# Patient Record
Sex: FEMALE | Race: WHITE | NOT HISPANIC OR LATINO | Employment: FULL TIME | URBAN - METROPOLITAN AREA
[De-identification: names, ages, dates, MRNs, and addresses within clinical notes are randomized per-mention and may not be internally consistent; named-entity substitution may affect disease eponyms.]

---

## 2018-04-22 ENCOUNTER — HOSPITAL ENCOUNTER (EMERGENCY)
Facility: HOSPITAL | Age: 33
Discharge: HOME | End: 2018-04-22
Attending: EMERGENCY MEDICINE
Payer: MEDICAID

## 2018-04-22 VITALS
WEIGHT: 196 LBS | DIASTOLIC BLOOD PRESSURE: 76 MMHG | RESPIRATION RATE: 16 BRPM | OXYGEN SATURATION: 100 % | SYSTOLIC BLOOD PRESSURE: 129 MMHG | TEMPERATURE: 98 F | BODY MASS INDEX: 30.7 KG/M2 | HEART RATE: 94 BPM

## 2018-04-22 DIAGNOSIS — R07.9 CHEST PAIN: ICD-10-CM

## 2018-04-22 DIAGNOSIS — F43.21 GRIEF REACTION: Primary | ICD-10-CM

## 2018-04-22 LAB
ALBUMIN SERPL BCP-MCNC: 4.6 G/DL
ALP SERPL-CCNC: 67 U/L
ALT SERPL W/O P-5'-P-CCNC: 19 U/L
ANION GAP SERPL CALC-SCNC: 15 MMOL/L
AST SERPL-CCNC: 16 U/L
BASOPHILS # BLD AUTO: 0.02 K/UL
BASOPHILS NFR BLD: 0.1 %
BILIRUB SERPL-MCNC: 0.5 MG/DL
BILIRUB UR QL STRIP: NEGATIVE
BNP SERPL-MCNC: 14 PG/ML
BUN SERPL-MCNC: 11 MG/DL
CALCIUM SERPL-MCNC: 10 MG/DL
CHLORIDE SERPL-SCNC: 107 MMOL/L
CLARITY UR: CLEAR
CO2 SERPL-SCNC: 17 MMOL/L
COLOR UR: COLORLESS
CREAT SERPL-MCNC: 0.9 MG/DL
DIFFERENTIAL METHOD: ABNORMAL
EOSINOPHIL # BLD AUTO: 0 K/UL
EOSINOPHIL NFR BLD: 0.1 %
ERYTHROCYTE [DISTWIDTH] IN BLOOD BY AUTOMATED COUNT: 13.7 %
EST. GFR  (AFRICAN AMERICAN): >60 ML/MIN/1.73 M^2
EST. GFR  (NON AFRICAN AMERICAN): >60 ML/MIN/1.73 M^2
GLUCOSE SERPL-MCNC: 110 MG/DL
GLUCOSE UR QL STRIP: NEGATIVE
HCT VFR BLD AUTO: 40.7 %
HGB BLD-MCNC: 13.7 G/DL
HGB UR QL STRIP: NEGATIVE
KETONES UR QL STRIP: NEGATIVE
LEUKOCYTE ESTERASE UR QL STRIP: NEGATIVE
LYMPHOCYTES # BLD AUTO: 1.6 K/UL
LYMPHOCYTES NFR BLD: 11 %
MCH RBC QN AUTO: 29.3 PG
MCHC RBC AUTO-ENTMCNC: 33.7 G/DL
MCV RBC AUTO: 87 FL
MONOCYTES # BLD AUTO: 0.7 K/UL
MONOCYTES NFR BLD: 4.6 %
NEUTROPHILS # BLD AUTO: 12.1 K/UL
NEUTROPHILS NFR BLD: 84.1 %
NITRITE UR QL STRIP: NEGATIVE
PH UR STRIP: 6 [PH] (ref 5–8)
PLATELET # BLD AUTO: 208 K/UL
PMV BLD AUTO: 11.5 FL
POTASSIUM SERPL-SCNC: 3.2 MMOL/L
PROT SERPL-MCNC: 7.9 G/DL
PROT UR QL STRIP: NEGATIVE
RBC # BLD AUTO: 4.67 M/UL
SODIUM SERPL-SCNC: 139 MMOL/L
SP GR UR STRIP: 1 (ref 1–1.03)
TROPONIN I SERPL DL<=0.01 NG/ML-MCNC: <0.006 NG/ML
URN SPEC COLLECT METH UR: ABNORMAL
UROBILINOGEN UR STRIP-ACNC: NEGATIVE EU/DL
WBC # BLD AUTO: 14.45 K/UL

## 2018-04-22 PROCEDURE — 99284 EMERGENCY DEPT VISIT MOD MDM: CPT

## 2018-04-22 PROCEDURE — 84484 ASSAY OF TROPONIN QUANT: CPT

## 2018-04-22 PROCEDURE — 81003 URINALYSIS AUTO W/O SCOPE: CPT

## 2018-04-22 PROCEDURE — 85025 COMPLETE CBC W/AUTO DIFF WBC: CPT

## 2018-04-22 PROCEDURE — 25000003 PHARM REV CODE 250: Performed by: EMERGENCY MEDICINE

## 2018-04-22 PROCEDURE — 80053 COMPREHEN METABOLIC PANEL: CPT

## 2018-04-22 PROCEDURE — 83880 ASSAY OF NATRIURETIC PEPTIDE: CPT

## 2018-04-22 PROCEDURE — 93005 ELECTROCARDIOGRAM TRACING: CPT

## 2018-04-22 PROCEDURE — 87086 URINE CULTURE/COLONY COUNT: CPT

## 2018-04-22 PROCEDURE — 93010 ELECTROCARDIOGRAM REPORT: CPT | Mod: ,,, | Performed by: INTERNAL MEDICINE

## 2018-04-22 RX ORDER — DIAZEPAM 5 MG/1
5 TABLET ORAL
Status: COMPLETED | OUTPATIENT
Start: 2018-04-22 | End: 2018-04-22

## 2018-04-22 RX ORDER — ESCITALOPRAM OXALATE 10 MG/1
10 TABLET ORAL DAILY
COMMUNITY
End: 2020-01-11 | Stop reason: CLARIF

## 2018-04-22 RX ORDER — DIAZEPAM 5 MG/1
5 TABLET ORAL EVERY 12 HOURS PRN
Qty: 4 TABLET | Refills: 0 | Status: SHIPPED | OUTPATIENT
Start: 2018-04-22 | End: 2018-11-06

## 2018-04-22 RX ORDER — POTASSIUM CHLORIDE 20 MEQ/1
20 TABLET, EXTENDED RELEASE ORAL
Status: COMPLETED | OUTPATIENT
Start: 2018-04-22 | End: 2018-04-22

## 2018-04-22 RX ADMIN — DIAZEPAM 5 MG: 5 TABLET ORAL at 06:04

## 2018-04-22 RX ADMIN — SODIUM CHLORIDE 500 ML: 0.9 INJECTION, SOLUTION INTRAVENOUS at 06:04

## 2018-04-22 RX ADMIN — POTASSIUM CHLORIDE 20 MEQ: 1500 TABLET, EXTENDED RELEASE ORAL at 07:04

## 2018-04-22 NOTE — ED TRIAGE NOTES
Patient presents to the ED via personal transportation. Patient reports a sharp, constant chest pain, headache, nausea, and sob that started after the death of family member.

## 2018-04-23 NOTE — DISCHARGE INSTRUCTIONS
Continue  your medications as prescribed.  Talk to your friends and family members about your feelings.  Return to the emergency room if you feel overwhelmed by your grief.  Or if you have persistent chest pain, breathing difficulty or any new or worsening symptoms.  Make an appointment to see your primary physician as soon as possible as well as your gynecologist.

## 2018-04-24 LAB — BACTERIA UR CULT: NORMAL

## 2018-05-30 ENCOUNTER — HOSPITAL ENCOUNTER (EMERGENCY)
Facility: HOSPITAL | Age: 33
Discharge: PSYCHIATRIC OR SUBSTANCE USE FACILITY | End: 2018-05-30
Attending: EMERGENCY MEDICINE
Payer: MEDICAID

## 2018-05-30 VITALS
HEIGHT: 66 IN | TEMPERATURE: 98 F | WEIGHT: 190 LBS | HEART RATE: 124 BPM | OXYGEN SATURATION: 99 % | DIASTOLIC BLOOD PRESSURE: 79 MMHG | RESPIRATION RATE: 18 BRPM | BODY MASS INDEX: 30.53 KG/M2 | SYSTOLIC BLOOD PRESSURE: 132 MMHG

## 2018-05-30 DIAGNOSIS — E87.29 ALCOHOLIC KETOACIDOSIS: ICD-10-CM

## 2018-05-30 DIAGNOSIS — F32.A DEPRESSION, UNSPECIFIED DEPRESSION TYPE: ICD-10-CM

## 2018-05-30 DIAGNOSIS — F29 PSYCHOSIS: ICD-10-CM

## 2018-05-30 DIAGNOSIS — T07.XXXA LACERATION OF MULTIPLE SITES OF SKIN: ICD-10-CM

## 2018-05-30 DIAGNOSIS — F10.929 ALCOHOLIC INTOXICATION WITH COMPLICATION: ICD-10-CM

## 2018-05-30 LAB
ALBUMIN SERPL BCP-MCNC: 4.1 G/DL
ALP SERPL-CCNC: 69 U/L
ALT SERPL W/O P-5'-P-CCNC: 14 U/L
AMPHET+METHAMPHET UR QL: NEGATIVE
ANION GAP SERPL CALC-SCNC: 15 MMOL/L
APAP SERPL-MCNC: <3 UG/ML
AST SERPL-CCNC: 12 U/L
B-HCG UR QL: NEGATIVE
BARBITURATES UR QL SCN>200 NG/ML: NEGATIVE
BASOPHILS # BLD AUTO: 0.03 K/UL
BASOPHILS NFR BLD: 0.2 %
BENZODIAZ UR QL SCN>200 NG/ML: NEGATIVE
BILIRUB SERPL-MCNC: 0.4 MG/DL
BILIRUB UR QL STRIP: NEGATIVE
BUN SERPL-MCNC: 13 MG/DL
BZE UR QL SCN: NEGATIVE
CALCIUM SERPL-MCNC: 9.3 MG/DL
CANNABINOIDS UR QL SCN: NORMAL
CHLORIDE SERPL-SCNC: 110 MMOL/L
CLARITY UR: CLEAR
CO2 SERPL-SCNC: 19 MMOL/L
COLOR UR: YELLOW
CREAT SERPL-MCNC: 0.8 MG/DL
CREAT UR-MCNC: 117.6 MG/DL
CTP QC/QA: YES
DIFFERENTIAL METHOD: ABNORMAL
EOSINOPHIL # BLD AUTO: 0.2 K/UL
EOSINOPHIL NFR BLD: 1.1 %
ERYTHROCYTE [DISTWIDTH] IN BLOOD BY AUTOMATED COUNT: 13.4 %
EST. GFR  (AFRICAN AMERICAN): >60 ML/MIN/1.73 M^2
EST. GFR  (NON AFRICAN AMERICAN): >60 ML/MIN/1.73 M^2
ETHANOL SERPL-MCNC: 97 MG/DL
GLUCOSE SERPL-MCNC: 161 MG/DL
GLUCOSE UR QL STRIP: NEGATIVE
HCT VFR BLD AUTO: 41.6 %
HGB BLD-MCNC: 13.7 G/DL
HGB UR QL STRIP: NEGATIVE
KETONES UR QL STRIP: ABNORMAL
LEUKOCYTE ESTERASE UR QL STRIP: NEGATIVE
LYMPHOCYTES # BLD AUTO: 4.1 K/UL
LYMPHOCYTES NFR BLD: 30.1 %
MCH RBC QN AUTO: 29.5 PG
MCHC RBC AUTO-ENTMCNC: 32.9 G/DL
MCV RBC AUTO: 90 FL
METHADONE UR QL SCN>300 NG/ML: NEGATIVE
MONOCYTES # BLD AUTO: 0.6 K/UL
MONOCYTES NFR BLD: 4.3 %
NEUTROPHILS # BLD AUTO: 8.8 K/UL
NEUTROPHILS NFR BLD: 64.3 %
NITRITE UR QL STRIP: NEGATIVE
OPIATES UR QL SCN: NEGATIVE
PCP UR QL SCN>25 NG/ML: NEGATIVE
PH UR STRIP: 6 [PH] (ref 5–8)
PLATELET # BLD AUTO: 202 K/UL
PMV BLD AUTO: 11 FL
POTASSIUM SERPL-SCNC: 3.2 MMOL/L
PROT SERPL-MCNC: 7.6 G/DL
PROT UR QL STRIP: NEGATIVE
RBC # BLD AUTO: 4.64 M/UL
SALICYLATES SERPL-MCNC: <5 MG/DL
SODIUM SERPL-SCNC: 144 MMOL/L
SP GR UR STRIP: 1.02 (ref 1–1.03)
TOXICOLOGY INFORMATION: NORMAL
TSH SERPL DL<=0.005 MIU/L-ACNC: 1.16 UIU/ML
URN SPEC COLLECT METH UR: ABNORMAL
UROBILINOGEN UR STRIP-ACNC: NEGATIVE EU/DL
WBC # BLD AUTO: 13.69 K/UL

## 2018-05-30 PROCEDURE — 80307 DRUG TEST PRSMV CHEM ANLYZR: CPT

## 2018-05-30 PROCEDURE — 80320 DRUG SCREEN QUANTALCOHOLS: CPT

## 2018-05-30 PROCEDURE — 12005 RPR S/N/A/GEN/TRK12.6-20.0CM: CPT | Mod: RT

## 2018-05-30 PROCEDURE — 25000003 PHARM REV CODE 250: Performed by: EMERGENCY MEDICINE

## 2018-05-30 PROCEDURE — 80053 COMPREHEN METABOLIC PANEL: CPT

## 2018-05-30 PROCEDURE — 93010 ELECTROCARDIOGRAM REPORT: CPT | Mod: ,,, | Performed by: INTERNAL MEDICINE

## 2018-05-30 PROCEDURE — 81025 URINE PREGNANCY TEST: CPT | Performed by: NURSE PRACTITIONER

## 2018-05-30 PROCEDURE — 90471 IMMUNIZATION ADMIN: CPT | Performed by: EMERGENCY MEDICINE

## 2018-05-30 PROCEDURE — 90715 TDAP VACCINE 7 YRS/> IM: CPT | Performed by: EMERGENCY MEDICINE

## 2018-05-30 PROCEDURE — 85025 COMPLETE CBC W/AUTO DIFF WBC: CPT

## 2018-05-30 PROCEDURE — 80329 ANALGESICS NON-OPIOID 1 OR 2: CPT

## 2018-05-30 PROCEDURE — 93005 ELECTROCARDIOGRAM TRACING: CPT

## 2018-05-30 PROCEDURE — 81003 URINALYSIS AUTO W/O SCOPE: CPT | Mod: 59

## 2018-05-30 PROCEDURE — 84443 ASSAY THYROID STIM HORMONE: CPT

## 2018-05-30 PROCEDURE — 12035 INTMD RPR S/A/T/EXT 12.6-20: CPT | Mod: LT

## 2018-05-30 PROCEDURE — 63600175 PHARM REV CODE 636 W HCPCS: Performed by: EMERGENCY MEDICINE

## 2018-05-30 PROCEDURE — 99285 EMERGENCY DEPT VISIT HI MDM: CPT | Mod: 25

## 2018-05-30 RX ORDER — LIDOCAINE HYDROCHLORIDE AND EPINEPHRINE 20; 10 MG/ML; UG/ML
20 INJECTION, SOLUTION INFILTRATION; PERINEURAL
Status: COMPLETED | OUTPATIENT
Start: 2018-05-30 | End: 2018-05-30

## 2018-05-30 RX ORDER — LORAZEPAM 0.5 MG/1
2 TABLET ORAL
Status: COMPLETED | OUTPATIENT
Start: 2018-05-30 | End: 2018-05-30

## 2018-05-30 RX ORDER — LIDOCAINE HYDROCHLORIDE 20 MG/ML
JELLY TOPICAL
Status: DISCONTINUED | OUTPATIENT
Start: 2018-05-30 | End: 2018-05-30

## 2018-05-30 RX ORDER — ACETAMINOPHEN 325 MG/1
650 TABLET ORAL
Status: COMPLETED | OUTPATIENT
Start: 2018-05-30 | End: 2018-05-30

## 2018-05-30 RX ORDER — LIDOCAINE HYDROCHLORIDE 20 MG/ML
5 INJECTION, SOLUTION INFILTRATION; PERINEURAL
Status: COMPLETED | OUTPATIENT
Start: 2018-05-30 | End: 2018-05-30

## 2018-05-30 RX ORDER — IBUPROFEN 400 MG/1
800 TABLET ORAL
Status: COMPLETED | OUTPATIENT
Start: 2018-05-30 | End: 2018-05-30

## 2018-05-30 RX ADMIN — IBUPROFEN 800 MG: 400 TABLET ORAL at 02:05

## 2018-05-30 RX ADMIN — CLOSTRIDIUM TETANI TOXOID ANTIGEN (FORMALDEHYDE INACTIVATED), CORYNEBACTERIUM DIPHTHERIAE TOXOID ANTIGEN (FORMALDEHYDE INACTIVATED), BORDETELLA PERTUSSIS TOXOID ANTIGEN (GLUTARALDEHYDE INACTIVATED), BORDETELLA PERTUSSIS FILAMENTOUS HEMAGGLUTININ ANTIGEN (FORMALDEHYDE INACTIVATED), BORDETELLA PERTUSSIS PERTACTIN ANTIGEN, AND BORDETELLA PERTUSSIS FIMBRIAE 2/3 ANTIGEN 0.5 ML: 5; 2; 2.5; 5; 3; 5 INJECTION, SUSPENSION INTRAMUSCULAR at 02:05

## 2018-05-30 RX ADMIN — LIDOCAINE HYDROCHLORIDE,EPINEPHRINE BITARTRATE 20 ML: 20; .01 INJECTION, SOLUTION INFILTRATION; PERINEURAL at 03:05

## 2018-05-30 RX ADMIN — LIDOCAINE HYDROCHLORIDE 5 ML: 20 INJECTION, SOLUTION INFILTRATION; PERINEURAL at 02:05

## 2018-05-30 RX ADMIN — BACITRACIN ZINC, NEOMYCIN SULFATE, AND POLYMYXIN B SULFATE 1 EACH: 400; 3.5; 5 OINTMENT TOPICAL at 04:05

## 2018-05-30 RX ADMIN — ACETAMINOPHEN 650 MG: 325 TABLET, FILM COATED ORAL at 02:05

## 2018-05-30 RX ADMIN — LORAZEPAM 2 MG: 0.5 TABLET ORAL at 02:05

## 2018-05-30 NOTE — ED TRIAGE NOTES
"Patient here with laceration to left arm wrapped in gauze.  Reports cutting arm with a knife to "release my pain."  Patient states "I am in a lot of pain emotionally and have been going through a lot of things lately.  I didn't mean to cut that deep!  I am not trying to kill myself."  Patient reports not taking her lexapro for "a couple of months."  "

## 2018-05-30 NOTE — ED TRIAGE NOTES
"Pt states "she hurts too much, her sister , she's trying to stay strong for her boyfriend, so she cut herself to relieve some of her pain. She wasn't trying to kill herself and didn't mean to cut that deep".  "

## 2018-05-30 NOTE — ED NOTES
Patient accepted by Deisi at [facility] ([address] [place] [address]) for the service of Dr. Monroy.  Report to be called to [phone].

## 2018-05-30 NOTE — ED PROVIDER NOTES
"Encounter Date: 2018    SCRIBE #1 NOTE: I, Yon Balderrama, am scribing for, and in the presence of,  Fabby Montanez MD. I have scribed the following portions of the note - Other sections scribed: HPI, ROS, PE.       History     Chief Complaint   Patient presents with    Laceration     pt reports that she cut herself just PTA; pt has multiple lacerations to left forearm, sterile gauze applied, bleeding controlled; pt denies trying to kill herself and reports that she just wanted to feel pain but went too deep;     CC: Laceration    32 year old female presents to the ED for evaluation after she cut herself 3 times to her left forearm. She reports acute moderate pain to her forearm and a generalized headache that is 4/10. Pt is very tearful during exam and is wiping away her tears with a bloody towel. Pt reports she is "dealing with a lot as her sister and boyfriend's sister have passed away in the last few months." Pt denies suicidal ideation and reports she "wanted to get rid of the emotional pain." Pt reports she has been drinking tonight.    Last tetanus shot unknown.       Pt is not currently taking any medications. She has a history of cutting herself in her teenage years; she has never been hospitalized for this. Pt reports she began talking to a grief specialist when her sister , but stopped but stopped after her boyfriend's sister .    Pt's sister  earlier this year. Pt's boyfriend's sister Radha committed suicide by hanging herself around two months ago. Pt's boyfriend has been "suicidal" since his sister ." Pt's brother was killed by a drunk  in . Pt's mother lives in [place] with pt's two-year-old brother. Pt lives with her boyfriend of 7 years.    Pt just enrolled in [employer]. Has math class tomorrow. Wants to get into nursing school.    PMH: anxiety, POTS (postural orthostatic tachycardia syndrome)      The history is provided by the patient. No "  was used.     Review of patient's allergies indicates:   Allergen Reactions    Demerol (pf) [meperidine (pf)]      Past Medical History:   Diagnosis Date    Anxiety     Inappropriate sinus tachycardia     POTS (postural orthostatic tachycardia syndrome)     Sepsis      History reviewed. No pertinent surgical history.  Family History   Problem Relation Age of Onset    Hypertension Mother     Diabetes Mother     Hypertension Father     Diabetes Father      Social History   Substance Use Topics    Smoking status: Current Every Day Smoker     Packs/day: 0.50     Types: Cigarettes    Smokeless tobacco: Never Used    Alcohol use Yes      Comment: ocassionally     Review of Systems   Constitutional: Negative for fever.   HENT: Negative for rhinorrhea.    Eyes: Negative for visual disturbance.   Respiratory: Negative for shortness of breath.    Cardiovascular: Negative for chest pain.   Gastrointestinal: Negative for nausea.   Genitourinary: Negative for dysuria.   Musculoskeletal: Negative for back pain and neck stiffness.   Skin: Negative for rash.   Neurological: Negative for weakness.   Hematological: Does not bruise/bleed easily.   Psychiatric/Behavioral: Positive for dysphoric mood and self-injury (3 lacerations to left forearm).       Physical Exam     Initial Vitals [05/30/18 0054]   BP Pulse Resp Temp SpO2   135/67 (!) 140 (!) 22 99.2 °F (37.3 °C) 98 %      MAP       89.67         Physical Exam    Nursing note and vitals reviewed.  Constitutional: She appears well-developed and well-nourished. She is not diaphoretic.   Awake and alert. Tearful. Crying.    HENT:   Head: Normocephalic and atraumatic.   Nose: Nose normal.   Eyes: EOM are normal. Pupils are equal, round, and reactive to light. No scleral icterus.   Neck: Normal range of motion. Neck supple.   Cardiovascular: Regular rhythm, normal heart sounds and intact distal pulses.   Tachycardic, regular.   Pulmonary/Chest: Breath  sounds normal. No respiratory distress. She has no wheezes. She has no rhonchi. She has no rales.   Abdominal: Soft. Normal appearance and bowel sounds are normal. She exhibits no distension. There is no tenderness. There is no rebound and no guarding.   Musculoskeletal: Normal range of motion. She exhibits no edema or tenderness.   FAROM of L hand.   Neurological: She is alert and oriented to person, place, and time. She has normal strength.   Sensation intact to L hand.   Skin: Skin is warm and dry. No rash noted.   3 linear lacerations to left forearm (6 cm, 5 cm, and 4 cm)   Exposes subcutaneous fat but does not expose tendons  Veinous oozing from largest veing  No pulsatile flow   Psychiatric: Her behavior is normal.   Tearful. Appropriate.         ED Course   Lac Repair  Date/Time: 5/30/2018 3:14 AM  Performed by: MARIANA BURNS  Authorized by: MARIANA BURNS   Body area: upper extremity  Location details: left lower arm  Preparation: Patient was prepped and draped in the usual sterile fashion.  Irrigation solution: saline  Tendon closure: 4-0 nylon  Number of sutures: 18  Technique: simple  Approximation: close  Approximation difficulty: simple  Dressing: antibiotic ointment  Patient tolerance: Patient tolerated the procedure well with no immediate complications  Comments: 3 distinct lacerations on palmar left forearm.  1 laceration is 6 cm, 1 laceration is 5 cm, 1 laceration is 4 cm.  The 6cm laceration is deeper and exposes subcutaneous fat; the other two are more shallow and again expose fat. No significant vascular or tendon injury.  Irrigated with 1L NS.  40mL lidocaine 2% with epinephrine used for local anesthesia.  7 sutures placed in 6cm laceration.  6 sutures placed in 5cm laceration.  5 sutures placed in 4cm laceration.  All sutures were simple interrupted. No deep sutures placed.  All sutures were 4-0 nylon.        Labs Reviewed   CBC W/ AUTO DIFFERENTIAL   COMPREHENSIVE METABOLIC PANEL    TSH   URINALYSIS   DRUG SCREEN PANEL, URINE EMERGENCY   ALCOHOL,MEDICAL (ETHANOL)   ACETAMINOPHEN LEVEL   SALICYLATE LEVEL   POCT URINE PREGNANCY     EKG Readings: (Independently Interpreted)   01:57: Sinus tach, . No ectopy. No STEMI.        X-Rays:   Independently Interpreted Readings:   Other Readings:  LUE XR no foreign body    Medical Decision Making:   History:   I obtained history from: someone other than patient.  Old Medical Records: I decided to obtain old medical records.  Old Records Summarized: records from previous admission(s) and records from clinic visits.  Initial Assessment:   This is an emergent evaluation with 32-year-old female presents with self-inflicted injuries to her left upper extremity.  Patient has been depressed recently secondary to multiple deaths in her family.  Differential Diagnosis:   Ddx includes acute psychotic episode, SI/danger to self, HI/danger to others, but also toxidrome, withdrawal (eg from EtOH or BZD therapy), electrolyte derangement, serotonin syndrome, unusual pathology like anti-NMDA receptor encephalitis, other.  Independently Interpreted Test(s):   I have ordered and independently interpreted X-rays - see prior notes.  I have ordered and independently interpreted EKG Reading(s) - see prior notes  Clinical Tests:   Lab Tests: Ordered and Reviewed  Radiological Study: Reviewed and Ordered  Medical Tests: Ordered and Reviewed  ED Management:  No foreign body to LUE XR. No tendon or significant vascular injury to exam. Tdap updated and lacs repaired as noted.     Some labs still pending. Bicarb 19 but suspect alcoholic ketoacidosis. Glucose minimally elevated at 161 but no glucosuria. No KELSEY.    Patient had PO lorazepam for anxiety, and PO APAP, PO ibuprofen for headache.    Patient represents danger to herself based on her degree of injury (needed 18 stitches) despite her statement that she is not suicidal. I have placed her under PEC. She will be medically  cleared when labs return. She is aware of wound care requirements for laceration and will have the sutures removed in 7 days.            Scribe Attestation:   Scribe #1: I performed the above scribed service and the documentation accurately describes the services I performed. I attest to the accuracy of the note.    Attending Attestation:           Physician Attestation for Scribe:  Physician Attestation Statement for Scribe #1: I, Fabby Montanez MD, reviewed documentation, as scribed by Yon Balderrama in my presence, and it is both accurate and complete.                    Clinical Impression:   The primary encounter diagnosis was Self-inflicted injury. Diagnoses of Psychosis, Laceration of multiple sites of skin, Alcoholic intoxication with complication, Alcoholic ketoacidosis, and Depression, unspecified depression type were also pertinent to this visit.                           Fabby Montanez MD  05/30/18 0410

## 2018-05-30 NOTE — ED NOTES
Pec faxed to [facility], [facility], [facility], [facility], [facility].  [facility], [facility], [facility], [facility], [facility], [facility], [facility],  [facility], [facility], [facility], [facility], [facility], [facility], [facility], [facility], [facility], [facility], [facility], [facility], [facility],  [facility], [facility], [facility], [facility], [facility], [facility], [facility], [facility], [facility], [facility], [facility],

## 2018-05-30 NOTE — ED NOTES
Received report from Anna Moreira. Pt sitting in bed. In no acute distress. Will continue to monitor

## 2018-10-09 VITALS
WEIGHT: 191 LBS | RESPIRATION RATE: 16 BRPM | BODY MASS INDEX: 29.98 KG/M2 | DIASTOLIC BLOOD PRESSURE: 87 MMHG | HEIGHT: 67 IN | OXYGEN SATURATION: 100 % | HEART RATE: 113 BPM | SYSTOLIC BLOOD PRESSURE: 138 MMHG | TEMPERATURE: 98 F

## 2018-10-09 LAB
B-HCG UR QL: NEGATIVE
CTP QC/QA: YES

## 2018-10-09 PROCEDURE — 81025 URINE PREGNANCY TEST: CPT | Performed by: PHYSICIAN ASSISTANT

## 2018-10-09 PROCEDURE — 99284 EMERGENCY DEPT VISIT MOD MDM: CPT | Mod: 25

## 2018-10-09 PROCEDURE — 81003 URINALYSIS AUTO W/O SCOPE: CPT

## 2018-10-10 ENCOUNTER — HOSPITAL ENCOUNTER (EMERGENCY)
Facility: HOSPITAL | Age: 33
Discharge: HOME | End: 2018-10-10
Attending: EMERGENCY MEDICINE
Payer: MEDICAID

## 2018-10-10 DIAGNOSIS — H92.01 RIGHT EAR PAIN: ICD-10-CM

## 2018-10-10 DIAGNOSIS — R11.0 NAUSEA: ICD-10-CM

## 2018-10-10 DIAGNOSIS — R51.9 ACUTE NONINTRACTABLE HEADACHE, UNSPECIFIED HEADACHE TYPE: Primary | ICD-10-CM

## 2018-10-10 LAB
BILIRUB UR QL STRIP: NEGATIVE
CLARITY UR: CLEAR
COLOR UR: NORMAL
GLUCOSE UR QL STRIP: NEGATIVE
HGB UR QL STRIP: NEGATIVE
KETONES UR QL STRIP: NEGATIVE
LEUKOCYTE ESTERASE UR QL STRIP: NEGATIVE
NITRITE UR QL STRIP: NEGATIVE
PH UR STRIP: 5 [PH] (ref 5–8)
PROT UR QL STRIP: NEGATIVE
SP GR UR STRIP: 1.01 (ref 1–1.03)
URN SPEC COLLECT METH UR: NORMAL
UROBILINOGEN UR STRIP-ACNC: NEGATIVE EU/DL

## 2018-10-10 PROCEDURE — 96361 HYDRATE IV INFUSION ADD-ON: CPT

## 2018-10-10 PROCEDURE — 96375 TX/PRO/DX INJ NEW DRUG ADDON: CPT

## 2018-10-10 PROCEDURE — 25000003 PHARM REV CODE 250: Performed by: PHYSICIAN ASSISTANT

## 2018-10-10 PROCEDURE — 63600175 PHARM REV CODE 636 W HCPCS: Performed by: PHYSICIAN ASSISTANT

## 2018-10-10 PROCEDURE — 96374 THER/PROPH/DIAG INJ IV PUSH: CPT

## 2018-10-10 RX ORDER — FLUTICASONE PROPIONATE 50 MCG
1 SPRAY, SUSPENSION (ML) NASAL 2 TIMES DAILY PRN
Qty: 15 G | Refills: 0 | Status: SHIPPED | OUTPATIENT
Start: 2018-10-10 | End: 2018-11-09

## 2018-10-10 RX ORDER — PROCHLORPERAZINE EDISYLATE 5 MG/ML
10 INJECTION INTRAMUSCULAR; INTRAVENOUS
Status: DISCONTINUED | OUTPATIENT
Start: 2018-10-10 | End: 2018-10-10

## 2018-10-10 RX ORDER — ONDANSETRON 2 MG/ML
4 INJECTION INTRAMUSCULAR; INTRAVENOUS
Status: COMPLETED | OUTPATIENT
Start: 2018-10-10 | End: 2018-10-10

## 2018-10-10 RX ORDER — ETODOLAC 200 MG/1
200 CAPSULE ORAL 3 TIMES DAILY PRN
Qty: 20 CAPSULE | Refills: 0 | Status: SHIPPED | OUTPATIENT
Start: 2018-10-10 | End: 2018-10-17

## 2018-10-10 RX ORDER — DIPHENHYDRAMINE HCL 25 MG
25 CAPSULE ORAL
Status: DISCONTINUED | OUTPATIENT
Start: 2018-10-10 | End: 2018-10-10

## 2018-10-10 RX ORDER — KETOROLAC TROMETHAMINE 30 MG/ML
15 INJECTION, SOLUTION INTRAMUSCULAR; INTRAVENOUS
Status: COMPLETED | OUTPATIENT
Start: 2018-10-10 | End: 2018-10-10

## 2018-10-10 RX ORDER — ONDANSETRON 4 MG/1
4 TABLET, ORALLY DISINTEGRATING ORAL EVERY 6 HOURS PRN
Qty: 15 TABLET | Refills: 0 | Status: SHIPPED | OUTPATIENT
Start: 2018-10-10 | End: 2018-10-15

## 2018-10-10 RX ADMIN — SODIUM CHLORIDE 1000 ML: 0.9 INJECTION, SOLUTION INTRAVENOUS at 12:10

## 2018-10-10 RX ADMIN — KETOROLAC TROMETHAMINE 15 MG: 30 INJECTION, SOLUTION INTRAMUSCULAR at 01:10

## 2018-10-10 RX ADMIN — ONDANSETRON HYDROCHLORIDE 4 MG: 2 INJECTION INTRAMUSCULAR; INTRAVENOUS at 01:10

## 2018-10-10 NOTE — ED PROVIDER NOTES
"Encounter Date: 10/9/2018       History     Chief Complaint   Patient presents with    Headache     Pt c/o headache x 3 weeks.  Denies relief with pain medication.  Reports taking pain away at 1730; with some relief.       Nausea     CC: Headache     HPI:   33 y/o female with history of depression, anxiety, POTS and family history of brain aneurysms (grandfather's sister in 20s, aunt) and brain tumors presenting for evaluation of acute onset of atraumatic headaches x 3 weeks. Denies history of HAs prior to 3 weeks ago. Pt reports HA initially occipital but now are throbbing and wrapping around her head to her eyes. Pt states progressively worsening in frequency and severity; initially 3-4 HAs in the first week lasting only few minutes, then several times a day last week and every day intermittently this week lasting few to several hours. She states headaches start dull, become more severe and goes away. Initially few minutes lasting now current one 6 hours. HAs are worse with sneezing, yelling, bending forward, laying down.  Pt began to experience nausea yesterday with HAs. Additionally pt states she saw black spots in her vision yesterday during HA that resolved spontaneously. Pt does have history of panic attacks and states she was having palpitations  and bilateral arm tingling (L>R) lasting 1 hour at that time as well. She reports 1 episode of lightheadedness that occurred few minutes yesterday and 1 hour today with her HA. Pt additionally states she has had sharp ear pressure and pain in L ear for a few weeks attempted tx with olive oil. Pt additionally states she has experienced "shocking/jolts of electricity" in head that stun her for a second and goes away occurring several times within past several months. Pt states she stopped taking  Lexapro 2 months ago as she attributed those symptoms to her antidepressants, but has persisted to have the symptoms. Denies SI/HI. Current HA 6/10 in severity.Attempted tx " with excedrin and pain away attempted tx. Denies trauma, personal history of HA, FC, photophobia, phonophobia, weakness, numbness          Review of patient's allergies indicates:   Allergen Reactions    Demerol (pf) [meperidine (pf)]      Past Medical History:   Diagnosis Date    Anxiety     Depression     Inappropriate sinus tachycardia     POTS (postural orthostatic tachycardia syndrome)     Sepsis      History reviewed. No pertinent surgical history.  Family History   Problem Relation Age of Onset    Hypertension Mother     Diabetes Mother     Hypertension Father     Diabetes Father      Social History     Tobacco Use    Smoking status: Current Every Day Smoker     Packs/day: 0.50     Types: Cigarettes    Smokeless tobacco: Never Used   Substance Use Topics    Alcohol use: Yes     Comment: ocassionally    Drug use: No     Review of Systems   Constitutional: Negative for chills and fever.   HENT: Positive for ear pain. Negative for congestion, rhinorrhea and sore throat.    Eyes: Positive for visual disturbance (spots (felt like having panic attack) ). Negative for photophobia.   Respiratory: Negative for cough and shortness of breath.    Cardiovascular: Negative for chest pain.   Gastrointestinal: Positive for nausea. Negative for vomiting.   Genitourinary: Positive for frequency. Negative for dysuria, hematuria and urgency.   Musculoskeletal: Positive for back pain (chronic). Negative for gait problem and neck pain.   Skin: Negative for rash.   Neurological: Positive for light-headedness, numbness and headaches. Negative for dizziness, seizures, syncope, facial asymmetry, speech difficulty and weakness.        (+) tingling      Psychiatric/Behavioral: Negative for confusion.       Physical Exam     Initial Vitals [10/09/18 2254]   BP Pulse Resp Temp SpO2   (!) 157/90 (!) 113 16 98.2 °F (36.8 °C) 100 %      MAP       --         Physical Exam    Nursing note and vitals reviewed.  Constitutional: She  appears well-developed and well-nourished.   HENT:   Head: Normocephalic.   Right Ear: Hearing, external ear and ear canal normal. A middle ear effusion is present.   Left Ear: Hearing, tympanic membrane, external ear and ear canal normal.  No middle ear effusion.   Nose: Nose normal.   Mouth/Throat: Uvula is midline, oropharynx is clear and moist and mucous membranes are normal. No oropharyngeal exudate, posterior oropharyngeal edema or posterior oropharyngeal erythema.   Eyes: Conjunctivae are normal.   Cardiovascular: Normal rate and regular rhythm. Exam reveals no gallop and no friction rub.    No murmur heard.  Pulmonary/Chest: Breath sounds normal. She has no wheezes. She has no rhonchi. She has no rales.   Abdominal: Soft. Bowel sounds are normal. She exhibits no distension. There is no tenderness. There is no rebound, no guarding, no tenderness at McBurney's point and negative Yeboah's sign.   Musculoskeletal: Normal range of motion.   Lymphadenopathy:     She has no cervical adenopathy.   Neurological: She is alert. She has normal strength. No cranial nerve deficit or sensory deficit. Coordination and gait normal.   Skin: Skin is warm and dry.   Psychiatric: She has a normal mood and affect.         ED Course   Procedures  Labs Reviewed   URINALYSIS, REFLEX TO URINE CULTURE    Narrative:     Preferred Collection Type->Urine, Clean Catch   POCT URINE PREGNANCY          Imaging Results          CT Head Without Contrast (Final result)  Result time 10/10/18 01:34:33    Final result by Pramod Desai MD (10/10/18 01:34:33)                 Impression:      No acute intracranial abnormalities      Electronically signed by: Pramod Desai MD  Date:    10/10/2018  Time:    01:34             Narrative:    EXAMINATION:  CT HEAD WITHOUT CONTRAST    CLINICAL HISTORY:  headache;    TECHNIQUE:  Low dose axial images were obtained through the head.  Coronal and sagittal reformations were also performed. Contrast was  not administered.    COMPARISON:  November 1, 2010.    FINDINGS:  The brain parenchyma appears normal for age with good corticomedullary differentiation.  There is no evidence of acute infarct, hemorrhage, or mass.  The ventricular system is normal in size.  No mass-effect or midline shift.  There are no abnormal extra-axial fluid collections.  The paranasal sinuses and mastoid air cells are clear.  The calvarium appears intact.  .                                 Medical Decision Making:   Initial Assessment:   34 -year-old female with history of anxiety, depression, POTS family history of brain tumors and brain aneurysms presenting for evaluation of atraumatic headache x3 weeks is worsening in severity and duration.  Denies history of HAs. Patient is afebrile, well appearing in no distress.  Exam as above.  No focal neurologic deficits.  No meningeal signs. Patient is tachycardic, however, may be due to anxiety or POTS.  CT head is negative for intracranial mass or bleed.  IV fluids, Toradol and Zofran given in the ED with  moderate improvement of symptoms.  Patient is ready seen her primary care for current complaint was prescribed Fioricet which she has not picked up yet.  Instructed patient to begin taking this as needed, but may cause drowsiness will prescribe Lodine for 1 patient has to be a work or drive.  Zofran for nausea.  Flonase for middle ear effusion.Will have patient follow up with Neurology for further evaluation management.  Her headaches sound more consistent with tension headaches.  However, given new onset of headaches and family history of brain aneurysms, will have her follow up outpatient for further management.  Return to the ER for worsening symptoms or as needed.                      Clinical Impression:   The primary encounter diagnosis was Acute nonintractable headache, unspecified headache type. Diagnoses of Nausea and Right ear pain were also pertinent to this visit.                              Madison Wagner PA-C  10/10/18 0305

## 2018-10-10 NOTE — ED TRIAGE NOTES
Patient arrived to ED with c/o frontal headache intermittently x 3 weeks with nausea.  No acute distress noted.  Denies blurred vision, dizziness, or fever.

## 2018-10-10 NOTE — DISCHARGE INSTRUCTIONS
Take Lodine for headache and Fioricet as previously prescribed. Zofran for nausea. Flonase for fluid in ear.     Follow up with primary care and Neurology for further evaluation and management in 2 days. Return to ER for worsening symptoms or as needed.

## 2018-11-06 ENCOUNTER — OFFICE VISIT (OUTPATIENT)
Dept: OBSTETRICS AND GYNECOLOGY | Facility: CLINIC | Age: 33
End: 2018-11-06
Payer: MEDICAID

## 2018-11-06 VITALS
WEIGHT: 196.75 LBS | DIASTOLIC BLOOD PRESSURE: 58 MMHG | SYSTOLIC BLOOD PRESSURE: 124 MMHG | BODY MASS INDEX: 31.62 KG/M2 | HEIGHT: 66 IN

## 2018-11-06 DIAGNOSIS — N89.8 VAGINAL DRYNESS: Primary | ICD-10-CM

## 2018-11-06 PROCEDURE — 99999 PR PBB SHADOW E&M-EST. PATIENT-LVL II: CPT | Mod: PBBFAC,,, | Performed by: OBSTETRICS & GYNECOLOGY

## 2018-11-06 PROCEDURE — 99203 OFFICE O/P NEW LOW 30 MIN: CPT | Mod: S$PBB,,, | Performed by: OBSTETRICS & GYNECOLOGY

## 2018-11-06 PROCEDURE — 99212 OFFICE O/P EST SF 10 MIN: CPT | Mod: PBBFAC | Performed by: OBSTETRICS & GYNECOLOGY

## 2018-11-06 RX ORDER — ESTRADIOL 0.1 MG/G
1 CREAM VAGINAL DAILY
Qty: 42.5 G | Refills: 1 | Status: ON HOLD | OUTPATIENT
Start: 2018-11-06 | End: 2020-07-15 | Stop reason: HOSPADM

## 2018-11-06 RX ORDER — LOVASTATIN 10 MG/1
TABLET ORAL
COMMUNITY
End: 2020-01-11 | Stop reason: CLARIF

## 2018-11-06 RX ORDER — CHOLECALCIFEROL (VITAMIN D3) 25 MCG
TABLET ORAL
COMMUNITY
End: 2020-01-11 | Stop reason: CLARIF

## 2018-11-06 NOTE — PATIENT INSTRUCTIONS
Sex and Aging: Talking About Sex    Judging by magazines and TV, doesnt it seem that only young people have sex? Well, you know better. As you grow older, your sex life may change. But that doesnt mean it has to end.   Why talk about sex?  Talking with your partner can improve your relationship and your sex life. And discussing your sexual activity with your health care provider is the only way to get treatment for medical problems that may affect your sex life. Talking about sex may feel awkward. Try writing down questions or concerns you have. This can help you get a discussion started.  Your health can affect your sex life  Age can sometimes bring health problems. And conditions such as heart disease, diabetes, menopause, depression, arthritis, and high blood pressure can cause changes that affect your sex life. Certain medications can affect sex, too. Be open and honest with your health care provider about any problems youre having.  Your health care provider may be able to help if you:  · Have pain during sex  · Have vaginal dryness  · Cant have an erection  · Cant have an orgasm  · Have developed a sexual problem after starting to take a new medication  · Have a physical problem that prevents you from enjoying sex  · Have no interest in sex  There are many possible treatments for these medical causes. These include lubricants or estrogen for vaginal dryness, the most common cause of sexual problems in older women. Other options are medications for erectile dysfunction, the most common cause in men. Medications or counseling can also help with lack of sexual desire or other concerns about sex.  Talking with your partner  If you have a concern about your sex life, it affects your partner, too. So you need to talk about it. Just getting a problem out in the open can go a long way toward solving it.  You may want to talk about:  · What you do and dont enjoy  · How to work around a physical problem  · Ways  to be intimate other than sex  · Whether to get medical care for a problem  · How condoms can protect you and your partner from STDs  Getting back into the dating game  Its not uncommon these days for older adults to find themselves single again. If the last time you were single was a long time ago, you may wonder if the rules have changed. Its true that some things have changed. When you were young, you may not have thought much about STDs, including HIV/AIDS. These days a sexually active person needs to learn about STDs, and know how to avoid getting them. This includes using a condom if you have more than 1 sexual partner.  But some things havent changed. As always, you decide what you will and wont do. This includes deciding whether to have sex and if so, under what conditions.  Date Last Reviewed: 5/31/2015  © 6377-4466 [employer]. [address] [place] [address], [place] [address]. All rights reserved. This information is not intended as a substitute for professional medical care. Always follow your healthcare professional's instructions.

## 2018-11-06 NOTE — PROGRESS NOTES
History & Physical  Gynecology      SUBJECTIVE:     Chief Complaint: Consult (always dry when having sex )       History of Present Illness:  Ms. Barney is a 31 y/o female who presents to clinic c/o vaginal dryness during intercourse. She reports that she has no libido since Mirena placement 3 years ago. She reports thatt she experiences the vaginal dryness even with Replens. She reports the vaginal dryness with sex only. She reports that she has been amenorrheic for one year with mirena in place. She reports that she is thinking about removing her Mirena as she does not want to be on birth control but she does not want to get pregnant either so she would like to take some time to think about it.      Review of patient's allergies indicates:   Allergen Reactions    Demerol (pf) [meperidine (pf)]        Past Medical History:   Diagnosis Date    Anxiety     Depression     Inappropriate sinus tachycardia     POTS (postural orthostatic tachycardia syndrome)     Sepsis      History reviewed. No pertinent surgical history.  OB History      Para Term  AB Living    6 3 2 1 3 3    SAB TAB Ectopic Multiple Live Births                     Family History   Problem Relation Age of Onset    Hypertension Mother     Diabetes Mother     Hypertension Father     Diabetes Father      Social History     Tobacco Use    Smoking status: Current Every Day Smoker     Packs/day: 0.50     Types: Cigarettes    Smokeless tobacco: Never Used   Substance Use Topics    Alcohol use: Yes     Frequency: Monthly or less     Comment: ocassionally    Drug use: No       Current Outpatient Medications   Medication Sig    vitamin D (VITAMIN D3) 1000 units Tab Vitamin D3 1,000 unit tablet   Take 1 tablet every day by oral route.    escitalopram oxalate (LEXAPRO) 10 MG tablet Take 10 mg by mouth once daily.    estradiol (ESTRACE) 0.01 % (0.1 mg/gram) vaginal cream Place 1 g vaginally once daily.    fluticasone (FLONASE) 50  mcg/actuation nasal spray 1 spray (50 mcg total) by Each Nare route 2 (two) times daily as needed for Rhinitis.    lovastatin (MEVACOR) 10 MG tablet lovastatin 10 mg tablet   Take 1 tablet every day by oral route for 30 days.    sertraline (ZOLOFT) 50 MG tablet Take 50 mg by mouth once daily.     No current facility-administered medications for this visit.      Review of Systems:  Review of Systems   Constitutional: Negative for chills and fever.   Respiratory: Negative for cough.    Cardiovascular: Negative for chest pain and palpitations.   Gastrointestinal: Negative for abdominal pain, diarrhea and nausea.   Genitourinary: Positive for decreased libido. Negative for dysuria, frequency, hematuria, pelvic pain, vaginal bleeding and vaginal discharge.        Vaginal dryness        OBJECTIVE:     Physical Exam:  Physical Exam   Constitutional: She is oriented to person, place, and time. She appears well-developed and well-nourished. No distress.   Cardiovascular: Normal rate.   Pulmonary/Chest: Effort normal. No respiratory distress.   Neurological: She is alert and oriented to person, place, and time.   Skin: Skin is warm and dry.   Nursing note and vitals reviewed.      ASSESSMENT:       ICD-10-CM ICD-9-CM    1. Vaginal dryness N89.8 625.8 estradiol (ESTRACE) 0.01 % (0.1 mg/gram) vaginal cream     Plan:      Snow was seen today for consult.    Diagnoses and all orders for this visit:    Vaginal dryness  -     estradiol (ESTRACE) 0.01 % (0.1 mg/gram) vaginal cream; Place 1 g vaginally once daily.  -  Declines systemic estrogen  - Declines personal and familal h/o of breast and uterine cancer and blood clots  - Considering having mirena removed  - Discussed progesterone only birth control causing hypoestrogenic states. She understands that the estrogen cream may not increase libido but may help with vaginal dryness  - return to clinic in 3 months to follow up       No orders of the defined types were placed in  this encounter.      Follow-up in about 3 months (around 2/6/2019), or if symptoms worsen or fail to improve.    Counseling time: 15 minutes    Glenys Jolley

## 2018-11-07 ENCOUNTER — TELEPHONE (OUTPATIENT)
Dept: OBSTETRICS AND GYNECOLOGY | Facility: CLINIC | Age: 33
End: 2018-11-07

## 2018-11-07 NOTE — TELEPHONE ENCOUNTER
Attempted to call pt but number provided was for [employer].  No message left. Will try calling again tomorrow. jtn      ----- Message from Alondra Ohara sent at 11/7/2018  3:24 PM CST -----  Contact: Self  Please call patient concerning listed medication [phone]            estradiol (ESTRACE) 0.01 % (0.1 mg/gram) vaginal cream

## 2018-11-09 ENCOUNTER — TELEPHONE (OUTPATIENT)
Dept: OBSTETRICS AND GYNECOLOGY | Facility: CLINIC | Age: 33
End: 2018-11-09

## 2018-11-09 NOTE — TELEPHONE ENCOUNTER
11/9/18 @ 1234 (PRIMITIVO)   SPOKE WITH [facility] PHARMACIST SHE STATED THAT THE PT'S INSURANCE  MEDICAID/ [facility], WILL NOT COVER THIS MEDICATION AT ALL , WILL NOT ACCEPT P.A EITHER.  PHARMACIST ASKED DOES THE DOCTOR WANT TO ORDER SOMETHING ELSE.???  MESSAGE SENT TO DR TURNER.     ---------------------------  3005 (PRIMITIVO)  SPOKE WITH MS DIANA, INFORMED HER I WILL CONTACT DR TURNER , TO SEE WHAT OTHER MEDICATION SHE CAN ORDER FOR HER SINCE THE INSURANCE TURNED DOWN THE ESTRACE CREAM    ----- Message from Sammi Hicks sent at 11/9/2018 11:43 AM CST -----  Contact: Self/ [phone]  Pt requesting call from staff regarding RX for [estradiol (ESTRACE) 0.01 % (0.1 mg/gram) vaginal cream]. Please call to advise. Thank you.

## 2018-11-13 ENCOUNTER — TELEPHONE (OUTPATIENT)
Dept: OBSTETRICS AND GYNECOLOGY | Facility: CLINIC | Age: 33
End: 2018-11-13

## 2018-11-13 NOTE — TELEPHONE ENCOUNTER
Spoke with pharmacist rx estradiol is too expensive and is not covered by her insurance. Pharmacy informed I would route a message to Dr. Rueda and see if he wants to change rx to something different.

## 2018-11-13 NOTE — TELEPHONE ENCOUNTER
----- Message from Florentino Gonzalez sent at 11/13/2018 11:53 AM CST -----  Contact: Nicole/[facility]/[phone]  Nicole called to follow up on a previous message regarding patient's medication:  estradiol (ESTRACE) 0.01 % (0.1 mg/gram) vaginal cream. Thank you.

## 2018-12-17 ENCOUNTER — TELEPHONE (OUTPATIENT)
Dept: OBSTETRICS AND GYNECOLOGY | Facility: CLINIC | Age: 33
End: 2018-12-17

## 2018-12-17 NOTE — TELEPHONE ENCOUNTER
12/17/18 @ 1531 (LAS)  INCOMING CALL FROM [facility], UNABLE TO RETURNE CALL TO POLINA @ [facility] , TO CLARIFY WHAT HER REQUEST IS FOR. ALL SHE LEFT WAS A FAX # , NO PHONE #      ----- Message from Polina Rizvi sent at 12/17/2018 10:51 AM CST -----  Contact: Polina young/ [facility] [phone]  Requesting office not on this pt. Pls fax to [phone]. Thanks......Deana

## 2018-12-28 NOTE — ED PROVIDER NOTES
Encounter Date: 4/22/2018    SCRIBE #1 NOTE: I, Jhoan Castaneda, am scribing for, and in the presence of,  Piotr Schaefer MD. I have scribed the following portions of the note - Other sections scribed: HPI, ROS, PE.       History     Chief Complaint   Patient presents with    Chest Pain     chest pain and pressure to head, sister in law just passed away;  reports hx of inappropriate sinus tachycardia and states she supposed to be on antidepressant to help control heart rate and anxiety but not taking it     CC: Chest Pain    HPI: This 32 y.o. Female with anxiety, inappropriate sinus tachycardia and history of sepsis presents to the ED c/o weakness, SOB, headache, chest pain and anxiety which began today after the passing of a family member. Pt has not taken any meds to relieve her symptoms. She is supposed to be on antidepressant and anxiety meds but does not comply. Pt denies fever, chills, back pain, abdominal pain or diarrhea.      The history is provided by the patient. No  was used.     Review of patient's allergies indicates:   Allergen Reactions    Demerol (pf) [meperidine (pf)]      Past Medical History:   Diagnosis Date    Anxiety     Inappropriate sinus tachycardia     POTS (postural orthostatic tachycardia syndrome)     Sepsis      History reviewed. No pertinent surgical history.  Family History   Problem Relation Age of Onset    Hypertension Mother     Diabetes Mother     Hypertension Father     Diabetes Father      Social History   Substance Use Topics    Smoking status: Current Some Day Smoker     Types: Cigarettes    Smokeless tobacco: Never Used    Alcohol use Yes      Comment: ocassionally     Review of Systems   Constitutional: Negative for chills and fever.   HENT: Negative for ear pain, rhinorrhea and sore throat.    Eyes: Negative for redness.   Respiratory: Positive for shortness of breath.    Cardiovascular: Positive for chest pain.   Gastrointestinal: Negative  for abdominal pain, diarrhea, nausea and vomiting.   Genitourinary: Negative for dysuria and hematuria.   Musculoskeletal: Negative for back pain and neck pain.   Skin: Negative for rash.   Neurological: Positive for weakness and headaches. Negative for numbness.   Hematological: Does not bruise/bleed easily.   Psychiatric/Behavioral: The patient is nervous/anxious.        Physical Exam     Initial Vitals [04/22/18 1808]   BP Pulse Resp Temp SpO2   (!) 162/81 (!) 133 20 98.2 °F (36.8 °C) 98 %      MAP       108         Physical Exam    Nursing note and vitals reviewed.  Constitutional: She appears well-developed and well-nourished. She is cooperative.  Non-toxic appearance. No distress.   HENT:   Head: Normocephalic and atraumatic.   Mouth/Throat: Oropharynx is clear and moist.   Eyes: Conjunctivae and EOM are normal. Pupils are equal, round, and reactive to light. No scleral icterus.   Neck: Normal range of motion and full passive range of motion without pain. Neck supple. No thyromegaly present. No JVD present.   Cardiovascular: Normal rate, regular rhythm, normal heart sounds and normal pulses.   Pulmonary/Chest: Effort normal and breath sounds normal. No respiratory distress.   Abdominal: Soft. Normal appearance and bowel sounds are normal. She exhibits no distension and no mass. There is no tenderness.   Musculoskeletal: Normal range of motion. She exhibits no edema or tenderness.   Neurological: She is alert and oriented to person, place, and time. She has normal strength. No cranial nerve deficit or sensory deficit.   Skin: Skin is warm, dry and intact. No rash noted.   Psychiatric: Her speech is normal. Her mood appears anxious.   tearful         ED Course   Procedures  Labs Reviewed   CBC W/ AUTO DIFFERENTIAL - Abnormal; Notable for the following:        Result Value    WBC 14.45 (*)     Gran # (ANC) 12.1 (*)     Gran% 84.1 (*)     Lymph% 11.0 (*)     All other components within normal limits    COMPREHENSIVE METABOLIC PANEL - Abnormal; Notable for the following:     Potassium 3.2 (*)     CO2 17 (*)     All other components within normal limits   URINALYSIS - Abnormal; Notable for the following:     Color, UA Colorless (*)     All other components within normal limits   CULTURE, URINE   TROPONIN I   B-TYPE NATRIURETIC PEPTIDE     EKG Readings: (Independently Interpreted)   Initial Reading: No STEMI. Rhythm: Sinus Tachycardia. Heart Rate: 134. Ectopy: No Ectopy. Conduction: Normal. ST Segments: Non-Specific ST Segment Depression.          Medical Decision Making:   Differential Diagnosis:   Anxiety  Stress reaction  Grief reaction  ACS  Independently Interpreted Test(s):   I have ordered and independently interpreted EKG Reading(s) - see prior notes  Clinical Tests:   Lab Tests: Ordered and Reviewed  Medical Tests: Ordered and Reviewed  ED Management:  Patient tearful and emotional time of history and physical.  EKG without definite acute ischemic changes.  Lungs are clear to auscultation.  Patient given IV hydration and by mouth Valium with significant improvement in symptoms.  Patient reports RESOLUTION of shortness of breath and of chest pain.  Labs reviewed and significant for a potassium of 3.2.  Troponin is negative.  White count is elevated at 14.45.  Patient denies recent fever or productive cough.  Patient does report recently taking antibiotics for a urinary tract infection.  UA checked and not indicative of infection.  She reports having close follow-up with her primary care physician as well as her gynecologist.  I have a low clinical suspicion of ACS in this patient with negative troponin and who has had resolution of her symptoms.  I have a low clinical suspicion of PE in this patient given her lack of hypoxia and resolution of symptoms symptoms with Valium.  Patient's symptoms most likely due to stress and grief reaction the setting of al oss of a family member immediately prior to symptom  onset.  Patient wishes to be discharged.  Patient and family member counseled extensively on concerning symptoms for which return to emergency room and the importance of close follow-up.  Patient instructed to call 911 or contact the emergency room or the crisis help line should she feel helpless or hopeless or action is to harm herself.  Patient denies feeling suicidal or homicidal at this time.  She is hemodynamically stable and fit for discharge to follow-up with her primary physician and gynecologist.            Scribe Attestation:   Scribe #1: I performed the above scribed service and the documentation accurately describes the services I performed. I attest to the accuracy of the note.    Attending Attestation:           Physician Attestation for Scribe:  Physician Attestation Statement for Scribe #1: I, Piotr Schaefer MD, reviewed documentation, as scribed by Jhoan Castaneda in my presence, and it is both accurate and complete.                    Clinical Impression:   The primary encounter diagnosis was Grief reaction. A diagnosis of Chest pain was also pertinent to this visit.    Disposition:   Disposition: Discharged  Condition: Stable                        Piotr Schaefer MD  04/22/18 9433     - - -

## 2019-06-01 ENCOUNTER — HOSPITAL ENCOUNTER (EMERGENCY)
Facility: HOSPITAL | Age: 34
Discharge: HOME | End: 2019-06-01
Attending: EMERGENCY MEDICINE
Payer: MEDICAID

## 2019-06-01 VITALS
SYSTOLIC BLOOD PRESSURE: 106 MMHG | BODY MASS INDEX: 31.5 KG/M2 | HEART RATE: 74 BPM | HEIGHT: 66 IN | TEMPERATURE: 99 F | RESPIRATION RATE: 18 BRPM | DIASTOLIC BLOOD PRESSURE: 71 MMHG | WEIGHT: 196 LBS | OXYGEN SATURATION: 99 %

## 2019-06-01 DIAGNOSIS — R53.83 FATIGUE, UNSPECIFIED TYPE: ICD-10-CM

## 2019-06-01 DIAGNOSIS — R06.02 SHORTNESS OF BREATH: ICD-10-CM

## 2019-06-01 DIAGNOSIS — Z87.898 HISTORY OF PALPITATIONS: ICD-10-CM

## 2019-06-01 DIAGNOSIS — R11.0 NAUSEA: ICD-10-CM

## 2019-06-01 DIAGNOSIS — R07.9 CHEST PAIN: Primary | ICD-10-CM

## 2019-06-01 DIAGNOSIS — R51.9 ACUTE NONINTRACTABLE HEADACHE, UNSPECIFIED HEADACHE TYPE: ICD-10-CM

## 2019-06-01 LAB
ALBUMIN SERPL BCP-MCNC: 3.7 G/DL (ref 3.5–5.2)
ALP SERPL-CCNC: 49 U/L (ref 55–135)
ALT SERPL W/O P-5'-P-CCNC: 19 U/L (ref 10–44)
ANION GAP SERPL CALC-SCNC: 9 MMOL/L (ref 8–16)
AST SERPL-CCNC: 11 U/L (ref 10–40)
B-HCG UR QL: NEGATIVE
BASOPHILS # BLD AUTO: 0.04 K/UL (ref 0–0.2)
BASOPHILS NFR BLD: 0.5 % (ref 0–1.9)
BILIRUB SERPL-MCNC: 0.4 MG/DL (ref 0.1–1)
BNP SERPL-MCNC: <10 PG/ML (ref 0–99)
BUN SERPL-MCNC: 14 MG/DL (ref 6–20)
CALCIUM SERPL-MCNC: 8.5 MG/DL (ref 8.7–10.5)
CHLORIDE SERPL-SCNC: 111 MMOL/L (ref 95–110)
CO2 SERPL-SCNC: 21 MMOL/L (ref 23–29)
CREAT SERPL-MCNC: 0.7 MG/DL (ref 0.5–1.4)
CTP QC/QA: YES
D DIMER PPP IA.FEU-MCNC: <0.19 MG/L FEU
DIFFERENTIAL METHOD: ABNORMAL
EOSINOPHIL # BLD AUTO: 0.1 K/UL (ref 0–0.5)
EOSINOPHIL NFR BLD: 1.6 % (ref 0–8)
ERYTHROCYTE [DISTWIDTH] IN BLOOD BY AUTOMATED COUNT: 13.5 % (ref 11.5–14.5)
EST. GFR  (AFRICAN AMERICAN): >60 ML/MIN/1.73 M^2
EST. GFR  (NON AFRICAN AMERICAN): >60 ML/MIN/1.73 M^2
GLUCOSE SERPL-MCNC: 100 MG/DL (ref 70–110)
HCT VFR BLD AUTO: 38.4 % (ref 37–48.5)
HGB BLD-MCNC: 12.2 G/DL (ref 12–16)
LYMPHOCYTES # BLD AUTO: 1.8 K/UL (ref 1–4.8)
LYMPHOCYTES NFR BLD: 22.9 % (ref 18–48)
MCH RBC QN AUTO: 29.3 PG (ref 27–31)
MCHC RBC AUTO-ENTMCNC: 31.8 G/DL (ref 32–36)
MCV RBC AUTO: 92 FL (ref 82–98)
MONOCYTES # BLD AUTO: 0.3 K/UL (ref 0.3–1)
MONOCYTES NFR BLD: 4.2 % (ref 4–15)
NEUTROPHILS # BLD AUTO: 5.6 K/UL (ref 1.8–7.7)
NEUTROPHILS NFR BLD: 70.8 % (ref 38–73)
PLATELET # BLD AUTO: 160 K/UL (ref 150–350)
PMV BLD AUTO: 10.8 FL (ref 9.2–12.9)
POTASSIUM SERPL-SCNC: 3 MMOL/L (ref 3.5–5.1)
PROT SERPL-MCNC: 6.2 G/DL (ref 6–8.4)
RBC # BLD AUTO: 4.17 M/UL (ref 4–5.4)
SODIUM SERPL-SCNC: 141 MMOL/L (ref 136–145)
TROPONIN I SERPL DL<=0.01 NG/ML-MCNC: <0.006 NG/ML (ref 0–0.03)
WBC # BLD AUTO: 7.89 K/UL (ref 3.9–12.7)

## 2019-06-01 PROCEDURE — 83880 ASSAY OF NATRIURETIC PEPTIDE: CPT

## 2019-06-01 PROCEDURE — 93005 ELECTROCARDIOGRAM TRACING: CPT

## 2019-06-01 PROCEDURE — 93010 EKG 12-LEAD: ICD-10-PCS | Mod: ,,, | Performed by: INTERNAL MEDICINE

## 2019-06-01 PROCEDURE — 99285 EMERGENCY DEPT VISIT HI MDM: CPT | Mod: 25

## 2019-06-01 PROCEDURE — 85025 COMPLETE CBC W/AUTO DIFF WBC: CPT

## 2019-06-01 PROCEDURE — 84484 ASSAY OF TROPONIN QUANT: CPT

## 2019-06-01 PROCEDURE — 81025 URINE PREGNANCY TEST: CPT | Performed by: EMERGENCY MEDICINE

## 2019-06-01 PROCEDURE — 36000 PLACE NEEDLE IN VEIN: CPT

## 2019-06-01 PROCEDURE — 85379 FIBRIN DEGRADATION QUANT: CPT

## 2019-06-01 PROCEDURE — 93010 ELECTROCARDIOGRAM REPORT: CPT | Mod: ,,, | Performed by: INTERNAL MEDICINE

## 2019-06-01 PROCEDURE — 80053 COMPREHEN METABOLIC PANEL: CPT

## 2019-06-01 RX ORDER — PROPRANOLOL HYDROCHLORIDE 10 MG/1
10 TABLET ORAL 3 TIMES DAILY
COMMUNITY
End: 2020-01-11 | Stop reason: CLARIF

## 2019-06-01 NOTE — ED PROVIDER NOTES
"Encounter Date: 6/1/2019       History     Chief Complaint   Patient presents with    Chest Pain     since 2330, hx of SVT, reports feeling CP and SOB, pt in NSR with EMS, given 4mg zofran with EMS for nausea, and 325 asa with EMS      This is an emergent evaluation of a 33 y.o. Female with inappropriate sinus tachycardia and POTS who presents via EMS with chest pain. Patient reports that her cardiologist at [facility] started her on propranolol 10mg PO BID about 2 days ago. She took the first dose of the medication in the afternoon 2 days ago (Thursday 5/30/19). Several hours later, she felt nauseated and had a headache, "like a migraine." Patient took another dose of the medication yesterday morning (Friday 5/31/19) and again several hours later she felt nauseated, fatigued, and had chest tightness and left arm numbness. Patient works as  and also felt lightheaded at work. Patient did not take another dose of medication yesterday or today. (She has only had the two doses of propranolol.) She notes that she did not have any palpitations for the 2 days she took propranolol; prior to that, she had been having daily palpitations.     Patient has one 6-year-old child who is "snotty." She lives with her child and her boyfriend. She has just been accepted to [employer] school.    PMH: POTS, inappropriate sinus tachycardia, sepsis    Psych: depression, anxiety        Review of patient's allergies indicates:   Allergen Reactions    Demerol (pf) [meperidine (pf)]      Past Medical History:   Diagnosis Date    Anxiety     Depression     Inappropriate sinus tachycardia     POTS (postural orthostatic tachycardia syndrome)     Sepsis      History reviewed. No pertinent surgical history.  Family History   Problem Relation Age of Onset    Hypertension Mother     Diabetes Mother     Hypertension Father     Diabetes Father      Social History     Tobacco Use    Smoking status: Current Every Day Smoker     " Packs/day: 0.50     Types: Cigarettes    Smokeless tobacco: Never Used   Substance Use Topics    Alcohol use: Yes     Frequency: Monthly or less     Comment: ocassionally    Drug use: No     Review of Systems   Constitutional: Negative for diaphoresis and fever.   HENT: Negative for sore throat.    Eyes: Negative for visual disturbance.   Respiratory: Positive for chest tightness and shortness of breath.    Cardiovascular: Positive for chest pain. Negative for leg swelling.   Gastrointestinal: Positive for nausea. Negative for vomiting.   Genitourinary: Negative for dysuria.   Musculoskeletal: Negative for back pain and neck pain.   Skin: Negative for rash.   Neurological: Positive for light-headedness. Negative for syncope.       Physical Exam     Initial Vitals [06/01/19 0133]   BP Pulse Resp Temp SpO2   (!) 150/102 108 16 98 °F (36.7 °C) 100 %      MAP       --         Physical Exam    Nursing note and vitals reviewed.  Constitutional: She appears well-developed and well-nourished. She is not diaphoretic.   Awake, alert, nontoxic.   HENT:   Head: Normocephalic and atraumatic.   Mouth/Throat: Oropharynx is clear and moist.   Eyes: Conjunctivae and EOM are normal. Pupils are equal, round, and reactive to light.   Neck: Normal range of motion. Neck supple.   Cardiovascular: Normal rate, regular rhythm, normal heart sounds and intact distal pulses.   No murmur heard.  Pulmonary/Chest: Breath sounds normal. No respiratory distress. She has no wheezes. She has no rhonchi. She has no rales.   Abdominal: Soft. Bowel sounds are normal. She exhibits no distension. There is no tenderness. There is no rebound and no guarding.   Musculoskeletal: Normal range of motion. She exhibits no edema or tenderness.   Neurological: She is alert and oriented to person, place, and time. She has normal strength.   Moving all extremities.   Skin: Skin is warm and dry. No erythema. No pallor.   Psychiatric: She has a normal mood and  "affect.         ED Course   Procedures  Labs Reviewed   CBC W/ AUTO DIFFERENTIAL - Abnormal; Notable for the following components:       Result Value    Mean Corpuscular Hemoglobin Conc 31.8 (*)     All other components within normal limits   COMPREHENSIVE METABOLIC PANEL - Abnormal; Notable for the following components:    Potassium 3.0 (*)     Chloride 111 (*)     CO2 21 (*)     Calcium 8.5 (*)     Alkaline Phosphatase 49 (*)     All other components within normal limits   B-TYPE NATRIURETIC PEPTIDE   TROPONIN I   D DIMER, QUANTITATIVE   POCT URINE PREGNANCY     EKG Readings: (Independently Interpreted)   01:41: NSR, HR 96. Normal axis. Q waves in II, III, aVF, V6. No STEMI.      ECG Results          EKG 12-lead (Final result)  Result time 06/01/19 23:03:29    Final result by Interface, Lab In [place] (06/01/19 23:03:29)                 Narrative:    Test Reason : R07.9,    Vent. Rate : 096 BPM     Atrial Rate : 096 BPM     P-R Int : 158 ms          QRS Dur : 086 ms      QT Int : 350 ms       P-R-T Axes : 070 077 048 degrees     QTc Int : 442 ms    Normal sinus rhythm  Normal ECG  When compared with ECG of 30-MAY-2018 01:57,  No significant change was found  Confirmed by Mike HENRIQUEZ, Parrish WARE ([id number]) on 6/1/2019 11:03:24 PM    Referred By: [email or web address]   SELF           Confirmed By:Parrish Mckeon MD                            Imaging Results          X-Ray Chest AP Portable (Final result)  Result time 06/01/19 02:42:05    Final result by Rishabh Ortega MD (06/01/19 02:42:05)                 Impression:      No acute cardiopulmonary finding identified on this single view.      Electronically signed by: Rishabh Ortega MD  Date:    06/01/2019  Time:    02:42             Narrative:    EXAMINATION:  XR CHEST AP PORTABLE    CLINICAL HISTORY:  Provided history is "  Shortness of breath".    TECHNIQUE:  One view of the chest.    COMPARISON:  08/08/2015.    FINDINGS:  Cardiac wires overlie the chest.  Cardiac silhouette is " magnified by portable technique but not felt to be enlarged.  There is no focal consolidation.  No sizable pleural effusion.  No pneumothorax.                              X-Rays:   Independently Interpreted Readings:   Other Readings:  CXR NAD    Medical Decision Making:   History:   Old Medical Records: I decided to obtain old medical records.  Old Records Summarized: records from clinic visits and records from previous admission(s).  Initial Assessment:   34 yo female with POTS and inappropriate sinus tachycardia with chest pain, fatigue, nausea, lightheadedness. Recently started on propranolol.   Differential Diagnosis:   Ddx includes dysrhythmia, hypotension, KELSEY, adverse effect of propranolol, other.  Independently Interpreted Test(s):   I have ordered and independently interpreted X-rays - see prior notes.  I have ordered and independently interpreted EKG Reading(s) - see prior notes  Clinical Tests:   Lab Tests: Ordered and Reviewed  Radiological Study: Ordered and Reviewed  Medical Tests: Ordered and Reviewed  ED Management:  UPT negative.    EKG no STEMI.    CXR NAD.    Labs reassuring including troponin and ddimer. Given duration of symptoms, negative troponin effectively rules out ACS. Normal ddimer makes dissection or PE unlikely.     Vitals reassuring throughout ED stay. No dysrhythmia or inappropriate tachycardia noted.    I discussed negative ED workup with patient. Symptoms may be related to recent propranolol therapy, as this may cause orthostatic hypotension and headache, though chest pain is less likely related. Patient may consider halving dose of propranolol or taking only PRN palpitations -- this may be less effective than prophylaxis, however. Patient voiced understanding and states she will f/u to her cardiologist at [facility].                       Clinical Impression:       ICD-10-CM ICD-9-CM   1. Chest pain R07.9 786.50   2. Shortness of breath R06.02 786.05   3. Nausea R11.0 787.02   4. Acute  nonintractable headache, unspecified headache type R51 784.0   5. Fatigue, unspecified type R53.83 780.79   6. History of palpitations Z87.898 V12.59                                Fabby Montanez MD  06/03/19 1806

## 2019-06-01 NOTE — ED TRIAGE NOTES
Pt here with c/o chest pain since 2330. Pt reports hx of SVT, just recently started taking Propanolol. EMS gave

## 2019-10-11 ENCOUNTER — LAB VISIT (OUTPATIENT)
Dept: LAB | Facility: HOSPITAL | Age: 34
End: 2019-10-11
Attending: OBSTETRICS & GYNECOLOGY
Payer: MEDICAID

## 2019-10-11 ENCOUNTER — OFFICE VISIT (OUTPATIENT)
Dept: OBSTETRICS AND GYNECOLOGY | Facility: CLINIC | Age: 34
End: 2019-10-11
Payer: MEDICAID

## 2019-10-11 VITALS
SYSTOLIC BLOOD PRESSURE: 122 MMHG | WEIGHT: 190.69 LBS | DIASTOLIC BLOOD PRESSURE: 80 MMHG | HEIGHT: 66 IN | BODY MASS INDEX: 30.65 KG/M2

## 2019-10-11 DIAGNOSIS — Z11.3 SCREEN FOR STD (SEXUALLY TRANSMITTED DISEASE): ICD-10-CM

## 2019-10-11 DIAGNOSIS — N89.8 VAGINAL DISCHARGE: ICD-10-CM

## 2019-10-11 DIAGNOSIS — Z01.419 ENCOUNTER FOR GYNECOLOGICAL EXAMINATION WITHOUT ABNORMAL FINDING: Primary | ICD-10-CM

## 2019-10-11 DIAGNOSIS — Z12.39 SCREENING BREAST EXAMINATION: ICD-10-CM

## 2019-10-11 PROCEDURE — 87661 TRICHOMONAS VAGINALIS AMPLIF: CPT

## 2019-10-11 PROCEDURE — 87801 DETECT AGNT MULT DNA AMPLI: CPT

## 2019-10-11 PROCEDURE — 99395 PREV VISIT EST AGE 18-39: CPT | Mod: S$PBB,,, | Performed by: OBSTETRICS & GYNECOLOGY

## 2019-10-11 PROCEDURE — 99999 PR PBB SHADOW E&M-EST. PATIENT-LVL III: ICD-10-PCS | Mod: PBBFAC,,, | Performed by: OBSTETRICS & GYNECOLOGY

## 2019-10-11 PROCEDURE — 87340 HEPATITIS B SURFACE AG IA: CPT

## 2019-10-11 PROCEDURE — 88175 CYTOPATH C/V AUTO FLUID REDO: CPT

## 2019-10-11 PROCEDURE — 99213 OFFICE O/P EST LOW 20 MIN: CPT | Mod: PBBFAC | Performed by: OBSTETRICS & GYNECOLOGY

## 2019-10-11 PROCEDURE — 36415 COLL VENOUS BLD VENIPUNCTURE: CPT

## 2019-10-11 PROCEDURE — 86803 HEPATITIS C AB TEST: CPT

## 2019-10-11 PROCEDURE — 99999 PR PBB SHADOW E&M-EST. PATIENT-LVL III: CPT | Mod: PBBFAC,,, | Performed by: OBSTETRICS & GYNECOLOGY

## 2019-10-11 PROCEDURE — 87624 HPV HI-RISK TYP POOLED RSLT: CPT

## 2019-10-11 PROCEDURE — 86703 HIV-1/HIV-2 1 RESULT ANTBDY: CPT

## 2019-10-11 PROCEDURE — 86592 SYPHILIS TEST NON-TREP QUAL: CPT

## 2019-10-11 PROCEDURE — 87481 CANDIDA DNA AMP PROBE: CPT | Mod: 59

## 2019-10-11 PROCEDURE — 99395 PR PREVENTIVE VISIT,EST,18-39: ICD-10-PCS | Mod: S$PBB,,, | Performed by: OBSTETRICS & GYNECOLOGY

## 2019-10-11 PROCEDURE — 87491 CHLMYD TRACH DNA AMP PROBE: CPT | Mod: 59

## 2019-10-11 NOTE — PROGRESS NOTES
Subjective:       Patient ID: Snow Barney is a 33 y.o. female.    Chief Complaint:  Exposure to STD      History of Present Illness  HPI  Annual Exam-Premenopausal  Patient presents for annual exam.  The patient is sexually active. GYN screening history: last pap: was abnormal: per patient report. The patient wears seatbelts: yes. The patient participates in regular exercise: yes. Has the patient ever been transfused or tattooed?: no. The patient reports that there is not domestic violence in her life.    Recently found out that her partner was cheating on her and would like STD screening.    Has Mirena IUD in place since 2016.  No menses with Mirena IUD.  Feels like it is making her gain weight.  Considering BTL, but she is nervous about having surgery.      GYN & OB History  No LMP recorded (lmp unknown). Patient has had an implant.   Date of Last Pap: No result found    OB History    Para Term  AB Living   6 3 2 1 3 3   SAB TAB Ectopic Multiple Live Births                  # Outcome Date GA Lbr Benjamin/2nd Weight Sex Delivery Anes PTL Lv   6 Term            5 Term            4 AB            3 AB            2 AB            1                Obstetric Comments   GYN Hx: 13/ No menses with Iud   Mirena IUD in 2016   History of abnormal pap with colposcopy   History of chlamydia.     Past Medical History:  Past Medical History:   Diagnosis Date    Anxiety     Depression     Inappropriate sinus tachycardia     POTS (postural orthostatic tachycardia syndrome)     Sepsis        Past Surgical History:  History reviewed. No pertinent surgical history.    Family History:  Family History   Problem Relation Age of Onset    Hypertension Mother     Diabetes Mother     Hypertension Father     Diabetes Father        Allergies:  Review of patient's allergies indicates:   Allergen Reactions    Demerol (pf) [meperidine (pf)]        Medications:  Current Outpatient Medications on File Prior to Visit    Medication Sig Dispense Refill    escitalopram oxalate (LEXAPRO) 10 MG tablet Take 10 mg by mouth once daily.      estradiol (ESTRACE) 0.01 % (0.1 mg/gram) vaginal cream Place 1 g vaginally once daily. (Patient not taking: Reported on 10/11/2019) 42.5 g 1    lovastatin (MEVACOR) 10 MG tablet lovastatin 10 mg tablet   Take 1 tablet every day by oral route for 30 days.      propranolol (INDERAL) 10 MG tablet Take 10 mg by mouth 3 (three) times daily.      sertraline (ZOLOFT) 50 MG tablet Take 50 mg by mouth once daily.      vitamin D (VITAMIN D3) 1000 units Tab Vitamin D3 1,000 unit tablet   Take 1 tablet every day by oral route.       No current facility-administered medications on file prior to visit.             Review of Systems  Review of Systems   Constitutional: Negative.    HENT: Negative.    Eyes: Negative.    Respiratory: Negative.    Cardiovascular: Negative.    Gastrointestinal: Negative.    Endocrine: Negative.    Genitourinary: Negative.    Musculoskeletal: Negative.    Integumentary:  Negative.   Neurological: Negative.    Hematological: Negative.    Psychiatric/Behavioral: Negative.    Breast: negative.            Objective:    Physical Exam:   Constitutional: She is oriented to person, place, and time. She appears well-nourished.    HENT:   Head: Normocephalic and atraumatic.    Eyes: EOM are normal. Right eye exhibits normal extraocular motion. Left eye exhibits normal extraocular motion.    Neck: Neck supple. No thyromegaly present.    Cardiovascular: Normal rate.     Pulmonary/Chest: Effort normal. No respiratory distress. Right breast exhibits no mass, no skin change and no tenderness. Left breast exhibits no mass, no skin change and no tenderness. Breasts are symmetrical.        Abdominal: Soft. She exhibits no distension and no mass. There is no tenderness. Hernia confirmed negative in the right inguinal area and confirmed negative in the left inguinal area.     Genitourinary: Vagina  normal and uterus normal. Pelvic exam was performed with patient prone. There is no rash or lesion on the right labia. There is no rash or lesion on the left labia. Uterus is not tender. Cervix is normal. Right adnexum displays no tenderness and no fullness. Left adnexum displays no tenderness and no fullness. No bleeding in the vagina. No vaginal discharge found. Labial bartholins normal.Cervix exhibits no motion tenderness and no friability. Additional cervical findings: IUD strings visualized          Musculoskeletal: Normal range of motion.      Lymphadenopathy:     She has no cervical adenopathy.        Right: No inguinal adenopathy present.        Left: No inguinal adenopathy present.    Neurological: She is oriented to person, place, and time.   Cranial Nerves II-XII grossly intact.    Skin: No rash noted. No erythema.    Psychiatric: She has a normal mood and affect. Her behavior is normal.          Assessment:        1. Encounter for gynecological examination without abnormal finding    2. Screening breast examination    3. Screen for STD (sexually transmitted disease)                Plan:      1. Encounter for gynecological examination without abnormal finding  - Diet and exercise encouraged.  - Condom use encouraged for STD prevention.  - Seat belt use encouraged.  - Reviewed ASCCP Pap guidelines and screening recommendations.  - Liquid-based pap smear, screening  - HPV High Risk Genotypes, PCR    2. Screening breast examination  - Self breast exams encouraged     3. Screen for STD (sexually transmitted disease)  - Hepatitis B surface antigen; Future  - Hepatitis C antibody; Future  - RPR; Future  - HIV 1/2 Ag/Ab (4th Gen); Future  - C. trachomatis/N. gonorrhoeae by AMP DNA [facility]; Cervicovaginal

## 2019-10-12 LAB — RPR SER QL: NORMAL

## 2019-10-14 LAB
BACTERIAL VAGINOSIS DNA: NEGATIVE
C TRACH DNA SPEC QL NAA+PROBE: NOT DETECTED
CANDIDA GLABRATA DNA: NEGATIVE
CANDIDA KRUSEI DNA: NEGATIVE
CANDIDA RRNA VAG QL PROBE: NEGATIVE
HBV SURFACE AG SERPL QL IA: NEGATIVE
HCV AB SERPL QL IA: NEGATIVE
HIV 1+2 AB+HIV1 P24 AG SERPL QL IA: NEGATIVE
N GONORRHOEA DNA SPEC QL NAA+PROBE: NOT DETECTED
T VAGINALIS RRNA GENITAL QL PROBE: NEGATIVE

## 2019-10-16 LAB
HPV HR 12 DNA CVX QL NAA+PROBE: NEGATIVE
HPV16 AG SPEC QL: NEGATIVE
HPV18 DNA SPEC QL NAA+PROBE: NEGATIVE

## 2019-10-21 ENCOUNTER — PATIENT MESSAGE (OUTPATIENT)
Dept: OBSTETRICS AND GYNECOLOGY | Facility: CLINIC | Age: 34
End: 2019-10-21

## 2019-11-12 ENCOUNTER — HOSPITAL ENCOUNTER (EMERGENCY)
Facility: HOSPITAL | Age: 34
Discharge: HOME | End: 2019-11-13
Attending: EMERGENCY MEDICINE
Payer: MEDICAID

## 2019-11-12 DIAGNOSIS — R30.0 DYSURIA: ICD-10-CM

## 2019-11-12 DIAGNOSIS — F41.9 ANXIETY: Primary | ICD-10-CM

## 2019-11-12 DIAGNOSIS — R07.9 CHEST PAIN: ICD-10-CM

## 2019-11-12 LAB
B-HCG UR QL: NEGATIVE
CTP QC/QA: YES

## 2019-11-12 PROCEDURE — 93005 ELECTROCARDIOGRAM TRACING: CPT | Mod: ER

## 2019-11-12 PROCEDURE — 93010 ELECTROCARDIOGRAM REPORT: CPT | Mod: ,,, | Performed by: INTERNAL MEDICINE

## 2019-11-12 PROCEDURE — 99285 EMERGENCY DEPT VISIT HI MDM: CPT | Mod: 25,ER

## 2019-11-12 PROCEDURE — 81025 URINE PREGNANCY TEST: CPT | Mod: ER | Performed by: NURSE PRACTITIONER

## 2019-11-12 PROCEDURE — 93010 EKG 12-LEAD: ICD-10-PCS | Mod: ,,, | Performed by: INTERNAL MEDICINE

## 2019-11-12 RX ORDER — ALPRAZOLAM 0.25 MG/1
0.5 TABLET ORAL
Status: COMPLETED | OUTPATIENT
Start: 2019-11-12 | End: 2019-11-13

## 2019-11-13 VITALS
OXYGEN SATURATION: 98 % | WEIGHT: 186 LBS | BODY MASS INDEX: 30.02 KG/M2 | SYSTOLIC BLOOD PRESSURE: 126 MMHG | HEART RATE: 87 BPM | TEMPERATURE: 99 F | DIASTOLIC BLOOD PRESSURE: 75 MMHG | RESPIRATION RATE: 20 BRPM

## 2019-11-13 LAB
ALBUMIN SERPL-MCNC: 3.9 G/DL (ref 3.3–5.5)
ALP SERPL-CCNC: 64 U/L (ref 42–141)
BILIRUB SERPL-MCNC: 0.6 MG/DL (ref 0.2–1.6)
BILIRUBIN, POC UA: NEGATIVE
BLOOD, POC UA: NEGATIVE
BUN SERPL-MCNC: 15 MG/DL (ref 7–22)
CALCIUM SERPL-MCNC: 9.9 MG/DL (ref 8–10.3)
CHLORIDE SERPL-SCNC: 108 MMOL/L (ref 98–108)
CLARITY, POC UA: ABNORMAL
COLOR, POC UA: YELLOW
CREAT SERPL-MCNC: 1 MG/DL (ref 0.6–1.2)
GLUCOSE SERPL-MCNC: 116 MG/DL (ref 73–118)
GLUCOSE, POC UA: NEGATIVE
KETONES, POC UA: NEGATIVE
LEUKOCYTE EST, POC UA: ABNORMAL
NITRITE, POC UA: NEGATIVE
PH UR STRIP: 6.5 [PH]
POC ALT (SGPT): 18 U/L (ref 10–47)
POC AST (SGOT): 21 U/L (ref 11–38)
POC CARDIAC TROPONIN I: 0 NG/ML
POC TCO2: 27 MMOL/L (ref 18–33)
POTASSIUM BLD-SCNC: 3.7 MMOL/L (ref 3.6–5.1)
PROTEIN, POC UA: NEGATIVE
PROTEIN, POC: 7.7 G/DL (ref 6.4–8.1)
SAMPLE: NORMAL
SODIUM BLD-SCNC: 142 MMOL/L (ref 128–145)
SPECIFIC GRAVITY, POC UA: 1.02
UROBILINOGEN, POC UA: 0.2 E.U./DL

## 2019-11-13 PROCEDURE — 85025 COMPLETE CBC W/AUTO DIFF WBC: CPT | Mod: ER

## 2019-11-13 PROCEDURE — 87086 URINE CULTURE/COLONY COUNT: CPT

## 2019-11-13 PROCEDURE — 80053 COMPREHEN METABOLIC PANEL: CPT | Mod: ER

## 2019-11-13 PROCEDURE — 81003 URINALYSIS AUTO W/O SCOPE: CPT | Mod: ER

## 2019-11-13 PROCEDURE — 25000003 PHARM REV CODE 250: Mod: ER | Performed by: NURSE PRACTITIONER

## 2019-11-13 PROCEDURE — 84484 ASSAY OF TROPONIN QUANT: CPT | Mod: ER

## 2019-11-13 RX ORDER — NITROFURANTOIN 25; 75 MG/1; MG/1
100 CAPSULE ORAL 2 TIMES DAILY
Qty: 10 CAPSULE | Refills: 0 | Status: SHIPPED | OUTPATIENT
Start: 2019-11-13 | End: 2019-11-18

## 2019-11-13 RX ORDER — PHENAZOPYRIDINE HYDROCHLORIDE 200 MG/1
200 TABLET, FILM COATED ORAL 3 TIMES DAILY
Qty: 6 TABLET | Refills: 0 | Status: SHIPPED | OUTPATIENT
Start: 2019-11-13 | End: 2019-11-23

## 2019-11-13 RX ADMIN — ALPRAZOLAM 0.5 MG: 0.25 TABLET ORAL at 12:11

## 2019-11-13 NOTE — ED PROVIDER NOTES
"Encounter Date: 11/12/2019    SCRIBE #1 NOTE: I, Donald Burkett, am scribing for, and in the presence of,  Clark Rust DNP. I have scribed the following portions of the note - Other sections scribed: HPI, ROS, PE.       History     Chief Complaint   Patient presents with    Chest Pain     pt reports CP x's 6hrs. N/V. Pt adds she drank a beer at 1930 to help reduce her CP. CP described as "off and on". Pt states she was going to take one of her valium tab but changed her mind. Pt adds, she has anxiety and it may be "adding" to her pain.     Snow Barney is a 33 y.o. female who presents to the ED complaining of intermittent, sharp, nonradiating chest pain to the epigastric area onset 6 hours ago at 1730. 5/10. Worsened with anxiety. Patient reports driving home and began to feel nauseated. Patient states "feeling her heart beating." Patient reports vomiting. Patient took protonix this morning.    The history is provided by the patient. No  was used.     Review of patient's allergies indicates:   Allergen Reactions    Demerol (pf) [meperidine (pf)]      Past Medical History:   Diagnosis Date    Anxiety     Depression     Inappropriate sinus tachycardia     POTS (postural orthostatic tachycardia syndrome)     Sepsis      History reviewed. No pertinent surgical history.  Family History   Problem Relation Age of Onset    Hypertension Mother     Diabetes Mother     Hypertension Father     Diabetes Father      Social History     Tobacco Use    Smoking status: Current Every Day Smoker     Packs/day: 0.50     Types: Cigarettes    Smokeless tobacco: Never Used   Substance Use Topics    Alcohol use: Yes     Frequency: Monthly or less     Comment: ocassionally    Drug use: No     Review of Systems   Constitutional: Negative for fever.   Respiratory: Negative for shortness of breath.    Cardiovascular: Positive for chest pain.   Gastrointestinal: Positive for vomiting. Negative for nausea. "   Genitourinary: Positive for dysuria. Negative for frequency and vaginal discharge.   Psychiatric/Behavioral: The patient is nervous/anxious.    All other systems reviewed and are negative.      Physical Exam     Initial Vitals [11/12/19 2315]   BP Pulse Resp Temp SpO2   130/80 110 18 98.6 °F (37 °C) 99 %      MAP       --         Physical Exam    Nursing note and vitals reviewed.  Constitutional: She appears well-developed and well-nourished.   HENT:   Head: Normocephalic and atraumatic.   Right Ear: External ear normal.   Left Ear: External ear normal.   Eyes: Conjunctivae are normal.   Neck: Normal range of motion. Neck supple.   Cardiovascular: Normal rate, regular rhythm and intact distal pulses. Exam reveals no gallop and no friction rub.    No murmur heard.  Pulmonary/Chest: Effort normal and breath sounds normal. No respiratory distress. She has no wheezes. She has no rhonchi. She has no rales.   Musculoskeletal: Normal range of motion.   Neurological: She is alert and oriented to person, place, and time.   Skin: Skin is warm and dry.   Psychiatric: She has a normal mood and affect.         ED Course   Procedures  Labs Reviewed   POCT URINALYSIS W/O SCOPE - Abnormal; Notable for the following components:       Result Value    Leukocytes, UA Trace (*)     All other components within normal limits   CULTURE, URINE   TROPONIN ISTAT   POCT URINE PREGNANCY   POCT CBC   POCT URINALYSIS W/O SCOPE   POCT CMP   POCT TROPONIN   POCT CMP          Imaging Results          X-Ray Chest PA And Lateral (Final result)  Result time 11/13/19 00:14:44    Final result by Gisele Lowe MD (11/13/19 00:14:44)                 Impression:      No acute intrathoracic abnormality.      Electronically signed by: Gisele Lowe  Date:    11/13/2019  Time:    00:14             Narrative:    EXAMINATION:  CHEST PA AND LATERAL    CLINICAL HISTORY:  Chest pain, unspecified    TECHNIQUE:  PA and lateral chest  radiograph    COMPARISON:  06/01/2019    FINDINGS:  The cardiac silhouette is within normal limits.   There is no focal consolidation, pneumothorax, or pleural effusion.                                 Medical Decision Making:   History:   Old Medical Records: I decided to obtain old medical records.  Clinical Tests:   Lab Tests: Ordered and Reviewed  Radiological Study: Ordered and Reviewed  Medical Tests: Ordered and Reviewed  ED Management:  Chest pain resolved with meds given in ED.            Scribe Attestation:   Scribe #1: I performed the above scribed service and the documentation accurately describes the services I performed. I attest to the accuracy of the note.             ED Course as of Nov 13 0326 Tue Nov 12, 2019   2322 EKG 117HR with ST, no ectopy, no stemi, signed by Dr. Urias.    [VC]   2331 Preg Test, Ur: Negative [VC]   Wed Nov 13, 2019   0014 SBAR given to Dr. JUNIOR Urias, myc are ends now.    [VC]      ED Course User Index  [VC] Clark Rust, [facility]            Discharge Medications     Discharge Medication List as of 11/13/2019 12:59 AM      START taking these medications    Details   nitrofurantoin, macrocrystal-monohydrate, (MACROBID) 100 MG capsule Take 1 capsule (100 mg total) by mouth 2 (two) times daily. for 5 days, Starting Wed 11/13/2019, Until Mon 11/18/2019, Print      phenazopyridine (PYRIDIUM) 200 MG tablet Take 1 tablet (200 mg total) by mouth 3 (three) times daily. for 10 days, Starting Wed 11/13/2019, Until Sat 11/23/2019, Print         CONTINUE these medications which have NOT CHANGED    Details   diazepam (VALIUM ORAL) Take by mouth., Historical Med      pantoprazole sodium (PROTONIX ORAL) Take by mouth., Historical Med      escitalopram oxalate (LEXAPRO) 10 MG tablet Take 10 mg by mouth once daily., Historical Med      estradiol (ESTRACE) 0.01 % (0.1 mg/gram) vaginal cream Place 1 g vaginally once daily., Starting Tue 11/6/2018, Until Wed 11/6/2019, Normal      lovastatin  (MEVACOR) 10 MG tablet lovastatin 10 mg tablet   Take 1 tablet every day by oral route for 30 days., Historical Med      propranolol (INDERAL) 10 MG tablet Take 10 mg by mouth 3 (three) times daily., Historical Med      sertraline (ZOLOFT) 50 MG tablet Take 50 mg by mouth once daily., Until Discontinued, Historical Med      vitamin D (VITAMIN D3) 1000 units Tab Vitamin D3 1,000 unit tablet   Take 1 tablet every day by oral route., Historical Med                   Patient discharged to home in stable condition with instructions to:   1. Please take all meds as prescribed.  2. Follow-up with your primary care doctor   3. Return precautions discussed and patient and/or family/caretaker understands to return to the emergency room for any concerns including worsening of your current symptoms, fever, chills, night sweats, worsening pain, chest pain, shortness of breath, nausea, vomiting, diarrhea, bleeding, headache, difficulty talking, visual disturbances, weakness, numbness or any other acute concerns      Clinical Impression:     1. Anxiety    2. Chest pain    3. Dysuria            Disposition:   Disposition: Discharged  Condition: Stable                     April Urias MD  11/13/19 0328

## 2019-11-15 LAB — BACTERIA UR CULT: NORMAL

## 2019-12-27 ENCOUNTER — OFFICE VISIT (OUTPATIENT)
Dept: OBSTETRICS AND GYNECOLOGY | Facility: CLINIC | Age: 34
End: 2019-12-27
Payer: MEDICAID

## 2019-12-27 ENCOUNTER — TELEPHONE (OUTPATIENT)
Dept: OBSTETRICS AND GYNECOLOGY | Facility: CLINIC | Age: 34
End: 2019-12-27

## 2019-12-27 VITALS
BODY MASS INDEX: 30.65 KG/M2 | WEIGHT: 190.69 LBS | HEIGHT: 66 IN | DIASTOLIC BLOOD PRESSURE: 78 MMHG | SYSTOLIC BLOOD PRESSURE: 123 MMHG

## 2019-12-27 DIAGNOSIS — B96.89 BV (BACTERIAL VAGINOSIS): Primary | ICD-10-CM

## 2019-12-27 DIAGNOSIS — N76.0 BV (BACTERIAL VAGINOSIS): Primary | ICD-10-CM

## 2019-12-27 DIAGNOSIS — Z11.3 SCREEN FOR STD (SEXUALLY TRANSMITTED DISEASE): ICD-10-CM

## 2019-12-27 PROCEDURE — 99213 OFFICE O/P EST LOW 20 MIN: CPT | Mod: PBBFAC | Performed by: OBSTETRICS & GYNECOLOGY

## 2019-12-27 PROCEDURE — 87661 TRICHOMONAS VAGINALIS AMPLIF: CPT

## 2019-12-27 PROCEDURE — 87801 DETECT AGNT MULT DNA AMPLI: CPT

## 2019-12-27 PROCEDURE — 99999 PR PBB SHADOW E&M-EST. PATIENT-LVL III: CPT | Mod: PBBFAC,,, | Performed by: OBSTETRICS & GYNECOLOGY

## 2019-12-27 PROCEDURE — 99213 PR OFFICE/OUTPT VISIT, EST, LEVL III, 20-29 MIN: ICD-10-PCS | Mod: S$PBB,,, | Performed by: OBSTETRICS & GYNECOLOGY

## 2019-12-27 PROCEDURE — 87481 CANDIDA DNA AMP PROBE: CPT | Mod: 59

## 2019-12-27 PROCEDURE — 99999 PR PBB SHADOW E&M-EST. PATIENT-LVL III: ICD-10-PCS | Mod: PBBFAC,,, | Performed by: OBSTETRICS & GYNECOLOGY

## 2019-12-27 PROCEDURE — 99213 OFFICE O/P EST LOW 20 MIN: CPT | Mod: S$PBB,,, | Performed by: OBSTETRICS & GYNECOLOGY

## 2019-12-27 PROCEDURE — 87491 CHLMYD TRACH DNA AMP PROBE: CPT

## 2019-12-27 RX ORDER — METRONIDAZOLE 500 MG/1
500 TABLET ORAL 2 TIMES DAILY
Qty: 14 TABLET | Refills: 0 | Status: SHIPPED | OUTPATIENT
Start: 2019-12-27 | End: 2020-01-03

## 2019-12-27 NOTE — TELEPHONE ENCOUNTER
12/27/19 @ 1127  SPOKE WITH MS DIANA, INFORMED HER THAT THE DR NEEDS TO EXAMINE HER BEFORE SHE WRITES A PRESCRIPTION, APPT MADE WITH DR SANCHEZ FOR TODAY 12/27/19 @ 1445      ----- Message from Princess Obrien sent at 12/27/2019 11:13 AM CST -----  Contact: Patient ph  [phone]  Type: Patient Call Back    Who called: Patient    What is the request in detail: Pt believes she has bacterial vaginosis and needs antibiotics. Would like to speak to nurse or dr about this. Please call.   .  [facility]/pharmacy #[id number] - [place] - [address]  [address] [address]  Phone: [phone] Fax: [phone]    Would the patient rather a call back or a response via [facility]? Call back    Best call back number: [phone]

## 2019-12-27 NOTE — PROGRESS NOTES
Subjective:       Patient ID: Snow Barney is a 34 y.o. female.    Chief Complaint:  Vaginal Discharge      History of Present Illness  HPI  Patient is here for STD check.  Reports having intercourse and the condom accidentally came off.  She would like STD testing.      Also thinks she has BV.    Would also like to discuss BTL.    GYN & OB History  No LMP recorded. Patient has had an implant.   Date of Last Pap: 10/20/2019    OB History    Para Term  AB Living   6 3 2 1 3 3   SAB TAB Ectopic Multiple Live Births                  # Outcome Date GA Lbr Benjamin/2nd Weight Sex Delivery Anes PTL Lv   6 Term            5 Term            4 AB            3 AB            2 AB            1                Obstetric Comments   GYN Hx: 13/ No menses with Iud   Mirena IUD in 2016   History of abnormal pap with colposcopy   History of chlamydia.       Review of Systems  Review of Systems   Constitutional: Negative.    HENT: Negative.    Eyes: Negative.    Respiratory: Negative.    Cardiovascular: Negative.    Gastrointestinal: Negative.    Endocrine: Negative.    Genitourinary: Positive for vaginal discharge and vaginal odor.   Musculoskeletal: Negative.    Integumentary:  Negative.   Neurological: Negative.    Hematological: Negative.    Psychiatric/Behavioral: Negative.    Breast: negative.            Objective:    Physical Exam:   Constitutional: She is oriented to person, place, and time. She appears well-developed.    HENT:   Head: Normocephalic and atraumatic.    Eyes: EOM are normal.     Cardiovascular: Normal rate.     Pulmonary/Chest: Effort normal.        Abdominal: Soft. There is no tenderness.     Genitourinary: Vagina normal and uterus normal. Pelvic exam was performed with patient prone. There is no rash, tenderness or lesion on the right labia. There is no rash, tenderness or lesion on the left labia. Uterus is not tender. Cervix is normal. Right adnexum displays no tenderness and no fullness.  Left adnexum displays no tenderness and no fullness. No erythema or bleeding in the vagina. No vaginal discharge found. Labial bartholins normal.Cervix exhibits no motion tenderness. Additional cervical findings: IUD strings visualized          Musculoskeletal: Normal range of motion.      Lymphadenopathy:        Right: No inguinal adenopathy present.        Left: No inguinal adenopathy present.    Neurological: She is oriented to person, place, and time.    Skin: Skin is warm.    Psychiatric: She has a normal mood and affect.          Assessment:        1. BV (bacterial vaginosis)    2. Screen for STD (sexually transmitted disease)                Plan:      1. BV (bacterial vaginosis)  - Vaginosis Screen by DNA Probe  - metroNIDAZOLE (FLAGYL) 500 MG tablet; Take 1 tablet (500 mg total) by mouth 2 (two) times daily. for 7 days  Dispense: 14 tablet; Refill: 0    2. Screen for STD (sexually transmitted disease)  - C. trachomatis/N. gonorrhoeae by AMP DNA [facility]; Cervicovaginal      Patient interested in tubal ligation.  Laparoscopic tubal ligation reviewed with patient. She is undecided.  She will notify office if she wishes to proceed.

## 2019-12-29 LAB
BACTERIAL VAGINOSIS DNA: POSITIVE
C TRACH DNA SPEC QL NAA+PROBE: NOT DETECTED
CANDIDA GLABRATA DNA: NEGATIVE
CANDIDA KRUSEI DNA: NEGATIVE
CANDIDA RRNA VAG QL PROBE: NEGATIVE
N GONORRHOEA DNA SPEC QL NAA+PROBE: NOT DETECTED
T VAGINALIS RRNA GENITAL QL PROBE: NEGATIVE

## 2019-12-30 ENCOUNTER — PATIENT MESSAGE (OUTPATIENT)
Dept: OBSTETRICS AND GYNECOLOGY | Facility: CLINIC | Age: 34
End: 2019-12-30

## 2020-01-11 ENCOUNTER — HOSPITAL ENCOUNTER (EMERGENCY)
Facility: HOSPITAL | Age: 35
Discharge: HOME | End: 2020-01-11
Attending: EMERGENCY MEDICINE
Payer: MEDICAID

## 2020-01-11 VITALS
RESPIRATION RATE: 19 BRPM | OXYGEN SATURATION: 100 % | SYSTOLIC BLOOD PRESSURE: 130 MMHG | HEART RATE: 88 BPM | DIASTOLIC BLOOD PRESSURE: 88 MMHG | TEMPERATURE: 98 F | HEIGHT: 67 IN | BODY MASS INDEX: 29.82 KG/M2 | WEIGHT: 190 LBS

## 2020-01-11 DIAGNOSIS — R00.2 PALPITATIONS: ICD-10-CM

## 2020-01-11 DIAGNOSIS — J02.9 PHARYNGITIS, UNSPECIFIED ETIOLOGY: ICD-10-CM

## 2020-01-11 DIAGNOSIS — R07.9 CHEST PAIN: ICD-10-CM

## 2020-01-11 DIAGNOSIS — H66.001 ACUTE SUPPURATIVE OTITIS MEDIA OF RIGHT EAR WITHOUT SPONTANEOUS RUPTURE OF TYMPANIC MEMBRANE, RECURRENCE NOT SPECIFIED: ICD-10-CM

## 2020-01-11 DIAGNOSIS — R55 NEAR SYNCOPE: Primary | ICD-10-CM

## 2020-01-11 LAB
ALBUMIN SERPL-MCNC: 3.9 G/DL (ref 3.3–5.5)
ALP SERPL-CCNC: 69 U/L (ref 42–141)
B-HCG UR QL: NEGATIVE
BILIRUB SERPL-MCNC: 0.6 MG/DL (ref 0.2–1.6)
BILIRUBIN, POC UA: NEGATIVE
BLOOD, POC UA: NEGATIVE
BUN SERPL-MCNC: 15 MG/DL (ref 7–22)
CALCIUM SERPL-MCNC: 10 MG/DL (ref 8–10.3)
CHLORIDE SERPL-SCNC: 110 MMOL/L (ref 98–108)
CLARITY, POC UA: CLEAR
COLOR, POC UA: NORMAL
CREAT SERPL-MCNC: 0.5 MG/DL (ref 0.6–1.2)
CTP QC/QA: YES
GLUCOSE SERPL-MCNC: 116 MG/DL (ref 73–118)
GLUCOSE, POC UA: NEGATIVE
KETONES, POC UA: NEGATIVE
LEUKOCYTE EST, POC UA: NEGATIVE
NITRITE, POC UA: NEGATIVE
PH UR STRIP: 7 [PH]
POC ALT (SGPT): 23 U/L (ref 10–47)
POC AST (SGOT): 20 U/L (ref 11–38)
POC B-TYPE NATRIURETIC PEPTIDE: <5 PG/ML (ref 0–100)
POC CARDIAC TROPONIN I: 0 NG/ML
POC MOLECULAR INFLUENZA A AGN: NEGATIVE
POC MOLECULAR INFLUENZA B AGN: NEGATIVE
POC PTINR: 1 (ref 0.9–1.2)
POC PTWBT: 11.4 SEC (ref 9.7–14.3)
POC TCO2: 26 MMOL/L (ref 18–33)
POTASSIUM BLD-SCNC: 3.7 MMOL/L (ref 3.6–5.1)
PROTEIN, POC UA: NEGATIVE
PROTEIN, POC: 7.6 G/DL (ref 6.4–8.1)
S PYO RRNA THROAT QL PROBE: NEGATIVE
SAMPLE: NORMAL
SAMPLE: NORMAL
SODIUM BLD-SCNC: 143 MMOL/L (ref 128–145)
SPECIFIC GRAVITY, POC UA: 1.01
UROBILINOGEN, POC UA: 0.2 E.U./DL

## 2020-01-11 PROCEDURE — 87880 STREP A ASSAY W/OPTIC: CPT | Mod: ER

## 2020-01-11 PROCEDURE — 85025 COMPLETE CBC W/AUTO DIFF WBC: CPT | Mod: ER

## 2020-01-11 PROCEDURE — 93005 ELECTROCARDIOGRAM TRACING: CPT | Mod: ER

## 2020-01-11 PROCEDURE — 63600175 PHARM REV CODE 636 W HCPCS: Mod: ER | Performed by: EMERGENCY MEDICINE

## 2020-01-11 PROCEDURE — 93010 ELECTROCARDIOGRAM REPORT: CPT | Mod: ,,, | Performed by: INTERNAL MEDICINE

## 2020-01-11 PROCEDURE — 87081 CULTURE SCREEN ONLY: CPT

## 2020-01-11 PROCEDURE — 93010 EKG 12-LEAD: ICD-10-PCS | Mod: ,,, | Performed by: INTERNAL MEDICINE

## 2020-01-11 PROCEDURE — 84484 ASSAY OF TROPONIN QUANT: CPT | Mod: ER

## 2020-01-11 PROCEDURE — 80053 COMPREHEN METABOLIC PANEL: CPT | Mod: ER

## 2020-01-11 PROCEDURE — 83880 ASSAY OF NATRIURETIC PEPTIDE: CPT | Mod: ER

## 2020-01-11 PROCEDURE — 81025 URINE PREGNANCY TEST: CPT | Mod: ER | Performed by: EMERGENCY MEDICINE

## 2020-01-11 PROCEDURE — 96360 HYDRATION IV INFUSION INIT: CPT | Mod: ER

## 2020-01-11 PROCEDURE — 99285 EMERGENCY DEPT VISIT HI MDM: CPT | Mod: 25,ER

## 2020-01-11 PROCEDURE — 81003 URINALYSIS AUTO W/O SCOPE: CPT | Mod: ER

## 2020-01-11 PROCEDURE — 87502 INFLUENZA DNA AMP PROBE: CPT | Mod: 59,ER

## 2020-01-11 PROCEDURE — 85610 PROTHROMBIN TIME: CPT | Mod: ER

## 2020-01-11 RX ORDER — FLUTICASONE PROPIONATE 50 MCG
1 SPRAY, SUSPENSION (ML) NASAL 2 TIMES DAILY
Qty: 1 BOTTLE | Refills: 0 | Status: SHIPPED | OUTPATIENT
Start: 2020-01-11 | End: 2020-07-03 | Stop reason: CLARIF

## 2020-01-11 RX ORDER — AMOXICILLIN AND CLAVULANATE POTASSIUM 875; 125 MG/1; MG/1
1 TABLET, FILM COATED ORAL 2 TIMES DAILY
Qty: 20 TABLET | Refills: 0 | Status: SHIPPED | OUTPATIENT
Start: 2020-01-11 | End: 2020-01-21

## 2020-01-11 RX ORDER — IBUPROFEN 600 MG/1
600 TABLET ORAL EVERY 6 HOURS PRN
Qty: 20 TABLET | Refills: 0 | OUTPATIENT
Start: 2020-01-11 | End: 2020-05-15

## 2020-01-11 RX ORDER — PROMETHAZINE HYDROCHLORIDE AND DEXTROMETHORPHAN HYDROBROMIDE 6.25; 15 MG/5ML; MG/5ML
5 SYRUP ORAL 3 TIMES DAILY PRN
Qty: 200 ML | Refills: 0 | Status: SHIPPED | OUTPATIENT
Start: 2020-01-11 | End: 2020-01-21

## 2020-01-11 RX ORDER — ACETAMINOPHEN 500 MG
1000 TABLET ORAL EVERY 6 HOURS PRN
Qty: 30 TABLET | Refills: 0 | OUTPATIENT
Start: 2020-01-11 | End: 2020-05-15

## 2020-01-11 RX ADMIN — SODIUM CHLORIDE 1000 ML: 0.9 INJECTION, SOLUTION INTRAVENOUS at 06:01

## 2020-01-12 NOTE — ED PROVIDER NOTES
Encounter Date: 1/11/2020    SCRIBE #1 NOTE: I, Shawna Aguilera, am scribing for, and in the presence of,  Dr. Mckinley. I have scribed the following portions of the note - Other sections scribed: HPI, ROS, PE.       History     Chief Complaint   Patient presents with    Anxiety     pt states that she was at work about 1 hour ago and started having chest tightness, sob, dizziness. pt states she has svt     Snow Barney is a 34 y.o. Female with anxiety who presents to the ED complaining of chest tightness x 2 hours. Reports her rare chest tightness is usually just the center of her chest, but today the tightness spread across her whole chest. Reports palpitations and sore throat. States she started to feel bad at work with dizziness. Everything went fuzzy, her knees buckled and she almost fainted. Denies SOB, falling and passing out. Denies fever, chills, rhinorrhea, nausea, vomiting, diarrhea, cough, and dysuria.     The history is provided by the patient. No  was used.     Review of patient's allergies indicates:   Allergen Reactions    Demerol (pf) [meperidine (pf)]      Past Medical History:   Diagnosis Date    Anxiety     Depression     Inappropriate sinus tachycardia     POTS (postural orthostatic tachycardia syndrome)     Sepsis      History reviewed. No pertinent surgical history.  Family History   Problem Relation Age of Onset    Hypertension Mother     Diabetes Mother     Hypertension Father     Diabetes Father      Social History     Tobacco Use    Smoking status: Current Every Day Smoker     Packs/day: 0.50     Types: Cigarettes    Smokeless tobacco: Never Used   Substance Use Topics    Alcohol use: Yes     Frequency: Monthly or less     Comment: ocassionally    Drug use: No     Review of Systems   Constitutional: Negative for chills and fever.   HENT: Positive for sore throat. Negative for congestion and rhinorrhea.    Respiratory: Positive for chest tightness. Negative  for shortness of breath.    Cardiovascular: Positive for palpitations. Negative for chest pain.   Gastrointestinal: Negative for diarrhea, nausea and vomiting.   Genitourinary: Negative for dysuria.   Neurological: Positive for dizziness. Negative for syncope.   All other systems reviewed and are negative.      Physical Exam     Initial Vitals [01/11/20 1750]   BP Pulse Resp Temp SpO2   137/86 99 18 98 °F (36.7 °C) 99 %      MAP       --         Patient gave consent to have physical exam performed.    Physical Exam    Nursing note and vitals reviewed.  Constitutional: She appears well-developed and well-nourished.   HENT:   Head: Normocephalic and atraumatic.   Right Ear: External ear normal. Tympanic membrane is erythematous.   Left Ear: External ear normal.   Nose: Mucosal edema and rhinorrhea present.   Mouth/Throat: Posterior oropharyngeal edema and posterior oropharyngeal erythema present. No oropharyngeal exudate.   Eyes: Conjunctivae and EOM are normal. Pupils are equal, round, and reactive to light.   Neck: Normal range of motion and phonation normal. Neck supple.   Cardiovascular: Normal rate, regular rhythm, normal heart sounds and intact distal pulses. Exam reveals no gallop and no friction rub.    No murmur heard.  Pulmonary/Chest: Effort normal and breath sounds normal. No stridor. No respiratory distress. She has no wheezes. She has no rhonchi. She has no rales. She exhibits no tenderness.   Abdominal: Soft. Bowel sounds are normal. She exhibits no distension. There is no tenderness. There is no rigidity, no rebound and no guarding.   Musculoskeletal: Normal range of motion. She exhibits no edema or tenderness.   Lymphadenopathy:     She has cervical adenopathy.   Neurological: She is alert and oriented to person, place, and time. She has normal strength. No cranial nerve deficit or sensory deficit. GCS score is 15. GCS eye subscore is 4. GCS verbal subscore is 5. GCS motor subscore is 6.   Skin: Skin  is warm and dry. Capillary refill takes less than 2 seconds. No rash noted.   Psychiatric: She has a normal mood and affect. Her behavior is normal.         ED Course   Procedures  Labs Reviewed   POCT CMP - Abnormal; Notable for the following components:       Result Value    POC Chloride 110 (*)     POC Creatinine 0.5 (*)     All other components within normal limits   CULTURE, STREP A,  THROAT   TROPONIN ISTAT   POCT URINALYSIS W/O SCOPE   POCT CBC   POCT RAPID STREP A   POCT INFLUENZA A/B MOLECULAR   POCT URINALYSIS W/O SCOPE   POCT URINE PREGNANCY   POCT CMP   POCT PROTIME-INR   POCT TROPONIN   POCT B-TYPE NATRIURETIC PEPTIDE (BNP)   ISTAT PROCEDURE   POCT B-TYPE NATRIURETIC PEPTIDE (BNP)         EKG Readings: (Independently Interpreted)   Initial Reading: No STEMI. Rhythm: Normal Sinus Rhythm. Heart Rate: 98. Axis: Normal.   Normal EKG. QTc normal at 426. When compared to prior EKG dated 11-12-19 rate decreased by 19 bpm.        Imaging Results          X-Ray Chest PA And Lateral (In process)                  Medical Decision Making:   History:   Old Medical Records: I decided to obtain old medical records.  Clinical Tests:   Lab Tests: Ordered and Reviewed  Medical Tests: Ordered and Reviewed  Chief complaint: chest tightness  Differential diagnosis: cardiac arrhythmia, influenza A/B, streptococcal pharyngitis, dehydration, elctrolyte imbalance, UTI, pregnancy    Treatment in the ED; PE, IV fluids  Patient reports feeling better after treatment in the ER.      Ordered CXR, rapid strep, influenza A/B, UPT, UA, CBC, CMP, INR, troponin, BNP, EKG  Discussed treatment, prescriptions, labs, and imaging results.    Turn care patient over to Dr. Urias.  We discussed patient's presentation, vital signs, ED course, treatment, pending re-evaluation,  labs and pending radiology reports.      Fill and take prescriptions as directed.  Return to the ED if symptoms worsen or do not resolve.   Answered questions and discussed  discharge plan.    Patient feels better and is ready for discharge.  Follow up with PCP/specialist in 1 day.            Scribe Attestation:   Scribe #1: I performed the above scribed service and the documentation accurately describes the services I performed. I attest to the accuracy of the note.     I, Dr. Drea Mckinley, personally performed the services described in this documentation. This document was produced by a scribe under my direction and in my presence. All medical record entries made by the scribe were at my direction and in my presence.  I have reviewed the chart and agree that the record reflects my personal performance and is accurate and complete. Drea Mckinley, .     01/11/2020 6:58 PM                        Clinical Impression:     1. Near syncope    2. Chest pain    3. Palpitations    4. Pharyngitis, unspecified etiology    5. Acute suppurative otitis media of right ear without spontaneous rupture of tympanic membrane, recurrence not specified                                Drea Mckinley DO  01/11/20 7827

## 2020-01-13 LAB — BACTERIA THROAT CULT: NORMAL

## 2020-01-17 ENCOUNTER — OFFICE VISIT (OUTPATIENT)
Dept: OBSTETRICS AND GYNECOLOGY | Facility: CLINIC | Age: 35
End: 2020-01-17
Payer: MEDICAID

## 2020-01-17 ENCOUNTER — LAB VISIT (OUTPATIENT)
Dept: LAB | Facility: HOSPITAL | Age: 35
End: 2020-01-17
Attending: OBSTETRICS & GYNECOLOGY
Payer: MEDICAID

## 2020-01-17 VITALS
BODY MASS INDEX: 29.65 KG/M2 | WEIGHT: 188.94 LBS | SYSTOLIC BLOOD PRESSURE: 130 MMHG | HEIGHT: 67 IN | DIASTOLIC BLOOD PRESSURE: 92 MMHG

## 2020-01-17 DIAGNOSIS — Z11.3 SCREEN FOR STD (SEXUALLY TRANSMITTED DISEASE): ICD-10-CM

## 2020-01-17 DIAGNOSIS — Z11.3 SCREEN FOR STD (SEXUALLY TRANSMITTED DISEASE): Primary | ICD-10-CM

## 2020-01-17 PROCEDURE — 87340 HEPATITIS B SURFACE AG IA: CPT

## 2020-01-17 PROCEDURE — 86592 SYPHILIS TEST NON-TREP QUAL: CPT

## 2020-01-17 PROCEDURE — 99213 OFFICE O/P EST LOW 20 MIN: CPT | Mod: S$PBB,,, | Performed by: OBSTETRICS & GYNECOLOGY

## 2020-01-17 PROCEDURE — 99212 OFFICE O/P EST SF 10 MIN: CPT | Mod: PBBFAC | Performed by: OBSTETRICS & GYNECOLOGY

## 2020-01-17 PROCEDURE — 99999 PR PBB SHADOW E&M-EST. PATIENT-LVL II: CPT | Mod: PBBFAC,,, | Performed by: OBSTETRICS & GYNECOLOGY

## 2020-01-17 PROCEDURE — 87491 CHLMYD TRACH DNA AMP PROBE: CPT

## 2020-01-17 PROCEDURE — 86803 HEPATITIS C AB TEST: CPT

## 2020-01-17 PROCEDURE — 87481 CANDIDA DNA AMP PROBE: CPT | Mod: 59

## 2020-01-17 PROCEDURE — 36415 COLL VENOUS BLD VENIPUNCTURE: CPT

## 2020-01-17 PROCEDURE — 99213 PR OFFICE/OUTPT VISIT, EST, LEVL III, 20-29 MIN: ICD-10-PCS | Mod: S$PBB,,, | Performed by: OBSTETRICS & GYNECOLOGY

## 2020-01-17 PROCEDURE — 86703 HIV-1/HIV-2 1 RESULT ANTBDY: CPT

## 2020-01-17 PROCEDURE — 99999 PR PBB SHADOW E&M-EST. PATIENT-LVL II: ICD-10-PCS | Mod: PBBFAC,,, | Performed by: OBSTETRICS & GYNECOLOGY

## 2020-01-17 NOTE — PROGRESS NOTES
Subjective:       Patient ID: Snow Barney is a 34 y.o. female.    Chief Complaint:  Vaginal Discharge (with irritation since last visit )      History of Present Illness  HPI  Patient is here for STD check.  Reports having intercourse and the condom accidentally came off for a second time.  She is having some vaginal irritation and would like STD testing.  She would like STD testing.        GYN & OB History  Patient's last menstrual period was 2020.   Date of Last Pap: 10/20/2019    OB History    Para Term  AB Living   6 3 2 1 3 3   SAB TAB Ectopic Multiple Live Births                  # Outcome Date GA Lbr Benjamin/2nd Weight Sex Delivery Anes PTL Lv   6 Term            5 Term            4 AB            3 AB            2 AB            1                Obstetric Comments   GYN Hx: 13/ No menses with Iud   Mirena IUD in 2016   History of abnormal pap with colposcopy   History of chlamydia.       Review of Systems  Review of Systems   Constitutional: Negative.    HENT: Negative.    Eyes: Negative.    Respiratory: Negative.    Cardiovascular: Negative.    Gastrointestinal: Negative.    Endocrine: Negative.    Genitourinary: Positive for vaginal discharge and vaginal odor.   Musculoskeletal: Negative.    Integumentary:  Negative.   Neurological: Negative.    Hematological: Negative.    Psychiatric/Behavioral: Negative.    Breast: negative.            Objective:    Physical Exam:   Constitutional: She is oriented to person, place, and time. She appears well-developed.    HENT:   Head: Normocephalic and atraumatic.    Eyes: EOM are normal.     Cardiovascular: Normal rate.     Pulmonary/Chest: Effort normal.        Abdominal: Soft. There is no tenderness.     Genitourinary: Vagina normal and uterus normal. Pelvic exam was performed with patient prone. There is no rash, tenderness or lesion on the right labia. There is no rash, tenderness or lesion on the left labia. Uterus is not tender. Cervix is  normal. Right adnexum displays no tenderness and no fullness. Left adnexum displays no tenderness and no fullness. No erythema or bleeding in the vagina. No vaginal discharge found. Labial bartholins normal.Cervix exhibits no motion tenderness. Additional cervical findings: IUD strings visualized          Musculoskeletal: Normal range of motion.       Neurological: She is oriented to person, place, and time.    Skin: Skin is warm.    Psychiatric: She has a normal mood and affect.        10/20/2019 - nml  Assessment:        1. Screen for STD (sexually transmitted disease)                Plan:      1. Screen for STD (sexually transmitted disease)  - Vaginosis Screen by DNA Probe  - C. trachomatis/N. gonorrhoeae by AMP DNA  - Hepatitis B surface antigen; Future  - Hepatitis C antibody; Future  - HIV 1/2 Ag/Ab (4th Gen); Future  - RPR; Future

## 2020-01-20 LAB
C TRACH DNA SPEC QL NAA+PROBE: NOT DETECTED
HBV SURFACE AG SERPL QL IA: NEGATIVE
HCV AB SERPL QL IA: NEGATIVE
HIV 1+2 AB+HIV1 P24 AG SERPL QL IA: NEGATIVE
N GONORRHOEA DNA SPEC QL NAA+PROBE: NOT DETECTED
RPR SER QL: NORMAL

## 2020-01-21 LAB
BACTERIAL VAGINOSIS DNA: NEGATIVE
CANDIDA GLABRATA DNA: NEGATIVE
CANDIDA KRUSEI DNA: NEGATIVE
CANDIDA RRNA VAG QL PROBE: NEGATIVE
T VAGINALIS RRNA GENITAL QL PROBE: NEGATIVE

## 2020-02-28 ENCOUNTER — HOSPITAL ENCOUNTER (EMERGENCY)
Facility: HOSPITAL | Age: 35
Discharge: HOME | End: 2020-02-28
Attending: EMERGENCY MEDICINE
Payer: MEDICAID

## 2020-02-28 VITALS
HEART RATE: 82 BPM | DIASTOLIC BLOOD PRESSURE: 81 MMHG | OXYGEN SATURATION: 100 % | BODY MASS INDEX: 29.66 KG/M2 | HEIGHT: 67 IN | RESPIRATION RATE: 17 BRPM | SYSTOLIC BLOOD PRESSURE: 118 MMHG | TEMPERATURE: 98 F | WEIGHT: 189 LBS

## 2020-02-28 DIAGNOSIS — R07.9 CHEST PAIN: ICD-10-CM

## 2020-02-28 LAB
ALBUMIN SERPL BCP-MCNC: 4.4 G/DL (ref 3.5–5.2)
ALP SERPL-CCNC: 65 U/L (ref 55–135)
ALT SERPL W/O P-5'-P-CCNC: 22 U/L (ref 10–44)
ANION GAP SERPL CALC-SCNC: 11 MMOL/L (ref 8–16)
AST SERPL-CCNC: 14 U/L (ref 10–40)
B-HCG UR QL: NEGATIVE
BASOPHILS # BLD AUTO: 0.04 K/UL (ref 0–0.2)
BASOPHILS NFR BLD: 0.5 % (ref 0–1.9)
BILIRUB SERPL-MCNC: 0.7 MG/DL (ref 0.1–1)
BUN SERPL-MCNC: 17 MG/DL (ref 6–20)
CALCIUM SERPL-MCNC: 9.7 MG/DL (ref 8.7–10.5)
CHLORIDE SERPL-SCNC: 107 MMOL/L (ref 95–110)
CO2 SERPL-SCNC: 23 MMOL/L (ref 23–29)
CREAT SERPL-MCNC: 0.8 MG/DL (ref 0.5–1.4)
CTP QC/QA: YES
DIFFERENTIAL METHOD: ABNORMAL
EOSINOPHIL # BLD AUTO: 0.1 K/UL (ref 0–0.5)
EOSINOPHIL NFR BLD: 0.9 % (ref 0–8)
ERYTHROCYTE [DISTWIDTH] IN BLOOD BY AUTOMATED COUNT: 13.3 % (ref 11.5–14.5)
EST. GFR  (AFRICAN AMERICAN): >60 ML/MIN/1.73 M^2
EST. GFR  (NON AFRICAN AMERICAN): >60 ML/MIN/1.73 M^2
GLUCOSE SERPL-MCNC: 90 MG/DL (ref 70–110)
HCT VFR BLD AUTO: 42.7 % (ref 37–48.5)
HGB BLD-MCNC: 13.5 G/DL (ref 12–16)
IMM GRANULOCYTES # BLD AUTO: 0.02 K/UL (ref 0–0.04)
IMM GRANULOCYTES NFR BLD AUTO: 0.2 % (ref 0–0.5)
LYMPHOCYTES # BLD AUTO: 2.4 K/UL (ref 1–4.8)
LYMPHOCYTES NFR BLD: 26.9 % (ref 18–48)
MCH RBC QN AUTO: 28.8 PG (ref 27–31)
MCHC RBC AUTO-ENTMCNC: 31.6 G/DL (ref 32–36)
MCV RBC AUTO: 91 FL (ref 82–98)
MONOCYTES # BLD AUTO: 0.5 K/UL (ref 0.3–1)
MONOCYTES NFR BLD: 5.7 % (ref 4–15)
NEUTROPHILS # BLD AUTO: 5.8 K/UL (ref 1.8–7.7)
NEUTROPHILS NFR BLD: 65.8 % (ref 38–73)
NRBC BLD-RTO: 0 /100 WBC
PLATELET # BLD AUTO: 199 K/UL (ref 150–350)
PMV BLD AUTO: 11 FL (ref 9.2–12.9)
POTASSIUM SERPL-SCNC: 3.8 MMOL/L (ref 3.5–5.1)
PROT SERPL-MCNC: 7.4 G/DL (ref 6–8.4)
RBC # BLD AUTO: 4.69 M/UL (ref 4–5.4)
SODIUM SERPL-SCNC: 141 MMOL/L (ref 136–145)
TROPONIN I SERPL DL<=0.01 NG/ML-MCNC: <0.006 NG/ML (ref 0–0.03)
WBC # BLD AUTO: 8.78 K/UL (ref 3.9–12.7)

## 2020-02-28 PROCEDURE — 80053 COMPREHEN METABOLIC PANEL: CPT

## 2020-02-28 PROCEDURE — 84484 ASSAY OF TROPONIN QUANT: CPT

## 2020-02-28 PROCEDURE — 99285 EMERGENCY DEPT VISIT HI MDM: CPT | Mod: 25

## 2020-02-28 PROCEDURE — 85025 COMPLETE CBC W/AUTO DIFF WBC: CPT

## 2020-02-28 PROCEDURE — 93005 ELECTROCARDIOGRAM TRACING: CPT

## 2020-02-28 PROCEDURE — 81025 URINE PREGNANCY TEST: CPT | Performed by: EMERGENCY MEDICINE

## 2020-02-29 NOTE — ED PROVIDER NOTES
Encounter Date: 2020    SCRIBE #1 NOTE: I, Alondar Reyes, am scribing for, and in the presence of,  Kasi Chaves MD. I have scribed the following portions of the note - Other sections scribed: HPI, ROS, PE.       History     Chief Complaint   Patient presents with    Chest Pain     Pt reports having intermittent midsternal cp that radiates to left side of chest and left shoulder x 1 day. Hx of SVT. Pt reports having an abnormal EKG Monday at urgent care. No sob. Pt also c/o dizziness, palpitation, nausea, and HA.     CC: CP    Patient is a 34 y.o female with a PMHx of Anxiety, Sepsis, and  Postural Orthostatic Tachycardia Syndrome who presents to the ED complaining of midsternal chest pain radiating to the left side that began today. Patient states that pain worsened while at work today and with exertion. She reports of associated diaphoresis. Symptoms resolved upon arrival. Hx of hospitalization for Sepsis in . Patient had endometritis post-partum. Hx of frequent palpitations with associated light-headedness since this occurrence. Patient follows up with a cardiologist. No Hx of MI, cardiac stent placement, CABG, or other cardiac Hx. Paternal FHx of cardiac complications. She reports of a cousin who  from MI in later 30s.     The history is provided by the patient.     Review of patient's allergies indicates:   Allergen Reactions    Demerol (pf) [meperidine (pf)]      Past Medical History:   Diagnosis Date    Anxiety     Depression     Inappropriate sinus tachycardia     POTS (postural orthostatic tachycardia syndrome)     Sepsis      History reviewed. No pertinent surgical history.  Family History   Problem Relation Age of Onset    Hypertension Mother     Diabetes Mother     Hypertension Father     Diabetes Father      Social History     Tobacco Use    Smoking status: Current Every Day Smoker     Packs/day: 1.00     Types: Cigarettes    Smokeless tobacco: Never Used   Substance Use  Topics    Alcohol use: Yes     Frequency: Monthly or less     Comment: ocassionally    Drug use: No     Review of Systems   Constitutional: Positive for diaphoresis (Resolved). Negative for chills and fever.   HENT: Negative for congestion, postnasal drip, rhinorrhea, sinus pressure, sneezing and sore throat.    Eyes: Negative for discharge and redness.   Respiratory: Negative for cough and shortness of breath.    Cardiovascular: Positive for chest pain (Resolved).   Gastrointestinal: Negative for abdominal pain, blood in stool, constipation, diarrhea, nausea and vomiting.   Genitourinary: Negative for dysuria, hematuria, pelvic pain, vaginal bleeding, vaginal discharge and vaginal pain.   Musculoskeletal: Negative for arthralgias and myalgias.   Skin: Negative for rash and wound.   Neurological: Negative for weakness, light-headedness and headaches.   Psychiatric/Behavioral: Negative for agitation and confusion. The patient is not nervous/anxious.        Physical Exam     Initial Vitals [02/28/20 1844]   BP Pulse Resp Temp SpO2   139/86 (!) 112 20 98.3 °F (36.8 °C) 100 %      MAP       --         Physical Exam    Nursing note and vitals reviewed.  Constitutional: She appears well-developed and well-nourished. She is not diaphoretic. No distress.   HENT:   Head: Normocephalic and atraumatic.   Eyes: Conjunctivae are normal. Right eye exhibits no discharge. Left eye exhibits no discharge. No scleral icterus.   Neck: No tracheal deviation present. No JVD present.   Cardiovascular: Normal rate, regular rhythm, normal heart sounds and intact distal pulses. Exam reveals no gallop and no friction rub.    No murmur heard.  Pulmonary/Chest: No stridor. No respiratory distress. She has no wheezes. She has no rhonchi. She has no rales.   Abdominal: Soft. She exhibits no distension. There is no tenderness. There is no rebound and no guarding.   Musculoskeletal: Normal range of motion. She exhibits no edema or tenderness.    Neurological: She is alert and oriented to person, place, and time. She has normal strength. GCS score is 15. GCS eye subscore is 4. GCS verbal subscore is 5. GCS motor subscore is 6.   CONSTANCE with NGND's   Skin: Skin is warm and dry. Capillary refill takes less than 2 seconds. No rash and no abscess noted. No erythema. No pallor.   Psychiatric: She has a normal mood and affect. Her behavior is normal. Judgment and thought content normal.         ED Course   Procedures  Labs Reviewed   CBC W/ AUTO DIFFERENTIAL - Abnormal; Notable for the following components:       Result Value    Mean Corpuscular Hemoglobin Conc 31.6 (*)     All other components within normal limits   COMPREHENSIVE METABOLIC PANEL   TROPONIN I   POCT URINE PREGNANCY     EKG Readings: (Independently Interpreted)   Initial Reading: No STEMI. Rhythm: Sinus Tachycardia. Heart Rate: 118. Ectopy: No Ectopy. Conduction: Normal. ST Segments: Normal ST Segments. T Waves: Normal. Axis: Normal. Clinical Impression: Sinus Tachycardia     ECG Results          EKG 12-lead (Final result)  Result time 02/29/20 14:56:44    Final result by [facility] (02/29/20 14:56:44)                 Narrative:    Test Reason : R07.9,    Vent. Rate : 118 BPM     Atrial Rate : 118 BPM     P-R Int : 142 ms          QRS Dur : 070 ms      QT Int : 318 ms       P-R-T Axes : 077 101 038 degrees     QTc Int : 445 ms    Sinus tachycardia  Rightward axis  Borderline Abnormal ECG  When compared with ECG of 11-JAN-2020 17:52,  No significant change was found  Confirmed by Jairo Mosher MD ([id number]) on 2/29/2020 2:56:32 PM    Referred By: [email or web address]   SELF           Confirmed By:Jairo Mosher MD                            Imaging Results          X-Ray Chest AP Portable (Final result)  Result time 02/28/20 20:21:40    Final result by Aki Campos MD (02/28/20 20:21:40)                 Impression:      No detrimental change or radiographic acute intrathoracic process  seen.      Electronically signed by: Aki Campos MD  Date:    02/28/2020  Time:    20:21             Narrative:    EXAMINATION:  XR CHEST AP PORTABLE    CLINICAL HISTORY:  chest pain;    TECHNIQUE:  Single frontal view of the chest was performed.    COMPARISON:  Chest radiograph 01/11/2020    FINDINGS:  No detrimental change.The lungs are clear, with normal appearance of pulmonary vasculature and no pleural effusion or pneumothorax.    The cardiac silhouette is normal in size. The hilar and mediastinal contours are unremarkable.    Bones are intact.                                 Medical Decision Making:   Independently Interpreted Test(s):   I have ordered and independently interpreted EKG Reading(s) - see prior notes  Clinical Tests:   Lab Tests: Ordered and Reviewed  Radiological Study: Ordered and Reviewed  Medical Tests: Ordered and Reviewed            Scribe Attestation:   Scribe #1: I performed the above scribed service and the documentation accurately describes the services I performed. I attest to the accuracy of the note.        Pt arrived alert, afebrile, non-toxic in appearance, in no acute respiratory distress with VSS.  EKG revealed no acute findings concerning for ischemia, arrhythmia, heart block or any pathology to warrant cardiology consultation.  CXR revealed no acute pathology.  Labs showed no acute findings.  Low risk for MACE by HEART score.  Pt discharged and counseled on the need to return to the nearest emergency room if they experience any other concerning symptoms.  Pt counseled to F/U outpatient with a PCP over the next two to three days.    Kasi Chaves MD                                Clinical Impression:       ICD-10-CM ICD-9-CM   1. Chest pain R07.9 786.50         Disposition:   Disposition: Discharged  Condition: Stable     ED Disposition Condition    Discharge Stable        ED Prescriptions     None        Follow-up Information     Follow up With Specialties Details Why  Contact Info    Ki Gonzalez, RADHAP-C Family Medicine Schedule an appointment as soon as possible for a visit in 3 days to follow-up on today's visit [address]  [place] [facility]  [place] [address]  [phone]      [facility] Emergency Medicine Go to  As needed, If symptoms worsen [address] [place] [address] [phone]                       I, Kasi Chaves, personally performed the services described in this documentation. All medical record entries made by the scribe were at my direction and in my presence.  I have reviewed the chart and agree that the record reflects my personal performance and is accurate and complete.               Kasi Chaves MD  02/29/20 [id number]

## 2020-04-21 ENCOUNTER — PATIENT MESSAGE (OUTPATIENT)
Dept: OBSTETRICS AND GYNECOLOGY | Facility: CLINIC | Age: 35
End: 2020-04-21

## 2020-04-27 ENCOUNTER — OFFICE VISIT (OUTPATIENT)
Dept: OBSTETRICS AND GYNECOLOGY | Facility: CLINIC | Age: 35
End: 2020-04-27
Payer: MEDICAID

## 2020-04-27 DIAGNOSIS — B37.9 YEAST INFECTION: ICD-10-CM

## 2020-04-27 DIAGNOSIS — B96.89 BV (BACTERIAL VAGINOSIS): Primary | ICD-10-CM

## 2020-04-27 DIAGNOSIS — N76.0 BV (BACTERIAL VAGINOSIS): Primary | ICD-10-CM

## 2020-04-27 PROCEDURE — 99213 PR OFFICE/OUTPT VISIT, EST, LEVL III, 20-29 MIN: ICD-10-PCS | Mod: 95,,, | Performed by: OBSTETRICS & GYNECOLOGY

## 2020-04-27 PROCEDURE — 99213 OFFICE O/P EST LOW 20 MIN: CPT | Mod: 95,,, | Performed by: OBSTETRICS & GYNECOLOGY

## 2020-04-27 RX ORDER — FLUCONAZOLE 150 MG/1
150 TABLET ORAL ONCE
Qty: 1 TABLET | Refills: 0 | Status: SHIPPED | OUTPATIENT
Start: 2020-04-27 | End: 2020-04-27

## 2020-04-27 RX ORDER — METRONIDAZOLE 7.5 MG/G
1 GEL VAGINAL NIGHTLY
Qty: 70 G | Refills: 0 | Status: SHIPPED | OUTPATIENT
Start: 2020-04-27 | End: 2020-05-02

## 2020-04-27 NOTE — PROGRESS NOTES
The patient location is: home  The chief complaint leading to consultation is: vaginal discharge  Visit type: audiovisual  Total time spent with patient: 15 min  Each patient to whom he or she provides medical services by telemedicine is:  (1) informed of the relationship between the physician and patient and the respective role of any other health care provider with respect to management of the patient; and (2) notified that he or she may decline to receive medical services by telemedicine and may withdraw from such care at any time.    Notes:   34 y.o.   presents for evaluation of vulvo/vaginal symptoms.  No LMP recorded. Patient has had an implant.    CC/HPI:   The patient complains of vaginal discharge and vaginal odor.  Her discharge is malodorous;  Other associated symptoms:  None.     Symptoms have been present: approximately 5 days.    She has tried nothing for symptom relief.  It was ineffective.    The patient denies douching.  She denies recent antibiotic use.    Medical history:  History of previous vaginal infections: bacterial vaginosis  Past Medical History:   Diagnosis Date    Anxiety     Depression     Inappropriate sinus tachycardia     POTS (postural orthostatic tachycardia syndrome)     Sepsis      OB History        6    Para   3    Term   2       1    AB   3    Living   3       SAB        TAB        Ectopic        Multiple        Live Births               Obstetric Comments   GYN Hx: 13/ No menses with Iud  Mirena IUD in 2016  History of abnormal pap with colposcopy  History of chlamydia.           Review of patient's allergies indicates:   Allergen Reactions    Demerol (pf) [meperidine (pf)]      Impression: bacterial vaginosis  Plan:   Prescriptions as noted in orders  Reviewed vulvar/vaginal care and hygiene  Return visit if symptoms persist or progress despite treatment      1. BV (bacterial vaginosis)  - metroNIDAZOLE (METROGEL) 0.75 % vaginal gel; Place 1  applicator vaginally every evening. for 5 days  Dispense: 70 g; Refill: 0    2. Yeast infection  - Patient requests diflucan as she gets yeast infection after taking antibiotics.  - fluconazole (DIFLUCAN) 150 MG Tab; Take 1 tablet (150 mg total) by mouth once. for 1 dose  Dispense: 1 tablet; Refill: 0      - Vaginal hygiene reviewed.  - Probiotics encouraged.  - Patient was counseled today on vaginitis prevention including :  a. avoiding feminine products such as deoderant soaps, body wash, bubble bath, douches, scented toilet paper, deoderant tampons or pads, feminine wipes, chronic pad use, etc.  b. avoiding other vulvovaginal irritants such as long hot baths, humidity, tight, synthetic clothing, chlorine and sitting around in wet bathing suits  c. wearing cotton underwear, avoiding thong underwear and no underwear to bed  d. taking showers instead of baths and use a hair dryer on cool setting afterwards to dry  e. wearing cotton to exercise and shower immediately after exercise and change clothes  f. using polyurethane condoms without spermicide if sexually active and symptoms are triggered by intercourse

## 2020-05-15 ENCOUNTER — HOSPITAL ENCOUNTER (EMERGENCY)
Facility: HOSPITAL | Age: 35
Discharge: HOME | End: 2020-05-15
Attending: EMERGENCY MEDICINE
Payer: MEDICAID

## 2020-05-15 VITALS
WEIGHT: 190 LBS | TEMPERATURE: 98 F | HEART RATE: 80 BPM | DIASTOLIC BLOOD PRESSURE: 88 MMHG | BODY MASS INDEX: 29.82 KG/M2 | SYSTOLIC BLOOD PRESSURE: 130 MMHG | OXYGEN SATURATION: 98 % | HEIGHT: 67 IN | RESPIRATION RATE: 18 BRPM

## 2020-05-15 DIAGNOSIS — M54.32 SCIATICA OF LEFT SIDE: Primary | ICD-10-CM

## 2020-05-15 DIAGNOSIS — R52 PAIN: ICD-10-CM

## 2020-05-15 LAB
B-HCG UR QL: NEGATIVE
CTP QC/QA: YES

## 2020-05-15 PROCEDURE — 63600175 PHARM REV CODE 636 W HCPCS: Performed by: EMERGENCY MEDICINE

## 2020-05-15 PROCEDURE — 81025 URINE PREGNANCY TEST: CPT | Performed by: EMERGENCY MEDICINE

## 2020-05-15 PROCEDURE — 99284 EMERGENCY DEPT VISIT MOD MDM: CPT | Mod: 25

## 2020-05-15 PROCEDURE — 96372 THER/PROPH/DIAG INJ SC/IM: CPT

## 2020-05-15 RX ORDER — DEXAMETHASONE SODIUM PHOSPHATE 4 MG/ML
4 INJECTION, SOLUTION INTRA-ARTICULAR; INTRALESIONAL; INTRAMUSCULAR; INTRAVENOUS; SOFT TISSUE
Status: COMPLETED | OUTPATIENT
Start: 2020-05-15 | End: 2020-05-15

## 2020-05-15 RX ORDER — IBUPROFEN 600 MG/1
600 TABLET ORAL EVERY 6 HOURS PRN
Qty: 20 TABLET | Refills: 0 | Status: SHIPPED | OUTPATIENT
Start: 2020-05-15 | End: 2021-12-08

## 2020-05-15 RX ORDER — KETOROLAC TROMETHAMINE 30 MG/ML
30 INJECTION, SOLUTION INTRAMUSCULAR; INTRAVENOUS
Status: COMPLETED | OUTPATIENT
Start: 2020-05-15 | End: 2020-05-15

## 2020-05-15 RX ORDER — ACETAMINOPHEN 325 MG/1
650 TABLET ORAL EVERY 6 HOURS PRN
Qty: 13 TABLET | Refills: 0 | Status: SHIPPED | OUTPATIENT
Start: 2020-05-15 | End: 2021-12-08

## 2020-05-15 RX ADMIN — KETOROLAC TROMETHAMINE 30 MG: 30 INJECTION, SOLUTION INTRAMUSCULAR at 11:05

## 2020-05-15 RX ADMIN — DEXAMETHASONE SODIUM PHOSPHATE 4 MG: 4 INJECTION, SOLUTION INTRA-ARTICULAR; INTRALESIONAL; INTRAMUSCULAR; INTRAVENOUS; SOFT TISSUE at 11:05

## 2020-05-15 NOTE — ED PROVIDER NOTES
Encounter Date: 5/15/2020    SCRIBE #1 NOTE: I, Francisco J Fernandez, am scribing for, and in the presence of,  Damaso Sanchez MD. I have scribed the following portions of the note - Other sections scribed: HPI, ROS, PE, MDM.       History     Chief Complaint   Patient presents with    Leg Pain     pt. reports she has pain in her left leg for the past 1 day. pt reports she was sent by  for further eval.      This 34 y.o F with a hx of Inappropriate sinus tachycardia, POTS (postural orthostatic tachycardia syndrome) and Sepsis presents to the ED c/o acute onset of moderate (5/10) left posterior calf pain with associated paraesthesia since yesterday. She states her pain intermittently radiates from the left posterior thigh to the lower leg. No alleviating or exacerbating factors. The pt was sent to the ED from urgent care to rule out DVT. She does have Mirena IUD in place and no longer has menstrual periods as a result. She does smoke cigarettes and consume EtOH. She does not use illicit drugs. She denies leg swelling, chest pain, SOB, fever, chills, diaphoresis, nausea, emesis, diarrhea, abdominal pain, dysuria, difficulty urinating, constipation, diarrhea, rash and any other associated symptoms. No prior tx.     The history is provided by the patient.     Review of patient's allergies indicates:   Allergen Reactions    Demerol (pf) [meperidine (pf)]      Past Medical History:   Diagnosis Date    Anxiety     Depression     Inappropriate sinus tachycardia     POTS (postural orthostatic tachycardia syndrome)     Sepsis      History reviewed. No pertinent surgical history.  Family History   Problem Relation Age of Onset    Hypertension Mother     Diabetes Mother     Hypertension Father     Diabetes Father      Social History     Tobacco Use    Smoking status: Current Every Day Smoker     Packs/day: 1.00     Types: Cigarettes    Smokeless tobacco: Never Used   Substance Use Topics    Alcohol use: Yes      Frequency: Monthly or less     Comment: ocassionally    Drug use: No     Review of Systems   Constitutional: Negative for chills, diaphoresis and fever.   HENT: Negative for congestion and rhinorrhea.    Eyes: Negative for redness.   Respiratory: Negative for cough and shortness of breath.    Cardiovascular: Negative for chest pain.   Gastrointestinal: Negative for abdominal pain, diarrhea, nausea and vomiting.   Genitourinary: Negative for difficulty urinating and dysuria.   Musculoskeletal: Negative for back pain and neck pain.        (+) left posterior calf pain  (+) left posterior thigh pain   Skin: Negative for rash.   Neurological: Negative for syncope.        (+) left posterior calf paraesthesia   Psychiatric/Behavioral: The patient is not nervous/anxious.        Physical Exam     Initial Vitals [05/15/20 0953]   BP Pulse Resp Temp SpO2   (!) 132/90 81 18 98.3 °F (36.8 °C) 97 %      MAP       --         Physical Exam    Nursing note and vitals reviewed.  Constitutional: She appears well-developed and well-nourished.  Non-toxic appearance. No distress.   HENT:   Head: Normocephalic and atraumatic.   Mouth/Throat: Oropharynx is clear and moist and mucous membranes are normal.   Eyes: Conjunctivae and EOM are normal. Pupils are equal, round, and reactive to light. Right conjunctiva is not injected. Left conjunctiva is not injected. No scleral icterus.   Neck: Normal range of motion and full passive range of motion without pain. Neck supple.   Cardiovascular: Normal rate, regular rhythm, S1 normal, S2 normal, normal heart sounds and normal pulses. Exam reveals no gallop and no friction rub.    No murmur heard.  Pulses:       Radial pulses are 2+ on the right side, and 2+ on the left side.   Pulmonary/Chest: Effort normal and breath sounds normal. No respiratory distress.   Abdominal: Soft. She exhibits no distension. There is no tenderness.   Musculoskeletal: Normal range of motion. She exhibits no edema.   Good  active ROM of all extremities. No lower extremity edema or cyanosis. No tenderness in left calf.     No C or T or L-spine tenderness.  No step-off or crepitus.   Neurological: No cranial nerve deficit.   A&Ox4, normal speech. Equal strength and coordination in bilateral upper and lower extremities.     With walking patient shifts her hips to protect her left leg.  Also decreases her pressure while walking on the left leg with slight limp.   Skin: Skin is warm. No ecchymosis and no rash noted.   Psychiatric: She has a normal mood and affect. Thought content normal.         ED Course   Procedures  Labs Reviewed   POCT URINE PREGNANCY          Imaging Results          US Lower Extremity Veins Left (Final result)  Result time 05/15/20 11:03:55    Final result by Sven Hobbs Jr., MD (05/15/20 11:03:55)                 Impression:      No evidence of deep venous thrombosis in the left lower extremity.      Electronically signed by: Sven Núñez Jr  Date:    05/15/2020  Time:    11:03             Narrative:    EXAMINATION:  US LOWER EXTREMITY VEINS LEFT    CLINICAL HISTORY:  Pain, unspecified    TECHNIQUE:  Duplex and color flow Doppler evaluation and graded compression of the left lower extremity veins was performed.    COMPARISON:  None    FINDINGS:  Left thigh veins: The common femoral, femoral, popliteal, upper greater saphenous, and deep femoral veins are patent and free of thrombus. The veins are normally compressible and have normal phasic flow and augmentation response.    Left calf veins: The visualized calf veins are patent.    Contralateral CFV: The contralateral (right) common femoral vein is patent and free of thrombus.    Miscellaneous: None                                 Medical Decision Making:   Initial Assessment:   Three 4-year-old female coming in secondary to leg pain.  This most consistent actually sciatica.  No signs of cauda equina and/or epidural abscess.  Ultrasound negative for DVT.   Urine pregnancy negative.  Neurovascular intact.  Good pulses.  IM Decadron and ketorolac here to help with pain and inflammation.  Discharging with Tylenol and ibuprofen.  Also gave follow-up with Dr. Stacy with Neurosurgery he runs the spine clinic.  Primary care follow-up. I discussed with the patient the diagnosis, treatment plan, indications for return to the emergency department, and for expected follow-up. The patient verbalized an understanding. The patient is asked if there are any questions or concerns. We discuss the case, until all issues are addressed to the patients satisfaction. Patient understands and is agreeable to the plan.   Damaso Sanchez    Clinical Tests:   Lab Tests: Ordered and Reviewed  The following lab test(s) were unremarkable: UPT  Radiological Study: Ordered and Reviewed                                 Clinical Impression:       ICD-10-CM ICD-9-CM   1. Sciatica of left side M54.32 724.3   2. Pain R52 780.96             ED Disposition Condition    Discharge Stable        ED Prescriptions     Medication Sig Dispense Start Date End Date Auth. Provider    ibuprofen (ADVIL,MOTRIN) 600 MG tablet Take 1 tablet (600 mg total) by mouth every 6 (six) hours as needed. 20 tablet 5/15/2020  Damaso Sanchze MD    acetaminophen (TYLENOL) 325 MG tablet Take 2 tablets (650 mg total) by mouth every 6 (six) hours as needed. 13 tablet 5/15/2020  Damaso Sanchez MD        Follow-up Information     Follow up With Specialties Details Why Contact Info    Ki Gonzalez, FNP-C Family Medicine Schedule an appointment as soon as possible for a visit in 2 days  [address] [facility] [address] [facility] [address]  [facility] [address]  [phone]      Corona Stacy, DO Neurosurgery Schedule an appointment as soon as possible for a visit in 2 days  [address]  [address]  [facility] [address]  [phone]                        IDamaso, personally performed the services described in this documentation. All medical record  entries made by the scribe were at my direction and in my presence. I have reviewed the chart and agree that the record reflects my personal performance and is accurate and complete.                 Damaso Sanchez MD  05/15/20 6246

## 2020-06-08 ENCOUNTER — OFFICE VISIT (OUTPATIENT)
Dept: OBSTETRICS AND GYNECOLOGY | Facility: CLINIC | Age: 35
End: 2020-06-08
Payer: MEDICAID

## 2020-06-08 VITALS
SYSTOLIC BLOOD PRESSURE: 122 MMHG | DIASTOLIC BLOOD PRESSURE: 76 MMHG | BODY MASS INDEX: 30.87 KG/M2 | WEIGHT: 197.06 LBS

## 2020-06-08 DIAGNOSIS — Z11.3 SCREEN FOR STD (SEXUALLY TRANSMITTED DISEASE): ICD-10-CM

## 2020-06-08 DIAGNOSIS — Z30.432 ENCOUNTER FOR IUD REMOVAL: Primary | ICD-10-CM

## 2020-06-08 PROCEDURE — 99999 PR PBB SHADOW E&M-EST. PATIENT-LVL III: ICD-10-PCS | Mod: PBBFAC,,, | Performed by: OBSTETRICS & GYNECOLOGY

## 2020-06-08 PROCEDURE — 99499 NO LOS: ICD-10-PCS | Mod: S$PBB,,, | Performed by: OBSTETRICS & GYNECOLOGY

## 2020-06-08 PROCEDURE — 99499 UNLISTED E&M SERVICE: CPT | Mod: S$PBB,,, | Performed by: OBSTETRICS & GYNECOLOGY

## 2020-06-08 PROCEDURE — 87491 CHLMYD TRACH DNA AMP PROBE: CPT | Mod: 59

## 2020-06-08 PROCEDURE — 87481 CANDIDA DNA AMP PROBE: CPT | Mod: 59

## 2020-06-08 PROCEDURE — 58301 PR REMOVE, INTRAUTERINE DEVICE: ICD-10-PCS | Mod: S$PBB,,, | Performed by: OBSTETRICS & GYNECOLOGY

## 2020-06-08 PROCEDURE — 99999 PR PBB SHADOW E&M-EST. PATIENT-LVL III: CPT | Mod: PBBFAC,,, | Performed by: OBSTETRICS & GYNECOLOGY

## 2020-06-08 PROCEDURE — 87661 TRICHOMONAS VAGINALIS AMPLIF: CPT

## 2020-06-08 PROCEDURE — 99213 OFFICE O/P EST LOW 20 MIN: CPT | Mod: PBBFAC | Performed by: OBSTETRICS & GYNECOLOGY

## 2020-06-08 PROCEDURE — 58301 REMOVE INTRAUTERINE DEVICE: CPT | Mod: PBBFAC | Performed by: OBSTETRICS & GYNECOLOGY

## 2020-06-08 PROCEDURE — 58301 REMOVE INTRAUTERINE DEVICE: CPT | Mod: S$PBB,,, | Performed by: OBSTETRICS & GYNECOLOGY

## 2020-06-08 NOTE — PROCEDURES
Procedure Note:    34 y.o. female.  No LMP recorded (lmp unknown). Patient has had an implant.  She is here to have her IUD removed because she is experiencing side effects including: vaginal dryness and abnormal bleeding.    IUD removal reviewed with patient.  Patient has verbalized understanding of risks and benefits.  Currently, she is monogamous.      PROCEDURE:  Prior to performing the procedure, a time-out was performed.  The following components of the time out were conducted:  Verification of patient identity  Verification of the exact nature of procedure  Verification of surgical site and side  Review of allergies  Verification of the presence of a verbal informed consent    IUD string grasped. and IUD removed uneventfully.  Procedure well-tolerated by patient.    Instructions given to patient regarding vaginal bleeding and need for birth control. and Patient decided she will use: rhythm/natural family planning and withdrawal.    Patient advised to follow-up if having any problems or for her periodic health exam, or if she becomes pregnant.     Patient also requesting STD screening.    1. Encounter for IUD removal  - Removal of Intrauterine Device-Today    2. Screen for STD (sexually transmitted disease)  - C. trachomatis/N. gonorrhoeae by AMP DNA [facility]; Cervicovaginal  - Vaginosis Screen by DNA Probe

## 2020-06-09 LAB
BACTERIAL VAGINOSIS DNA: NEGATIVE
CANDIDA GLABRATA DNA: POSITIVE
CANDIDA KRUSEI DNA: NEGATIVE
CANDIDA RRNA VAG QL PROBE: POSITIVE
T VAGINALIS RRNA GENITAL QL PROBE: NEGATIVE

## 2020-06-10 ENCOUNTER — PATIENT MESSAGE (OUTPATIENT)
Dept: OBSTETRICS AND GYNECOLOGY | Facility: CLINIC | Age: 35
End: 2020-06-10

## 2020-06-10 DIAGNOSIS — B37.9 YEAST INFECTION: Primary | ICD-10-CM

## 2020-06-10 LAB
C TRACH DNA SPEC QL NAA+PROBE: NOT DETECTED
N GONORRHOEA DNA SPEC QL NAA+PROBE: NOT DETECTED

## 2020-06-10 RX ORDER — NYSTATIN 100000 U/G
CREAM TOPICAL 2 TIMES DAILY
Qty: 30 G | Refills: 1 | Status: ON HOLD | OUTPATIENT
Start: 2020-06-10 | End: 2020-07-15 | Stop reason: HOSPADM

## 2020-07-03 ENCOUNTER — HOSPITAL ENCOUNTER (EMERGENCY)
Facility: HOSPITAL | Age: 35
Discharge: HOME | End: 2020-07-04
Attending: EMERGENCY MEDICINE
Payer: MEDICAID

## 2020-07-03 DIAGNOSIS — R07.9 CHEST PAIN: ICD-10-CM

## 2020-07-03 LAB
ALBUMIN SERPL BCP-MCNC: 4.6 G/DL (ref 3.5–5.2)
ALP SERPL-CCNC: 57 U/L (ref 55–135)
ALT SERPL W/O P-5'-P-CCNC: 22 U/L (ref 10–44)
ANION GAP SERPL CALC-SCNC: 12 MMOL/L (ref 8–16)
AST SERPL-CCNC: 17 U/L (ref 10–40)
BASOPHILS # BLD AUTO: 0.05 K/UL (ref 0–0.2)
BASOPHILS NFR BLD: 0.7 % (ref 0–1.9)
BILIRUB SERPL-MCNC: 0.5 MG/DL (ref 0.1–1)
BILIRUB UR QL STRIP: NEGATIVE
BUN SERPL-MCNC: 15 MG/DL (ref 6–20)
CALCIUM SERPL-MCNC: 9.4 MG/DL (ref 8.7–10.5)
CHLORIDE SERPL-SCNC: 107 MMOL/L (ref 95–110)
CLARITY UR: CLEAR
CO2 SERPL-SCNC: 22 MMOL/L (ref 23–29)
COLOR UR: NORMAL
CREAT SERPL-MCNC: 0.7 MG/DL (ref 0.5–1.4)
DIFFERENTIAL METHOD: NORMAL
EOSINOPHIL # BLD AUTO: 0.3 K/UL (ref 0–0.5)
EOSINOPHIL NFR BLD: 3.4 % (ref 0–8)
ERYTHROCYTE [DISTWIDTH] IN BLOOD BY AUTOMATED COUNT: 13 % (ref 11.5–14.5)
EST. GFR  (AFRICAN AMERICAN): >60 ML/MIN/1.73 M^2
EST. GFR  (NON AFRICAN AMERICAN): >60 ML/MIN/1.73 M^2
GLUCOSE SERPL-MCNC: 89 MG/DL (ref 70–110)
GLUCOSE UR QL STRIP: NEGATIVE
HCT VFR BLD AUTO: 40.3 % (ref 37–48.5)
HGB BLD-MCNC: 13.1 G/DL (ref 12–16)
HGB UR QL STRIP: NEGATIVE
IMM GRANULOCYTES # BLD AUTO: 0.01 K/UL (ref 0–0.04)
IMM GRANULOCYTES NFR BLD AUTO: 0.1 % (ref 0–0.5)
KETONES UR QL STRIP: NEGATIVE
LEUKOCYTE ESTERASE UR QL STRIP: NEGATIVE
LYMPHOCYTES # BLD AUTO: 2.4 K/UL (ref 1–4.8)
LYMPHOCYTES NFR BLD: 33.3 % (ref 18–48)
MCH RBC QN AUTO: 29.7 PG (ref 27–31)
MCHC RBC AUTO-ENTMCNC: 32.5 G/DL (ref 32–36)
MCV RBC AUTO: 91 FL (ref 82–98)
MONOCYTES # BLD AUTO: 0.4 K/UL (ref 0.3–1)
MONOCYTES NFR BLD: 5.6 % (ref 4–15)
NEUTROPHILS # BLD AUTO: 4.2 K/UL (ref 1.8–7.7)
NEUTROPHILS NFR BLD: 56.9 % (ref 38–73)
NITRITE UR QL STRIP: NEGATIVE
NRBC BLD-RTO: 0 /100 WBC
PH UR STRIP: 6 [PH] (ref 5–8)
PLATELET # BLD AUTO: 194 K/UL (ref 150–350)
PMV BLD AUTO: 10.5 FL (ref 9.2–12.9)
POTASSIUM SERPL-SCNC: 3.8 MMOL/L (ref 3.5–5.1)
PROT SERPL-MCNC: 7.5 G/DL (ref 6–8.4)
PROT UR QL STRIP: NEGATIVE
RBC # BLD AUTO: 4.41 M/UL (ref 4–5.4)
SARS-COV-2 RDRP RESP QL NAA+PROBE: NEGATIVE
SODIUM SERPL-SCNC: 141 MMOL/L (ref 136–145)
SP GR UR STRIP: 1 (ref 1–1.03)
TROPONIN I SERPL DL<=0.01 NG/ML-MCNC: <0.006 NG/ML (ref 0–0.03)
URN SPEC COLLECT METH UR: NORMAL
UROBILINOGEN UR STRIP-ACNC: NEGATIVE EU/DL
WBC # BLD AUTO: 7.32 K/UL (ref 3.9–12.7)

## 2020-07-03 PROCEDURE — 96374 THER/PROPH/DIAG INJ IV PUSH: CPT

## 2020-07-03 PROCEDURE — 63600175 PHARM REV CODE 636 W HCPCS: Performed by: EMERGENCY MEDICINE

## 2020-07-03 PROCEDURE — 85379 FIBRIN DEGRADATION QUANT: CPT

## 2020-07-03 PROCEDURE — 80053 COMPREHEN METABOLIC PANEL: CPT

## 2020-07-03 PROCEDURE — 84484 ASSAY OF TROPONIN QUANT: CPT

## 2020-07-03 PROCEDURE — 99285 EMERGENCY DEPT VISIT HI MDM: CPT | Mod: 25

## 2020-07-03 PROCEDURE — 81003 URINALYSIS AUTO W/O SCOPE: CPT

## 2020-07-03 PROCEDURE — U0002 COVID-19 LAB TEST NON-CDC: HCPCS

## 2020-07-03 PROCEDURE — 85025 COMPLETE CBC W/AUTO DIFF WBC: CPT

## 2020-07-03 RX ORDER — KETOROLAC TROMETHAMINE 30 MG/ML
15 INJECTION, SOLUTION INTRAMUSCULAR; INTRAVENOUS
Status: COMPLETED | OUTPATIENT
Start: 2020-07-03 | End: 2020-07-03

## 2020-07-03 RX ORDER — HYDROXYZINE HYDROCHLORIDE 50 MG/1
50 TABLET, FILM COATED ORAL 4 TIMES DAILY PRN
COMMUNITY
End: 2021-12-08

## 2020-07-03 RX ORDER — OMEPRAZOLE 20 MG/1
20 CAPSULE, DELAYED RELEASE ORAL DAILY
Status: ON HOLD | COMMUNITY
End: 2020-07-15 | Stop reason: SDUPTHER

## 2020-07-03 RX ADMIN — KETOROLAC TROMETHAMINE 15 MG: 30 INJECTION, SOLUTION INTRAMUSCULAR at 10:07

## 2020-07-04 VITALS
DIASTOLIC BLOOD PRESSURE: 60 MMHG | TEMPERATURE: 98 F | SYSTOLIC BLOOD PRESSURE: 104 MMHG | HEART RATE: 66 BPM | WEIGHT: 190 LBS | HEIGHT: 67 IN | OXYGEN SATURATION: 98 % | RESPIRATION RATE: 18 BRPM | BODY MASS INDEX: 29.82 KG/M2

## 2020-07-04 LAB — D DIMER PPP IA.FEU-MCNC: <0.19 MG/L FEU

## 2020-07-04 NOTE — ED TRIAGE NOTES
Pt voiced she has been feeling different off and on . Sore throats, H/A, muscle cramping, diarrhea and Vomiting last week. Pt also voices she recently had her mirana out

## 2020-07-04 NOTE — ED PROVIDER NOTES
"Encounter Date: 7/3/2020    SCRIBE #1 NOTE: I, Sandee Flores, am scribing for, and in the presence of,  Kel Arthur MD. I have scribed the entire note.       History     Chief Complaint   Patient presents with    Sore Throat     Sore throat, eye burning, cough, upper abdominal burning sensations and it hurts to take a deep breathe in. Pt reports she has been feeling bad for the past week.     Pleurisy    COVID-19 Concerns     This is a 33 y/o female with a PMHx of Anxiety and POTS. She presents to the ED with a CC of myalgias that began 2 weeks ago. Associated symptoms include sore throat, headaches, fatigue, diarrhea, emesis, nausea, chest pain, and coughing with production. She also experiences pain with deep breaths and describes it as "a sporadic burning sensation." Patient has no history of blood clots. She did have a hunter horse 2 weeks ago that was tested for blood clots, but found negative. The patient had a headache last night that she described as her "head was full of fluid, and couldn't hear." She took Ibuprofen with minimal relief. Patient is currently on a Keto diet.      The history is provided by the patient.     Review of patient's allergies indicates:   Allergen Reactions    Demerol (pf) [meperidine (pf)]      Past Medical History:   Diagnosis Date    Anxiety     Depression     Inappropriate sinus tachycardia     POTS (postural orthostatic tachycardia syndrome)     Sepsis      No past surgical history on file.  Family History   Problem Relation Age of Onset    Hypertension Mother     Diabetes Mother     Hypertension Father     Diabetes Father      Social History     Tobacco Use    Smoking status: Current Every Day Smoker     Packs/day: 1.00     Types: Cigarettes    Smokeless tobacco: Never Used   Substance Use Topics    Alcohol use: Yes     Frequency: Monthly or less     Comment: ocassionally    Drug use: No     Review of Systems   Constitutional: Positive for fatigue " and fever. Negative for chills and diaphoresis.   HENT: Positive for sore throat. Negative for congestion and sinus pain.    Eyes: Negative for pain, discharge, redness and itching.   Respiratory: Positive for cough (with production). Negative for shortness of breath, wheezing and stridor.    Cardiovascular: Positive for chest pain. Negative for palpitations and leg swelling.   Gastrointestinal: Positive for diarrhea, nausea and vomiting. Negative for abdominal pain.   Genitourinary: Negative for dysuria, flank pain and frequency.   Musculoskeletal: Positive for myalgias. Negative for back pain and joint swelling.   Neurological: Positive for headaches. Negative for dizziness, syncope, facial asymmetry, speech difficulty, weakness and numbness.   Psychiatric/Behavioral: Negative for confusion.       Physical Exam     Initial Vitals [07/03/20 2134]   BP Pulse Resp Temp SpO2   137/79 94 20 98.3 °F (36.8 °C) 98 %      MAP       --         Physical Exam    Nursing note and vitals reviewed.  Constitutional: She appears well-developed and well-nourished. She is not diaphoretic. No distress.   HENT:   Head: Normocephalic and atraumatic.   Nose: Nose normal.   Eyes: Conjunctivae and EOM are normal. Pupils are equal, round, and reactive to light. No scleral icterus.   Neck: Normal range of motion. Neck supple.   Cardiovascular: Normal rate, regular rhythm, normal heart sounds, intact distal pulses and normal pulses. Exam reveals no gallop and no friction rub.    No murmur heard.  Pulmonary/Chest: Breath sounds normal. No stridor. No respiratory distress. She has no wheezes. She has no rhonchi. She has no rales.   Abdominal: Soft. Normal appearance and bowel sounds are normal. She exhibits no distension. There is no abdominal tenderness. There is no rebound and no guarding.   Musculoskeletal: Normal range of motion. No tenderness or edema.      Comments: Right leg is slightly larger than left leg   Neurological: She is alert  and oriented to person, place, and time. She has normal strength. No cranial nerve deficit.   Skin: Skin is warm and dry. No rash noted.   Psychiatric: She has a normal mood and affect. Her behavior is normal.         ED Course   Procedures  Labs Reviewed   COMPREHENSIVE METABOLIC PANEL - Abnormal; Notable for the following components:       Result Value    CO2 22 (*)     All other components within normal limits   SARS-COV-2 RNA AMPLIFICATION, QUAL   CBC W/ AUTO DIFFERENTIAL   TROPONIN I   URINALYSIS, REFLEX TO URINE CULTURE    Narrative:     Specimen Source->Urine   D DIMER, QUANTITATIVE   POCT URINE PREGNANCY     EKG Readings: (Independently Interpreted)   Rhythm: Normal Sinus Rhythm. Heart Rate: 90. Ectopy: No Ectopy. Conduction: Normal. ST Segments: Normal ST Segments. T Waves: Normal. Clinical Impression: Normal Sinus Rhythm       Imaging Results          X-Ray Chest 1 View (Final result)  Result time 07/03/20 22:49:35    Final result by Nela Chaney MD (07/03/20 22:49:35)                 Impression:      No acute cardiopulmonary process identified.      Electronically signed by: Nela Chaney MD  Date:    07/03/2020  Time:    22:49             Narrative:    EXAMINATION:  XR CHEST 1 VIEW    CLINICAL HISTORY:  Chest pain, unspecified    TECHNIQUE:  Single frontal view of the chest was performed.    COMPARISON:  02/28/2020.    FINDINGS:  Cardiac silhouette is normal in size.  Lungs are symmetrically expanded.  No evidence of focal consolidative process, pneumothorax, or significant pleural effusion.  No acute osseous abnormality identified.                                 Medical Decision Making:   Initial Assessment:   34-year-old female presenting with multiple complaints over the last week.  They include generalized weakness, fatigue, myalgias, chest pain.  On exam she is well-appearing in no acute distress.  She recently reports starting a new ketogenic diet.  Vitals are normal.  Exam unremarkable with  exception of slight swelling of her right leg.  She reports this is chronic times many years.  Denies other symptoms or risk factors for PE or DVT.  D-dimer is negative.  Remainder of workup including labs, troponin, chest x-ray unremarkable.  Unclear cause, though I doubt significant metabolic abnormality, no evidence of infection.  Symptoms possibly due to side effect of diet.  Believe she is safe for discharge home.  Discussed return precautions as well as need for primary care follow-up.  Discussed healthy dieting techniques.  Independently Interpreted Test(s):   I have ordered and independently interpreted X-rays - see prior notes.  Clinical Tests:   Lab Tests: Ordered and Reviewed  Radiological Study: Ordered and Reviewed            Scribe Attestation:   Scribe #1: I performed the above scribed service and the documentation accurately describes the services I performed. I attest to the accuracy of the note.                          Clinical Impression:       ICD-10-CM ICD-9-CM   1. Chest pain  R07.9 786.50   2. Chest pain  R07.9 786.50   3. Chest pain  R07.9 786.50         Disposition:   Disposition: Discharged  Condition: Stable     ED Disposition Condition    Discharge Stable       I, Kel Arthur, personally performed the services described in this documentation. All medical record entries made by the scribe were at my direction and in my presence.  I have reviewed the chart and agree that the record reflects my personal performance and is accurate and complete.  ED Prescriptions     None        Follow-up Information     Follow up With Specialties Details Why Contact Info    Ki Gonzalez, RADHAP-C Family Medicine Schedule an appointment as soon as possible for a visit  As needed, If symptoms worsen [address]  [facility] [address]  [phone]                                       Kel Arthur MD  07/04/20 0016

## 2020-07-13 ENCOUNTER — OFFICE VISIT (OUTPATIENT)
Dept: OBSTETRICS AND GYNECOLOGY | Facility: CLINIC | Age: 35
End: 2020-07-13
Payer: MEDICAID

## 2020-07-13 VITALS
BODY MASS INDEX: 31.15 KG/M2 | SYSTOLIC BLOOD PRESSURE: 118 MMHG | DIASTOLIC BLOOD PRESSURE: 70 MMHG | WEIGHT: 198.88 LBS

## 2020-07-13 DIAGNOSIS — Z30.09 STERILIZATION CONSULT: Primary | ICD-10-CM

## 2020-07-13 PROCEDURE — 99999 PR PBB SHADOW E&M-EST. PATIENT-LVL II: ICD-10-PCS | Mod: PBBFAC,,, | Performed by: OBSTETRICS & GYNECOLOGY

## 2020-07-13 PROCEDURE — 99213 OFFICE O/P EST LOW 20 MIN: CPT | Mod: S$PBB,,, | Performed by: OBSTETRICS & GYNECOLOGY

## 2020-07-13 PROCEDURE — 99213 PR OFFICE/OUTPT VISIT, EST, LEVL III, 20-29 MIN: ICD-10-PCS | Mod: S$PBB,,, | Performed by: OBSTETRICS & GYNECOLOGY

## 2020-07-13 PROCEDURE — 99212 OFFICE O/P EST SF 10 MIN: CPT | Mod: PBBFAC | Performed by: OBSTETRICS & GYNECOLOGY

## 2020-07-13 PROCEDURE — 99999 PR PBB SHADOW E&M-EST. PATIENT-LVL II: CPT | Mod: PBBFAC,,, | Performed by: OBSTETRICS & GYNECOLOGY

## 2020-07-13 NOTE — PROGRESS NOTES
Subjective:       Patient ID: Snow Barney is a 34 y.o. female.    Chief Complaint:  No chief complaint on file.      History of Present Illness  HPI  Patient here to discuss bilateral tubal ligation.  She is interested in bilateral tubal ligation.  However, she is scared to go under general anesthesia.    She is also interested in other forms of contraception that are not hormonal other than condoms.    GYN & OB History  Patient's last menstrual period was 2020 (approximate).   Date of Last Pap: 10/20/2019    OB History    Para Term  AB Living   6 3 2 1 3 3   SAB TAB Ectopic Multiple Live Births                  # Outcome Date GA Lbr Benjamin/2nd Weight Sex Delivery Anes PTL Lv   6 Term            5 Term            4 AB            3 AB            2 AB            1                Obstetric Comments   GYN Hx: 13/ No menses with Iud   Mirena IUD in 2016   History of abnormal pap with colposcopy   History of chlamydia.       Review of Systems  Review of Systems   Constitutional: Negative.    HENT: Negative.    Eyes: Negative.    Respiratory: Negative.    Cardiovascular: Negative.    Gastrointestinal: Negative.    Endocrine: Negative.    Genitourinary: Negative.    Musculoskeletal: Negative.    Integumentary:  Negative.   Neurological: Negative.    Hematological: Negative.    Psychiatric/Behavioral: Negative.    Breast: negative.            Objective:    Physical Exam:   Constitutional: She is oriented to person, place, and time. She appears well-developed.    HENT:   Head: Normocephalic and atraumatic.    Eyes: EOM are normal.     Cardiovascular: Normal rate.     Pulmonary/Chest: Effort normal.        Abdominal: Soft. There is no abdominal tenderness.             Musculoskeletal: Normal range of motion.       Neurological: She is oriented to person, place, and time.    Skin: Skin is warm.    Psychiatric: She has a normal mood and affect.          Assessment:        1. Sterilization consult                 Plan:      - We reviewed process for bilateral tubal ligation is laparoscopic bilateral salpingectomy.  - Medicaid consent form signed today.   - Reviewed recovery process.  - All forms of contraception discussed with the patient, including barrier protection (condoms), estrogen and progesterone methods (pills, patch, ring), progesterone only methods (pills, injection, subdermal implant, IUDs), copper only method (copper IUD), and sterilization (both male and female)..  She voiced understanding.  All questions answered.  - Patient is considering minipill vs BTL.    - She will notify office with her decision.  Of this 15 minute visit, 15 minutes were spent on counseling and coordination of care.

## 2020-07-14 ENCOUNTER — HOSPITAL ENCOUNTER (OUTPATIENT)
Facility: OTHER | Age: 35
Discharge: HOME | End: 2020-07-15
Attending: EMERGENCY MEDICINE | Admitting: EMERGENCY MEDICINE
Payer: MEDICAID

## 2020-07-14 DIAGNOSIS — R07.9 CHEST PAIN: ICD-10-CM

## 2020-07-14 DIAGNOSIS — R00.0 TACHYCARDIA: ICD-10-CM

## 2020-07-14 DIAGNOSIS — I26.99 PULMONARY EMBOLISM, UNSPECIFIED CHRONICITY, UNSPECIFIED PULMONARY EMBOLISM TYPE, UNSPECIFIED WHETHER ACUTE COR PULMONALE PRESENT: Primary | ICD-10-CM

## 2020-07-14 LAB
ALBUMIN SERPL BCP-MCNC: 3.9 G/DL (ref 3.5–5.2)
ALP SERPL-CCNC: 60 U/L (ref 55–135)
ALT SERPL W/O P-5'-P-CCNC: 38 U/L (ref 10–44)
ANION GAP SERPL CALC-SCNC: 9 MMOL/L (ref 8–16)
AST SERPL-CCNC: 21 U/L (ref 10–40)
B-HCG UR QL: NEGATIVE
BACTERIA #/AREA URNS HPF: NORMAL /HPF
BASOPHILS # BLD AUTO: 0.06 K/UL (ref 0–0.2)
BASOPHILS NFR BLD: 0.8 % (ref 0–1.9)
BILIRUB SERPL-MCNC: 0.3 MG/DL (ref 0.1–1)
BILIRUB UR QL STRIP: NEGATIVE
BNP SERPL-MCNC: 21 PG/ML (ref 0–99)
BUN SERPL-MCNC: 14 MG/DL (ref 6–20)
CALCIUM SERPL-MCNC: 9.1 MG/DL (ref 8.7–10.5)
CHLORIDE SERPL-SCNC: 110 MMOL/L (ref 95–110)
CLARITY UR: ABNORMAL
CO2 SERPL-SCNC: 24 MMOL/L (ref 23–29)
COLOR UR: YELLOW
CREAT SERPL-MCNC: 0.8 MG/DL (ref 0.5–1.4)
CTP QC/QA: YES
D DIMER PPP IA.FEU-MCNC: 0.6 MG/L FEU
DIFFERENTIAL METHOD: ABNORMAL
EOSINOPHIL # BLD AUTO: 0.3 K/UL (ref 0–0.5)
EOSINOPHIL NFR BLD: 4.3 % (ref 0–8)
ERYTHROCYTE [DISTWIDTH] IN BLOOD BY AUTOMATED COUNT: 13.2 % (ref 11.5–14.5)
EST. GFR  (AFRICAN AMERICAN): >60 ML/MIN/1.73 M^2
EST. GFR  (NON AFRICAN AMERICAN): >60 ML/MIN/1.73 M^2
GLUCOSE SERPL-MCNC: 129 MG/DL (ref 70–110)
GLUCOSE UR QL STRIP: NEGATIVE
HCT VFR BLD AUTO: 40.9 % (ref 37–48.5)
HGB BLD-MCNC: 12.8 G/DL (ref 12–16)
HGB UR QL STRIP: NEGATIVE
IMM GRANULOCYTES # BLD AUTO: 0.02 K/UL (ref 0–0.04)
IMM GRANULOCYTES NFR BLD AUTO: 0.3 % (ref 0–0.5)
KETONES UR QL STRIP: NEGATIVE
LEUKOCYTE ESTERASE UR QL STRIP: ABNORMAL
LIPASE SERPL-CCNC: 49 U/L (ref 4–60)
LYMPHOCYTES # BLD AUTO: 1.9 K/UL (ref 1–4.8)
LYMPHOCYTES NFR BLD: 25.4 % (ref 18–48)
MAGNESIUM SERPL-MCNC: 2 MG/DL (ref 1.6–2.6)
MCH RBC QN AUTO: 29.6 PG (ref 27–31)
MCHC RBC AUTO-ENTMCNC: 31.3 G/DL (ref 32–36)
MCV RBC AUTO: 95 FL (ref 82–98)
MICROSCOPIC COMMENT: NORMAL
MONOCYTES # BLD AUTO: 0.4 K/UL (ref 0.3–1)
MONOCYTES NFR BLD: 5.3 % (ref 4–15)
NEUTROPHILS # BLD AUTO: 4.9 K/UL (ref 1.8–7.7)
NEUTROPHILS NFR BLD: 63.9 % (ref 38–73)
NITRITE UR QL STRIP: NEGATIVE
NRBC BLD-RTO: 0 /100 WBC
PH UR STRIP: 6 [PH] (ref 5–8)
PLATELET # BLD AUTO: 174 K/UL (ref 150–350)
PMV BLD AUTO: 10.7 FL (ref 9.2–12.9)
POTASSIUM SERPL-SCNC: 3.6 MMOL/L (ref 3.5–5.1)
PROT SERPL-MCNC: 7.1 G/DL (ref 6–8.4)
PROT UR QL STRIP: NEGATIVE
RBC # BLD AUTO: 4.32 M/UL (ref 4–5.4)
SARS-COV-2 RDRP RESP QL NAA+PROBE: NEGATIVE
SODIUM SERPL-SCNC: 143 MMOL/L (ref 136–145)
SP GR UR STRIP: 1.02 (ref 1–1.03)
SQUAMOUS #/AREA URNS HPF: 5 /HPF
TROPONIN I SERPL DL<=0.01 NG/ML-MCNC: 0.01 NG/ML (ref 0–0.03)
TSH SERPL DL<=0.005 MIU/L-ACNC: 0.85 UIU/ML (ref 0.4–4)
URN SPEC COLLECT METH UR: ABNORMAL
UROBILINOGEN UR STRIP-ACNC: NEGATIVE EU/DL
WBC # BLD AUTO: 7.59 K/UL (ref 3.9–12.7)
WBC #/AREA URNS HPF: 5 /HPF (ref 0–5)

## 2020-07-14 PROCEDURE — 99285 EMERGENCY DEPT VISIT HI MDM: CPT | Mod: 25

## 2020-07-14 PROCEDURE — 96361 HYDRATE IV INFUSION ADD-ON: CPT

## 2020-07-14 PROCEDURE — 84484 ASSAY OF TROPONIN QUANT: CPT

## 2020-07-14 PROCEDURE — 83880 ASSAY OF NATRIURETIC PEPTIDE: CPT

## 2020-07-14 PROCEDURE — 83735 ASSAY OF MAGNESIUM: CPT

## 2020-07-14 PROCEDURE — 81025 URINE PREGNANCY TEST: CPT | Performed by: EMERGENCY MEDICINE

## 2020-07-14 PROCEDURE — 93010 EKG 12-LEAD: ICD-10-PCS | Mod: 76,,, | Performed by: INTERNAL MEDICINE

## 2020-07-14 PROCEDURE — 80053 COMPREHEN METABOLIC PANEL: CPT

## 2020-07-14 PROCEDURE — 83690 ASSAY OF LIPASE: CPT

## 2020-07-14 PROCEDURE — 63600175 PHARM REV CODE 636 W HCPCS: Performed by: EMERGENCY MEDICINE

## 2020-07-14 PROCEDURE — 96374 THER/PROPH/DIAG INJ IV PUSH: CPT | Mod: 59

## 2020-07-14 PROCEDURE — 25000003 PHARM REV CODE 250: Performed by: EMERGENCY MEDICINE

## 2020-07-14 PROCEDURE — 93010 ELECTROCARDIOGRAM REPORT: CPT | Mod: 76,,, | Performed by: INTERNAL MEDICINE

## 2020-07-14 PROCEDURE — 85379 FIBRIN DEGRADATION QUANT: CPT

## 2020-07-14 PROCEDURE — U0002 COVID-19 LAB TEST NON-CDC: HCPCS

## 2020-07-14 PROCEDURE — 81000 URINALYSIS NONAUTO W/SCOPE: CPT

## 2020-07-14 PROCEDURE — 84443 ASSAY THYROID STIM HORMONE: CPT

## 2020-07-14 PROCEDURE — 85025 COMPLETE CBC W/AUTO DIFF WBC: CPT

## 2020-07-14 PROCEDURE — 93005 ELECTROCARDIOGRAM TRACING: CPT

## 2020-07-14 RX ORDER — ASPIRIN 325 MG
325 TABLET ORAL
Status: COMPLETED | OUTPATIENT
Start: 2020-07-14 | End: 2020-07-14

## 2020-07-14 RX ORDER — ONDANSETRON 2 MG/ML
4 INJECTION INTRAMUSCULAR; INTRAVENOUS
Status: COMPLETED | OUTPATIENT
Start: 2020-07-14 | End: 2020-07-14

## 2020-07-14 RX ADMIN — ASPIRIN 325 MG ORAL TABLET 325 MG: 325 PILL ORAL at 08:07

## 2020-07-14 RX ADMIN — ONDANSETRON HYDROCHLORIDE 4 MG: 2 SOLUTION INTRAMUSCULAR; INTRAVENOUS at 09:07

## 2020-07-14 RX ADMIN — IOHEXOL 75 ML: 350 INJECTION, SOLUTION INTRAVENOUS at 11:07

## 2020-07-14 RX ADMIN — SODIUM CHLORIDE, SODIUM LACTATE, POTASSIUM CHLORIDE, AND CALCIUM CHLORIDE 1000 ML: .6; .31; .03; .02 INJECTION, SOLUTION INTRAVENOUS at 09:07

## 2020-07-15 VITALS
TEMPERATURE: 99 F | HEART RATE: 76 BPM | DIASTOLIC BLOOD PRESSURE: 74 MMHG | WEIGHT: 198.44 LBS | OXYGEN SATURATION: 97 % | RESPIRATION RATE: 18 BRPM | SYSTOLIC BLOOD PRESSURE: 121 MMHG | HEIGHT: 67 IN | BODY MASS INDEX: 31.15 KG/M2

## 2020-07-15 PROBLEM — R06.02 SOB (SHORTNESS OF BREATH): Status: ACTIVE | Noted: 2020-07-15

## 2020-07-15 PROBLEM — F41.9 ANXIETY: Status: ACTIVE | Noted: 2020-07-15

## 2020-07-15 PROBLEM — R07.9 CHEST PAIN: Status: ACTIVE | Noted: 2020-07-15

## 2020-07-15 PROBLEM — Z72.0 TOBACCO ABUSE: Status: ACTIVE | Noted: 2020-07-15

## 2020-07-15 PROBLEM — R00.0 SINUS TACHYCARDIA: Chronic | Status: ACTIVE | Noted: 2020-07-15

## 2020-07-15 PROBLEM — I26.99 PULMONARY EMBOLISM: Status: ACTIVE | Noted: 2020-07-15

## 2020-07-15 LAB — TROPONIN I SERPL DL<=0.01 NG/ML-MCNC: 0.01 NG/ML (ref 0–0.03)

## 2020-07-15 PROCEDURE — G0378 HOSPITAL OBSERVATION PER HR: HCPCS

## 2020-07-15 PROCEDURE — 63600175 PHARM REV CODE 636 W HCPCS: Performed by: EMERGENCY MEDICINE

## 2020-07-15 PROCEDURE — 99220 PR INITIAL OBSERVATION CARE,LEVL III: CPT | Mod: ,,, | Performed by: NURSE PRACTITIONER

## 2020-07-15 PROCEDURE — 96375 TX/PRO/DX INJ NEW DRUG ADDON: CPT

## 2020-07-15 PROCEDURE — 96372 THER/PROPH/DIAG INJ SC/IM: CPT | Mod: 59

## 2020-07-15 PROCEDURE — 25000003 PHARM REV CODE 250: Performed by: EMERGENCY MEDICINE

## 2020-07-15 PROCEDURE — 84484 ASSAY OF TROPONIN QUANT: CPT

## 2020-07-15 PROCEDURE — 25500020 PHARM REV CODE 255: Performed by: EMERGENCY MEDICINE

## 2020-07-15 PROCEDURE — 63600175 PHARM REV CODE 636 W HCPCS: Performed by: NURSE PRACTITIONER

## 2020-07-15 PROCEDURE — 96361 HYDRATE IV INFUSION ADD-ON: CPT

## 2020-07-15 PROCEDURE — 99220 PR INITIAL OBSERVATION CARE,LEVL III: ICD-10-PCS | Mod: ,,, | Performed by: NURSE PRACTITIONER

## 2020-07-15 RX ORDER — ENOXAPARIN SODIUM 100 MG/ML
1 INJECTION SUBCUTANEOUS EVERY 12 HOURS
Status: DISCONTINUED | OUTPATIENT
Start: 2020-07-15 | End: 2020-07-15 | Stop reason: HOSPADM

## 2020-07-15 RX ORDER — LORAZEPAM 2 MG/ML
2 INJECTION INTRAMUSCULAR EVERY 4 HOURS PRN
Status: DISCONTINUED | OUTPATIENT
Start: 2020-07-15 | End: 2020-07-15

## 2020-07-15 RX ORDER — ACETAMINOPHEN 325 MG/1
650 TABLET ORAL EVERY 6 HOURS PRN
Status: DISCONTINUED | OUTPATIENT
Start: 2020-07-15 | End: 2020-07-15

## 2020-07-15 RX ORDER — SODIUM CHLORIDE 0.9 % (FLUSH) 0.9 %
10 SYRINGE (ML) INJECTION
Status: DISCONTINUED | OUTPATIENT
Start: 2020-07-15 | End: 2020-07-15 | Stop reason: HOSPADM

## 2020-07-15 RX ORDER — ONDANSETRON 2 MG/ML
4 INJECTION INTRAMUSCULAR; INTRAVENOUS EVERY 4 HOURS PRN
Status: DISCONTINUED | OUTPATIENT
Start: 2020-07-15 | End: 2020-07-15 | Stop reason: HOSPADM

## 2020-07-15 RX ORDER — ACETAMINOPHEN 325 MG/1
650 TABLET ORAL EVERY 6 HOURS PRN
Status: DISCONTINUED | OUTPATIENT
Start: 2020-07-15 | End: 2020-07-15 | Stop reason: HOSPADM

## 2020-07-15 RX ORDER — RIVAROXABAN 15 MG-20MG
KIT ORAL
Qty: 51 TABLET | Refills: 0 | Status: SHIPPED | OUTPATIENT
Start: 2020-07-15 | End: 2020-11-10 | Stop reason: ALTCHOICE

## 2020-07-15 RX ORDER — KETOROLAC TROMETHAMINE 30 MG/ML
30 INJECTION, SOLUTION INTRAMUSCULAR; INTRAVENOUS
Status: COMPLETED | OUTPATIENT
Start: 2020-07-15 | End: 2020-07-15

## 2020-07-15 RX ORDER — OMEPRAZOLE 20 MG/1
20 CAPSULE, DELAYED RELEASE ORAL DAILY
Qty: 30 CAPSULE | Refills: 0 | Status: SHIPPED | OUTPATIENT
Start: 2020-07-15 | End: 2023-03-31

## 2020-07-15 RX ORDER — KETOROLAC TROMETHAMINE 30 MG/ML
15 INJECTION, SOLUTION INTRAMUSCULAR; INTRAVENOUS EVERY 6 HOURS PRN
Status: DISCONTINUED | OUTPATIENT
Start: 2020-07-15 | End: 2020-07-15

## 2020-07-15 RX ORDER — LORAZEPAM 2 MG/ML
2 INJECTION INTRAMUSCULAR
Status: COMPLETED | OUTPATIENT
Start: 2020-07-15 | End: 2020-07-15

## 2020-07-15 RX ORDER — POLYETHYLENE GLYCOL 3350 17 G/17G
17 POWDER, FOR SOLUTION ORAL DAILY
Status: DISCONTINUED | OUTPATIENT
Start: 2020-07-15 | End: 2020-07-15

## 2020-07-15 RX ORDER — ENOXAPARIN SODIUM 100 MG/ML
1 INJECTION SUBCUTANEOUS
Status: COMPLETED | OUTPATIENT
Start: 2020-07-15 | End: 2020-07-15

## 2020-07-15 RX ORDER — MORPHINE SULFATE 10 MG/ML
4 INJECTION INTRAMUSCULAR; INTRAVENOUS; SUBCUTANEOUS EVERY 4 HOURS PRN
Status: DISCONTINUED | OUTPATIENT
Start: 2020-07-15 | End: 2020-07-15

## 2020-07-15 RX ORDER — PANTOPRAZOLE SODIUM 40 MG/1
40 TABLET, DELAYED RELEASE ORAL DAILY
Status: DISCONTINUED | OUTPATIENT
Start: 2020-07-15 | End: 2020-07-15 | Stop reason: HOSPADM

## 2020-07-15 RX ADMIN — ENOXAPARIN SODIUM 90 MG: 100 INJECTION SUBCUTANEOUS at 10:07

## 2020-07-15 RX ADMIN — LORAZEPAM 2 MG: 2 INJECTION INTRAMUSCULAR; INTRAVENOUS at 01:07

## 2020-07-15 RX ADMIN — ENOXAPARIN SODIUM 90 MG: 100 INJECTION SUBCUTANEOUS at 01:07

## 2020-07-15 RX ADMIN — KETOROLAC TROMETHAMINE 30 MG: 30 INJECTION, SOLUTION INTRAMUSCULAR at 01:07

## 2020-07-15 RX ADMIN — PANTOPRAZOLE SODIUM 40 MG: 40 TABLET, DELAYED RELEASE ORAL at 10:07

## 2020-07-15 NOTE — PLAN OF CARE
Patient is discharged home.     No CM needs for discharge.     Family to transport home.        07/15/20 1221   Final Note   Assessment Type Final Discharge Note   Anticipated Discharge Disposition Home   What phone number can be called within the next 1-3 days to see how you are doing after discharge? [phone]   Right Care Referral Info   Post Acute Recommendation No Care

## 2020-07-15 NOTE — ED NOTES
Spoke with Javier from transfer center with the number to give report at [facility].

## 2020-07-15 NOTE — ASSESSMENT & PLAN NOTE
CT- Small acute pulmonary embolus within right upper lobe proximal segmental branch.   D-dimer-.6    Lovenox BID  Oxygen

## 2020-07-15 NOTE — PLAN OF CARE
Patient is alert and oriented with no communication barriers.     Prior to admission patient was independent. Patient denied the use of HH or DME.      Patients PCP is correct on the face sheet. Patient choice pharmacy is [facility] in [facility].      Patient denies having any written advance directives.      Patients family will transport the patient home at discharge.     No CM needs identified at this time.     CM team will continue to follow.          07/15/20 1215   Discharge Assessment   Assessment Type Discharge Planning Assessment   Confirmed/corrected address and phone number on facesheet? Yes   Assessment information obtained from? Patient   Communicated expected length of stay with patient/caregiver no   Prior to hospitilization cognitive status: Alert/Oriented   Prior to hospitalization functional status: Independent   Current cognitive status: Alert/Oriented   Current Functional Status: Independent   Lives With significant other   Able to Return to Prior Arrangements yes   Is patient able to care for self after discharge? Yes   Patient's perception of discharge disposition home or selfcare   Readmission Within the Last 30 Days no previous admission in last 30 days   Patient currently being followed by outpatient case management? No   Patient currently receives any other outside agency services? No   Equipment Currently Used at Home none   Do you have any problems affording any of your prescribed medications? No   Is the patient taking medications as prescribed? yes   Does the patient have transportation home? Yes   Transportation Anticipated family or friend will provide   Does the patient receive services at the [facility]madin Clinic? No   Discharge Plan A Home   DME Needed Upon Discharge  none   Patient/Family in Agreement with Plan yes

## 2020-07-15 NOTE — PLAN OF CARE
(Physician in Lead of Transfers)   Outside Transfer Acceptance Note / Regional Referral Center        Transferring Physician: Dr. Montanez    Accepting Physician: Miles Gordon MD / Dr. Kennedy    Date of Acceptance: 07/15/2020    Transferring Facility: [facility]    Reason for Transfer: Capacity issues     Report from Transferring Physician/Hospital course: The patient is a 33 y/o female with PMH of sinus tach and anxiety who presented with SOB and sharp chest pain. Found to have a PE on CTA. Tachycardia resolved. On RA with normal sats.       Labs & Radiographs: see EPIC      To Do List:   1) Admit to obs  2) Determine anticoagulation coverage      Miles Gordon MD  Hospital Medicine Staff

## 2020-07-15 NOTE — HPI
The patient is a 34 y.o. female with a past medical history of tachycardia presents substernal sharp chest pain that radiates to bilateral shoulders x10 days. The pt reports that she had a COVID-10 test on 7/3 and it came back negative.  Pt reports associated fatigue, fever of 101.8, loss of appetite, nausea, diarrhea, shortness of breath, and cough.  Pt denies any urinary symptoms. She states that she took a Plan B about a week ago.  On CT, patient found to have a pulmonary embolus.  She was transferred to [facility] for further management of her PE.

## 2020-07-15 NOTE — PLAN OF CARE
Patient lying quietly in bed with eyes closed. Patient ambulated to bathroom and back to bed independently. Right antecubital 20 gauge IV access intact. Sinus rhythm on telemetry. Call light within reach. Encouraged patient to call for any and all needs. Patient verbalized understanding of instructions. Report given to oncoming nurse.

## 2020-07-15 NOTE — H&P
"[facility] Hospital Medicine  History & Physical    Patient Name: Snow Barney  MRN: [id number]  Admission Date: 7/14/2020  Attending Physician: Maggi Kennedy MD   Primary Care Provider: BRITANY Price         Patient information was obtained from patient, past medical records and ER records.     Subjective:     Principal Problem:Pulmonary embolism    Chief Complaint:   Chief Complaint   Patient presents with    Chest Pain     Patient c/o substernal chest pain that radiates to bilateral shoulders with sob, fatigue, decreased appetite, diarrhea, headache, fever, n/v, and "bumps on tongue" since July 3rd.  Reports that she was tested for COVID on 07/03/2020 and reports being negative, though reports symptoms have increased since testing.  Reports going to a party and bar on July 4th.  Denies cardiac hx.    Fever    Vomiting    Diarrhea    Shortness of Breath    Headache        HPI: The patient is a 34 y.o. female with a past medical history of tachycardia presents substernal sharp chest pain that radiates to bilateral shoulders x10 days. The pt reports that she had a COVID-10 test on 7/3 and it came back negative.  Pt reports associated fatigue, fever of 101.8, loss of appetite, nausea, diarrhea, shortness of breath, and cough.  Pt denies any urinary symptoms. She states that she took a Plan B about a week ago.  On CT, patient found to have a pulmonary embolus.  She was transferred to [facility] for further management of her PE.    Past Medical History:   Diagnosis Date    Anxiety     Depression     Hernia, hiatal     Inappropriate sinus tachycardia     POTS (postural orthostatic tachycardia syndrome)     Sepsis        History reviewed. No pertinent surgical history.    Review of patient's allergies indicates:   Allergen Reactions    Demerol (pf) [meperidine (pf)]        No current facility-administered medications on file prior to encounter.      Current Outpatient Medications on " File Prior to Encounter   Medication Sig    omeprazole (PRILOSEC) 20 MG capsule Take 20 mg by mouth once daily.    acetaminophen (TYLENOL) 325 MG tablet Take 2 tablets (650 mg total) by mouth every 6 (six) hours as needed.    estradiol (ESTRACE) 0.01 % (0.1 mg/gram) vaginal cream Place 1 g vaginally once daily. (Patient not taking: Reported on 10/11/2019)    hydrOXYzine (ATARAX) 50 MG tablet Take 50 mg by mouth 4 (four) times daily as needed for Itching.    ibuprofen (ADVIL,MOTRIN) 600 MG tablet Take 1 tablet (600 mg total) by mouth every 6 (six) hours as needed.    nystatin (MYCOSTATIN) cream Apply topically 2 (two) times daily. (Patient not taking: Reported on 7/13/2020)     Family History     Problem Relation (Age of Onset)    Diabetes Mother, Father    Hypertension Mother, Father        Tobacco Use    Smoking status: Current Every Day Smoker     Packs/day: 1.00     Types: Cigarettes    Smokeless tobacco: Never Used    Tobacco comment: varies from 7 cigarettes to whole pack in day   Substance and Sexual Activity    Alcohol use: Yes     Frequency: Monthly or less     Comment: ocassionally    Drug use: No    Sexual activity: Yes     Partners: Male     Review of Systems   Constitutional: Positive for activity change. Negative for appetite change and fever.   HENT: Negative for congestion, ear pain, rhinorrhea and sinus pressure.    Eyes: Negative for pain and discharge.   Respiratory: Positive for chest tightness and shortness of breath. Negative for cough and wheezing.    Cardiovascular: Positive for chest pain. Negative for leg swelling.   Gastrointestinal: Negative for abdominal distention, abdominal pain, diarrhea, nausea and vomiting.   Endocrine: Negative for cold intolerance and heat intolerance.   Genitourinary: Negative for difficulty urinating, flank pain, frequency, hematuria and urgency.   Musculoskeletal: Positive for myalgias. Negative for arthralgias and joint swelling.    Allergic/Immunologic: Negative for environmental allergies and food allergies.   Neurological: Negative for dizziness, weakness, light-headedness and headaches.   Hematological: Does not bruise/bleed easily.   Psychiatric/Behavioral: Negative for agitation, behavioral problems and decreased concentration.     Objective:     Vital Signs (Most Recent):  Temp: 98.9 °F (37.2 °C) (07/15/20 0332)  Pulse: 86 (07/15/20 0413)  Resp: 20 (07/15/20 0303)  BP: 106/61 (07/15/20 0402)  SpO2: 95 % (07/15/20 0413) Vital Signs (24h Range):  Temp:  [98.2 °F (36.8 °C)-98.9 °F (37.2 °C)] 98.9 °F (37.2 °C)  Pulse:  [] 86  Resp:  [19-24] 20  SpO2:  [95 %-100 %] 95 %  BP: (106-163)/(61-99) 106/61     Weight: 89.8 kg (198 lb)  Body mass index is 31.01 kg/m².    Physical Exam  Constitutional:       Appearance: Normal appearance. She is well-developed.   HENT:      Head: Normocephalic.   Eyes:      General:         Right eye: No discharge.         Left eye: No discharge.      Conjunctiva/sclera: Conjunctivae normal.   Neck:      Musculoskeletal: Normal range of motion and neck supple.   Cardiovascular:      Rate and Rhythm: Normal rate and regular rhythm.      Heart sounds: Normal heart sounds.   Pulmonary:      Effort: Pulmonary effort is normal. Tachypnea present. No respiratory distress.      Breath sounds: Normal breath sounds.   Abdominal:      General: Bowel sounds are normal. There is no distension.      Palpations: Abdomen is soft.      Tenderness: There is no abdominal tenderness.   Musculoskeletal: Normal range of motion.   Skin:     General: Skin is warm and dry.   Neurological:      Mental Status: She is alert and oriented to person, place, and time.      GCS: GCS eye subscore is 4. GCS verbal subscore is 5. GCS motor subscore is 6.      Motor: Motor function is intact.   Psychiatric:         Mood and Affect: Mood normal.         Speech: Speech normal.         Behavior: Behavior normal.             Significant Labs:   CBC:    Recent Labs   Lab 07/14/20  2105   WBC 7.59   HGB 12.8   HCT 40.9        CMP:   Recent Labs   Lab 07/14/20 2105      K 3.6      CO2 24   *   BUN 14   CREATININE 0.8   CALCIUM 9.1   PROT 7.1   ALBUMIN 3.9   BILITOT 0.3   ALKPHOS 60   AST 21   ALT 38   ANIONGAP 9   EGFRNONAA >60       Significant Imaging: I have reviewed all pertinent imaging results/findings within the past 24 hours.    Assessment/Plan:     * Pulmonary embolism  CT- Small acute pulmonary embolus within right upper lobe proximal segmental branch.   D-dimer-.6    Lovenox BID  Oxygen      Tobacco abuse  Patient counseled about smoking cessation especially with pulmonary embolism.      Sinus tachycardia        Anxiety        SOB (shortness of breath)        Chest pain          VTE Risk Mitigation (From admission, onward)         Ordered     enoxaparin injection 90 mg  Every 12 hours      07/15/20 0446     IP VTE HIGH RISK PATIENT  Once      07/15/20 0446     Place sequential compression device  Until discontinued      07/15/20 0446                   William Awad NP  Department of Hospital Medicine   [facility]

## 2020-07-15 NOTE — ED TRIAGE NOTES
Pt presents to the ED with c/o N/V/D x4 times that is oily and malodorous that started today, temp of 101.8 at home, sores on side of tongue since Thursday, fatigue x 3-4 weeks, and tachycardia x4 days. Reports HR of 120-148 while resting at home.    Pt. Reports she took medicine for the temp but doesn't remember what. Denies taking meds for N/V/D at home PTA.    Reports resolved sharp mid-sternal chest pain that radiates to back and left anterior chest with deep inspiration. that started en route to ED.     Pt. Placed on cardiac monitor.   MD at bedside.

## 2020-07-15 NOTE — NURSING
Patient stable. Discharge education provided. Verbalized understanding. Denies further needs or questions. IV removed. Tolerated well. Personal belongings gathered to be sent home with the patient. Tele box removed and returned to the monitor tech. [employer] called. Transported via wheelchair. Safety maintained.

## 2020-07-15 NOTE — DISCHARGE INSTRUCTIONS
Thank you for choosing [facility] as your Health Care Provider. [facility] strives to provide the best healthcare available to you. In the next few days you may receive a Survey, either by mail or email,  asking you to rate our care that was provided to you during your stay.  Please return the survey to us, as your feedback is important. We aim to meet your expectations of safe, quality health care.    From your [facility] Team.      July 15, 2020         [address]  [facility] [address]  Phone: [phone]  Fax: [phone]       Patient: Snow Barney   YOB: 1985  Date of Visit: 07/15/2020    To Whom It May Concern:    Jordi Barney  was at [facility] on 07/15/2020. She may return to work/school on 07/20/2020 with no restrictions. If you have any questions or concerns, or if I can be of further assistance, please do not hesitate to contact me.    Sincerely,        Maggi Kennedy MD  Hospital Medicine  [facility]

## 2020-07-15 NOTE — DISCHARGE SUMMARY
"[facility] Hospital Medicine  Discharge Summary      Patient Name: Snow Barney  MRN: [id number]  Admission Date: 7/14/2020  Hospital Length of Stay: 0 days  Discharge Date and Time:  07/15/2020 11:48 AM  Attending Physician: Maggi Kenneyd MD   Discharging Provider: Maggi Kennedy MD  Primary Care Provider: BRITANY Price      HPI:   The patient is a 34 y.o. female with a past medical history of tachycardia presents substernal sharp chest pain that radiates to bilateral shoulders x10 days. The pt reports that she had a COVID-10 test on 7/3 and it came back negative.  Pt reports associated fatigue, fever of 101.8, loss of appetite, nausea, diarrhea, shortness of breath, and cough.  Pt denies any urinary symptoms. She states that she took a Plan B about a week ago.  On CT, patient found to have a pulmonary embolus.  She was transferred to [facility] for further management of her PE.    * No surgery found *      Hospital Course:   Patient hospitalized for small RUL PE. She was started on weight based lovenox. Initially had tachycardia but resolved and oxygen saturation remained stable on room air. She has no prior history of blood clot and no known family history. She is a current smoker and has been "sleeping in bed all day" for the past 3 weeks, which are the only possible factors contributing. She is feeling well today and stable for discharge home on xarelto.     Consults:     No new Assessment & Plan notes have been filed under this hospital service since the last note was generated.  Service: Hospital Medicine    Final Active Diagnoses:    Diagnosis Date Noted POA    PRINCIPAL PROBLEM:  Pulmonary embolism [I26.99] 07/15/2020 Yes    Tobacco abuse [Z72.0] 07/15/2020 Yes      Problems Resolved During this Admission:       Discharged Condition: good    Disposition: Home or Self Care    Follow Up:  Follow-up Information     BRITANY Price. Schedule an appointment as soon as possible for a " visit in 2 weeks.    Specialty: Family Medicine  Why: hospital follow-up for PE  Contact information:  [address] [address]  [place] [facility]  [place] [id number] [phone]                 Patient Instructions:      Diet Adult Regular     Activity as tolerated       Significant Diagnostic Studies: Labs:   BMP:   Recent Labs   Lab 07/14/20 2105   *      K 3.6      CO2 24   BUN 14   CREATININE 0.8   CALCIUM 9.1   MG 2.0   , CBC   Recent Labs   Lab 07/14/20 2105   WBC 7.59   HGB 12.8   HCT 40.9       and All labs within the past 24 hours have been reviewed    Pending Diagnostic Studies:     None         Medications:  Reconciled Home Medications:      Medication List      START taking these medications    XARELTO 15 mg (42)- 20 mg (9) tablet dose pack  Generic drug: rivaroxaban  Take 1 tablet (15 mg) by mouth twice daily with food for 21 days followed by 1 tablet (20 mg) by mouth once daily with food        CONTINUE taking these medications    acetaminophen 325 MG tablet  Commonly known as: TYLENOL  Take 2 tablets (650 mg total) by mouth every 6 (six) hours as needed.     hydrOXYzine 50 MG tablet  Commonly known as: ATARAX  Take 50 mg by mouth 4 (four) times daily as needed for Itching.     ibuprofen 600 MG tablet  Commonly known as: ADVIL,MOTRIN  Take 1 tablet (600 mg total) by mouth every 6 (six) hours as needed.     omeprazole 20 MG capsule  Commonly known as: PRILOSEC  Take 1 capsule (20 mg total) by mouth once daily.        STOP taking these medications    estradioL 0.01 % (0.1 mg/gram) vaginal cream  Commonly known as: ESTRACE     nystatin cream  Commonly known as: MYCOSTATIN            Indwelling Lines/Drains at time of discharge:   Lines/Drains/Airways     None                 Time spent on the discharge of patient: 25 minutes  Patient was seen and examined on the date of discharge and determined to be suitable for discharge.         Maggi Kennedy MD  Department of Hospital  Medicine  [facility]

## 2020-07-15 NOTE — ED PROVIDER NOTES
" Encounter Date: 7/14/2020    SCRIBE #1 NOTE: I, Stefani Ac, am scribing for, and in the presence of,  Jayda Chou MD. I have scribed the following portions of the note - Other sections scribed: HPI, ROS, PE.       History     Chief Complaint   Patient presents with    Chest Pain     Patient c/o substernal chest pain that radiates to bilateral shoulders with sob, fatigue, decreased appetite, diarrhea, headache, fever, n/v, and "bumps on tongue" since July 3rd.  Reports that she was tested for COVID on 07/03/2020 and reports being negative, though reports symptoms have increased since testing.  Reports going to a party and bar on July 4th.  Denies cardiac hx.    Fever    Vomiting    Diarrhea    Shortness of Breath    Headache     This is a 34 y.o. female with a PMHx of Sinus Tachycardia (possible POTS), Hiatal Hernia, and Anxiety who presents to the Emergency Department with a cc of substernal sharp chest pain that radiates to bilateral shoulders x10 days. The pt reports that she had a COVID-10 test on 7/3 and it came back negative, but she went to a bar on 7/4/2020 as well as a 4th of July party and states symptoms have worsening since then. Pt reports associated fatigue, fever of 101.8 (last this afternoon, patient reports taking apap or ibuprofen for it), loss of appetite, emesis (NBNB), nausea, diarrhea (yellow x 2), SOB (with coughing), HA, rhinorrhea, cough with slight mucus production, and myalgias. Pt denies any urinary symptoms. She states that she took a Plan B about a week ago and her Mirena has been taken out at the end of last month. Denies h/o PE/DVT, immobilization, h/o cancer, leg swelling. Denies family or personal history of cardiac history. No ETOH, illicit. Tobacco 6-8 cigarettes/ day.         The history is provided by the patient. No  was used.     Review of patient's allergies indicates:   Allergen Reactions    Demerol (pf) [meperidine (pf)]      Past Medical " History:   Diagnosis Date    Anxiety     Depression     Hernia, hiatal     Inappropriate sinus tachycardia     POTS (postural orthostatic tachycardia syndrome)     Sepsis      History reviewed. No pertinent surgical history.  Family History   Problem Relation Age of Onset    Hypertension Mother     Diabetes Mother     Hypertension Father     Diabetes Father      Social History     Tobacco Use    Smoking status: Current Every Day Smoker     Packs/day: 1.00     Types: Cigarettes    Smokeless tobacco: Never Used    Tobacco comment: varies from 7 cigarettes to whole pack in day   Substance Use Topics    Alcohol use: Yes     Frequency: Monthly or less     Comment: ocassionally    Drug use: No     Review of Systems   Constitutional: Positive for appetite change, fatigue and fever. Negative for chills and diaphoresis.   HENT: Positive for rhinorrhea. Negative for ear pain and sore throat.    Eyes: Negative for photophobia, pain, discharge, redness, itching and visual disturbance.   Respiratory: Positive for cough and shortness of breath.    Cardiovascular: Positive for chest pain. Negative for leg swelling.   Gastrointestinal: Positive for diarrhea, nausea and vomiting. Negative for abdominal pain, blood in stool and constipation.   Genitourinary: Negative for dysuria, flank pain, frequency, hematuria and urgency.   Musculoskeletal: Positive for myalgias. Negative for arthralgias, neck pain and neck stiffness.   Skin: Negative for rash and wound.   Neurological: Positive for headaches. Negative for weakness, light-headedness and numbness.   Psychiatric/Behavioral: Negative for confusion and suicidal ideas.   All other systems reviewed and are negative.      Physical Exam     Initial Vitals [07/14/20 1946]   BP Pulse Resp Temp SpO2   (!) 163/90 (!) 132 (!) 22 98.2 °F (36.8 °C) 100 %      MAP       --         Physical Exam    Nursing note and vitals reviewed.  Constitutional: She appears well-developed and  well-nourished. She is not diaphoretic. No distress.   Appears uncomfortable. Anxious.   HENT:   Head: Normocephalic and atraumatic.   Right Ear: External ear normal.   Left Ear: External ear normal.   Nose: Nose normal.   Mouth/Throat: Oropharynx is clear and moist. No oropharyngeal exudate.   One sore noted on lateral edge of tongue, appears to be aphous ulcer    Eyes: Conjunctivae and EOM are normal. Pupils are equal, round, and reactive to light. Right eye exhibits no discharge. Left eye exhibits no discharge. No scleral icterus.   Neck: Normal range of motion. Neck supple. No JVD present.   Cardiovascular: Regular rhythm, normal heart sounds and intact distal pulses. Tachycardia present.  Exam reveals no gallop and no friction rub.    No murmur heard.  Pulmonary/Chest: Breath sounds normal. No stridor. No respiratory distress. She has no wheezes. She has no rhonchi. She has no rales.   Abdominal: Soft. Bowel sounds are normal. She exhibits no distension. There is no abdominal tenderness. There is no rebound and no guarding.   Negative Yeboah's sign, no CVA tenderness   Musculoskeletal: Normal range of motion. No tenderness or edema.   Lymphadenopathy:     She has no cervical adenopathy.   Neurological: She is alert and oriented to person, place, and time. She has normal strength. No cranial nerve deficit or sensory deficit. GCS score is 15. GCS eye subscore is 4. GCS verbal subscore is 5. GCS motor subscore is 6.   Skin: Skin is warm and dry. Capillary refill takes less than 2 seconds. No rash noted. No pallor.   Psychiatric: She has a normal mood and affect. Thought content normal.         ED Course   Procedures  Labs Reviewed   CBC W/ AUTO DIFFERENTIAL - Abnormal; Notable for the following components:       Result Value    Mean Corpuscular Hemoglobin Conc 31.3 (*)     All other components within normal limits   COMPREHENSIVE METABOLIC PANEL - Abnormal; Notable for the following components:    Glucose 129 (*)      All other components within normal limits   D DIMER, QUANTITATIVE - Abnormal; Notable for the following components:    D-Dimer 0.60 (*)     All other components within normal limits   URINALYSIS, REFLEX TO URINE CULTURE - Abnormal; Notable for the following components:    Appearance, UA Hazy (*)     Leukocytes, UA Trace (*)     All other components within normal limits    Narrative:     Specimen Source->Urine   TROPONIN I   B-TYPE NATRIURETIC PEPTIDE   SARS-COV-2 RNA AMPLIFICATION, QUAL   LIPASE   TSH   MAGNESIUM   URINALYSIS MICROSCOPIC    Narrative:     Specimen Source->Urine   TROPONIN I   POCT URINE PREGNANCY     EKG Readings: (Independently Interpreted)   Sinus tachycardia at 122. no ST elevation or significant depression.  T-wave inversions in 1 and aVL.  QTC is 433.  Normal axis.       Imaging Results           CTA Chest Non-Coronary (PE Study) (Final result)  Result time 07/14/20 23:33:05    Final result by Nela Chaney MD (07/14/20 23:33:05)                 Impression:      Small acute pulmonary embolus within right upper lobe proximal segmental branch.    This report was flagged in Epic as abnormal.      Electronically signed by: Nela Chaney MD  Date:    07/14/2020  Time:    23:33             Narrative:    EXAMINATION:  CTA CHEST NON CORONARY    CLINICAL HISTORY:  PE suspected, intermediate prob, positive D-dimer;    TECHNIQUE:  Low dose axial images, sagittal and coronal reformations were obtained from the thoracic inlet to the lung bases following the IV administration of 75 mL of Omnipaque 350.  Contrast timing was optimized to evaluate the pulmonary arteries.  MIP images were performed.    COMPARISON:  None    FINDINGS:  Structures at the base of the neck are unremarkable.  Aorta is non-aneurysmal.  The heart is normal in size without pericardial effusion.  Small short segment intraluminal filling defect seen within right upper lobe proximal segmental branch consistent with acute PE.  No  additional pulmonary thromboemboli or more proximal or central PE seen.  There is no evidence of mediastinal, axillary, or hilar lymph node enlargement.  The esophagus is unremarkable along its course.    The trachea and bronchi are patent.  The lungs are symmetrically expanded.  Lungs are clear.  No evidence of mass, consolidation, pneumothorax, or pleural effusion.  No evidence of pulmonary hemorrhage or infarction.    The visualized abdominal structures are unremarkable.  No acute osseous abnormality identified.  Extrathoracic soft tissues are unremarkable.                               X-Ray Chest AP Portable (Final result)  Result time 07/14/20 20:39:28    Final result by Nela Chaney MD (07/14/20 20:39:28)                 Impression:      No acute cardiopulmonary process identified.      Electronically signed by: Nela Chaney MD  Date:    07/14/2020  Time:    20:39             Narrative:    EXAMINATION:  XR CHEST AP PORTABLE    CLINICAL HISTORY:  Chest Pain;    TECHNIQUE:  Single frontal view of the chest was performed.    COMPARISON:  07/03/2020.    FINDINGS:  Cardiac silhouette is normal in size.  Lungs are symmetrically expanded.  No evidence of focal consolidative process, pneumothorax, or significant pleural effusion.  No acute osseous abnormality identified.                              X-Rays:   Independently Interpreted Readings:   Other Readings:  CXR NAD    Medical Decision Making:   History:   Old Medical Records: I decided to obtain old medical records.  Old Records Summarized: records from clinic visits and records from previous admission(s).  Independently Interpreted Test(s):   I have ordered and independently interpreted X-rays - see prior notes.  I have ordered and independently interpreted EKG Reading(s) - see prior notes  Clinical Tests:   Lab Tests: Reviewed and Ordered  Radiological Study: Ordered and Reviewed  Medical Tests: Ordered and Reviewed    Additional MDM:   Heart Score:     History:          Slightly suspicious.  ECG:             Normal  Age:               Less than 45 years  Risk factors: no risk factors known  Troponin:       Less than or equal to normal limit  Final Score: 0      MDM  34 year old patient with hx of anxiety, tachycardiapresenting secondary to chest pain, fever, viral like symptoms. Patient is at lower risk of ACS due to risk factors and HPI with a heart score of 0. Patient has received an aspirin. EKG was reassuring and chest xray showed nothing acute. Most likely viral syndrome cause of symptoms.      Also considered but less likely:     PE: normal rate, no sob/recent immobilization/surgery/travel/family history.+plan b recently, +tachycardia, Ddimer +, CTA PE pending  Pneumonia: chest xray negative.  Tamponade: unlikely due to chest xray and ekg  STEMI: No STEMI on ekg, trop negative   Dissection: equal pulses bilaterally and no ripping chest pain to the back  Esophageal rupture: no dysphagia or vomiting and chest xray negative for mediastinal air  Arrhythmia: no arrhythmia on ekg  CHF: no fluid overload on Cxr and physical exam  Pneumothorax: bilateral breath sounds and no signs of pneumothorax on chest xray, BNP [facility]  Covid test negative.  UPT negative     Patient pending re-eval and CTA PE, care signed out to Dr. Montanez at 1047 pm.     Physician Update    Patient received as signout from Dr. Chou pending CTA chest.    35 yo female with possible POTS presents with shortness of breath and chest pain radiating to bilateral shoulders. She is a smoker. Patient took Plan B last week.    On exam, patient is awake, alert, and nontoxic.     CTA shows small PE.    I discussed result with patient and need for OBS for lovenox and transition to PO anticoagulation. Patient understands and is amenable.     Fabby Montanez             Scribganesh Attestation:   Scribe #1: I performed the above scribed service and the documentation accurately describes the services I  performed. I attest to the accuracy of the note.                          Clinical Impression:       ICD-10-CM ICD-9-CM   1. Pulmonary embolism, unspecified chronicity, unspecified pulmonary embolism type, unspecified whether acute cor pulmonale present  I26.99 415.19   2. Chest pain  R07.9 786.50   3. Tachycardia  R00.0 785.0             ED Disposition Condition    Observation        Scribe attestation: I,Jayda Chou, personally performed the services described in this documentation. All medical record entries made by the scribe were at my direction and in my presence.  I have reviewed the chart and agree that the record reflects my personal performance and is accurate and complete.                      Fabby Montanez MD  07/15/20 0031

## 2020-07-15 NOTE — HOSPITAL COURSE
"Patient hospitalized for small RUL PE. She was started on weight based lovenox. Initially had tachycardia but resolved and oxygen saturation remained stable on room air. She has no prior history of blood clot and no known family history. She is a current smoker and has been "sleeping in bed all day" for the past 3 weeks, which are the only possible factors contributing. She is feeling well today and stable for discharge home on xarelto.  "

## 2020-07-15 NOTE — PLAN OF CARE
CM spoke with [facility] and the patients Dwaine is en route to be delivered to [facility]

## 2020-07-15 NOTE — SUBJECTIVE & OBJECTIVE
Past Medical History:   Diagnosis Date    Anxiety     Depression     Hernia, hiatal     Inappropriate sinus tachycardia     POTS (postural orthostatic tachycardia syndrome)     Sepsis        History reviewed. No pertinent surgical history.    Review of patient's allergies indicates:   Allergen Reactions    Demerol (pf) [meperidine (pf)]        No current facility-administered medications on file prior to encounter.      Current Outpatient Medications on File Prior to Encounter   Medication Sig    omeprazole (PRILOSEC) 20 MG capsule Take 20 mg by mouth once daily.    acetaminophen (TYLENOL) 325 MG tablet Take 2 tablets (650 mg total) by mouth every 6 (six) hours as needed.    estradiol (ESTRACE) 0.01 % (0.1 mg/gram) vaginal cream Place 1 g vaginally once daily. (Patient not taking: Reported on 10/11/2019)    hydrOXYzine (ATARAX) 50 MG tablet Take 50 mg by mouth 4 (four) times daily as needed for Itching.    ibuprofen (ADVIL,MOTRIN) 600 MG tablet Take 1 tablet (600 mg total) by mouth every 6 (six) hours as needed.    nystatin (MYCOSTATIN) cream Apply topically 2 (two) times daily. (Patient not taking: Reported on 7/13/2020)     Family History     Problem Relation (Age of Onset)    Diabetes Mother, Father    Hypertension Mother, Father        Tobacco Use    Smoking status: Current Every Day Smoker     Packs/day: 1.00     Types: Cigarettes    Smokeless tobacco: Never Used    Tobacco comment: varies from 7 cigarettes to whole pack in day   Substance and Sexual Activity    Alcohol use: Yes     Frequency: Monthly or less     Comment: ocassionally    Drug use: No    Sexual activity: Yes     Partners: Male     Review of Systems   Constitutional: Positive for activity change. Negative for appetite change and fever.   HENT: Negative for congestion, ear pain, rhinorrhea and sinus pressure.    Eyes: Negative for pain and discharge.   Respiratory: Positive for chest tightness and shortness of breath. Negative  for cough and wheezing.    Cardiovascular: Positive for chest pain. Negative for leg swelling.   Gastrointestinal: Negative for abdominal distention, abdominal pain, diarrhea, nausea and vomiting.   Endocrine: Negative for cold intolerance and heat intolerance.   Genitourinary: Negative for difficulty urinating, flank pain, frequency, hematuria and urgency.   Musculoskeletal: Positive for myalgias. Negative for arthralgias and joint swelling.   Allergic/Immunologic: Negative for environmental allergies and food allergies.   Neurological: Negative for dizziness, weakness, light-headedness and headaches.   Hematological: Does not bruise/bleed easily.   Psychiatric/Behavioral: Negative for agitation, behavioral problems and decreased concentration.     Objective:     Vital Signs (Most Recent):  Temp: 98.9 °F (37.2 °C) (07/15/20 0332)  Pulse: 86 (07/15/20 0413)  Resp: 20 (07/15/20 0303)  BP: 106/61 (07/15/20 0402)  SpO2: 95 % (07/15/20 0413) Vital Signs (24h Range):  Temp:  [98.2 °F (36.8 °C)-98.9 °F (37.2 °C)] 98.9 °F (37.2 °C)  Pulse:  [] 86  Resp:  [19-24] 20  SpO2:  [95 %-100 %] 95 %  BP: (106-163)/(61-99) 106/61     Weight: 89.8 kg (198 lb)  Body mass index is 31.01 kg/m².    Physical Exam  Constitutional:       Appearance: Normal appearance. She is well-developed.   HENT:      Head: Normocephalic.   Eyes:      General:         Right eye: No discharge.         Left eye: No discharge.      Conjunctiva/sclera: Conjunctivae normal.   Neck:      Musculoskeletal: Normal range of motion and neck supple.   Cardiovascular:      Rate and Rhythm: Normal rate and regular rhythm.      Heart sounds: Normal heart sounds.   Pulmonary:      Effort: Pulmonary effort is normal. Tachypnea present. No respiratory distress.      Breath sounds: Normal breath sounds.   Abdominal:      General: Bowel sounds are normal. There is no distension.      Palpations: Abdomen is soft.      Tenderness: There is no abdominal tenderness.    Musculoskeletal: Normal range of motion.   Skin:     General: Skin is warm and dry.   Neurological:      Mental Status: She is alert and oriented to person, place, and time.      GCS: GCS eye subscore is 4. GCS verbal subscore is 5. GCS motor subscore is 6.      Motor: Motor function is intact.   Psychiatric:         Mood and Affect: Mood normal.         Speech: Speech normal.         Behavior: Behavior normal.             Significant Labs:   CBC:   Recent Labs   Lab 07/14/20  2105   WBC 7.59   HGB 12.8   HCT 40.9        CMP:   Recent Labs   Lab 07/14/20  2105      K 3.6      CO2 24   *   BUN 14   CREATININE 0.8   CALCIUM 9.1   PROT 7.1   ALBUMIN 3.9   BILITOT 0.3   ALKPHOS 60   AST 21   ALT 38   ANIONGAP 9   EGFRNONAA >60       Significant Imaging: I have reviewed all pertinent imaging results/findings within the past 24 hours.

## 2020-07-16 ENCOUNTER — NURSE TRIAGE (OUTPATIENT)
Dept: ADMINISTRATIVE | Facility: CLINIC | Age: 35
End: 2020-07-16

## 2020-07-16 ENCOUNTER — HOSPITAL ENCOUNTER (EMERGENCY)
Facility: HOSPITAL | Age: 35
Discharge: HOME | End: 2020-07-16
Attending: EMERGENCY MEDICINE
Payer: MEDICAID

## 2020-07-16 VITALS
DIASTOLIC BLOOD PRESSURE: 76 MMHG | SYSTOLIC BLOOD PRESSURE: 134 MMHG | OXYGEN SATURATION: 99 % | TEMPERATURE: 98 F | HEIGHT: 67 IN | HEART RATE: 79 BPM | BODY MASS INDEX: 31.08 KG/M2 | WEIGHT: 198 LBS | RESPIRATION RATE: 20 BRPM

## 2020-07-16 DIAGNOSIS — R07.9 CHEST PAIN: ICD-10-CM

## 2020-07-16 DIAGNOSIS — R06.02 SOB (SHORTNESS OF BREATH): Primary | ICD-10-CM

## 2020-07-16 DIAGNOSIS — I26.99 ACUTE PULMONARY EMBOLISM, UNSPECIFIED PULMONARY EMBOLISM TYPE, UNSPECIFIED WHETHER ACUTE COR PULMONALE PRESENT: ICD-10-CM

## 2020-07-16 DIAGNOSIS — F41.9 ANXIETY: ICD-10-CM

## 2020-07-16 LAB — TROPONIN I SERPL DL<=0.01 NG/ML-MCNC: <0.006 NG/ML (ref 0–0.03)

## 2020-07-16 PROCEDURE — 36000 PLACE NEEDLE IN VEIN: CPT

## 2020-07-16 PROCEDURE — 93010 EKG 12-LEAD: ICD-10-PCS | Mod: ,,, | Performed by: INTERNAL MEDICINE

## 2020-07-16 PROCEDURE — 93005 ELECTROCARDIOGRAM TRACING: CPT

## 2020-07-16 PROCEDURE — 84484 ASSAY OF TROPONIN QUANT: CPT

## 2020-07-16 PROCEDURE — 25000003 PHARM REV CODE 250: Performed by: EMERGENCY MEDICINE

## 2020-07-16 PROCEDURE — 93010 ELECTROCARDIOGRAM REPORT: CPT | Mod: ,,, | Performed by: INTERNAL MEDICINE

## 2020-07-16 PROCEDURE — 99284 EMERGENCY DEPT VISIT MOD MDM: CPT | Mod: 25

## 2020-07-16 PROCEDURE — U0003 INFECTIOUS AGENT DETECTION BY NUCLEIC ACID (DNA OR RNA); SEVERE ACUTE RESPIRATORY SYNDROME CORONAVIRUS 2 (SARS-COV-2) (CORONAVIRUS DISEASE [COVID-19]), AMPLIFIED PROBE TECHNIQUE, MAKING USE OF HIGH THROUGHPUT TECHNOLOGIES AS DESCRIBED BY CMS-2020-01-R: HCPCS

## 2020-07-16 RX ORDER — HYDROCODONE BITARTRATE AND ACETAMINOPHEN 5; 325 MG/1; MG/1
1 TABLET ORAL
Status: DISCONTINUED | OUTPATIENT
Start: 2020-07-16 | End: 2020-07-16

## 2020-07-16 RX ORDER — ALPRAZOLAM 0.5 MG/1
0.5 TABLET ORAL 3 TIMES DAILY PRN
Qty: 9 TABLET | Refills: 0 | OUTPATIENT
Start: 2020-07-16 | End: 2022-02-03

## 2020-07-16 RX ORDER — ALPRAZOLAM 0.5 MG/1
0.5 TABLET ORAL
Status: COMPLETED | OUTPATIENT
Start: 2020-07-16 | End: 2020-07-16

## 2020-07-16 RX ADMIN — ALPRAZOLAM 0.5 MG: 0.5 TABLET ORAL at 09:07

## 2020-07-16 NOTE — TELEPHONE ENCOUNTER
Pt took first dose of xeralto today, she is having widespread itching, and her chest feels a little funny, and  Her tongue may be a little more swollen than it was before. ( her tongue was already slightly swollen and has multiple canker sores on it, so she is having a hard time telling, but thinks it may be slightly more swollen) symptoms began approximately an hour after taking the Xeralto.    Reason for Disposition   Widespread hives, itching or facial swelling and onset < 2 hours of exposure to high-risk allergen (e.g., sting, nuts, 1st dose of antibiotic)    Additional Information   Negative: Life-threatening reaction in the past to similar substance (e.g., food, insect bite/sting, medication, etc.) and < 2 hours since exposure   Negative: Wheezing, stridor, hoarseness, or difficulty breathing   Negative: Tightness in the chest or throat and begins within 2 hours of exposure to allergic substance   Negative: Difficulty swallowing, drooling, or slurred speech   Negative: Difficult to awaken or acting confused (e.g., disoriented, slurred speech)   Negative: Unresponsive, passed out, or very weak   Negative: Other symptom of severe allergic reaction (Exception: Hives or facial swelling alone)   Negative: Sounds like a life-threatening emergency to the triager   Negative: Widespread hives and onset > 2 hours after exposure to high-risk allergen (e.g., sting, nuts, 1st dose of antibiotic)   Negative: Widespread itching and onset > 2 hours after exposure to high-risk allergen (e.g., sting, nuts, 1st dose of antibiotic)   Negative: Face swelling and onset > 2 hours after exposure to high-risk allergen (e.g., sting, nuts, 1st dose of antibiotic)    Protocols used: [id number]

## 2020-07-17 NOTE — ED PROVIDER NOTES
Encounter Date: 7/16/2020       History     Chief Complaint   Patient presents with    Shortness of Breath     pt reports SOB and chest pain ongoing a few days; pt reports recently being dx with a blood clot in her RIGHT lung; pt reports that the pain was in her right lower chest but moved to the middle and left lower chest a hour ago; pt appears anxious     Patient presents for evaluation of substernal chest tightness that started yesterday.  Patient has been having symptoms off and on since being diagnosed with a right-sided subsegmental pulmonary embolus in the right upper lobe on recent admission.  She was diagnosed at [facility] emergency room by transfer to [facility] for admission due to bed shortage.  Patient was started on Xarelto which she is compliant with.  She states she has intermittently had fever up to 101 F. she is concerned because she was exposed to a neighbor with COVID earlier in the week.  She tested negative at urgent care and during her hospital admission.  She states she has a nonproductive cough.  She complains of mild shortness of breath.  She was concerned because her heart rate is fluctuating as low as 50 beats per minute which is unusual for her because she is usually tachycardic.  Patient has a history of anxiety.  Patient states she had diarrhea today.  She denies abdominal pain.  No vomiting.  No hemoptysis.  No pleuritic component to the pain.  She also complains of periscapular back pain has been present since her diagnosis.  Of note her CTA did not show any evidence of pneumonia.  Her lab work was normal.  Patient was actually seen at [facility] last night for similar complaints and had Doppler ultrasound of both lower extremities that was negative.  EKG did not show any ischemia.  Troponin was normal.  Patient has been taking Tylenol for the pain with partial control.        Review of patient's allergies indicates:   Allergen Reactions    Demerol (pf) [meperidine (pf)]      Past  Medical History:   Diagnosis Date    Anxiety     Depression     Hernia, hiatal     Inappropriate sinus tachycardia     POTS (postural orthostatic tachycardia syndrome)     Sepsis      No past surgical history on file.  Family History   Problem Relation Age of Onset    Hypertension Mother     Diabetes Mother     Hypertension Father     Diabetes Father      Social History     Tobacco Use    Smoking status: Current Every Day Smoker     Packs/day: 1.00     Types: Cigarettes     Start date: 7/15/1996    Smokeless tobacco: Never Used    Tobacco comment: varies from 7 cigarettes to whole pack in day   Substance Use Topics    Alcohol use: Yes     Frequency: Monthly or less     Comment: ocassionally    Drug use: No     Review of Systems   Constitutional: Positive for fatigue and fever. Negative for chills and diaphoresis.   HENT: Negative for congestion and sore throat.    Respiratory: Positive for cough, chest tightness and shortness of breath.    Cardiovascular: Negative for chest pain, palpitations and leg swelling.   Gastrointestinal: Positive for diarrhea. Negative for abdominal pain, nausea and vomiting.   Genitourinary: Negative for dysuria and flank pain.   Musculoskeletal: Positive for back pain.   Skin: Negative for rash.   Neurological: Positive for dizziness and light-headedness. Negative for headaches.       Physical Exam     Initial Vitals [07/16/20 2130]   BP Pulse Resp Temp SpO2   (!) 164/93 76 20 98.3 °F (36.8 °C) 100 %      MAP       --         Physical Exam    Nursing note and vitals reviewed.  Constitutional: She appears well-developed and well-nourished. She is not diaphoretic. No distress.   HENT:   Head: Normocephalic and atraumatic.   Nose: Nose normal.   Mouth/Throat: Oropharynx is clear and moist.   Eyes: Conjunctivae and EOM are normal. Pupils are equal, round, and reactive to light. Right eye exhibits no discharge. Left eye exhibits no discharge. No scleral icterus.   Neck: Normal  range of motion. Neck supple. No tracheal deviation present. No JVD present.   Cardiovascular: Normal rate, regular rhythm and normal heart sounds.   No murmur heard.  Pulmonary/Chest: Breath sounds normal. No stridor. No respiratory distress. She has no wheezes. She has no rhonchi. She has no rales. She exhibits no tenderness.   Abdominal: Soft. She exhibits no distension. There is no abdominal tenderness. There is no rebound and no guarding.   Musculoskeletal: Normal range of motion. No tenderness or edema.   Neurological: She is alert and oriented to person, place, and time. GCS score is 15. GCS eye subscore is 4. GCS verbal subscore is 5. GCS motor subscore is 6.   Skin: Skin is warm and dry. No rash noted. No erythema.   Psychiatric: Her behavior is normal. Judgment and thought content normal.   Patient appears anxious.         ED Course   Procedures  Labs Reviewed   TROPONIN I   SARS-COV-2 (COVID-19) QUALITATIVE PCR     EKG Readings: (Independently Interpreted)   Initial Reading: No STEMI. Heart Rate: 63. Ectopy: No Ectopy. Conduction: Normal. ST Segments: Normal ST Segments. T Waves: Normal. Axis: Normal.   No ischemic changes.       Imaging Results          X-Ray Chest PA And Lateral (In process)                  Medical Decision Making:   Initial Assessment:   Patient complains chest tightness and shortness of breath.  Patient was recently diagnosed pulmonary embolism.  Patient is taking Xarelto.  Patient is concerned that she may have contracted COVID.  Patient has had 3-COVID test in the last 2 weeks.  Patient appears very anxious on exam.  Cardiac pulmonary exam is normal.  Oxygen saturations 100% on room air.  The patient is afebrile in the emergency room.  Blood pressure is normal in the emergency room.  Suspect that the majority of her symptoms are due to anxiety from her recent diagnosis interfere of brooke COVID.  Will retest for COVID with routine testing.  Will check chest x-ray and EKG and  troponin.  Differential Diagnosis:   Anxiety, pulmonary embolism, GERD, pneumonia, COVID  Clinical Tests:   Lab Tests: Ordered and Reviewed  The following lab test(s) were unremarkable: Troponin  Radiological Study: Ordered and Reviewed  Medical Tests: Ordered and Reviewed  ED Management:  2310:  Symptoms resolved was Xanax.  Chest x-ray is unchanged.  No infiltrates.  EKG is within normal limits.  Troponin is normal .  repeat COVID testing was sent.  Patient advised that if this is COVID test is negative that she should not be concerned about having acute infection at this time.  She was counseled about preventative measures to prevent COVID infection.  She was counseled about the symptoms associated with pulmonary embolism and to continue her current medications.  Blood pressure and heart rate are within normal limits.  Patient is currently afebrile.  I suspect the majority of her symptoms are due to anxiety over COVID and her recent diagnosis.  Patient advised to follow up with her primary care physician for further treatment for recent pulmonary embolism.                                 Clinical Impression:       ICD-10-CM ICD-9-CM   1. SOB (shortness of breath)  R06.02 786.05   2. Chest pain  R07.9 786.50   3. Anxiety  F41.9 300.00   4. Acute pulmonary embolism, unspecified pulmonary embolism type, unspecified whether acute cor pulmonale present  I26.99 415.19         Disposition:   Disposition: Discharged  Condition: Stable                        Souleymane Lopez MD  07/16/20 2312       Souleymane Lopez MD  07/16/20 2317

## 2020-07-18 LAB — SARS-COV-2 RNA RESP QL NAA+PROBE: NOT DETECTED

## 2020-07-29 ENCOUNTER — LAB VISIT (OUTPATIENT)
Dept: LAB | Facility: HOSPITAL | Age: 35
End: 2020-07-29
Attending: OBSTETRICS & GYNECOLOGY
Payer: MEDICAID

## 2020-07-29 ENCOUNTER — OFFICE VISIT (OUTPATIENT)
Dept: OBSTETRICS AND GYNECOLOGY | Facility: CLINIC | Age: 35
End: 2020-07-29
Payer: MEDICAID

## 2020-07-29 VITALS — SYSTOLIC BLOOD PRESSURE: 128 MMHG | BODY MASS INDEX: 31.25 KG/M2 | DIASTOLIC BLOOD PRESSURE: 78 MMHG | WEIGHT: 199.5 LBS

## 2020-07-29 DIAGNOSIS — Z11.3 SCREEN FOR STD (SEXUALLY TRANSMITTED DISEASE): ICD-10-CM

## 2020-07-29 DIAGNOSIS — Z11.3 SCREEN FOR STD (SEXUALLY TRANSMITTED DISEASE): Primary | ICD-10-CM

## 2020-07-29 PROCEDURE — 86703 HIV-1/HIV-2 1 RESULT ANTBDY: CPT

## 2020-07-29 PROCEDURE — 86803 HEPATITIS C AB TEST: CPT

## 2020-07-29 PROCEDURE — 87661 TRICHOMONAS VAGINALIS AMPLIF: CPT

## 2020-07-29 PROCEDURE — 99999 PR PBB SHADOW E&M-EST. PATIENT-LVL III: CPT | Mod: PBBFAC,,, | Performed by: OBSTETRICS & GYNECOLOGY

## 2020-07-29 PROCEDURE — 99213 OFFICE O/P EST LOW 20 MIN: CPT | Mod: S$PBB,,, | Performed by: OBSTETRICS & GYNECOLOGY

## 2020-07-29 PROCEDURE — 87340 HEPATITIS B SURFACE AG IA: CPT

## 2020-07-29 PROCEDURE — 86592 SYPHILIS TEST NON-TREP QUAL: CPT

## 2020-07-29 PROCEDURE — 36415 COLL VENOUS BLD VENIPUNCTURE: CPT

## 2020-07-29 PROCEDURE — 87510 GARDNER VAG DNA DIR PROBE: CPT

## 2020-07-29 PROCEDURE — 87801 DETECT AGNT MULT DNA AMPLI: CPT

## 2020-07-29 PROCEDURE — 99213 PR OFFICE/OUTPT VISIT, EST, LEVL III, 20-29 MIN: ICD-10-PCS | Mod: S$PBB,,, | Performed by: OBSTETRICS & GYNECOLOGY

## 2020-07-29 PROCEDURE — 87481 CANDIDA DNA AMP PROBE: CPT | Mod: 59

## 2020-07-29 PROCEDURE — 99999 PR PBB SHADOW E&M-EST. PATIENT-LVL III: ICD-10-PCS | Mod: PBBFAC,,, | Performed by: OBSTETRICS & GYNECOLOGY

## 2020-07-29 PROCEDURE — 99213 OFFICE O/P EST LOW 20 MIN: CPT | Mod: PBBFAC | Performed by: OBSTETRICS & GYNECOLOGY

## 2020-07-29 PROCEDURE — 87491 CHLMYD TRACH DNA AMP PROBE: CPT

## 2020-07-29 NOTE — PROGRESS NOTES
Subjective:       Patient ID: Snow Barney is a 34 y.o. female.    Chief Complaint:  STD CHECK      History of Present Illness  HPI  Patient requesting STD check.  She thinks her partner is being unfaithful.    She was recently diagnosed with a pulmonary embolism.  She stopped her OCP.      GYN & OB History  Patient's last menstrual period was 2020.   Date of Last Pap: 10/20/2019    OB History    Para Term  AB Living   6 3 2 1 3 3   SAB TAB Ectopic Multiple Live Births                  # Outcome Date GA Lbr Benjamin/2nd Weight Sex Delivery Anes PTL Lv   6 Term            5 Term            4 AB            3 AB            2 AB            1                Obstetric Comments   GYN Hx: 13/ No menses with Iud   Mirena IUD in 2016   History of abnormal pap with colposcopy   History of chlamydia.       Review of Systems  Review of Systems   Constitutional: Negative.    HENT: Negative.    Eyes: Negative.    Respiratory: Negative.    Cardiovascular: Negative.    Gastrointestinal: Negative.    Endocrine: Negative.    Genitourinary: Negative.    Musculoskeletal: Negative.    Integumentary:  Negative.   Neurological: Negative.    Hematological: Negative.    Psychiatric/Behavioral: Negative.    Breast: negative.            Objective:    Physical Exam:   Constitutional: She is oriented to person, place, and time. She appears well-developed.    HENT:   Head: Normocephalic and atraumatic.    Eyes: EOM are normal.     Cardiovascular: Normal rate.     Pulmonary/Chest: Effort normal.        Abdominal: Soft. There is no abdominal tenderness.     Genitourinary:    Vagina and uterus normal.      Pelvic exam was performed with patient supine.   There is no rash, tenderness or lesion on the right labia. There is no rash, tenderness or lesion on the left labia. Uterus is not tender. Cervix is normal. Right adnexum displays no tenderness and no fullness. Left adnexum displays no tenderness and no fullness. No erythema  or bleeding in the vagina. Labial bartholins normal.Cervix exhibits no motion tenderness. negative for vaginal discharge          Musculoskeletal: Normal range of motion.       Neurological: She is oriented to person, place, and time.    Skin: Skin is warm.    Psychiatric: She has a normal mood and affect.          Assessment:        1. Screen for STD (sexually transmitted disease)                Plan:      1. Screen for STD (sexually transmitted disease)  - C. trachomatis/N. gonorrhoeae by AMP DNA  - Hepatitis B Surface Antigen; Future  - Hepatitis C Antibody; Future  - HIV 1/2 Ag/Ab (4th Gen); Future  - RPR; Future  - Vaginosis Screen by DNA Probe      Pt to follow up in 12 wks for possible BTL.

## 2020-07-30 LAB
HBV SURFACE AG SERPL QL IA: NEGATIVE
HCV AB SERPL QL IA: NEGATIVE
HIV 1+2 AB+HIV1 P24 AG SERPL QL IA: NEGATIVE
RPR SER QL: NORMAL

## 2020-08-04 LAB
C TRACH DNA SPEC QL NAA+PROBE: NOT DETECTED
N GONORRHOEA DNA SPEC QL NAA+PROBE: NOT DETECTED

## 2020-08-06 LAB
CANDIDA RRNA VAG QL PROBE: NEGATIVE
G VAGINALIS RRNA GENITAL QL PROBE: POSITIVE
T VAGINALIS RRNA GENITAL QL PROBE: NEGATIVE

## 2020-08-07 ENCOUNTER — PATIENT MESSAGE (OUTPATIENT)
Dept: OBSTETRICS AND GYNECOLOGY | Facility: CLINIC | Age: 35
End: 2020-08-07

## 2020-08-07 DIAGNOSIS — B96.89 BV (BACTERIAL VAGINOSIS): Primary | ICD-10-CM

## 2020-08-07 DIAGNOSIS — N76.0 BV (BACTERIAL VAGINOSIS): Primary | ICD-10-CM

## 2020-08-07 RX ORDER — METRONIDAZOLE 500 MG/1
500 TABLET ORAL 2 TIMES DAILY
Qty: 14 TABLET | Refills: 0 | Status: SHIPPED | OUTPATIENT
Start: 2020-08-07 | End: 2020-08-11 | Stop reason: CLARIF

## 2020-08-11 ENCOUNTER — HOSPITAL ENCOUNTER (EMERGENCY)
Facility: HOSPITAL | Age: 35
Discharge: HOME | End: 2020-08-11
Attending: EMERGENCY MEDICINE
Payer: MEDICAID

## 2020-08-11 VITALS
RESPIRATION RATE: 18 BRPM | HEART RATE: 78 BPM | BODY MASS INDEX: 30.61 KG/M2 | OXYGEN SATURATION: 99 % | SYSTOLIC BLOOD PRESSURE: 114 MMHG | HEIGHT: 67 IN | TEMPERATURE: 98 F | DIASTOLIC BLOOD PRESSURE: 74 MMHG | WEIGHT: 195 LBS

## 2020-08-11 DIAGNOSIS — R06.02 SOB (SHORTNESS OF BREATH): ICD-10-CM

## 2020-08-11 DIAGNOSIS — R07.9 CHEST PAIN: ICD-10-CM

## 2020-08-11 DIAGNOSIS — Z86.711 HISTORY OF PULMONARY EMBOLISM: ICD-10-CM

## 2020-08-11 LAB
ANION GAP SERPL CALC-SCNC: 13 MMOL/L (ref 8–16)
APTT BLDCRRT: 30.3 SEC (ref 21–32)
B-HCG UR QL: NEGATIVE
BASOPHILS # BLD AUTO: 0.03 K/UL (ref 0–0.2)
BASOPHILS NFR BLD: 0.5 % (ref 0–1.9)
BUN SERPL-MCNC: 16 MG/DL (ref 6–30)
CHLORIDE SERPL-SCNC: 106 MMOL/L (ref 95–110)
CREAT SERPL-MCNC: 0.6 MG/DL (ref 0.5–1.4)
CTP QC/QA: YES
DIFFERENTIAL METHOD: ABNORMAL
EOSINOPHIL # BLD AUTO: 0.3 K/UL (ref 0–0.5)
EOSINOPHIL NFR BLD: 4.7 % (ref 0–8)
ERYTHROCYTE [DISTWIDTH] IN BLOOD BY AUTOMATED COUNT: 13.8 % (ref 11.5–14.5)
GLUCOSE SERPL-MCNC: 101 MG/DL (ref 70–110)
HCT VFR BLD AUTO: 38.8 % (ref 37–48.5)
HCT VFR BLD CALC: 40 %PCV (ref 36–54)
HGB BLD-MCNC: 12.6 G/DL (ref 12–16)
IMM GRANULOCYTES # BLD AUTO: 0.01 K/UL (ref 0–0.04)
IMM GRANULOCYTES NFR BLD AUTO: 0.2 % (ref 0–0.5)
INR PPP: 1 (ref 0.8–1.2)
LYMPHOCYTES # BLD AUTO: 1.8 K/UL (ref 1–4.8)
LYMPHOCYTES NFR BLD: 30.9 % (ref 18–48)
MCH RBC QN AUTO: 29.9 PG (ref 27–31)
MCHC RBC AUTO-ENTMCNC: 32.5 G/DL (ref 32–36)
MCV RBC AUTO: 92 FL (ref 82–98)
MONOCYTES # BLD AUTO: 0.4 K/UL (ref 0.3–1)
MONOCYTES NFR BLD: 7.4 % (ref 4–15)
NEUTROPHILS # BLD AUTO: 3.3 K/UL (ref 1.8–7.7)
NEUTROPHILS NFR BLD: 56.3 % (ref 38–73)
NRBC BLD-RTO: 0 /100 WBC
PLATELET # BLD AUTO: 148 K/UL (ref 150–350)
PMV BLD AUTO: 10.7 FL (ref 9.2–12.9)
POC IONIZED CALCIUM: 1.27 MMOL/L (ref 1.06–1.42)
POC TCO2 (MEASURED): 24 MMOL/L (ref 23–29)
POTASSIUM BLD-SCNC: 4.3 MMOL/L (ref 3.5–5.1)
PROTHROMBIN TIME: 11 SEC (ref 9–12.5)
RBC # BLD AUTO: 4.21 M/UL (ref 4–5.4)
SAMPLE: NORMAL
SODIUM BLD-SCNC: 138 MMOL/L (ref 136–145)
TROPONIN I SERPL DL<=0.01 NG/ML-MCNC: <0.006 NG/ML (ref 0–0.03)
WBC # BLD AUTO: 5.93 K/UL (ref 3.9–12.7)

## 2020-08-11 PROCEDURE — 93005 ELECTROCARDIOGRAM TRACING: CPT

## 2020-08-11 PROCEDURE — 93010 EKG 12-LEAD: ICD-10-PCS | Mod: ,,, | Performed by: INTERNAL MEDICINE

## 2020-08-11 PROCEDURE — 82565 ASSAY OF CREATININE: CPT

## 2020-08-11 PROCEDURE — 63600175 PHARM REV CODE 636 W HCPCS: Performed by: NURSE PRACTITIONER

## 2020-08-11 PROCEDURE — 84132 ASSAY OF SERUM POTASSIUM: CPT

## 2020-08-11 PROCEDURE — 84295 ASSAY OF SERUM SODIUM: CPT

## 2020-08-11 PROCEDURE — 85025 COMPLETE CBC W/AUTO DIFF WBC: CPT

## 2020-08-11 PROCEDURE — 85610 PROTHROMBIN TIME: CPT

## 2020-08-11 PROCEDURE — 84484 ASSAY OF TROPONIN QUANT: CPT

## 2020-08-11 PROCEDURE — 85730 THROMBOPLASTIN TIME PARTIAL: CPT

## 2020-08-11 PROCEDURE — 81025 URINE PREGNANCY TEST: CPT | Performed by: EMERGENCY MEDICINE

## 2020-08-11 PROCEDURE — 96374 THER/PROPH/DIAG INJ IV PUSH: CPT | Mod: 59

## 2020-08-11 PROCEDURE — 85014 HEMATOCRIT: CPT

## 2020-08-11 PROCEDURE — 82330 ASSAY OF CALCIUM: CPT

## 2020-08-11 PROCEDURE — 25500020 PHARM REV CODE 255: Performed by: EMERGENCY MEDICINE

## 2020-08-11 PROCEDURE — 99285 EMERGENCY DEPT VISIT HI MDM: CPT | Mod: 25

## 2020-08-11 PROCEDURE — 99900035 HC TECH TIME PER 15 MIN (STAT)

## 2020-08-11 PROCEDURE — 93010 ELECTROCARDIOGRAM REPORT: CPT | Mod: ,,, | Performed by: INTERNAL MEDICINE

## 2020-08-11 RX ORDER — LORAZEPAM 2 MG/ML
0.5 INJECTION INTRAMUSCULAR
Status: DISCONTINUED | OUTPATIENT
Start: 2020-08-11 | End: 2020-08-11

## 2020-08-11 RX ORDER — MORPHINE SULFATE 10 MG/ML
4 INJECTION INTRAMUSCULAR; INTRAVENOUS; SUBCUTANEOUS
Status: COMPLETED | OUTPATIENT
Start: 2020-08-11 | End: 2020-08-11

## 2020-08-11 RX ORDER — HYDROCODONE BITARTRATE AND ACETAMINOPHEN 5; 325 MG/1; MG/1
1 TABLET ORAL EVERY 6 HOURS PRN
Qty: 12 TABLET | Refills: 0 | OUTPATIENT
Start: 2020-08-11 | End: 2022-02-03

## 2020-08-11 RX ADMIN — MORPHINE SULFATE 4 MG: 10 INJECTION INTRAVENOUS at 12:08

## 2020-08-11 RX ADMIN — IOHEXOL 75 ML: 350 INJECTION, SOLUTION INTRAVENOUS at 10:08

## 2020-08-11 NOTE — ED PROVIDER NOTES
Encounter Date: 8/11/2020       History     Chief Complaint   Patient presents with    Chest Pain     right CP radiating to right side and back    Leg Pain     left leg pain x 2 weeks dx with PE x 1 month    Flank Pain     right side started this am,getting worse     HPI   CC: SOB and chest pain     This is a 34 y.o. female with PMHx of GERD, anxiety, hiatal hernia, and recent pulmonary embolism, who presents to the ED with right side chest pain and SOB. The patient reports that she woke up this morning feeling SOB along with right side chest pain that is worse with laying down. The patient denies taking any medications for the pain. The patient also reports that she has had mild, intermittent, tenderness with movements to her left leg in the calf area that does not radiate. The patient denies heart palpitations, injuries, cough,  N/V/D, or fever. She reports that she is currently still smoking cigarettes and is trying to quit. The patient reports no other health concerns.     The history is provided by the patient. No  was used.   Review of patient's allergies indicates:   Allergen Reactions    Demerol (pf) [meperidine (pf)]      Past Medical History:   Diagnosis Date    Anxiety     Depression     Hernia, hiatal     Inappropriate sinus tachycardia     POTS (postural orthostatic tachycardia syndrome)     Pulmonary emboli 2020    Sepsis      No past surgical history on file.  Family History   Problem Relation Age of Onset    Hypertension Mother     Diabetes Mother     Hypertension Father     Diabetes Father      Social History     Tobacco Use    Smoking status: Current Every Day Smoker     Packs/day: 1.00     Types: Cigarettes     Start date: 7/15/1996    Smokeless tobacco: Never Used    Tobacco comment: varies from 7 cigarettes to whole pack in day   Substance Use Topics    Alcohol use: Yes     Frequency: Monthly or less     Comment: ocassionally    Drug use: No     Review of  Systems  Constitutional: Negative for chills and fever.   HENT: Negative for congestion and sore throat.    Respiratory: Positive for shortness of breath. Negative for chest tightness.    Cardiovascular: Negative for chest pain and leg swelling.   Gastrointestinal: Negative for diarrhea, nausea and vomiting.   Genitourinary: Negative for dysuria, flank pain, hematuria and urgency.   Musculoskeletal: Negative for back pain.   Skin: Negative for rash.   Neurological: Positive for dizziness. Negative for syncope, weakness, numbness and headaches.   Hematological: Does not bruise/bleed easily.   Psychiatric/Behavioral: Negative for agitation and confusion.   Physical Exam     Initial Vitals [08/11/20 0800]   BP Pulse Resp Temp SpO2   134/79 87 18 98.6 °F (37 °C) 98 %      MAP       --         Physical Exam  Nursing note and vitals reviewed.  Constitutional: She appears well-developed and well-nourished.   HENT:   Head: Atraumatic.   Cardiovascular: Normal rate, regular rhythm, normal heart sounds, intact distal pulses and normal pulses.   Pulmonary/Chest: Effort normal and breath sounds normal.   Abdominal: She exhibits no distension. There is no abdominal tenderness. There is no rebound.   Musculoskeletal:      Left lower leg: She exhibits tenderness. She exhibits no swelling. No edema.        Legs:       Comments: Mild tenderness and pain to palpation of posterior left calf. No edema noted, distal pulses are symmetric and strong.   Neurological: She is alert and oriented to person, place, and time. She has normal strength.   Skin: Skin is warm. Capillary refill takes less than 2 seconds. No rash noted.   Psychiatric: She has a normal mood and affect.     ED Course   Procedures  Labs Reviewed   CBC W/ AUTO DIFFERENTIAL - Abnormal; Notable for the following components:       Result Value    Platelets 148 (*)     All other components within normal limits   PROTIME-INR   APTT   TROPONIN I   POCT URINE PREGNANCY   ISTAT  PROCEDURE   ISTAT CHEM8          Imaging Results          CTA Chest Non-Coronary (PE Study) (Final result)  Result time 08/11/20 10:42:33    Final result by Elmer Rodriguez MD (08/11/20 10:42:33)                 Impression:      1. The previously noted small filling defect in the right upper lobe pulmonary artery appears to be resolved on the current study.  No definite signs for acute pulmonary embolism on the current study.  2. Small sliding hiatal hernia.  3. Rest of the examination is unremarkable.      Electronically signed by: Elmer Rodriguez MD  Date:    08/11/2020  Time:    10:42             Narrative:    EXAMINATION:  CTA CHEST NON CORONARY    CLINICAL HISTORY:  PE suspected, intermediate prob, positive D-dimer;    TECHNIQUE:  Low dose axial images, sagittal and coronal reformations were obtained from the thoracic inlet to the lung bases following the IV administration of 75 mL of Omnipaque 350.  Contrast timing was optimized to evaluate the pulmonary arteries.  MIP images were performed.    COMPARISON:  None    FINDINGS:  There is a crescentic shaped artifact seen in the main pulmonary artery seen also on the previous study of 07/14/2020    The previously noted small defect in the right upper lobe pulmonary artery proximally is no longer visualized on the current study.  In the rest of the pulmonary arteries no persistent filling defects to suggest any pulmonary embolism.    There are flow artifacts noted in the superior vena cava.    There is a normal ascending and descending thoracic aorta without definite signs for dissections.    No significant axillary, supraclavicular or mediastinal or hilar adenopathy.  Normal enhancement of the thyroid is noted.    Lungs are clear.  No lobar consolidations.  Normal appearance of the tracheobronchial tree.    No gross abnormalities of the visualized bony thorax.  Adrenal glands demonstrate no significant abnormalities.  The visualized portions of the liver and the spleen  are unremarkable.  There is a small sliding hiatal hernia.                               US Lower Extremity Veins Bilateral (Final result)  Result time 08/11/20 10:03:31    Final result by Elmer Rodriguez MD (08/11/20 10:03:31)                 Impression:      No evidence of deep venous thrombosis in either lower extremity.      Electronically signed by: Elmer Rodriguez MD  Date:    08/11/2020  Time:    10:03             Narrative:    EXAMINATION:  US LOWER EXTREMITY VEINS BILATERAL    CLINICAL HISTORY:  Personal history of pulmonary embolism    TECHNIQUE:  Duplex and color flow Doppler and dynamic compression was performed of the bilateral lower extremity veins was performed.    COMPARISON:  None    FINDINGS:  Right thigh veins: The common femoral, femoral, popliteal, upper greater saphenous, and deep femoral veins are patent and free of thrombus. The veins are normally compressible and have normal phasic flow and augmentation response.    Right calf veins: The visualized calf veins are patent.    Left thigh veins: The common femoral, femoral, popliteal, upper greater saphenous, and deep femoral veins are patent and free of thrombus. The veins are normally compressible and have normal phasic flow and augmentation response.    Left calf veins: The visualized calf veins are patent.    Miscellaneous: None                               X-Ray Chest AP Portable (Final result)  Result time 08/11/20 09:06:12    Final result by Nancy Reveels MD (08/11/20 09:06:12)                 Impression:      No convincing evidence of disease.      Electronically signed by: Nancy Reveles MD  Date:    08/11/2020  Time:    09:06             Narrative:    EXAMINATION:  XR CHEST AP PORTABLE    CLINICAL HISTORY:  Shortness of breath    TECHNIQUE:  Single frontal view of the chest was performed.    COMPARISON:  07/16/2020.  07/14/2020.    FINDINGS:  Mediastinal structures are midline. Cardiac silhouette and pulmonary vascular distribution  are normal.    Lung volumes are normal and symmetric. I detect no pulmonary disease, pleural fluid, lymph node enlargement, cardiac decompensation, pneumothorax, pneumomediastinum, pneumoperitoneum or significant osseous abnormality.                                 Medical Decision Making:   Clinical Tests:   Lab Tests: Ordered and Reviewed       <> Summary of Lab: See below  Radiological Study: Ordered and Reviewed  Medical Tests: Ordered and Reviewed       APC / Resident Notes:   Patient is a 34-year-old female with a recent history of pulmonary embolus who presents for shortness of breath as well as left calf tenderness.  On physical examination the patient is afebrile and nontoxic in no apparent distress.  Bilateral breath sounds are clear to auscultation heart regular rate rhythm no murmurs clicks or rubs.  Nursing staff has measured the calves and found that the right calf is actually slightly larger than the left.  Distal pulse sensation and movement are intact.  Differential diagnosis includes DVT/PE/ACS MI, anxiety, conversion reaction, COVID pneumonia.              ED Course as of Aug 11 1213   Tue Aug 11, 2020   0805 Preg Test, Ur: Negative [VC]   0821 Student report: pt felt better at time of d/c. Last night right sided chest pain. This am had pain and sob lying down, better with sitting up, int pain in left calf since d/c. Left calf tender to palpation.     [VC]   0824 Pt was seen by perla loredo    [VC]   0914 No convincing evidence of disease.      X-Ray Chest AP Portable [VC]   0943 Essentially normal istat chem8   ISTAT PROCEDURE [VC]   1007 No evidence of deep venous thrombosis in either lower extremity.   US Lower Extremity Veins Bilateral [VC]   1015 CBC: leukocyte count was normal, the H&H was normal. The platelet count was reduced. This indicates mild asymptomatic thrombocytopenia       CBC auto differential(!) [VC]   1022 aPTT: 30.3 [VC]   1022 Protime: 11.0 [VC]   1022 INR: 1.0 [VC]    1031 Awaiting cta result and troponin result.    [VC]   1046 1. The previously noted small filling defect in the right upper lobe pulmonary artery appears to be resolved on the current study.  No definite signs for acute pulmonary embolism on the current study.  2. Small sliding hiatal hernia.  3. Rest of the examination is unremarkable.   CTA Chest Non-Coronary (PE Study) [VC]   1048 EKG 86, NSR NO ECTOPY, NO STEMI.    [VC]   1122 Troponin I: <0.006 [VC]      ED Course User Index  [VC] Clark Rust DNP      I have had a long conversation with the patient and she verbalized understanding that her symptoms are most likely secondary to her pre-existing pulmonary embolus.  She understands she should continue take her Xarelto as ordered and follow up as directed.  Medication was given for discomfort.  She should return for any worsening or changes in condition otherwise follow up as directed.See above for analyses of radiology, labs, and events during pt's visit and direct actions taken. Symptomatic therapies and return precautions on AVS.   Medication choices were made after reviewing allergies, medications, history, available laboratories. See below for discharge prescriptions if any and disposition.             Clinical Impression:       ICD-10-CM ICD-9-CM   1. Chest pain  R07.9 786.50   2. History of pulmonary embolism  Z86.711 V12.55   3. SOB (shortness of breath)  R06.02 786.05         Disposition:   Disposition: Discharged  Condition: Stable     ED Disposition Condition    Discharge Stable        ED Prescriptions     None        Follow-up Information     Follow up With Specialties Details Why Contact Info    [facility] Schedule an appointment as soon as possible for a visit   [address] [place] [id number]  [phone]                                       Clark Rust DNP  08/11/20 1210

## 2020-08-11 NOTE — MEDICAL/APP STUDENT
History     Chief Complaint   Patient presents with    Chest Pain     right CP radiating to right side and back    Leg Pain     left leg pain x 2 weeks dx with PE x 1 month    Flank Pain     right side started this am,getting worse     CC: SOB and chest pain    This is a 34 y.o. female with PMHx of GERD, anxiety, hiatal hernia, and recent pulmonary embolism, who presents to the ED with right side chest pain and SOB. The patient reports that she woke up this morning feeling SOB along with right side chest pain that is worse with laying down. The patient denies taking any medications for the pain. The patient also reports that she has had mild, intermittent, tenderness with movements to her left leg in the calf area that does not radiate. The patient denies heart palpitations, injuries, cough,  N/V/D, or fever. She reports that she is currently still smoking cigarettes and is trying to quit. The patient reports no other health concerns.    The history is provided by the patient. No  was used.       Past Medical History:   Diagnosis Date    Anxiety     Depression     Hernia, hiatal     Inappropriate sinus tachycardia     POTS (postural orthostatic tachycardia syndrome)     Pulmonary emboli 2020    Sepsis        No past surgical history on file.    Family History   Problem Relation Age of Onset    Hypertension Mother     Diabetes Mother     Hypertension Father     Diabetes Father        Social History     Tobacco Use    Smoking status: Current Every Day Smoker     Packs/day: 1.00     Types: Cigarettes     Start date: 7/15/1996    Smokeless tobacco: Never Used    Tobacco comment: varies from 7 cigarettes to whole pack in day   Substance Use Topics    Alcohol use: Yes     Frequency: Monthly or less     Comment: ocassionally    Drug use: No       Review of Systems   Constitutional: Negative for chills and fever.   HENT: Negative for congestion and sore throat.    Respiratory:  "Positive for shortness of breath. Negative for chest tightness.    Cardiovascular: Negative for chest pain and leg swelling.   Gastrointestinal: Negative for diarrhea, nausea and vomiting.   Genitourinary: Negative for dysuria, flank pain, hematuria and urgency.   Musculoskeletal: Negative for back pain.   Skin: Negative for rash.   Neurological: Positive for dizziness. Negative for syncope, weakness, numbness and headaches.   Hematological: Does not bruise/bleed easily.   Psychiatric/Behavioral: Negative for agitation and confusion.       Physical Exam   /79 (BP Location: Right arm, Patient Position: Sitting)   Pulse 87   Temp 98.6 °F (37 °C) (Oral)   Resp 18   Ht 5' 7" (1.702 m)   Wt 88.5 kg (195 lb)   LMP 07/20/2020   SpO2 98%   BMI 30.54 kg/m²     Physical Exam    Nursing note and vitals reviewed.  Constitutional: She appears well-developed and well-nourished.   HENT:   Head: Atraumatic.   Cardiovascular: Normal rate, regular rhythm, normal heart sounds, intact distal pulses and normal pulses.   Pulmonary/Chest: Effort normal and breath sounds normal.   Abdominal: She exhibits no distension. There is no abdominal tenderness. There is no rebound.   Musculoskeletal:      Left lower leg: She exhibits tenderness. She exhibits no swelling. No edema.        Legs:       Comments: Mild tenderness and pain to palpation of posterior left calf. No edema noted, distal pulses are symmetric and strong.   Neurological: She is alert and oriented to person, place, and time. She has normal strength.   Skin: Skin is warm. Capillary refill takes less than 2 seconds. No rash noted.   Psychiatric: She has a normal mood and affect.         ED Course         "

## 2020-08-11 NOTE — ED TRIAGE NOTES
Pt arrived to the Ed in personal vehicle complaining of SOB and chest pains that started at 4am. Pt states that the chest pain woke her out of her sleep. Pt was diagnosed a month with a pulmonary embolism. Pt was put on Xarelto.

## 2020-08-11 NOTE — DISCHARGE INSTRUCTIONS
Labwork was negative for any abnormalities.  Imaging did not show change in pulmonary embolism.  Continue all current meds and treatments.  You have been given a medication that may cause drowsiness, do not drive or operate heavy machinery with this medication. Follow up as directed.  Return to the Emergency department for any worsening or failure to improve, otherwise follow up with your primary care provider.

## 2020-08-14 ENCOUNTER — OFFICE VISIT (OUTPATIENT)
Dept: OBSTETRICS AND GYNECOLOGY | Facility: CLINIC | Age: 35
End: 2020-08-14
Payer: MEDICAID

## 2020-08-14 ENCOUNTER — LAB VISIT (OUTPATIENT)
Dept: LAB | Facility: HOSPITAL | Age: 35
End: 2020-08-14
Attending: OBSTETRICS & GYNECOLOGY
Payer: MEDICAID

## 2020-08-14 VITALS — DIASTOLIC BLOOD PRESSURE: 78 MMHG | SYSTOLIC BLOOD PRESSURE: 126 MMHG

## 2020-08-14 DIAGNOSIS — N93.8 DUB (DYSFUNCTIONAL UTERINE BLEEDING): ICD-10-CM

## 2020-08-14 DIAGNOSIS — N93.9 EPISODE OF HEAVY VAGINAL BLEEDING: Primary | ICD-10-CM

## 2020-08-14 DIAGNOSIS — N93.8 DUB (DYSFUNCTIONAL UTERINE BLEEDING): Primary | ICD-10-CM

## 2020-08-14 LAB
BASOPHILS # BLD AUTO: 0.03 K/UL (ref 0–0.2)
BASOPHILS NFR BLD: 0.5 % (ref 0–1.9)
DIFFERENTIAL METHOD: NORMAL
EOSINOPHIL # BLD AUTO: 0.2 K/UL (ref 0–0.5)
EOSINOPHIL NFR BLD: 3.3 % (ref 0–8)
ERYTHROCYTE [DISTWIDTH] IN BLOOD BY AUTOMATED COUNT: 13.3 % (ref 11.5–14.5)
HCT VFR BLD AUTO: 38.9 % (ref 37–48.5)
HGB BLD-MCNC: 12.6 G/DL (ref 12–16)
IMM GRANULOCYTES # BLD AUTO: 0.02 K/UL (ref 0–0.04)
IMM GRANULOCYTES NFR BLD AUTO: 0.3 % (ref 0–0.5)
LYMPHOCYTES # BLD AUTO: 1.9 K/UL (ref 1–4.8)
LYMPHOCYTES NFR BLD: 29.8 % (ref 18–48)
MCH RBC QN AUTO: 29.9 PG (ref 27–31)
MCHC RBC AUTO-ENTMCNC: 32.4 G/DL (ref 32–36)
MCV RBC AUTO: 92 FL (ref 82–98)
MONOCYTES # BLD AUTO: 0.5 K/UL (ref 0.3–1)
MONOCYTES NFR BLD: 7.2 % (ref 4–15)
NEUTROPHILS # BLD AUTO: 3.8 K/UL (ref 1.8–7.7)
NEUTROPHILS NFR BLD: 58.9 % (ref 38–73)
NRBC BLD-RTO: 0 /100 WBC
PLATELET # BLD AUTO: 169 K/UL (ref 150–350)
PMV BLD AUTO: 11.1 FL (ref 9.2–12.9)
RBC # BLD AUTO: 4.21 M/UL (ref 4–5.4)
WBC # BLD AUTO: 6.41 K/UL (ref 3.9–12.7)

## 2020-08-14 PROCEDURE — 85025 COMPLETE CBC W/AUTO DIFF WBC: CPT

## 2020-08-14 PROCEDURE — 99213 PR OFFICE/OUTPT VISIT, EST, LEVL III, 20-29 MIN: ICD-10-PCS | Mod: S$PBB,,, | Performed by: OBSTETRICS & GYNECOLOGY

## 2020-08-14 PROCEDURE — 99999 PR PBB SHADOW E&M-EST. PATIENT-LVL I: CPT | Mod: PBBFAC,,, | Performed by: OBSTETRICS & GYNECOLOGY

## 2020-08-14 PROCEDURE — 99211 OFF/OP EST MAY X REQ PHY/QHP: CPT | Mod: PBBFAC | Performed by: OBSTETRICS & GYNECOLOGY

## 2020-08-14 PROCEDURE — 99999 PR PBB SHADOW E&M-EST. PATIENT-LVL I: ICD-10-PCS | Mod: PBBFAC,,, | Performed by: OBSTETRICS & GYNECOLOGY

## 2020-08-14 PROCEDURE — 99213 OFFICE O/P EST LOW 20 MIN: CPT | Mod: S$PBB,,, | Performed by: OBSTETRICS & GYNECOLOGY

## 2020-08-14 PROCEDURE — 36415 COLL VENOUS BLD VENIPUNCTURE: CPT

## 2020-08-14 NOTE — PROGRESS NOTES
Subjective:       Patient ID: Snow Barney is a 34 y.o. female.    Chief Complaint:  No chief complaint on file.      History of Present Illness  HPI  Patient was recently diagnosed with a pulmonary embolism.  She stopped her OCP.  She was started on Xarelto.  Her Hematologist does not want her taking Progesterone as she believes this was a progesterone induced clot.    She started her menses today and reports having heavy bleeding with passage of large clots.  She is not saturating two pads an hour.  She is more concerned about the clots that she is passing.    Her Hematologist would prefer not to use TXA or progesterone.        GYN & OB History  Patient's last menstrual period was 2020.   Date of Last Pap: 10/20/2019    OB History    Para Term  AB Living   6 3 2 1 3 3   SAB TAB Ectopic Multiple Live Births                  # Outcome Date GA Lbr Benjamin/2nd Weight Sex Delivery Anes PTL Lv   6 Term            5 Term            4 AB            3 AB            2 AB            1                Obstetric Comments   GYN Hx: 13/ No menses with Iud   Mirena IUD in 2016   History of abnormal pap with colposcopy   History of chlamydia.       Review of Systems  Review of Systems   Constitutional: Negative.    HENT: Negative.    Eyes: Negative.    Respiratory: Negative.    Cardiovascular: Negative.    Gastrointestinal: Negative.    Endocrine: Negative.    Genitourinary: Negative.    Musculoskeletal: Negative.    Integumentary:  Negative.   Neurological: Negative.    Hematological: Negative.    Psychiatric/Behavioral: Negative.    Breast: negative.            Objective:    Physical Exam:   Constitutional: She is oriented to person, place, and time. She appears well-developed.    HENT:   Head: Normocephalic and atraumatic.    Eyes: EOM are normal.     Cardiovascular: Normal rate.     Pulmonary/Chest: Effort normal.        Abdominal: Soft. There is no abdominal tenderness.     Genitourinary:    Vagina and  uterus normal.      Pelvic exam was performed with patient supine.   There is no rash, tenderness or lesion on the right labia. There is no rash, tenderness or lesion on the left labia. Uterus is not tender. Cervix is normal. Right adnexum displays no tenderness and no fullness. Left adnexum displays no tenderness and no fullness. No erythema or bleeding in the vagina. Labial bartholins normal.Cervix exhibits no motion tenderness. negative for vaginal discharge          Musculoskeletal: Normal range of motion.       Neurological: She is oriented to person, place, and time.    Skin: Skin is warm.    Psychiatric: She has a normal mood and affect.          Assessment:        1. Episode of heavy vaginal bleeding                Plan:      1. Episode of heavy vaginal bleeding  - Patient reassured that clots are not dangerous and that it is blood sitting in the vaginal vault that clots prior to being passed onto the pad.  - We reviewed non-hormonal options to treat heavy bleeding such as hysterectomy or endometrial ablation.  She voiced understanding of options.  - She reports that she only needs to be on medication for 3 months and she would prefer to see how her menses are after she stops xarelto.  Of this 15  minute visit, 12 minutes were spent on counseling and coordination of care.

## 2020-09-10 ENCOUNTER — HOSPITAL ENCOUNTER (EMERGENCY)
Facility: HOSPITAL | Age: 35
Discharge: HOME | End: 2020-09-10
Attending: EMERGENCY MEDICINE
Payer: MEDICAID

## 2020-09-10 VITALS
BODY MASS INDEX: 30.29 KG/M2 | WEIGHT: 193 LBS | HEIGHT: 67 IN | RESPIRATION RATE: 18 BRPM | DIASTOLIC BLOOD PRESSURE: 67 MMHG | TEMPERATURE: 98 F | SYSTOLIC BLOOD PRESSURE: 115 MMHG | HEART RATE: 84 BPM | OXYGEN SATURATION: 100 %

## 2020-09-10 DIAGNOSIS — N93.9 VAGINAL BLEEDING: Primary | ICD-10-CM

## 2020-09-10 LAB
ABO + RH BLD: NORMAL
ALBUMIN SERPL BCP-MCNC: 4.1 G/DL (ref 3.5–5.2)
ALP SERPL-CCNC: 58 U/L (ref 55–135)
ALT SERPL W/O P-5'-P-CCNC: 18 U/L (ref 10–44)
ANION GAP SERPL CALC-SCNC: 12 MMOL/L (ref 8–16)
AST SERPL-CCNC: 30 U/L (ref 10–40)
B-HCG UR QL: NEGATIVE
BACTERIA #/AREA URNS HPF: ABNORMAL /HPF
BASOPHILS # BLD AUTO: 0.05 K/UL (ref 0–0.2)
BASOPHILS NFR BLD: 0.6 % (ref 0–1.9)
BILIRUB SERPL-MCNC: 0.4 MG/DL (ref 0.1–1)
BILIRUB UR QL STRIP: NEGATIVE
BLD GP AB SCN CELLS X3 SERPL QL: NORMAL
BUN SERPL-MCNC: 15 MG/DL (ref 6–20)
CALCIUM SERPL-MCNC: 9.2 MG/DL (ref 8.7–10.5)
CHLORIDE SERPL-SCNC: 106 MMOL/L (ref 95–110)
CLARITY UR: ABNORMAL
CO2 SERPL-SCNC: 19 MMOL/L (ref 23–29)
COLOR UR: ABNORMAL
CREAT SERPL-MCNC: 0.9 MG/DL (ref 0.5–1.4)
CTP QC/QA: YES
DIFFERENTIAL METHOD: NORMAL
EOSINOPHIL # BLD AUTO: 0.3 K/UL (ref 0–0.5)
EOSINOPHIL NFR BLD: 3.5 % (ref 0–8)
ERYTHROCYTE [DISTWIDTH] IN BLOOD BY AUTOMATED COUNT: 12.9 % (ref 11.5–14.5)
EST. GFR  (AFRICAN AMERICAN): >60 ML/MIN/1.73 M^2
EST. GFR  (NON AFRICAN AMERICAN): >60 ML/MIN/1.73 M^2
GLUCOSE SERPL-MCNC: 157 MG/DL (ref 70–110)
GLUCOSE UR QL STRIP: NEGATIVE
HCT VFR BLD AUTO: 40.3 % (ref 37–48.5)
HGB BLD-MCNC: 13 G/DL (ref 12–16)
HGB UR QL STRIP: ABNORMAL
HYALINE CASTS #/AREA URNS LPF: 0 /LPF
IMM GRANULOCYTES # BLD AUTO: 0.02 K/UL (ref 0–0.04)
IMM GRANULOCYTES NFR BLD AUTO: 0.3 % (ref 0–0.5)
KETONES UR QL STRIP: NEGATIVE
LEUKOCYTE ESTERASE UR QL STRIP: ABNORMAL
LYMPHOCYTES # BLD AUTO: 2.7 K/UL (ref 1–4.8)
LYMPHOCYTES NFR BLD: 34.6 % (ref 18–48)
MCH RBC QN AUTO: 29.7 PG (ref 27–31)
MCHC RBC AUTO-ENTMCNC: 32.3 G/DL (ref 32–36)
MCV RBC AUTO: 92 FL (ref 82–98)
MICROSCOPIC COMMENT: ABNORMAL
MONOCYTES # BLD AUTO: 0.3 K/UL (ref 0.3–1)
MONOCYTES NFR BLD: 4.4 % (ref 4–15)
NEUTROPHILS # BLD AUTO: 4.4 K/UL (ref 1.8–7.7)
NEUTROPHILS NFR BLD: 56.6 % (ref 38–73)
NITRITE UR QL STRIP: NEGATIVE
NRBC BLD-RTO: 0 /100 WBC
PH UR STRIP: 6 [PH] (ref 5–8)
PLATELET # BLD AUTO: 214 K/UL (ref 150–350)
PMV BLD AUTO: 11.8 FL (ref 9.2–12.9)
POTASSIUM SERPL-SCNC: 4.3 MMOL/L (ref 3.5–5.1)
PROT SERPL-MCNC: 7.8 G/DL (ref 6–8.4)
PROT UR QL STRIP: ABNORMAL
RBC # BLD AUTO: 4.38 M/UL (ref 4–5.4)
RBC #/AREA URNS HPF: >100 /HPF (ref 0–4)
SODIUM SERPL-SCNC: 137 MMOL/L (ref 136–145)
SP GR UR STRIP: 1.01 (ref 1–1.03)
URN SPEC COLLECT METH UR: ABNORMAL
UROBILINOGEN UR STRIP-ACNC: NEGATIVE EU/DL
WBC # BLD AUTO: 7.74 K/UL (ref 3.9–12.7)
WBC #/AREA URNS HPF: 3 /HPF (ref 0–5)

## 2020-09-10 PROCEDURE — 85025 COMPLETE CBC W/AUTO DIFF WBC: CPT

## 2020-09-10 PROCEDURE — 96360 HYDRATION IV INFUSION INIT: CPT

## 2020-09-10 PROCEDURE — 99284 EMERGENCY DEPT VISIT MOD MDM: CPT | Mod: 25

## 2020-09-10 PROCEDURE — 81025 URINE PREGNANCY TEST: CPT | Performed by: NURSE PRACTITIONER

## 2020-09-10 PROCEDURE — 86901 BLOOD TYPING SEROLOGIC RH(D): CPT

## 2020-09-10 PROCEDURE — 25000003 PHARM REV CODE 250: Performed by: EMERGENCY MEDICINE

## 2020-09-10 PROCEDURE — 80053 COMPREHEN METABOLIC PANEL: CPT

## 2020-09-10 PROCEDURE — 81000 URINALYSIS NONAUTO W/SCOPE: CPT

## 2020-09-10 RX ADMIN — SODIUM CHLORIDE 1000 ML: 0.9 INJECTION, SOLUTION INTRAVENOUS at 02:09

## 2020-09-10 NOTE — DISCHARGE INSTRUCTIONS
Contact your hematologist to discuss continued use of your Xarelto .  Return emergency room if symptoms worsen or new symptoms develop.

## 2020-09-10 NOTE — ED TRIAGE NOTES
Pt here with c/o heavy vaginal bleeding w/ large clots, SOB, weakness and light- headedness. Pt reports she is on Xarelto x 2 months for PE.

## 2020-09-10 NOTE — ED PROVIDER NOTES
"Encounter Date: 9/10/2020    SCRIBE #1 NOTE: I, Alondra Reyes , am scribing for, and in the presence of,  Souleymane Lopez MD. I have scribed the following portions of the note - Other sections scribed: HPI, ROS, PE.       History     Chief Complaint   Patient presents with    Vaginal Bleeding     w/ large clots, SOB, weakness and light- headedness, is on Xarelto x 2 months for PE     CC: Vaginal bleeding     Patient is a 34 year old female who presents to the ED complaining of heavy vaginal bleeding that began tonight. Patient states she began her menstrual cycle yesterday. Bleeding increased tonight. She reports saturating a super pad in 2 hours. Patient noted blood clots the "size of baby carrots" and one that she had to physically remove. She reports similar severity of bleeding last month for 2 days during her LMP. Reports having blood clots at that time, but they were smaller than those noted tonight. Reports associated light-headedness, SOB, CP, and bloating. Patient states CP resolved with rest. Reports difficulty urinating earlier today that has since resolved. No dysuria. No nausea or emesis. Patient is currently taking Xarelto following diagnosis of PE on 07/16/2020. She states she is scheduled to remain on the Xarelto until October or November, 2020 prior to undergoing genetic testing. Patient sees a hematologist at [facility]. Patient was informed her reticulocytes were high and her iron levels were low at her last hematology visit. States she was instructed to visit the ED if menstrual periods became heavy and experienced signs of anemia. Patient reports Hx of Mirena use in the past. No use of oral contraceptives at time of PE diagnosis. No Hx fibroids. Denies fever, chills, cough, congestion, and sore throat. No other associated symptoms.     The history is provided by the patient.     Review of patient's allergies indicates:   Allergen Reactions    Demerol (pf) [meperidine (pf)]      Past Medical " History:   Diagnosis Date    Anxiety     Depression     Hernia, hiatal     Inappropriate sinus tachycardia     POTS (postural orthostatic tachycardia syndrome)     Pulmonary emboli 2020    Sepsis      History reviewed. No pertinent surgical history.  Family History   Problem Relation Age of Onset    Hypertension Mother     Diabetes Mother     Hypertension Father     Diabetes Father      Social History     Tobacco Use    Smoking status: Current Every Day Smoker     Packs/day: 1.00     Types: Cigarettes     Start date: 7/15/1996    Smokeless tobacco: Never Used    Tobacco comment: varies from 7 cigarettes to whole pack in day   Substance Use Topics    Alcohol use: Yes     Frequency: Monthly or less     Comment: ocassionally    Drug use: No     Review of Systems   Constitutional: Negative for chills, diaphoresis and fever.   HENT: Negative for congestion and sore throat.    Eyes: Negative for visual disturbance.   Respiratory: Positive for shortness of breath. Negative for cough.    Cardiovascular: Positive for chest pain (Resolved).   Gastrointestinal: Positive for abdominal distention. Negative for abdominal pain, blood in stool, diarrhea, nausea and vomiting.        No melena.   Genitourinary: Positive for difficulty urinating (Resolved), menstrual problem and vaginal bleeding. Negative for dysuria, flank pain and hematuria.   Musculoskeletal: Negative for back pain.   Skin: Negative for rash and wound.   Neurological: Positive for light-headedness. Negative for dizziness, speech difficulty, weakness, numbness and headaches.   Psychiatric/Behavioral: Negative for confusion.   All other systems reviewed and are negative.      Physical Exam     Initial Vitals [09/10/20 0125]   BP Pulse Resp Temp SpO2   132/76 (!) 111 18 97.6 °F (36.4 °C) 99 %      MAP       --         Physical Exam    Nursing note and vitals reviewed.  Constitutional: She appears well-developed and well-nourished. She is not  diaphoretic. No distress.   HENT:   Head: Normocephalic and atraumatic.   Nose: Nose normal.   Mouth/Throat: Oropharynx is clear and moist.   Eyes: Conjunctivae and EOM are normal. Pupils are equal, round, and reactive to light. Right eye exhibits no discharge. Left eye exhibits no discharge. No scleral icterus.   Neck: Normal range of motion. Neck supple. No tracheal deviation present.   Cardiovascular: Normal rate, regular rhythm and normal heart sounds.   No murmur heard.  Pulmonary/Chest: Breath sounds normal. No stridor. No respiratory distress. She has no wheezes. She has no rhonchi. She has no rales.   Abdominal: Soft. She exhibits no distension. There is no abdominal tenderness. There is no rebound and no guarding.   Musculoskeletal: Normal range of motion. No tenderness or edema.   Neurological: She is alert and oriented to person, place, and time. She has normal strength. No cranial nerve deficit. GCS score is 15. GCS eye subscore is 4. GCS verbal subscore is 5. GCS motor subscore is 6.   Skin: Skin is warm and dry. No pallor.   Psychiatric: She has a normal mood and affect. Her behavior is normal. Judgment and thought content normal.         ED Course   Procedures  Labs Reviewed   URINALYSIS, REFLEX TO URINE CULTURE - Abnormal; Notable for the following components:       Result Value    Color, UA Red (*)     Appearance, UA Cloudy (*)     Protein, UA 2+ (*)     Occult Blood UA 2+ (*)     Leukocytes, UA Trace (*)     All other components within normal limits    Narrative:     Specimen Source->Urine   COMPREHENSIVE METABOLIC PANEL - Abnormal; Notable for the following components:    CO2 19 (*)     Glucose 157 (*)     All other components within normal limits   URINALYSIS MICROSCOPIC - Abnormal; Notable for the following components:    RBC, UA >100 (*)     All other components within normal limits    Narrative:     Specimen Source->Urine   CBC W/ AUTO DIFFERENTIAL   POCT URINE PREGNANCY   TYPE & SCREEN           Imaging Results    None          Medical Decision Making:   History:   Old Medical Records: I decided to obtain old medical records.  Initial Assessment:   Patient presents for evaluation of heavy vaginal bleeding.  Patient takes Xarelto for recently diagnosed pulmonary embolism.  Vital signs normal.  No hypotension.  No tachycardia.  Will check orthostatics.  Will check CBC.  Will check pregnancy test.  Differential Diagnosis:   Dysfunctional uterine bleeding, medication side effect, ectopic pregnancy, miscarriage  Clinical Tests:   Lab Tests: Ordered and Reviewed  The following lab test(s) were unremarkable: CBC, CMP, Urinalysis and UPT  ED Management:  0300:  Evidence significant blood loss on lab work.  Patient is not pregnant.  Orthostatics are negative.  Patient stable for discharge.  Patient instructed to discuss her symptoms with hematologist.  May benefit from discontinuation of her Xarelto.  Patient instructed to return emergency room if her bleeding worsens or new symptoms develop.            Scribe Attestation:   Scribe #1: I performed the above scribed service and the documentation accurately describes the services I performed. I attest to the accuracy of the note.                      Clinical Impression:       ICD-10-CM ICD-9-CM   1. Vaginal bleeding  N93.9 623.8         Disposition:   Disposition: Discharged  Condition: Stable     ED Disposition Condition    Discharge Stable        ED Prescriptions     None        Follow-up Information     Follow up With Specialties Details Why Contact Info    [facility]  As needed [address] [address]  [facility] [id number] [phone]                                I, Souleymane Lopez MD, personally performed the services described in this documentation. All medical record entries made by the scribe were at my direction and in my presence.  I have reviewed the chart and agree that the record reflects my personal  performance and is accurate and complete.         Souleymane Lopez MD  09/10/20 0167

## 2020-09-23 ENCOUNTER — PATIENT MESSAGE (OUTPATIENT)
Dept: OBSTETRICS AND GYNECOLOGY | Facility: CLINIC | Age: 35
End: 2020-09-23

## 2020-09-24 ENCOUNTER — PATIENT MESSAGE (OUTPATIENT)
Dept: OBSTETRICS AND GYNECOLOGY | Facility: CLINIC | Age: 35
End: 2020-09-24

## 2020-10-14 ENCOUNTER — OFFICE VISIT (OUTPATIENT)
Dept: OBSTETRICS AND GYNECOLOGY | Facility: CLINIC | Age: 35
End: 2020-10-14
Payer: MEDICAID

## 2020-10-14 VITALS
BODY MASS INDEX: 30.11 KG/M2 | WEIGHT: 192.25 LBS | DIASTOLIC BLOOD PRESSURE: 82 MMHG | SYSTOLIC BLOOD PRESSURE: 119 MMHG

## 2020-10-14 DIAGNOSIS — N93.9 EPISODE OF HEAVY VAGINAL BLEEDING: ICD-10-CM

## 2020-10-14 DIAGNOSIS — N93.8 DUB (DYSFUNCTIONAL UTERINE BLEEDING): Primary | ICD-10-CM

## 2020-10-14 LAB
B-HCG UR QL: NEGATIVE
CTP QC/QA: YES

## 2020-10-14 PROCEDURE — 99213 OFFICE O/P EST LOW 20 MIN: CPT | Mod: PBBFAC | Performed by: OBSTETRICS & GYNECOLOGY

## 2020-10-14 PROCEDURE — 99999 PR PBB SHADOW E&M-EST. PATIENT-LVL III: ICD-10-PCS | Mod: PBBFAC,,, | Performed by: OBSTETRICS & GYNECOLOGY

## 2020-10-14 PROCEDURE — 99213 OFFICE O/P EST LOW 20 MIN: CPT | Mod: S$PBB,,, | Performed by: OBSTETRICS & GYNECOLOGY

## 2020-10-14 PROCEDURE — 99999 PR PBB SHADOW E&M-EST. PATIENT-LVL III: CPT | Mod: PBBFAC,,, | Performed by: OBSTETRICS & GYNECOLOGY

## 2020-10-14 PROCEDURE — 81025 URINE PREGNANCY TEST: CPT | Mod: PBBFAC | Performed by: OBSTETRICS & GYNECOLOGY

## 2020-10-14 PROCEDURE — 99213 PR OFFICE/OUTPT VISIT, EST, LEVL III, 20-29 MIN: ICD-10-PCS | Mod: S$PBB,,, | Performed by: OBSTETRICS & GYNECOLOGY

## 2020-10-14 NOTE — PROGRESS NOTES
Subjective:       Patient ID: Snow Barney is a 34 y.o. female.    Chief Complaint:  No chief complaint on file.      History of Present Illness  HPI  Patient was recently diagnosed with a pulmonary embolism.  She stopped her OCP.  She was started on Xarelto.  Her Hematologist does not want her taking Progesterone as she believes this was a progesterone induced clot.    Had an episode of irregular vaginal bleeding that was 2 weeks apart.  Also, thinks she may have felt something on her cervix.    Her Hematologist would prefer not to use TXA or progesterone.        GYN & OB History  No LMP recorded.   Date of Last Pap: 10/20/2019    OB History    Para Term  AB Living   6 3 2 1 3 3   SAB TAB Ectopic Multiple Live Births                  # Outcome Date GA Lbr Benjamin/2nd Weight Sex Delivery Anes PTL Lv   6 Term            5 Term            4 AB            3 AB            2 AB            1                Obstetric Comments   GYN Hx: Menarche at 13   History of abnormal pap with colposcopy   History of chlamydia.       Review of Systems  Review of Systems   Constitutional: Negative.    HENT: Negative.    Eyes: Negative.    Respiratory: Negative.    Cardiovascular: Negative.    Gastrointestinal: Negative.    Endocrine: Negative.    Genitourinary: Negative.    Musculoskeletal: Negative.    Integumentary:  Negative.   Neurological: Negative.    Hematological: Negative.    Psychiatric/Behavioral: Negative.    Breast: negative.            Objective:    Physical Exam:   Constitutional: She is oriented to person, place, and time. She appears well-developed.    HENT:   Head: Normocephalic and atraumatic.    Eyes: EOM are normal.     Cardiovascular: Normal rate.     Pulmonary/Chest: Effort normal.        Abdominal: Soft. There is no abdominal tenderness.     Genitourinary:    Vagina and uterus normal.      Pelvic exam was performed with patient supine.   There is no rash, tenderness or lesion on the right labia.  There is no rash, tenderness or lesion on the left labia. Uterus is not tender. Cervix is normal. Right adnexum displays no tenderness and no fullness. Left adnexum displays no tenderness and no fullness. No erythema or bleeding in the vagina. Labial bartholins normal.Cervix exhibits no motion tenderness. negative for vaginal discharge          Musculoskeletal: Normal range of motion.       Neurological: She is oriented to person, place, and time.    Skin: Skin is warm.    Psychiatric: She has a normal mood and affect.        UPT neg  Assessment:        1. Episode of heavy vaginal bleeding                Plan:      1. DUB (dysfunctional uterine bleeding)  - Secondary to abnormal uterine bleeding, Pelvic ultrasound ordered.  Will call patient with results.  - Patient instructed to call office if she has not heard anything 2 wks after ultrasound.   - US Pelvis Comp with Transvag NON-OB (xpd; Future    - May consider endometrial biopsy after ultrasound results if abnormal uterine bleeding continues.    2. Episode of heavy vaginal bleeding  - POCT Urine Pregnancy    Options for hysterectomy and endometrial ablation reviewed today.  She is not interested at this time.

## 2020-10-21 ENCOUNTER — HOSPITAL ENCOUNTER (OUTPATIENT)
Dept: RADIOLOGY | Facility: HOSPITAL | Age: 35
Discharge: HOME | End: 2020-10-21
Attending: OBSTETRICS & GYNECOLOGY
Payer: MEDICAID

## 2020-10-21 DIAGNOSIS — N93.8 DUB (DYSFUNCTIONAL UTERINE BLEEDING): ICD-10-CM

## 2020-10-21 PROCEDURE — 76856 US EXAM PELVIC COMPLETE: CPT | Mod: 26,,, | Performed by: RADIOLOGY

## 2020-10-21 PROCEDURE — 76830 TRANSVAGINAL US NON-OB: CPT | Mod: 26,,, | Performed by: RADIOLOGY

## 2020-10-21 PROCEDURE — 76856 US PELVIS COMP WITH TRANSVAG NON-OB (XPD): ICD-10-PCS | Mod: 26,,, | Performed by: RADIOLOGY

## 2020-10-21 PROCEDURE — 76830 US PELVIS COMP WITH TRANSVAG NON-OB (XPD): ICD-10-PCS | Mod: 26,,, | Performed by: RADIOLOGY

## 2020-10-21 PROCEDURE — 76830 TRANSVAGINAL US NON-OB: CPT | Mod: TC

## 2020-10-31 ENCOUNTER — PATIENT MESSAGE (OUTPATIENT)
Dept: OBSTETRICS AND GYNECOLOGY | Facility: CLINIC | Age: 35
End: 2020-10-31

## 2020-10-31 DIAGNOSIS — Z20.828 EXPOSURE TO HERPES SIMPLEX VIRUS (HSV): Primary | ICD-10-CM

## 2020-11-02 ENCOUNTER — HOSPITAL ENCOUNTER (EMERGENCY)
Facility: HOSPITAL | Age: 35
Discharge: HOME | End: 2020-11-02
Attending: EMERGENCY MEDICINE
Payer: MEDICAID

## 2020-11-02 VITALS
OXYGEN SATURATION: 100 % | BODY MASS INDEX: 30.76 KG/M2 | WEIGHT: 196 LBS | HEIGHT: 67 IN | RESPIRATION RATE: 18 BRPM | SYSTOLIC BLOOD PRESSURE: 129 MMHG | HEART RATE: 87 BPM | DIASTOLIC BLOOD PRESSURE: 81 MMHG | TEMPERATURE: 99 F

## 2020-11-02 DIAGNOSIS — R00.2 PALPITATIONS: ICD-10-CM

## 2020-11-02 DIAGNOSIS — R19.7 DIARRHEA, UNSPECIFIED TYPE: Primary | ICD-10-CM

## 2020-11-02 DIAGNOSIS — R07.9 CHEST PAIN: ICD-10-CM

## 2020-11-02 LAB
ALBUMIN SERPL BCP-MCNC: 4.3 G/DL (ref 3.5–5.2)
ALP SERPL-CCNC: 70 U/L (ref 55–135)
ALT SERPL W/O P-5'-P-CCNC: 21 U/L (ref 10–44)
ANION GAP SERPL CALC-SCNC: 11 MMOL/L (ref 8–16)
AST SERPL-CCNC: 16 U/L (ref 10–40)
B-HCG UR QL: NEGATIVE
BASOPHILS # BLD AUTO: 0.03 K/UL (ref 0–0.2)
BASOPHILS NFR BLD: 0.4 % (ref 0–1.9)
BILIRUB SERPL-MCNC: 0.4 MG/DL (ref 0.1–1)
BILIRUB UR QL STRIP: NEGATIVE
BNP SERPL-MCNC: 15 PG/ML (ref 0–99)
BUN SERPL-MCNC: 13 MG/DL (ref 6–20)
CALCIUM SERPL-MCNC: 8.8 MG/DL (ref 8.7–10.5)
CHLORIDE SERPL-SCNC: 108 MMOL/L (ref 95–110)
CLARITY UR: CLEAR
CO2 SERPL-SCNC: 22 MMOL/L (ref 23–29)
COLOR UR: COLORLESS
CREAT SERPL-MCNC: 0.8 MG/DL (ref 0.5–1.4)
CTP QC/QA: YES
D DIMER PPP IA.FEU-MCNC: 0.28 MG/L FEU
DIFFERENTIAL METHOD: ABNORMAL
EOSINOPHIL # BLD AUTO: 0.1 K/UL (ref 0–0.5)
EOSINOPHIL NFR BLD: 1.4 % (ref 0–8)
ERYTHROCYTE [DISTWIDTH] IN BLOOD BY AUTOMATED COUNT: 12.8 % (ref 11.5–14.5)
EST. GFR  (AFRICAN AMERICAN): >60 ML/MIN/1.73 M^2
EST. GFR  (NON AFRICAN AMERICAN): >60 ML/MIN/1.73 M^2
GLUCOSE SERPL-MCNC: 123 MG/DL (ref 70–110)
GLUCOSE UR QL STRIP: NEGATIVE
HCT VFR BLD AUTO: 38.1 % (ref 37–48.5)
HGB BLD-MCNC: 12.8 G/DL (ref 12–16)
HGB UR QL STRIP: ABNORMAL
IMM GRANULOCYTES # BLD AUTO: 0.03 K/UL (ref 0–0.04)
IMM GRANULOCYTES NFR BLD AUTO: 0.4 % (ref 0–0.5)
KETONES UR QL STRIP: NEGATIVE
LEUKOCYTE ESTERASE UR QL STRIP: ABNORMAL
LYMPHOCYTES # BLD AUTO: 1.3 K/UL (ref 1–4.8)
LYMPHOCYTES NFR BLD: 18.9 % (ref 18–48)
MAGNESIUM SERPL-MCNC: 1.7 MG/DL (ref 1.6–2.6)
MCH RBC QN AUTO: 29.9 PG (ref 27–31)
MCHC RBC AUTO-ENTMCNC: 33.6 G/DL (ref 32–36)
MCV RBC AUTO: 89 FL (ref 82–98)
MICROSCOPIC COMMENT: NORMAL
MONOCYTES # BLD AUTO: 0.3 K/UL (ref 0.3–1)
MONOCYTES NFR BLD: 4.2 % (ref 4–15)
NEUTROPHILS # BLD AUTO: 5.2 K/UL (ref 1.8–7.7)
NEUTROPHILS NFR BLD: 74.7 % (ref 38–73)
NITRITE UR QL STRIP: NEGATIVE
NRBC BLD-RTO: 0 /100 WBC
PH UR STRIP: 7 [PH] (ref 5–8)
PLATELET # BLD AUTO: 199 K/UL (ref 150–350)
PMV BLD AUTO: 11 FL (ref 9.2–12.9)
POTASSIUM SERPL-SCNC: 3.7 MMOL/L (ref 3.5–5.1)
PROT SERPL-MCNC: 7.3 G/DL (ref 6–8.4)
PROT UR QL STRIP: NEGATIVE
RBC # BLD AUTO: 4.28 M/UL (ref 4–5.4)
RBC #/AREA URNS HPF: 2 /HPF (ref 0–4)
SODIUM SERPL-SCNC: 141 MMOL/L (ref 136–145)
SP GR UR STRIP: 1 (ref 1–1.03)
SQUAMOUS #/AREA URNS HPF: NORMAL /HPF
TROPONIN I SERPL DL<=0.01 NG/ML-MCNC: <0.006 NG/ML (ref 0–0.03)
URN SPEC COLLECT METH UR: ABNORMAL
UROBILINOGEN UR STRIP-ACNC: NEGATIVE EU/DL
WBC # BLD AUTO: 6.98 K/UL (ref 3.9–12.7)
WBC #/AREA URNS HPF: 4 /HPF (ref 0–5)

## 2020-11-02 PROCEDURE — 83880 ASSAY OF NATRIURETIC PEPTIDE: CPT

## 2020-11-02 PROCEDURE — 81025 URINE PREGNANCY TEST: CPT | Performed by: EMERGENCY MEDICINE

## 2020-11-02 PROCEDURE — 84484 ASSAY OF TROPONIN QUANT: CPT

## 2020-11-02 PROCEDURE — 85379 FIBRIN DEGRADATION QUANT: CPT

## 2020-11-02 PROCEDURE — 81000 URINALYSIS NONAUTO W/SCOPE: CPT

## 2020-11-02 PROCEDURE — 93010 ELECTROCARDIOGRAM REPORT: CPT | Mod: ,,, | Performed by: INTERNAL MEDICINE

## 2020-11-02 PROCEDURE — 93005 ELECTROCARDIOGRAM TRACING: CPT

## 2020-11-02 PROCEDURE — 80053 COMPREHEN METABOLIC PANEL: CPT

## 2020-11-02 PROCEDURE — 96361 HYDRATE IV INFUSION ADD-ON: CPT

## 2020-11-02 PROCEDURE — 85025 COMPLETE CBC W/AUTO DIFF WBC: CPT

## 2020-11-02 PROCEDURE — 83735 ASSAY OF MAGNESIUM: CPT

## 2020-11-02 PROCEDURE — 99285 EMERGENCY DEPT VISIT HI MDM: CPT | Mod: 25

## 2020-11-02 PROCEDURE — 25000003 PHARM REV CODE 250: Performed by: EMERGENCY MEDICINE

## 2020-11-02 PROCEDURE — 96360 HYDRATION IV INFUSION INIT: CPT

## 2020-11-02 PROCEDURE — 93010 EKG 12-LEAD: ICD-10-PCS | Mod: ,,, | Performed by: INTERNAL MEDICINE

## 2020-11-02 RX ADMIN — SODIUM CHLORIDE 1000 ML: 0.9 INJECTION, SOLUTION INTRAVENOUS at 01:11

## 2020-11-02 NOTE — ED PROVIDER NOTES
Encounter Date: 11/2/2020    SCRIBE #1 NOTE: I, Alondra Reyes, am scribing for, and in the presence of,  Urban Rowan MD. I have scribed the following portions of the note - Other sections scribed: HPI, ROS, PE, EKG.       History     Chief Complaint   Patient presents with    Chest Pain     Pt c/o N/V/D since yesterday, midsternal chest pain w/ SOB x1.5 hours. Reports hx of PE and pericardial effusion. Pain is 5/10    Emesis     CC: Chest pain     34-year-old female with a PMHx of postural orthostatic tachycardia syndrome, pulmonary embolism, and pericardial effusion presents to the ED complaining of midsternal chest pain that began about 1.5 hours ago. States she was cleaning at home when her pain began. Describes her pain as tightness. Reports associated palpations, wheezing, and SOB. Wheezing resolved with Albuterol usage PTA. Patient also reports nausea, emesis, and watery diarrhea since yesterday.  She reports 3 episodes of diarrhea today. No blood in stool. Reports cramping abdominal pain that has since resolved. She reports nausea and emesis with prior pulmonary embolism, however, her pain feels different as she had sharp back pain at that time. Patient states her blood thinner was discontinued 2 weeks ago for blood work. Reports Hx of hyponatremia and hypokalemia. Patient states she was taking Propanalol for tachycardia in the past, which she self-discontinued. Reports chronic urinary frequency. Reports chronic, intermittent cramping to lower extremities. Patient is on birth control. She denies fever, rhinorrhea, sore throat, cough, and any other associated symptoms.     The history is provided by the patient.     Review of patient's allergies indicates:   Allergen Reactions    Demerol (pf) [meperidine (pf)]      Past Medical History:   Diagnosis Date    Anxiety     Depression     Hernia, hiatal     Inappropriate sinus tachycardia     POTS (postural orthostatic tachycardia syndrome)      Pulmonary emboli 2020    Sepsis      No past surgical history on file.  Family History   Problem Relation Age of Onset    Hypertension Mother     Diabetes Mother     Hypertension Father     Diabetes Father      Social History     Tobacco Use    Smoking status: Current Every Day Smoker     Packs/day: 1.00     Types: Cigarettes     Start date: 7/15/1996    Smokeless tobacco: Never Used    Tobacco comment: varies from 7 cigarettes to whole pack in day   Substance Use Topics    Alcohol use: Yes     Frequency: Monthly or less     Comment: ocassionally    Drug use: No     Review of Systems   Constitutional: Negative for fever.   HENT: Negative for rhinorrhea and sore throat.    Eyes: Negative for visual disturbance.   Respiratory: Positive for shortness of breath. Negative for cough and wheezing.    Cardiovascular: Positive for chest pain and palpitations.   Gastrointestinal: Positive for diarrhea, nausea and vomiting. Negative for abdominal pain.   Genitourinary: Positive for frequency (Chronic). Negative for dysuria.   Musculoskeletal: Positive for myalgias (chronic). Negative for back pain.   Skin: Negative for rash.   Neurological: Positive for dizziness. Negative for headaches.       Physical Exam     Initial Vitals [11/02/20 1307]   BP Pulse Resp Temp SpO2   (!) 154/89 98 18 97.9 °F (36.6 °C) 100 %      MAP       --         Physical Exam    Nursing note and vitals reviewed.  Constitutional: She appears well-developed and well-nourished.   HENT:   Head: Normocephalic and atraumatic.   Eyes: EOM are normal. Pupils are equal, round, and reactive to light.   Neck: Normal range of motion. Neck supple. No thyromegaly present. No JVD present.   Cardiovascular: Normal rate and regular rhythm. Exam reveals no gallop and no friction rub.    No murmur heard.  Pulmonary/Chest: Breath sounds normal. No respiratory distress.   Abdominal: Soft. Bowel sounds are normal. She exhibits no distension. There is no abdominal  tenderness.   Musculoskeletal: Normal range of motion. No tenderness or edema.   Neurological: She is alert and oriented to person, place, and time. She has normal strength. GCS score is 15. GCS eye subscore is 4. GCS verbal subscore is 5. GCS motor subscore is 6.   Skin: Skin is warm. Capillary refill takes less than 2 seconds.         ED Course   Procedures  Labs Reviewed   CBC W/ AUTO DIFFERENTIAL - Abnormal; Notable for the following components:       Result Value    Gran % 74.7 (*)     All other components within normal limits   COMPREHENSIVE METABOLIC PANEL - Abnormal; Notable for the following components:    CO2 22 (*)     Glucose 123 (*)     All other components within normal limits   URINALYSIS, REFLEX TO URINE CULTURE - Abnormal; Notable for the following components:    Color, UA Colorless (*)     Occult Blood UA 3+ (*)     Leukocytes, UA Trace (*)     All other components within normal limits    Narrative:     Specimen Source->Urine   TROPONIN I   D DIMER, QUANTITATIVE   MAGNESIUM   URINALYSIS MICROSCOPIC    Narrative:     Specimen Source->Urine   B-TYPE NATRIURETIC PEPTIDE   POCT URINE PREGNANCY     EKG Readings: (Independently Interpreted)   Initial Reading: No STEMI. Rhythm: Sinus Tachycardia. Heart Rate: 103 bpm.   No ischemia.        Imaging Results          X-Ray Chest AP Portable (Final result)  Result time 11/02/20 13:59:36    Final result by Ward Chung MD (11/02/20 13:59:36)                 Impression:      No acute abnormality.      Electronically signed by: Ward Chung MD  Date:    11/02/2020  Time:    13:59             Narrative:    EXAMINATION:  XR CHEST AP PORTABLE    CLINICAL HISTORY:  Chest Pain;.    TECHNIQUE:  Single frontal portable view of the chest was performed.    COMPARISON:  08/11/2020    FINDINGS:  Support devices: None    The lungs are clear, with normal appearance of pulmonary vasculature and no pleural effusion or pneumothorax.    The cardiac silhouette is normal in  size. The hilar and mediastinal contours are unremarkable.    Bones are intact.                                 Medical Decision Making:   History:   Old Medical Records: I decided to obtain old medical records.  Independently Interpreted Test(s):   I have ordered and independently interpreted EKG Reading(s) - see prior notes  Clinical Tests:   Lab Tests: Ordered and Reviewed  Radiological Study: Ordered and Reviewed  Medical Tests: Ordered and Reviewed    Patient presents with diarrhea.  Then started having some chest pain had resolved and intermittent palpitations and some shortness of breath.  No wheezing.  Lung sounds are normal.  EKG shows sinus tachycardia of 103 without any other abnormalities.  Mild dry oropharynx otherwise normal skin turgor.  Abdominal exam benign.  Chest x-ray shows no abnormalities.  Patient does have a history of pulmonary embolism.  Currently offered Xarelto to prepare for further lab testing for hypercoagulable state.  No calf pain or leg swelling.  Symptoms today are not consistent with symptoms she had with her pulmonary embolism in July.  Lab work shows no abnormalities.  Specifically no elevated white cell count, anemia, acute kidney injury, electrolyte abnormalities.  Urinalysis shows 3+ blood but only 2 red blood cells.  Patient is not having myalgias to suggest rhabdo or other etiology.  Patient just finishing her menstrual period which is likely cause.  Patient is not pregnant.  After 2 L of IV fluids patient states all her symptoms have resolved.  States she gets dehydrated easily.  States her bowie off the past few days and she had limited food and water options.  She is not on a diuretic.  She no longer takes her beta-blocker because she states was making her blood pressure dropped too much.  Recommend she follow back up with her cardiologist and hematologist.  Patient stable for discharge.  She wants to be discharged home with her now feeling completely resolved.  Chart  review shows some evidence of anxiety.  Anxiety may contributed to some of the symptomatology.  She was concerned about having a pulmonary embolism.  With her symptoms not consistent with her prior pulmonary embolus and with no chest pain, shortness of breath, pain with breathing, tachycardia, tachypnea, hypoxia and normal D-dimer I do not feel another CT chest is warranted at this time.          Scribe Attestation:   Scribe #1: I performed the above scribed service and the documentation accurately describes the services I performed. I attest to the accuracy of the note.                      Clinical Impression:     ICD-10-CM ICD-9-CM   1. Diarrhea, unspecified type  R19.7 787.91   2. Chest pain  R07.9 786.50   3. Palpitations  R00.2 785.1                          ED Disposition Condition    Discharge Stable        ED Prescriptions     None        Follow-up Information     Follow up With Specialties Details Why Contact Info    [facility]  follow up with your doctors as scheduled. [address] [address]  [facility] [address]  [phone]      [facility]  As needed, If symptoms return, worsen, or any problems. [address] [facility] [address] [phone]                          I, Urban Rowan, personally performed the services described in this documentation. All medical record entries made by the scribe were at my direction and in my presence.  I have reviewed the chart and agree that the record reflects my personal performance and is accurate and complete.               Urban Rowan MD  11/02/20 [id number]

## 2020-11-02 NOTE — ED TRIAGE NOTES
Pt arrived to ER via personal transportation w c/o chest tightness, SOB , palpitations SINCE THIS MORNING.pT AAO X 4 HAS  A HISTORY OF PULMONARY EMBOLISM.Pt states she had nausea and vomiting yesterday.

## 2020-11-02 NOTE — TELEPHONE ENCOUNTER
Called patient.  She reports her boyfriend has HSV.  She is requesting HSV blood testing.    1. Exposure to herpes simplex virus (HSV)  - HSV 1 & 2, IgM; Future  - Herpes simplex type 1&2 IgG,Herpes titer; Future

## 2020-11-06 ENCOUNTER — LAB VISIT (OUTPATIENT)
Dept: LAB | Facility: HOSPITAL | Age: 35
End: 2020-11-06
Attending: OBSTETRICS & GYNECOLOGY
Payer: MEDICAID

## 2020-11-06 DIAGNOSIS — Z20.828 EXPOSURE TO HERPES SIMPLEX VIRUS (HSV): ICD-10-CM

## 2020-11-06 PROCEDURE — 36415 COLL VENOUS BLD VENIPUNCTURE: CPT

## 2020-11-06 PROCEDURE — 86696 HERPES SIMPLEX TYPE 2 TEST: CPT

## 2020-11-06 PROCEDURE — 86694 HERPES SIMPLEX NES ANTBDY: CPT

## 2020-11-09 LAB
B-HCG UR QL: NEGATIVE
CTP QC/QA: YES

## 2020-11-09 PROCEDURE — 93010 ELECTROCARDIOGRAM REPORT: CPT | Mod: ,,, | Performed by: INTERNAL MEDICINE

## 2020-11-09 PROCEDURE — 93005 ELECTROCARDIOGRAM TRACING: CPT

## 2020-11-09 PROCEDURE — 99285 EMERGENCY DEPT VISIT HI MDM: CPT | Mod: 25

## 2020-11-09 PROCEDURE — 93010 EKG 12-LEAD: ICD-10-PCS | Mod: ,,, | Performed by: INTERNAL MEDICINE

## 2020-11-09 PROCEDURE — 81025 URINE PREGNANCY TEST: CPT | Performed by: NURSE PRACTITIONER

## 2020-11-10 ENCOUNTER — HOSPITAL ENCOUNTER (EMERGENCY)
Facility: HOSPITAL | Age: 35
Discharge: HOME | End: 2020-11-10
Attending: STUDENT IN AN ORGANIZED HEALTH CARE EDUCATION/TRAINING PROGRAM
Payer: MEDICAID

## 2020-11-10 VITALS
RESPIRATION RATE: 17 BRPM | BODY MASS INDEX: 30.76 KG/M2 | OXYGEN SATURATION: 99 % | HEIGHT: 67 IN | DIASTOLIC BLOOD PRESSURE: 57 MMHG | WEIGHT: 196 LBS | TEMPERATURE: 98 F | SYSTOLIC BLOOD PRESSURE: 109 MMHG | HEART RATE: 71 BPM

## 2020-11-10 DIAGNOSIS — M79.606 LOWER EXTREMITY PAIN: ICD-10-CM

## 2020-11-10 DIAGNOSIS — R06.02 SOB (SHORTNESS OF BREATH): ICD-10-CM

## 2020-11-10 DIAGNOSIS — R07.9 CHEST PAIN: ICD-10-CM

## 2020-11-10 LAB
ALBUMIN SERPL BCP-MCNC: 4.2 G/DL (ref 3.5–5.2)
ALP SERPL-CCNC: 60 U/L (ref 55–135)
ALT SERPL W/O P-5'-P-CCNC: 18 U/L (ref 10–44)
ANION GAP SERPL CALC-SCNC: 12 MMOL/L (ref 8–16)
AST SERPL-CCNC: 15 U/L (ref 10–40)
BASOPHILS # BLD AUTO: 0.06 K/UL (ref 0–0.2)
BASOPHILS NFR BLD: 0.9 % (ref 0–1.9)
BILIRUB SERPL-MCNC: 0.3 MG/DL (ref 0.1–1)
BNP SERPL-MCNC: <10 PG/ML (ref 0–99)
BUN SERPL-MCNC: 19 MG/DL (ref 6–20)
CALCIUM SERPL-MCNC: 9.6 MG/DL (ref 8.7–10.5)
CHLORIDE SERPL-SCNC: 107 MMOL/L (ref 95–110)
CO2 SERPL-SCNC: 25 MMOL/L (ref 23–29)
CREAT SERPL-MCNC: 0.8 MG/DL (ref 0.5–1.4)
CTP QC/QA: YES
DIFFERENTIAL METHOD: ABNORMAL
EOSINOPHIL # BLD AUTO: 0.2 K/UL (ref 0–0.5)
EOSINOPHIL NFR BLD: 2.6 % (ref 0–8)
ERYTHROCYTE [DISTWIDTH] IN BLOOD BY AUTOMATED COUNT: 12.8 % (ref 11.5–14.5)
EST. GFR  (AFRICAN AMERICAN): >60 ML/MIN/1.73 M^2
EST. GFR  (NON AFRICAN AMERICAN): >60 ML/MIN/1.73 M^2
GLUCOSE SERPL-MCNC: 94 MG/DL (ref 70–110)
HCT VFR BLD AUTO: 38.5 % (ref 37–48.5)
HGB BLD-MCNC: 12.2 G/DL (ref 12–16)
IMM GRANULOCYTES # BLD AUTO: 0.02 K/UL (ref 0–0.04)
IMM GRANULOCYTES NFR BLD AUTO: 0.3 % (ref 0–0.5)
INR PPP: 1 (ref 0.8–1.2)
LYMPHOCYTES # BLD AUTO: 2.9 K/UL (ref 1–4.8)
LYMPHOCYTES NFR BLD: 41.9 % (ref 18–48)
MCH RBC QN AUTO: 29.3 PG (ref 27–31)
MCHC RBC AUTO-ENTMCNC: 31.7 G/DL (ref 32–36)
MCV RBC AUTO: 93 FL (ref 82–98)
MONOCYTES # BLD AUTO: 0.4 K/UL (ref 0.3–1)
MONOCYTES NFR BLD: 6.3 % (ref 4–15)
NEUTROPHILS # BLD AUTO: 3.4 K/UL (ref 1.8–7.7)
NEUTROPHILS NFR BLD: 48 % (ref 38–73)
NRBC BLD-RTO: 0 /100 WBC
PLATELET # BLD AUTO: 186 K/UL (ref 150–350)
PMV BLD AUTO: 10.9 FL (ref 9.2–12.9)
POTASSIUM SERPL-SCNC: 4.2 MMOL/L (ref 3.5–5.1)
PROT SERPL-MCNC: 7.4 G/DL (ref 6–8.4)
PROTHROMBIN TIME: 10.7 SEC (ref 9–12.5)
RBC # BLD AUTO: 4.16 M/UL (ref 4–5.4)
SARS-COV-2 RDRP RESP QL NAA+PROBE: NEGATIVE
SODIUM SERPL-SCNC: 144 MMOL/L (ref 136–145)
TROPONIN I SERPL DL<=0.01 NG/ML-MCNC: <0.006 NG/ML (ref 0–0.03)
WBC # BLD AUTO: 7 K/UL (ref 3.9–12.7)

## 2020-11-10 PROCEDURE — 25500020 PHARM REV CODE 255: Performed by: STUDENT IN AN ORGANIZED HEALTH CARE EDUCATION/TRAINING PROGRAM

## 2020-11-10 PROCEDURE — 83880 ASSAY OF NATRIURETIC PEPTIDE: CPT

## 2020-11-10 PROCEDURE — 85025 COMPLETE CBC W/AUTO DIFF WBC: CPT

## 2020-11-10 PROCEDURE — U0002 COVID-19 LAB TEST NON-CDC: HCPCS | Performed by: STUDENT IN AN ORGANIZED HEALTH CARE EDUCATION/TRAINING PROGRAM

## 2020-11-10 PROCEDURE — 85610 PROTHROMBIN TIME: CPT

## 2020-11-10 PROCEDURE — 84484 ASSAY OF TROPONIN QUANT: CPT

## 2020-11-10 PROCEDURE — 80053 COMPREHEN METABOLIC PANEL: CPT

## 2020-11-10 RX ORDER — KETOROLAC TROMETHAMINE 30 MG/ML
15 INJECTION, SOLUTION INTRAMUSCULAR; INTRAVENOUS
Status: DISCONTINUED | OUTPATIENT
Start: 2020-11-10 | End: 2020-11-10

## 2020-11-10 RX ORDER — METHOCARBAMOL 500 MG/1
1000 TABLET, FILM COATED ORAL 3 TIMES DAILY PRN
Qty: 30 TABLET | Refills: 0 | Status: SHIPPED | OUTPATIENT
Start: 2020-11-10 | End: 2020-11-15

## 2020-11-10 RX ADMIN — IOHEXOL 75 ML: 350 INJECTION, SOLUTION INTRAVENOUS at 02:11

## 2020-11-10 NOTE — ED PROVIDER NOTES
"Encounter Date: 11/9/2020    SCRIBE #1 NOTE: I, Fidel Dimas, am scribing for, and in the presence of,  Violet Russell DO. I have scribed the following portions of the note - Other sections scribed: HPI, ROS, PE.       History     Chief Complaint   Patient presents with    Chest Pain     Patient reports intermittent, non-radiating substernal "chest heaviness", upper back pain, "sharp" right rib pain, sob, and right leg cramping x 1 week.  Reports hx of PE and was removed off of blood thinners x 3 weeks.  Was seen last week here in ED for same symptoms.  No acute distress noted in triage.    Back Pain    Shortness of Breath     Snow Barney is a 34 y.o. female with a PMHx of anxiety, depression, POTS, and PE who presents to the ED with complaints of intermittent chest pain and intermittent shortness of breath for one week. Patient reports she was seen in ED for similar complaint one week ago but states she refused CTA to rule out PE at that time. Patient had a PE in July so she was concerned she was having another one. However, states pain she experienced one week ago was different than the pain experienced during PE. Several days later she started to experience back pain, which was the same as when she had her PE. Denies any trauma to back. Other symptoms include right leg pain, right leg swelling, "feeling winded" today, intermittent sharp pain underneath her lungs, "a weird sensation in her chest", diaphoresis, cough, and one episode of light-headedness. Denies any fever, chills, congestion, nausea, vomiting, or syncope. Patient was taken off Xarelto three weeks ago. Has not taken aspirin PTA. Endorses social history of smoking. Patient not currently on birth control and denies other PE risk factors.     The history is provided by the patient. No  was used.     Review of patient's allergies indicates:   Allergen Reactions    Demerol (pf) [meperidine (pf)]      Past Medical " History:   Diagnosis Date    Anxiety     Depression     Hernia, hiatal     Inappropriate sinus tachycardia     POTS (postural orthostatic tachycardia syndrome)     Pulmonary emboli 2020    Sepsis      No past surgical history on file.  Family History   Problem Relation Age of Onset    Hypertension Mother     Diabetes Mother     Hypertension Father     Diabetes Father      Social History     Tobacco Use    Smoking status: Current Every Day Smoker     Packs/day: 1.00     Types: Cigarettes     Start date: 7/15/1996    Smokeless tobacco: Never Used    Tobacco comment: varies from 7 cigarettes to whole pack in day   Substance Use Topics    Alcohol use: Yes     Frequency: Monthly or less     Comment: ocassionally    Drug use: No     Review of Systems   Constitutional: Positive for diaphoresis. Negative for chills and fever.   HENT: Negative for congestion and drooling.    Eyes: Negative for redness and visual disturbance.   Respiratory: Positive for cough and shortness of breath.    Cardiovascular: Positive for chest pain and leg swelling.   Gastrointestinal: Negative for abdominal pain, nausea and vomiting.   Genitourinary: Negative for dysuria.   Musculoskeletal: Positive for back pain.   Skin: Negative for rash.   Neurological: Positive for light-headedness. Negative for syncope, weakness and headaches.   Psychiatric/Behavioral: Negative for confusion.       Physical Exam     Initial Vitals [11/09/20 2050]   BP Pulse Resp Temp SpO2   119/83 (!) 122 (!) 22 98.5 °F (36.9 °C) 98 %      MAP       --         Physical Exam    Nursing note and vitals reviewed.  Constitutional: She appears well-developed and well-nourished. She is not diaphoretic.   HENT:   Head: Normocephalic and atraumatic.   Eyes: Conjunctivae and EOM are normal. Pupils are equal, round, and reactive to light. No scleral icterus.   Neck: Normal range of motion. Neck supple. No JVD present.   Cardiovascular: Normal rate, regular rhythm and  intact distal pulses. Exam reveals no gallop and no friction rub.    No murmur heard.  Pulmonary/Chest: Breath sounds normal. No respiratory distress.   Abdominal: Soft. There is no abdominal tenderness.   Musculoskeletal: Normal range of motion.      Right lower leg: She exhibits tenderness and swelling.      Comments: Tenderness to palpation of right calf. Right lower extremity is mildly larger than left lower extremity.   Neurological: She is alert. She has normal strength. She displays no atrophy and no tremor. No sensory deficit. She exhibits normal muscle tone. She displays no seizure activity.   Moving all extremities, no focal deficits   Skin: Skin is warm and dry. Capillary refill takes less than 2 seconds. No rash noted. No erythema.   Psychiatric: She has a normal mood and affect.         ED Course   Procedures  Labs Reviewed   CBC W/ AUTO DIFFERENTIAL - Abnormal; Notable for the following components:       Result Value    MCHC 31.7 (*)     All other components within normal limits   SARS-COV-2 RDRP GENE - Normal   COMPREHENSIVE METABOLIC PANEL   TROPONIN I   B-TYPE NATRIURETIC PEPTIDE   PROTIME-INR    Narrative:     Recoll. [id number] by [email or web address] at 11/10/2020 02:24, reason: Specimen   hemolyzed   POCT URINE PREGNANCY     EKG Readings: (Independently Interpreted)   EKG shows sinus tachycardia with a rate of 114 bpm , normal axis and intervals with nonspecific t wave inversions in lead V1. No acute ST segment changes. EKG grossly unchanged when compared to EKG from 11/2/2020     ECG Results          EKG 12-lead (In process)  Result time 11/10/20 06:04:58    In process by Interface, Lab In [facility] (11/10/20 06:04:58)                 Narrative:    Test Reason : R07.9,    Vent. Rate : 114 BPM     Atrial Rate : 114 BPM     P-R Int : 160 ms          QRS Dur : 076 ms      QT Int : 310 ms       P-R-T Axes : 066 091 024 degrees     QTc Int : 427 ms    Sinus tachycardia  Rightward axis  Borderline Abnormal ECG  When  compared with ECG of 02-NOV-2020 13:03,  No significant change was found    Referred By: [email or web address]   SELF           Confirmed By:                   In process by Interface, Lab In [facility] (11/10/20 05:59:10)                 Narrative:    Test Reason : R07.9,    Vent. Rate : 114 BPM     Atrial Rate : 114 BPM     P-R Int : 160 ms          QRS Dur : 076 ms      QT Int : 310 ms       P-R-T Axes : 066 091 024 degrees     QTc Int : 427 ms    Sinus tachycardia  Rightward axis  Borderline Abnormal ECG  When compared with ECG of 02-NOV-2020 13:03,  No significant change was found    Referred By: [email or web address]   SELF           Confirmed By:                             Imaging Results          CTA Chest Non-Coronary (PE Study) (Final result)  Result time 11/10/20 02:56:32    Final result by Levar Mcginnis MD (11/10/20 02:56:32)                 Impression:      No convincing evidence of pulmonary thromboembolism or other acute intrathoracic abnormality.    Small hiatal hernia.      Electronically signed by: Levar Mcginnis MD  Date:    11/10/2020  Time:    02:56             Narrative:    EXAMINATION:  CTA CHEST NON CORONARY    CLINICAL HISTORY:  PE suspected, high pretest prob;    TECHNIQUE:  Low dose axial images, sagittal and coronal reformations were obtained from the thoracic inlet to the lung bases following the IV administration of 75 mL of Omnipaque 350.  Contrast timing was optimized to evaluate the pulmonary arteries.  MIP images were performed.    COMPARISON:  CTA chest 08/11/2020    FINDINGS:  The visualized soft tissue structures at the base of the neck are unremarkable.    The thoracic aorta maintains normal caliber, contour, and course without significant atherosclerotic calcification within its course.  There is no evidence of aneurysmal dilation or dissection. The heart is not enlarged and there is no evidence of pericardial effusion.The esophagus maintains a normal course and caliber.  There is a small hiatal  hernia present.There is no axillary, mediastinal, or hilar lymph node enlargement.    The trachea is midline and proximal airways are patent. The lungs are symmetrically expanded. There is no pneumothorax or confluent airspace consolidation.  There is no evidence of pleural fluid.    There is no evidence of pulmonary thromboembolism.    Limited images of the upper abdomen obtained during the course of this dedicated thoracic CT demonstrate no acute abnormalities.    The osseous structures demonstrate mild degenerative changes of the spine.                               US Lower Extremity Veins Bilateral (Final result)  Result time 11/10/20 02:22:08    Final result by Levar Mcginnis MD (11/10/20 02:22:08)                 Impression:      No evidence of deep venous thrombosis in either lower extremity.      Electronically signed by: Levar Mcginnis MD  Date:    11/10/2020  Time:    02:22             Narrative:    EXAMINATION:  US LOWER EXTREMITY VEINS BILATERAL    CLINICAL HISTORY:  Pain in leg, unspecified    TECHNIQUE:  Duplex and color flow Doppler and dynamic compression was performed of the bilateral lower extremity veins was performed.    COMPARISON:  08/11/2020    FINDINGS:  Right thigh veins: The common femoral, femoral, popliteal, upper greater saphenous, and deep femoral veins are patent and free of thrombus. The veins are normally compressible and have normal phasic flow and augmentation response.    Right calf veins: The visualized calf veins are patent.    Left thigh veins: The common femoral, femoral, popliteal, upper greater saphenous, and deep femoral veins are patent and free of thrombus. The veins are normally compressible and have normal phasic flow and augmentation response.    Left calf veins: The visualized calf veins are patent.    Miscellaneous: None                               X-Ray Chest PA And Lateral (Final result)  Result time 11/09/20 23:21:21    Final result by Julio Espinal MD  (11/09/20 23:21:21)                 Impression:      No acute abnormality.      Electronically signed by: Julio George  Date:    11/09/2020  Time:    23:21             Narrative:    EXAMINATION:  XR CHEST PA AND LATERAL    CLINICAL HISTORY:  Shortness of breath    TECHNIQUE:  PA and lateral views of the chest were performed.    COMPARISON:  11/02/2020    FINDINGS:  The lungs are clear, with normal appearance of pulmonary vasculature and no pleural effusion or pneumothorax.    The cardiac silhouette is normal in size. The hilar and mediastinal contours are unremarkable.    Bones are intact.                                 Medical Decision Making:   History:   Old Medical Records: I decided to obtain old medical records.  Clinical Tests:   Lab Tests: Ordered and Reviewed  Radiological Study: Ordered and Reviewed  Medical Tests: Ordered and Reviewed  ED Management:   [facility]  This is an emergent evaluation of a 34 y.o. female with a PMHx of  PE who presents with intermittent chest pain and intermittent shortness of breath for one week with associated right leg pain and swelling. Initial vitals in the ED remarkable for a pulse of 122, RR of 22 with remainder of vitals unremarkable. Physical exam notable for a non-toxic appearing female. Tenderness to the right calf, with right leg appearing slightly larger than the left. Remainder of exam benign. DDx includes but is not limited to PE, PNA, ACS, CHF, PTX, arrhythmia, renal failure, musculoskeletal pain. Given history and presentation, labs and imaging including a cardiac work-up with CTA chest to rule out PE given patient high suspicion for PE and US of lower extremities. Work-up benign. Given benign work-up, presentation likely musculoskeletal. Will discharge with ED return precautions and close PCP follow-up. Patient expressed understanding and agreeable to the plan.     Violet Russell D.O  EMERGENCY MEDICINE  3:29 AM 11/10/2020            Scribe Attestation:    Scribe #1: I performed the above scribed service and the documentation accurately describes the services I performed. I attest to the accuracy of the note.                      Clinical Impression:       ICD-10-CM ICD-9-CM   1. Chest pain  R07.9 786.50   2. SOB (shortness of breath)  R06.02 786.05   3. Lower extremity pain  M79.606 729.5                          ED Disposition Condition    Discharge Stable        ED Prescriptions     Medication Sig Dispense Start Date End Date Auth. Provider    methocarbamoL (ROBAXIN) 500 MG Tab Take 2 tablets (1,000 mg total) by mouth 3 (three) times daily as needed. 30 tablet 11/10/2020 11/15/2020 Violet Russell DO        Follow-up Information     Follow up With Specialties Details Why Contact Info    [facility] Go to  If symptoms worsen [address] [facility] [id number] [phone]    Ki Gonzalez, [facility] Schedule an appointment as soon as possible for a visit  Emergency Room Follow-up [address] [address]  [phone]                        I, Violet Russell DO, personally performed the services described in this documentation. All medical record entries made by the scribe were at my direction and in my presence. I have reviewed the chart and agree that the record reflects my personal performance and is accurate and complete.                   Violet Russell DO  11/10/20 2739

## 2020-11-10 NOTE — FIRST PROVIDER EVALUATION
" Emergency Department TeleTriage Encounter Note      CHIEF COMPLAINT    Chief Complaint   Patient presents with    Chest Pain     Patient reports intermittent, non-radiating substernal "chest heaviness", upper back pain, "sharp" right rib pain, sob, and right leg cramping x 1 week.  Reports hx of PE and was removed off of blood thinners x 3 weeks.  Was seen last week here in ED for same symptoms.  No acute distress noted in triage.    Back Pain    Shortness of Breath       VITAL SIGNS   Initial Vitals [11/09/20 2050]   BP Pulse Resp Temp SpO2   119/83 (!) 122 (!) 22 98.5 °F (36.9 °C) 98 %      MAP       --            ALLERGIES    Review of patient's allergies indicates:   Allergen Reactions    Demerol (pf) [meperidine (pf)]        PROVIDER TRIAGE NOTE  33 y/o female which presents with worsening SOB and chest pain. Patient has a history of PE and was seen 1 week ago. She is also having associated right calf pain.       ORDERS  Labs Reviewed   POCT URINE PREGNANCY       ED Orders (720h ago, onward)    Start Ordered     Status Ordering Provider    11/09/20 2146 11/09/20 2145  POCT urine pregnancy  Once      Ordered NIMISHA LECHUGA    11/09/20 2146 11/09/20 2146  X-Ray Chest PA And Lateral  1 time imaging      Ordered NIMISHA LECHUGA    11/09/20 2046 11/09/20 2045  EKG 12-lead  Once  Completed    Completed by JERMAN CARROLL on 11/9/2020 at  8:45 PM CATHY JASSO            Virtual Visit Note: The provider triage portion of this emergency department evaluation and documentation was performed via [employer], a HIPAA-compliant telemedicine application, in concert with a tele-presenter in the room. A face to face patient evaluation with one of my colleagues will occur once the patient is placed in an emergency department room.      DISCLAIMER: This note was prepared with [employer] voice recognition transcription software. Garbled syntax, mangled pronouns, and other bizarre constructions may be attributed " to that software system.

## 2020-11-10 NOTE — ED NOTES
Pt resting w/ eyes closed and in no distress. Pt states she is fine, denies SOB. VSS. Will continue to monitor closely.

## 2020-11-10 NOTE — DISCHARGE INSTRUCTIONS
Please return to the ER if you have worsening symptoms, chest pain, shortness of breath, if you are unable to keep down fluids, if you are unable urinate, if you have persistent high fevers, if you pass out, or if you have other concerning symptoms.

## 2020-11-10 NOTE — ED NOTES
Pt c/o chest pain and SOB since earlier yesterday. Pt was taken off Xarelto 3 wks ago for PE. Pt states the SOB started 2 days ago. Pt also states her R lower leg is bigger than her L and she also has tenderness to the R calf. Pt denies trauma. Pt states she has a sharp pain in her upper back. Pt is A & O x 3, no respiratory distress observed. Respirations are even and unlabored. Skin is warm, dry and pink. VSS. YUMIKO x 3mm. BBS- CTA. Abd- SNT. PSM x 4 exts. Pt is connected to the pulse ox, B/P cuff and EKg monitor. Bed is locked and in the low position w/ the side rails up and locked for pt safety. Call bell @ the BS. Will continue to monitor closely.

## 2020-11-11 ENCOUNTER — PATIENT MESSAGE (OUTPATIENT)
Dept: OBSTETRICS AND GYNECOLOGY | Facility: CLINIC | Age: 35
End: 2020-11-11

## 2020-11-11 LAB
HSV AB, IGM BY EIA: 0.87 INDEX
HSV1 IGG SERPL QL IA: POSITIVE
HSV2 IGG SERPL QL IA: POSITIVE

## 2020-11-12 ENCOUNTER — PATIENT MESSAGE (OUTPATIENT)
Dept: OBSTETRICS AND GYNECOLOGY | Facility: CLINIC | Age: 35
End: 2020-11-12

## 2020-11-12 ENCOUNTER — TELEPHONE (OUTPATIENT)
Dept: OBSTETRICS AND GYNECOLOGY | Facility: CLINIC | Age: 35
End: 2020-11-12

## 2020-11-12 NOTE — TELEPHONE ENCOUNTER
Spoke with pt concerns answered     ----- Message from Janelle Jeter sent at 11/12/2020 12:14 PM CST -----  Contact: Self [phone]  Type: Patient Call Back    Who called:Self     What is the request in detail: Pt is calling in regards to her blood work results    Can the clinic reply by [facility]? Call back    Would the patient rather a call back or a response via [facility]? Call back    Best call back number: [phone]          Thank You

## 2020-11-22 ENCOUNTER — PATIENT MESSAGE (OUTPATIENT)
Dept: OBSTETRICS AND GYNECOLOGY | Facility: CLINIC | Age: 35
End: 2020-11-22

## 2020-11-22 DIAGNOSIS — A60.9 HSV (HERPES SIMPLEX VIRUS) ANOGENITAL INFECTION: Primary | ICD-10-CM

## 2020-11-23 ENCOUNTER — PATIENT MESSAGE (OUTPATIENT)
Dept: OBSTETRICS AND GYNECOLOGY | Facility: CLINIC | Age: 35
End: 2020-11-23

## 2020-11-23 RX ORDER — VALACYCLOVIR HYDROCHLORIDE 500 MG/1
1000 TABLET, FILM COATED ORAL 2 TIMES DAILY
Qty: 28 TABLET | Refills: 0 | Status: SHIPPED | OUTPATIENT
Start: 2020-11-23 | End: 2023-03-31

## 2020-12-08 ENCOUNTER — PATIENT MESSAGE (OUTPATIENT)
Dept: OBSTETRICS AND GYNECOLOGY | Facility: CLINIC | Age: 35
End: 2020-12-08

## 2020-12-08 DIAGNOSIS — B37.9 YEAST INFECTION: Primary | ICD-10-CM

## 2020-12-08 RX ORDER — FLUCONAZOLE 150 MG/1
150 TABLET ORAL DAILY
Qty: 1 TABLET | Refills: 0 | Status: SHIPPED | OUTPATIENT
Start: 2020-12-08 | End: 2020-12-09

## 2021-02-25 ENCOUNTER — OFFICE VISIT (OUTPATIENT)
Dept: OBSTETRICS AND GYNECOLOGY | Facility: CLINIC | Age: 36
End: 2021-02-25
Payer: MEDICAID

## 2021-02-25 ENCOUNTER — PATIENT MESSAGE (OUTPATIENT)
Dept: OBSTETRICS AND GYNECOLOGY | Facility: CLINIC | Age: 36
End: 2021-02-25

## 2021-02-25 VITALS
DIASTOLIC BLOOD PRESSURE: 76 MMHG | WEIGHT: 198.88 LBS | BODY MASS INDEX: 31.15 KG/M2 | SYSTOLIC BLOOD PRESSURE: 128 MMHG

## 2021-02-25 DIAGNOSIS — Z01.419 ENCOUNTER FOR GYNECOLOGICAL EXAMINATION WITHOUT ABNORMAL FINDING: Primary | ICD-10-CM

## 2021-02-25 DIAGNOSIS — B96.89 BV (BACTERIAL VAGINOSIS): ICD-10-CM

## 2021-02-25 DIAGNOSIS — N76.0 BV (BACTERIAL VAGINOSIS): ICD-10-CM

## 2021-02-25 DIAGNOSIS — Z12.39 SCREENING BREAST EXAMINATION: ICD-10-CM

## 2021-02-25 PROCEDURE — 99212 OFFICE O/P EST SF 10 MIN: CPT | Mod: PBBFAC | Performed by: OBSTETRICS & GYNECOLOGY

## 2021-02-25 PROCEDURE — 99395 PREV VISIT EST AGE 18-39: CPT | Mod: S$PBB,,, | Performed by: OBSTETRICS & GYNECOLOGY

## 2021-02-25 PROCEDURE — 99999 PR PBB SHADOW E&M-EST. PATIENT-LVL II: ICD-10-PCS | Mod: PBBFAC,,, | Performed by: OBSTETRICS & GYNECOLOGY

## 2021-02-25 PROCEDURE — 99999 PR PBB SHADOW E&M-EST. PATIENT-LVL II: CPT | Mod: PBBFAC,,, | Performed by: OBSTETRICS & GYNECOLOGY

## 2021-02-25 PROCEDURE — 99395 PR PREVENTIVE VISIT,EST,18-39: ICD-10-PCS | Mod: S$PBB,,, | Performed by: OBSTETRICS & GYNECOLOGY

## 2021-03-05 ENCOUNTER — PATIENT MESSAGE (OUTPATIENT)
Dept: OBSTETRICS AND GYNECOLOGY | Facility: CLINIC | Age: 36
End: 2021-03-05

## 2021-04-16 ENCOUNTER — PATIENT MESSAGE (OUTPATIENT)
Dept: RESEARCH | Facility: HOSPITAL | Age: 36
End: 2021-04-16

## 2021-04-19 ENCOUNTER — PATIENT MESSAGE (OUTPATIENT)
Dept: OBSTETRICS AND GYNECOLOGY | Facility: CLINIC | Age: 36
End: 2021-04-19

## 2021-04-19 DIAGNOSIS — Z80.3 FAMILY HISTORY OF BREAST CANCER: Primary | ICD-10-CM

## 2021-04-24 ENCOUNTER — PATIENT MESSAGE (OUTPATIENT)
Dept: OBSTETRICS AND GYNECOLOGY | Facility: CLINIC | Age: 36
End: 2021-04-24

## 2021-06-08 ENCOUNTER — OFFICE VISIT (OUTPATIENT)
Dept: OBSTETRICS AND GYNECOLOGY | Facility: CLINIC | Age: 36
End: 2021-06-08
Payer: MEDICAID

## 2021-06-08 ENCOUNTER — LAB VISIT (OUTPATIENT)
Dept: LAB | Facility: HOSPITAL | Age: 36
End: 2021-06-08
Attending: OBSTETRICS & GYNECOLOGY
Payer: MEDICAID

## 2021-06-08 VITALS
BODY MASS INDEX: 31.21 KG/M2 | WEIGHT: 199.31 LBS | DIASTOLIC BLOOD PRESSURE: 80 MMHG | SYSTOLIC BLOOD PRESSURE: 120 MMHG

## 2021-06-08 DIAGNOSIS — Z11.3 VENEREAL DISEASE SCREENING: ICD-10-CM

## 2021-06-08 DIAGNOSIS — Z30.018 ENCOUNTER FOR INITIAL PRESCRIPTION OF OTHER CONTRACEPTIVES: ICD-10-CM

## 2021-06-08 DIAGNOSIS — Z11.3 VENEREAL DISEASE SCREENING: Primary | ICD-10-CM

## 2021-06-08 DIAGNOSIS — Z72.0 SMOKING TRYING TO QUIT: ICD-10-CM

## 2021-06-08 PROCEDURE — 99214 OFFICE O/P EST MOD 30 MIN: CPT | Mod: S$PBB,,, | Performed by: OBSTETRICS & GYNECOLOGY

## 2021-06-08 PROCEDURE — 99214 PR OFFICE/OUTPT VISIT, EST, LEVL IV, 30-39 MIN: ICD-10-PCS | Mod: S$PBB,,, | Performed by: OBSTETRICS & GYNECOLOGY

## 2021-06-08 PROCEDURE — 86703 HIV-1/HIV-2 1 RESULT ANTBDY: CPT | Performed by: OBSTETRICS & GYNECOLOGY

## 2021-06-08 PROCEDURE — 87591 N.GONORRHOEAE DNA AMP PROB: CPT | Mod: 59 | Performed by: OBSTETRICS & GYNECOLOGY

## 2021-06-08 PROCEDURE — 87481 CANDIDA DNA AMP PROBE: CPT | Mod: 59 | Performed by: OBSTETRICS & GYNECOLOGY

## 2021-06-08 PROCEDURE — 99999 PR PBB SHADOW E&M-EST. PATIENT-LVL III: CPT | Mod: PBBFAC,,, | Performed by: OBSTETRICS & GYNECOLOGY

## 2021-06-08 PROCEDURE — 99999 PR PBB SHADOW E&M-EST. PATIENT-LVL III: ICD-10-PCS | Mod: PBBFAC,,, | Performed by: OBSTETRICS & GYNECOLOGY

## 2021-06-08 PROCEDURE — 87491 CHLMYD TRACH DNA AMP PROBE: CPT | Performed by: OBSTETRICS & GYNECOLOGY

## 2021-06-08 PROCEDURE — 87340 HEPATITIS B SURFACE AG IA: CPT | Performed by: OBSTETRICS & GYNECOLOGY

## 2021-06-08 PROCEDURE — 99213 OFFICE O/P EST LOW 20 MIN: CPT | Mod: PBBFAC | Performed by: OBSTETRICS & GYNECOLOGY

## 2021-06-08 PROCEDURE — 86803 HEPATITIS C AB TEST: CPT | Performed by: OBSTETRICS & GYNECOLOGY

## 2021-06-08 PROCEDURE — 86592 SYPHILIS TEST NON-TREP QUAL: CPT | Performed by: OBSTETRICS & GYNECOLOGY

## 2021-06-08 PROCEDURE — 36415 COLL VENOUS BLD VENIPUNCTURE: CPT | Performed by: OBSTETRICS & GYNECOLOGY

## 2021-06-08 RX ORDER — LACTIC ACID, L-, CITRIC ACID MONOHYDRATE, AND POTASSIUM BITARTRATE 90; 50; 20 MG/5G; MG/5G; MG/5G
1 GEL VAGINAL
Qty: 5 G | Refills: 6 | Status: SHIPPED | OUTPATIENT
Start: 2021-06-08 | End: 2021-12-08

## 2021-06-10 LAB
C TRACH DNA SPEC QL NAA+PROBE: NOT DETECTED
N GONORRHOEA DNA SPEC QL NAA+PROBE: NOT DETECTED

## 2021-06-14 LAB
BACTERIAL VAGINOSIS DNA: POSITIVE
CANDIDA GLABRATA DNA: POSITIVE
CANDIDA KRUSEI DNA: NEGATIVE
CANDIDA RRNA VAG QL PROBE: NEGATIVE
T VAGINALIS RRNA GENITAL QL PROBE: NEGATIVE

## 2021-06-15 ENCOUNTER — PATIENT MESSAGE (OUTPATIENT)
Dept: OBSTETRICS AND GYNECOLOGY | Facility: CLINIC | Age: 36
End: 2021-06-15

## 2021-06-15 RX ORDER — FLUCONAZOLE 150 MG/1
150 TABLET ORAL ONCE
Qty: 1 TABLET | Refills: 1 | Status: SHIPPED | OUTPATIENT
Start: 2021-06-15 | End: 2021-06-15

## 2021-06-15 RX ORDER — METRONIDAZOLE 7.5 MG/G
1 GEL VAGINAL DAILY
Qty: 70 G | Refills: 0 | Status: SHIPPED | OUTPATIENT
Start: 2021-06-15 | End: 2021-06-20

## 2021-08-03 ENCOUNTER — TELEPHONE (OUTPATIENT)
Dept: NEUROLOGY | Facility: CLINIC | Age: 36
End: 2021-08-03

## 2021-08-05 ENCOUNTER — CLINICAL SUPPORT (OUTPATIENT)
Dept: SMOKING CESSATION | Facility: CLINIC | Age: 36
End: 2021-08-05

## 2021-08-05 DIAGNOSIS — F17.200 NICOTINE DEPENDENCE: Primary | ICD-10-CM

## 2021-08-05 PROCEDURE — 99404 PR PREVENT COUNSEL,INDIV,60 MIN: ICD-10-PCS | Mod: S$GLB,,,

## 2021-08-05 PROCEDURE — 99999 PR PBB SHADOW E&M-EST. PATIENT-LVL I: CPT | Mod: PBBFAC,,,

## 2021-08-05 PROCEDURE — 99404 PREV MED CNSL INDIV APPRX 60: CPT | Mod: S$GLB,,,

## 2021-08-05 PROCEDURE — 99999 PR PBB SHADOW E&M-EST. PATIENT-LVL I: ICD-10-PCS | Mod: PBBFAC,,,

## 2021-08-05 RX ORDER — IBUPROFEN 200 MG
1 TABLET ORAL DAILY
Qty: 28 PATCH | Refills: 0 | Status: SHIPPED | OUTPATIENT
Start: 2021-08-05 | End: 2022-04-13

## 2021-08-23 ENCOUNTER — PATIENT MESSAGE (OUTPATIENT)
Dept: NEUROLOGY | Facility: CLINIC | Age: 36
End: 2021-08-23

## 2021-08-26 ENCOUNTER — CLINICAL SUPPORT (OUTPATIENT)
Dept: SMOKING CESSATION | Facility: CLINIC | Age: 36
End: 2021-08-26

## 2021-08-26 DIAGNOSIS — F17.200 NICOTINE DEPENDENCE: Primary | ICD-10-CM

## 2021-08-26 PROCEDURE — 99999 PR PBB SHADOW E&M-EST. PATIENT-LVL I: ICD-10-PCS | Mod: PBBFAC,,,

## 2021-08-26 PROCEDURE — 99999 PR PBB SHADOW E&M-EST. PATIENT-LVL I: CPT | Mod: PBBFAC,,,

## 2021-09-10 ENCOUNTER — PATIENT MESSAGE (OUTPATIENT)
Dept: OBSTETRICS AND GYNECOLOGY | Facility: CLINIC | Age: 36
End: 2021-09-10

## 2021-09-10 DIAGNOSIS — B96.89 BV (BACTERIAL VAGINOSIS): Primary | ICD-10-CM

## 2021-09-10 DIAGNOSIS — N76.0 BV (BACTERIAL VAGINOSIS): Primary | ICD-10-CM

## 2021-09-13 RX ORDER — METRONIDAZOLE 500 MG/1
500 TABLET ORAL 2 TIMES DAILY
Qty: 14 TABLET | Refills: 0 | Status: SHIPPED | OUTPATIENT
Start: 2021-09-13 | End: 2021-09-20

## 2021-09-21 ENCOUNTER — HOSPITAL ENCOUNTER (EMERGENCY)
Facility: HOSPITAL | Age: 36
Discharge: HOME | End: 2021-09-21
Attending: EMERGENCY MEDICINE
Payer: MEDICAID

## 2021-09-21 VITALS
RESPIRATION RATE: 20 BRPM | TEMPERATURE: 99 F | HEART RATE: 83 BPM | OXYGEN SATURATION: 100 % | SYSTOLIC BLOOD PRESSURE: 136 MMHG | WEIGHT: 186 LBS | BODY MASS INDEX: 29.13 KG/M2 | DIASTOLIC BLOOD PRESSURE: 76 MMHG

## 2021-09-21 DIAGNOSIS — R51.9 ACUTE NONINTRACTABLE HEADACHE, UNSPECIFIED HEADACHE TYPE: Primary | ICD-10-CM

## 2021-09-21 LAB
ALBUMIN SERPL BCP-MCNC: 4.3 G/DL (ref 3.5–5.2)
ALP SERPL-CCNC: 73 U/L (ref 55–135)
ALT SERPL W/O P-5'-P-CCNC: 9 U/L (ref 10–44)
ANION GAP SERPL CALC-SCNC: 10 MMOL/L (ref 8–16)
AST SERPL-CCNC: 12 U/L (ref 10–40)
B-HCG UR QL: NEGATIVE
BASOPHILS # BLD AUTO: 0.05 K/UL (ref 0–0.2)
BASOPHILS NFR BLD: 0.8 % (ref 0–1.9)
BILIRUB SERPL-MCNC: 0.6 MG/DL (ref 0.1–1)
BUN SERPL-MCNC: 11 MG/DL (ref 6–20)
CALCIUM SERPL-MCNC: 10.2 MG/DL (ref 8.7–10.5)
CHLORIDE SERPL-SCNC: 107 MMOL/L (ref 95–110)
CO2 SERPL-SCNC: 25 MMOL/L (ref 23–29)
CREAT SERPL-MCNC: 0.7 MG/DL (ref 0.5–1.4)
CTP QC/QA: YES
DIFFERENTIAL METHOD: NORMAL
EOSINOPHIL # BLD AUTO: 0.4 K/UL (ref 0–0.5)
EOSINOPHIL NFR BLD: 5.3 % (ref 0–8)
ERYTHROCYTE [DISTWIDTH] IN BLOOD BY AUTOMATED COUNT: 13.5 % (ref 11.5–14.5)
EST. GFR  (AFRICAN AMERICAN): >60 ML/MIN/1.73 M^2
EST. GFR  (NON AFRICAN AMERICAN): >60 ML/MIN/1.73 M^2
GLUCOSE SERPL-MCNC: 88 MG/DL (ref 70–110)
HCT VFR BLD AUTO: 40.2 % (ref 37–48.5)
HGB BLD-MCNC: 13.3 G/DL (ref 12–16)
IMM GRANULOCYTES # BLD AUTO: 0.02 K/UL (ref 0–0.04)
IMM GRANULOCYTES NFR BLD AUTO: 0.3 % (ref 0–0.5)
LYMPHOCYTES # BLD AUTO: 1.9 K/UL (ref 1–4.8)
LYMPHOCYTES NFR BLD: 29 % (ref 18–48)
MCH RBC QN AUTO: 29.3 PG (ref 27–31)
MCHC RBC AUTO-ENTMCNC: 33.1 G/DL (ref 32–36)
MCV RBC AUTO: 89 FL (ref 82–98)
MONOCYTES # BLD AUTO: 0.5 K/UL (ref 0.3–1)
MONOCYTES NFR BLD: 7 % (ref 4–15)
NEUTROPHILS # BLD AUTO: 3.8 K/UL (ref 1.8–7.7)
NEUTROPHILS NFR BLD: 57.6 % (ref 38–73)
NRBC BLD-RTO: 0 /100 WBC
PLATELET # BLD AUTO: 221 K/UL (ref 150–450)
PMV BLD AUTO: 11.3 FL (ref 9.2–12.9)
POTASSIUM SERPL-SCNC: 4 MMOL/L (ref 3.5–5.1)
PROT SERPL-MCNC: 7.9 G/DL (ref 6–8.4)
RBC # BLD AUTO: 4.54 M/UL (ref 4–5.4)
SODIUM SERPL-SCNC: 142 MMOL/L (ref 136–145)
WBC # BLD AUTO: 6.61 K/UL (ref 3.9–12.7)

## 2021-09-21 PROCEDURE — A9585 GADOBUTROL INJECTION: HCPCS | Performed by: PHYSICIAN ASSISTANT

## 2021-09-21 PROCEDURE — 85025 COMPLETE CBC W/AUTO DIFF WBC: CPT | Performed by: PHYSICIAN ASSISTANT

## 2021-09-21 PROCEDURE — 99285 EMERGENCY DEPT VISIT HI MDM: CPT

## 2021-09-21 PROCEDURE — 25000003 PHARM REV CODE 250: Performed by: PHYSICIAN ASSISTANT

## 2021-09-21 PROCEDURE — 25500020 PHARM REV CODE 255: Performed by: PHYSICIAN ASSISTANT

## 2021-09-21 PROCEDURE — 80053 COMPREHEN METABOLIC PANEL: CPT | Performed by: PHYSICIAN ASSISTANT

## 2021-09-21 PROCEDURE — 81025 URINE PREGNANCY TEST: CPT | Performed by: EMERGENCY MEDICINE

## 2021-09-21 RX ORDER — BUTALBITAL, ACETAMINOPHEN AND CAFFEINE 50; 325; 40 MG/1; MG/1; MG/1
1 TABLET ORAL EVERY 4 HOURS PRN
Qty: 30 TABLET | Refills: 0 | Status: SHIPPED | OUTPATIENT
Start: 2021-09-21 | End: 2021-10-21

## 2021-09-21 RX ORDER — BUTALBITAL, ACETAMINOPHEN AND CAFFEINE 50; 325; 40 MG/1; MG/1; MG/1
1 TABLET ORAL
Status: COMPLETED | OUTPATIENT
Start: 2021-09-21 | End: 2021-09-21

## 2021-09-21 RX ORDER — GADOBUTROL 604.72 MG/ML
8 INJECTION INTRAVENOUS
Status: COMPLETED | OUTPATIENT
Start: 2021-09-21 | End: 2021-09-21

## 2021-09-21 RX ORDER — LIRAGLUTIDE 6 MG/ML
1.2 INJECTION SUBCUTANEOUS DAILY
COMMUNITY
Start: 2021-08-12 | End: 2023-03-31

## 2021-09-21 RX ADMIN — SODIUM CHLORIDE 1000 ML: 0.9 INJECTION, SOLUTION INTRAVENOUS at 11:09

## 2021-09-21 RX ADMIN — GADOBUTROL 8 ML: 604.72 INJECTION INTRAVENOUS at 12:09

## 2021-09-21 RX ADMIN — BUTALBITAL, ACETAMINOPHEN AND CAFFEINE 1 TABLET: 50; 325; 40 TABLET ORAL at 10:09

## 2021-10-29 ENCOUNTER — OFFICE VISIT (OUTPATIENT)
Dept: NEUROLOGY | Facility: CLINIC | Age: 36
End: 2021-10-29
Payer: MEDICAID

## 2021-10-29 ENCOUNTER — PATIENT MESSAGE (OUTPATIENT)
Dept: NEUROLOGY | Facility: CLINIC | Age: 36
End: 2021-10-29

## 2021-10-29 DIAGNOSIS — G47.33 OSA (OBSTRUCTIVE SLEEP APNEA): ICD-10-CM

## 2021-10-29 DIAGNOSIS — G90.A POTS (POSTURAL ORTHOSTATIC TACHYCARDIA SYNDROME): ICD-10-CM

## 2021-10-29 DIAGNOSIS — F41.9 ANXIETY: ICD-10-CM

## 2021-10-29 DIAGNOSIS — Z72.0 TOBACCO ABUSE: ICD-10-CM

## 2021-10-29 DIAGNOSIS — D50.9 IRON DEFICIENCY ANEMIA, UNSPECIFIED IRON DEFICIENCY ANEMIA TYPE: ICD-10-CM

## 2021-10-29 DIAGNOSIS — A04.8 H. PYLORI INFECTION: ICD-10-CM

## 2021-10-29 DIAGNOSIS — J45.909 ASTHMA, UNSPECIFIED ASTHMA SEVERITY, UNSPECIFIED WHETHER COMPLICATED, UNSPECIFIED WHETHER PERSISTENT: ICD-10-CM

## 2021-10-29 DIAGNOSIS — I26.99 PULMONARY EMBOLISM, UNSPECIFIED CHRONICITY, UNSPECIFIED PULMONARY EMBOLISM TYPE, UNSPECIFIED WHETHER ACUTE COR PULMONALE PRESENT: ICD-10-CM

## 2021-10-29 DIAGNOSIS — R00.0 SINUS TACHYCARDIA: ICD-10-CM

## 2021-10-29 DIAGNOSIS — G43.709 CHRONIC MIGRAINE WITHOUT AURA WITHOUT STATUS MIGRAINOSUS, NOT INTRACTABLE: ICD-10-CM

## 2021-10-29 DIAGNOSIS — R42 DIZZINESS: ICD-10-CM

## 2021-10-29 DIAGNOSIS — G43.009 MIGRAINE WITHOUT AURA AND WITHOUT STATUS MIGRAINOSUS, NOT INTRACTABLE: Primary | ICD-10-CM

## 2021-10-29 DIAGNOSIS — F32.A DEPRESSION, UNSPECIFIED DEPRESSION TYPE: ICD-10-CM

## 2021-10-29 PROCEDURE — 99205 OFFICE O/P NEW HI 60 MIN: CPT | Mod: 95,,, | Performed by: PHYSICIAN ASSISTANT

## 2021-10-29 PROCEDURE — 99205 PR OFFICE/OUTPT VISIT, NEW, LEVL V, 60-74 MIN: ICD-10-PCS | Mod: 95,,, | Performed by: PHYSICIAN ASSISTANT

## 2021-10-29 RX ORDER — TOPIRAMATE SPINKLE 25 MG/1
CAPSULE ORAL
Qty: 30 CAPSULE | Refills: 0 | Status: SHIPPED | OUTPATIENT
Start: 2021-10-29 | End: 2022-02-07

## 2021-10-29 RX ORDER — TOPIRAMATE 50 MG/1
50 TABLET, FILM COATED ORAL DAILY
Qty: 30 TABLET | Refills: 2 | Status: SHIPPED | OUTPATIENT
Start: 2021-10-29 | End: 2022-02-07

## 2021-11-01 ENCOUNTER — PROCEDURE VISIT (OUTPATIENT)
Dept: OBSTETRICS AND GYNECOLOGY | Facility: CLINIC | Age: 36
End: 2021-11-01
Payer: MEDICAID

## 2021-11-01 VITALS
BODY MASS INDEX: 29.38 KG/M2 | HEART RATE: 69 BPM | SYSTOLIC BLOOD PRESSURE: 111 MMHG | WEIGHT: 187.19 LBS | DIASTOLIC BLOOD PRESSURE: 78 MMHG | HEIGHT: 67 IN

## 2021-11-01 DIAGNOSIS — Z30.430 ENCOUNTER FOR IUD INSERTION: Primary | ICD-10-CM

## 2021-11-01 PROCEDURE — 58300 INSERT INTRAUTERINE DEVICE: CPT | Mod: PBBFAC | Performed by: OBSTETRICS & GYNECOLOGY

## 2021-11-01 PROCEDURE — 58300 INSERTION OF IUD: ICD-10-PCS | Mod: S$PBB,,, | Performed by: OBSTETRICS & GYNECOLOGY

## 2021-11-01 RX ADMIN — COPPER 380 MM: 313.4 INTRAUTERINE DEVICE INTRAUTERINE at 10:11

## 2021-11-03 ENCOUNTER — TELEPHONE (OUTPATIENT)
Dept: PHARMACY | Facility: CLINIC | Age: 36
End: 2021-11-03
Payer: MEDICAID

## 2021-11-03 ENCOUNTER — PATIENT MESSAGE (OUTPATIENT)
Dept: PHARMACY | Facility: CLINIC | Age: 36
End: 2021-11-03
Payer: MEDICAID

## 2021-11-03 ENCOUNTER — PATIENT MESSAGE (OUTPATIENT)
Dept: OBSTETRICS AND GYNECOLOGY | Facility: CLINIC | Age: 36
End: 2021-11-03
Payer: MEDICAID

## 2021-12-08 ENCOUNTER — OFFICE VISIT (OUTPATIENT)
Dept: NEUROLOGY | Facility: CLINIC | Age: 36
End: 2021-12-08
Payer: MEDICAID

## 2021-12-08 DIAGNOSIS — G43.109 VERTIGINOUS MIGRAINE: ICD-10-CM

## 2021-12-08 DIAGNOSIS — R42 DIZZINESS: ICD-10-CM

## 2021-12-08 PROCEDURE — 99215 PR OFFICE/OUTPT VISIT, EST, LEVL V, 40-54 MIN: ICD-10-PCS | Mod: 95,,, | Performed by: PSYCHIATRY & NEUROLOGY

## 2021-12-08 PROCEDURE — 99215 OFFICE O/P EST HI 40 MIN: CPT | Mod: 95,,, | Performed by: PSYCHIATRY & NEUROLOGY

## 2021-12-31 ENCOUNTER — HOSPITAL ENCOUNTER (EMERGENCY)
Facility: HOSPITAL | Age: 36
Discharge: HOME | End: 2021-12-31
Attending: EMERGENCY MEDICINE
Payer: MEDICAID

## 2021-12-31 VITALS
TEMPERATURE: 98 F | WEIGHT: 190 LBS | BODY MASS INDEX: 29.82 KG/M2 | SYSTOLIC BLOOD PRESSURE: 118 MMHG | HEIGHT: 67 IN | DIASTOLIC BLOOD PRESSURE: 81 MMHG | OXYGEN SATURATION: 99 % | RESPIRATION RATE: 18 BRPM | HEART RATE: 83 BPM

## 2021-12-31 DIAGNOSIS — R07.9 CHEST PAIN: ICD-10-CM

## 2021-12-31 DIAGNOSIS — R06.02 SOB (SHORTNESS OF BREATH): Primary | ICD-10-CM

## 2021-12-31 DIAGNOSIS — R53.83 FATIGUE, UNSPECIFIED TYPE: ICD-10-CM

## 2021-12-31 LAB
ALBUMIN SERPL BCP-MCNC: 4.4 G/DL (ref 3.5–5.2)
ALP SERPL-CCNC: 67 U/L (ref 55–135)
ALT SERPL W/O P-5'-P-CCNC: 13 U/L (ref 10–44)
ANION GAP SERPL CALC-SCNC: 9 MMOL/L (ref 8–16)
AST SERPL-CCNC: 16 U/L (ref 10–40)
B-HCG UR QL: NEGATIVE
BACTERIA #/AREA URNS HPF: NORMAL /HPF
BASOPHILS # BLD AUTO: 0.05 K/UL (ref 0–0.2)
BASOPHILS NFR BLD: 0.7 % (ref 0–1.9)
BILIRUB SERPL-MCNC: 0.5 MG/DL (ref 0.1–1)
BILIRUB UR QL STRIP: NEGATIVE
BUN SERPL-MCNC: 14 MG/DL (ref 6–20)
CALCIUM SERPL-MCNC: 9.8 MG/DL (ref 8.7–10.5)
CHLORIDE SERPL-SCNC: 108 MMOL/L (ref 95–110)
CLARITY UR: CLEAR
CO2 SERPL-SCNC: 23 MMOL/L (ref 23–29)
COLOR UR: YELLOW
CREAT SERPL-MCNC: 0.7 MG/DL (ref 0.5–1.4)
CTP QC/QA: NORMAL
CTP QC/QA: YES
CTP QC/QA: YES
D DIMER PPP IA.FEU-MCNC: 0.32 MG/L FEU
DIFFERENTIAL METHOD: NORMAL
EOSINOPHIL # BLD AUTO: 0.2 K/UL (ref 0–0.5)
EOSINOPHIL NFR BLD: 2.7 % (ref 0–8)
ERYTHROCYTE [DISTWIDTH] IN BLOOD BY AUTOMATED COUNT: 13.2 % (ref 11.5–14.5)
EST. GFR  (AFRICAN AMERICAN): >60 ML/MIN/1.73 M^2
EST. GFR  (NON AFRICAN AMERICAN): >60 ML/MIN/1.73 M^2
GLUCOSE SERPL-MCNC: 92 MG/DL (ref 70–110)
GLUCOSE UR QL STRIP: NEGATIVE
HCT VFR BLD AUTO: 42 % (ref 37–48.5)
HGB BLD-MCNC: 13.5 G/DL (ref 12–16)
HGB UR QL STRIP: ABNORMAL
HYALINE CASTS #/AREA URNS LPF: 1 /LPF
IMM GRANULOCYTES # BLD AUTO: 0.01 K/UL (ref 0–0.04)
IMM GRANULOCYTES NFR BLD AUTO: 0.1 % (ref 0–0.5)
KETONES UR QL STRIP: NEGATIVE
LEUKOCYTE ESTERASE UR QL STRIP: NEGATIVE
LYMPHOCYTES # BLD AUTO: 2.1 K/UL (ref 1–4.8)
LYMPHOCYTES NFR BLD: 29.4 % (ref 18–48)
MCH RBC QN AUTO: 28.7 PG (ref 27–31)
MCHC RBC AUTO-ENTMCNC: 32.1 G/DL (ref 32–36)
MCV RBC AUTO: 89 FL (ref 82–98)
MICROSCOPIC COMMENT: NORMAL
MONOCYTES # BLD AUTO: 0.4 K/UL (ref 0.3–1)
MONOCYTES NFR BLD: 5.4 % (ref 4–15)
NEUTROPHILS # BLD AUTO: 4.4 K/UL (ref 1.8–7.7)
NEUTROPHILS NFR BLD: 61.7 % (ref 38–73)
NITRITE UR QL STRIP: NEGATIVE
NRBC BLD-RTO: 0 /100 WBC
PH UR STRIP: 8 [PH] (ref 5–8)
PLATELET # BLD AUTO: 208 K/UL (ref 150–450)
PMV BLD AUTO: 11.2 FL (ref 9.2–12.9)
POC MOLECULAR INFLUENZA A AGN: NEGATIVE
POC MOLECULAR INFLUENZA B AGN: NEGATIVE
POTASSIUM SERPL-SCNC: 4.3 MMOL/L (ref 3.5–5.1)
PROT SERPL-MCNC: 8.4 G/DL (ref 6–8.4)
PROT UR QL STRIP: NEGATIVE
RBC # BLD AUTO: 4.7 M/UL (ref 4–5.4)
RBC #/AREA URNS HPF: 2 /HPF (ref 0–4)
SARS-COV-2 RDRP RESP QL NAA+PROBE: NEGATIVE
SODIUM SERPL-SCNC: 140 MMOL/L (ref 136–145)
SP GR UR STRIP: 1.01 (ref 1–1.03)
TROPONIN I SERPL DL<=0.01 NG/ML-MCNC: <0.006 NG/ML (ref 0–0.03)
URN SPEC COLLECT METH UR: ABNORMAL
UROBILINOGEN UR STRIP-ACNC: NEGATIVE EU/DL
WBC # BLD AUTO: 7.1 K/UL (ref 3.9–12.7)
WBC #/AREA URNS HPF: 1 /HPF (ref 0–5)

## 2021-12-31 PROCEDURE — 81000 URINALYSIS NONAUTO W/SCOPE: CPT | Performed by: PHYSICIAN ASSISTANT

## 2021-12-31 PROCEDURE — 99284 EMERGENCY DEPT VISIT MOD MDM: CPT | Mod: 25

## 2021-12-31 PROCEDURE — 25000003 PHARM REV CODE 250: Performed by: PHYSICIAN ASSISTANT

## 2021-12-31 PROCEDURE — 80053 COMPREHEN METABOLIC PANEL: CPT | Performed by: PHYSICIAN ASSISTANT

## 2021-12-31 PROCEDURE — 85379 FIBRIN DEGRADATION QUANT: CPT | Performed by: PHYSICIAN ASSISTANT

## 2021-12-31 PROCEDURE — 81025 URINE PREGNANCY TEST: CPT | Performed by: EMERGENCY MEDICINE

## 2021-12-31 PROCEDURE — U0002 COVID-19 LAB TEST NON-CDC: HCPCS | Performed by: PHYSICIAN ASSISTANT

## 2021-12-31 PROCEDURE — 84484 ASSAY OF TROPONIN QUANT: CPT | Performed by: PHYSICIAN ASSISTANT

## 2021-12-31 PROCEDURE — 96360 HYDRATION IV INFUSION INIT: CPT

## 2021-12-31 PROCEDURE — 85025 COMPLETE CBC W/AUTO DIFF WBC: CPT | Performed by: PHYSICIAN ASSISTANT

## 2021-12-31 RX ORDER — ALBUTEROL SULFATE 90 UG/1
1-2 AEROSOL, METERED RESPIRATORY (INHALATION) EVERY 6 HOURS PRN
Qty: 6.7 G | Refills: 1 | Status: SHIPPED | OUTPATIENT
Start: 2021-12-31 | End: 2022-04-13

## 2021-12-31 RX ORDER — ACETAMINOPHEN 500 MG
1000 TABLET ORAL
Status: COMPLETED | OUTPATIENT
Start: 2021-12-31 | End: 2021-12-31

## 2021-12-31 RX ORDER — PREDNISONE 20 MG/1
60 TABLET ORAL DAILY
Qty: 9 TABLET | Refills: 0 | Status: SHIPPED | OUTPATIENT
Start: 2021-12-31 | End: 2022-01-03

## 2021-12-31 RX ADMIN — SODIUM CHLORIDE 1000 ML: 0.9 INJECTION, SOLUTION INTRAVENOUS at 01:12

## 2021-12-31 RX ADMIN — ACETAMINOPHEN 1000 MG: 500 TABLET ORAL at 01:12

## 2021-12-31 NOTE — DISCHARGE INSTRUCTIONS

## 2021-12-31 NOTE — ED PROVIDER NOTES
Encounter Date: 12/31/2021    SCRIBE #1 NOTE: I, Jairo Espinosa, am scribing for, and in the presence of,  Jhoan Rodriguez PA-C. I have scribed the following portions of the note - Other sections scribed: HPI, ROS.       History     Chief Complaint   Patient presents with    Fatigue    Shortness of Breath     Pt stated she has been feeling malaise, with SOB and pain to her left rib, and pain to left side of neck. Pt stated last time she had these symptoms she had a PE     36 y.o. female, with a pertinent past medical history of pulmonary emboli and asthma presents to the ED with intermittent shortness of breath that began yesterday. Pt states that yesterday she was experiencing shortness of breath in the morning and at night but it has gone away now. Pt states that she has also been experiencing fatigue, nausea, and left rib pain. Pt states that these symptoms are similar to when she had a PE in the past. Pt measured an oxygen saturation of 92% at home after washing dishes. Pt denies being anticoagulated. Pt states that she takes steroids for her asthma. No other exacerbating or alleviating factors. Patient denies fever, or other associated symptoms.       The history is provided by the patient. No  was used.     Review of patient's allergies indicates:   Allergen Reactions    Demerol (pf) [meperidine (pf)] Hives     Past Medical History:   Diagnosis Date    Anxiety     Asthma     Depression     Hernia, hiatal     Inappropriate sinus tachycardia     POTS (postural orthostatic tachycardia syndrome)     Pulmonary emboli 2020    Sepsis      History reviewed. No pertinent surgical history.  Family History   Problem Relation Age of Onset    Hypertension Mother     Diabetes Mother     Hypertension Father     Diabetes Father      Social History     Tobacco Use    Smoking status: Current Every Day Smoker     Packs/day: 1.00     Types: Cigarettes     Start date: 7/15/1996    Smokeless  tobacco: Never Used    Tobacco comment: varies from 7 cigarettes to whole pack in day   Substance Use Topics    Alcohol use: Yes     Comment: ocassionally    Drug use: No     Review of Systems   Constitutional: Positive for fatigue. Negative for chills and fever.   HENT: Negative for congestion, rhinorrhea and sore throat.    Eyes: Negative for visual disturbance.   Respiratory: Positive for shortness of breath. Negative for cough.    Cardiovascular: Negative for chest pain.   Gastrointestinal: Positive for nausea. Negative for abdominal pain, diarrhea and vomiting.   Genitourinary: Negative for dysuria, frequency and hematuria.   Musculoskeletal: Negative for back pain.        (+) rib pain   Skin: Negative for rash.   Neurological: Negative for dizziness, weakness and headaches.       Physical Exam     Initial Vitals [12/31/21 1044]   BP Pulse Resp Temp SpO2   (!) 126/90 84 (!) 22 98.1 °F (36.7 °C) 99 %      MAP       --         Physical Exam    Nursing note and vitals reviewed.  Constitutional: She appears well-developed and well-nourished. She is not diaphoretic. No distress.   HENT:   Head: Normocephalic and atraumatic.   Nose: Nose normal.   Eyes: Conjunctivae and EOM are normal. Right eye exhibits no discharge. Left eye exhibits no discharge.   Neck: No tracheal deviation present. No JVD present.   Normal range of motion.  Cardiovascular: Normal rate, regular rhythm and normal heart sounds. Exam reveals no friction rub.    No murmur heard.  Pulmonary/Chest: Breath sounds normal. No stridor. No respiratory distress. She has no wheezes. She has no rhonchi. She has no rales. She exhibits no tenderness.   Abdominal: Abdomen is soft. She exhibits no distension. There is no abdominal tenderness.   No right CVA tenderness.  No left CVA tenderness. There is no rebound, no guarding, no tenderness at McBurney's point and negative Yeboah's sign. positive Rovsing's sign  Musculoskeletal:         General: No edema.       Cervical back: Normal range of motion.      Comments: No skin changes of the flank.  Nontender where she reports pain.  No bony deformities.     Neurological: She is alert and oriented to person, place, and time.   Skin: Skin is warm and dry. No rash and no abscess noted. No erythema. No pallor.         ED Course   Procedures  Labs Reviewed   URINALYSIS, REFLEX TO URINE CULTURE - Abnormal; Notable for the following components:       Result Value    Occult Blood UA 3+ (*)     All other components within normal limits    Narrative:     Specimen Source->Urine   CBC W/ AUTO DIFFERENTIAL   COMPREHENSIVE METABOLIC PANEL   TROPONIN I   D DIMER, QUANTITATIVE   URINALYSIS MICROSCOPIC    Narrative:     Specimen Source->Urine   POCT URINE PREGNANCY   SARS-COV-2 RDRP GENE    Narrative:     This test utilizes isothermal nucleic acid amplification   technology to detect the SARS-CoV-2 RdRp nucleic acid segment.   The analytical sensitivity (limit of detection) is 125 genome   equivalents/mL.   A POSITIVE result implies infection with the SARS-CoV-2 virus;   the patient is presumed to be contagious.     A NEGATIVE result means that SARS-CoV-2 nucleic acids are not   present above the limit of detection. A NEGATIVE result should be   treated as presumptive. It does not rule out the possibility of   COVID-19 and should not be the sole basis for treatment decisions.   If COVID-19 is strongly suspected based on clinical and exposure   history, re-testing using an alternate molecular assay should be   considered.   This test is only for use under the Food and Drug   Administration s Emergency Use Authorization (EUA).   Commercial kits are provided by [employer].   Performance characteristics of the EUA have been independently   verified by [facility] Department of   Pathology and Laboratory Medicine.   _________________________________________________________________   The authorized Fact Sheet for Healthcare  Providers and the authorized Fact   Sheet for Patients of the ID NOW COVID-19 are available on the FDA   website:     https://www.fda.gov/media/691452/download  https://www.fda.gov/media/202038/download       POCT INFLUENZA A/B MOLECULAR          Imaging Results          X-Ray Chest AP Portable (Final result)  Result time 12/31/21 12:58:59    Final result by Lana Camargo MD (12/31/21 12:58:59)                 Impression:      Normal exam.      Electronically signed by: Lana Camargo MD  Date:    12/31/2021  Time:    12:58             Narrative:    EXAMINATION:  XR CHEST AP PORTABLE    CLINICAL HISTORY:  Chest pain, unspecified    TECHNIQUE:  Single frontal view of the chest was performed.    COMPARISON:  11/09/2020    FINDINGS:  The lungs are symmetrically inflated with no mass, nodule, pneumothorax, airspace consolidation or pleural effusion.  The cardiomediastinal silhouette, osseous and soft tissue structures are normal.                                 Medications   sodium chloride 0.9% bolus 1,000 mL (0 mLs Intravenous Stopped 12/31/21 1425)   acetaminophen tablet 1,000 mg (1,000 mg Oral Given 12/31/21 1311)     Medical Decision Making:   History:   Old Medical Records: I decided to obtain old medical records.  ED Management:  I am unsure of the etiology of this patient's symptoms, however, I do not believe they are of emergent/life threatening etiology at this time.  May be experiencing mild asthma exacerbation in the absence of other findings.  Perhaps there is a musculoskeletal strain of the rib margin? No wheezing at this time.  No hypoxia or respiratory distress. Wells PE risk criteria = low. D-dimer normal. Low risk for ACS. No anemia.  No metabolic or electrolyte disturbance.  Chest x-ray shows no pneumonia, effusion, or widening mediastinum.  Advising follow-up with PCP. Strict return precautions discussed.  Agreeable to plan.          Scribe Attestation:   Scribe #1: I performed the above  scribed service and the documentation accurately describes the services I performed. I attest to the accuracy of the note.                 Clinical Impression:   Final diagnoses:  [R07.9] Chest pain  [R06.02] SOB (shortness of breath) (Primary)  [R53.83] Fatigue, unspecified type          ED Disposition Condition    Discharge Stable        ED Prescriptions     Medication Sig Dispense Start Date End Date Auth. Provider    albuterol (PROVENTIL/VENTOLIN HFA) 90 mcg/actuation inhaler Inhale 1-2 puffs into the lungs every 6 (six) hours as needed for Wheezing. Rescue 6.7 g 12/31/2021  Jhoan Rodriguez PA-C    predniSONE (DELTASONE) 20 MG tablet Take 3 tablets (60 mg total) by mouth once daily. for 3 days 9 tablet 12/31/2021 1/3/2022 Jhoan Rodriguez PA-C        Follow-up Information     Follow up With Specialties Details Why Contact Info    Betty Elmore MD Internal Medicine Schedule an appointment as soon as possible for a visit in 3 days For re-evaluation [address] [facility] [address]  [phone]      [facility] Emergency Medicine Go to  If symptoms worsen [address]  [facility] [address] [phone]       I, Jhoan Rodriguez PA-C, personally performed the services described in this documentation. All medical record entries made by the scribe were at my direction and in my presence. I have reviewed the chart and agree that the record reflects my personal performance and is accurate and complete.       Jhoan Rodriguez PA-C  12/31/21 [id number]

## 2021-12-31 NOTE — ED TRIAGE NOTES
Patient c/o left rib/neck pain since this am and sob, occasional cough, & malaise x1 week.   Hx-Asthma, PE, & PTSD

## 2021-12-31 NOTE — Clinical Note
"Snow Phelan" Ector was seen and treated in our emergency department on 12/31/2021.  She may return to work on 01/03/2022.       If you have any questions or concerns, please don't hesitate to call.      Jhoan Rodriguez PA-C"

## 2022-01-06 ENCOUNTER — OFFICE VISIT (OUTPATIENT)
Dept: OBSTETRICS AND GYNECOLOGY | Facility: CLINIC | Age: 37
End: 2022-01-06
Payer: MEDICAID

## 2022-01-06 VITALS
BODY MASS INDEX: 29.1 KG/M2 | SYSTOLIC BLOOD PRESSURE: 130 MMHG | WEIGHT: 185.44 LBS | HEIGHT: 67 IN | DIASTOLIC BLOOD PRESSURE: 82 MMHG

## 2022-01-06 DIAGNOSIS — Z30.431 IUD CHECK UP: Primary | ICD-10-CM

## 2022-01-06 PROCEDURE — 99212 OFFICE O/P EST SF 10 MIN: CPT | Mod: S$PBB,,, | Performed by: OBSTETRICS & GYNECOLOGY

## 2022-01-06 PROCEDURE — 1159F MED LIST DOCD IN RCRD: CPT | Mod: CPTII,,, | Performed by: OBSTETRICS & GYNECOLOGY

## 2022-01-06 PROCEDURE — 99212 PR OFFICE/OUTPT VISIT, EST, LEVL II, 10-19 MIN: ICD-10-PCS | Mod: S$PBB,,, | Performed by: OBSTETRICS & GYNECOLOGY

## 2022-01-06 PROCEDURE — 99999 PR PBB SHADOW E&M-EST. PATIENT-LVL III: ICD-10-PCS | Mod: PBBFAC,,, | Performed by: OBSTETRICS & GYNECOLOGY

## 2022-01-06 PROCEDURE — 99213 OFFICE O/P EST LOW 20 MIN: CPT | Mod: PBBFAC | Performed by: OBSTETRICS & GYNECOLOGY

## 2022-01-06 PROCEDURE — 3008F PR BODY MASS INDEX (BMI) DOCUMENTED: ICD-10-PCS | Mod: CPTII,,, | Performed by: OBSTETRICS & GYNECOLOGY

## 2022-01-06 PROCEDURE — 3008F BODY MASS INDEX DOCD: CPT | Mod: CPTII,,, | Performed by: OBSTETRICS & GYNECOLOGY

## 2022-01-06 PROCEDURE — 3075F SYST BP GE 130 - 139MM HG: CPT | Mod: CPTII,,, | Performed by: OBSTETRICS & GYNECOLOGY

## 2022-01-06 PROCEDURE — 3075F PR MOST RECENT SYSTOLIC BLOOD PRESS GE 130-139MM HG: ICD-10-PCS | Mod: CPTII,,, | Performed by: OBSTETRICS & GYNECOLOGY

## 2022-01-06 PROCEDURE — 99999 PR PBB SHADOW E&M-EST. PATIENT-LVL III: CPT | Mod: PBBFAC,,, | Performed by: OBSTETRICS & GYNECOLOGY

## 2022-01-06 PROCEDURE — 3079F PR MOST RECENT DIASTOLIC BLOOD PRESSURE 80-89 MM HG: ICD-10-PCS | Mod: CPTII,,, | Performed by: OBSTETRICS & GYNECOLOGY

## 2022-01-06 PROCEDURE — 1159F PR MEDICATION LIST DOCUMENTED IN MEDICAL RECORD: ICD-10-PCS | Mod: CPTII,,, | Performed by: OBSTETRICS & GYNECOLOGY

## 2022-01-06 PROCEDURE — 3079F DIAST BP 80-89 MM HG: CPT | Mod: CPTII,,, | Performed by: OBSTETRICS & GYNECOLOGY

## 2022-01-06 NOTE — PROGRESS NOTES
Subjective:       Patient ID: Snow Barney is a 36 y.o. female.    Chief Complaint:  IUD Check      History of Present Illness  HPI  Patient had copper IUD placed 8 wks ago.  She is happy with IUD.    GYN & OB History  No LMP recorded. (Menstrual status: Other).   Date of Last Pap: No result found    OB History    Para Term  AB Living   6 3 2 1 3 3   SAB IAB Ectopic Multiple Live Births                  # Outcome Date GA Lbr Benjamin/2nd Weight Sex Delivery Anes PTL Lv   6 Term            5 Term            4 AB            3 AB            2 AB            1                Obstetric Comments   GYN Hx: Menarche at 13   History of abnormal pap with colposcopy   History of chlamydia.       Review of Systems  Review of Systems        Objective:    Physical Exam:   Constitutional: She is oriented to person, place, and time. She appears well-developed.    HENT:   Head: Normocephalic and atraumatic.    Eyes: EOM are normal.     Cardiovascular: Normal rate.     Pulmonary/Chest: Effort normal.        Abdominal: Soft. There is no abdominal tenderness.     Genitourinary:    Vagina, uterus, right adnexa and left adnexa normal.      Pelvic exam was performed with patient supine.   The external female genitalia was normal.   No external genitalia lesions identified,Genitalia hair distrobution normal .   Labial bartholins normal.There is no rash, tenderness or lesion on the right labia. There is no rash, tenderness or lesion on the left labia. Cervix is normal. Right adnexum displays no tenderness and no fullness. Left adnexum displays no tenderness and no fullness. No erythema,  no vaginal discharge or bleeding in the vagina.    No signs of injury in the vagina.   Vagina was moist.Cervix exhibits no motion tenderness. Also,  IUD strings visualized  Uterus consistancy normal. and Uerus contour normal  Uterus is not enlarged and not tender. Normal urethral meatus.Urethral Meatus exhibits: urethral lesion and  prolapsedUrethra findings: no urethral mass and no tendernessBladder findings: no bladder distention and no bladder tenderness          Musculoskeletal: Normal range of motion.       Neurological: She is oriented to person, place, and time.    Skin: Skin is warm.    Psychiatric: She has a normal mood and affect.          Assessment:        1. IUD check up                Plan:      - strings visualized.  - follow up prn or for annual exam.

## 2022-01-23 ENCOUNTER — HOSPITAL ENCOUNTER (EMERGENCY)
Facility: HOSPITAL | Age: 37
Discharge: HOME | End: 2022-01-24
Attending: EMERGENCY MEDICINE
Payer: MEDICAID

## 2022-01-23 DIAGNOSIS — R53.83 FATIGUE, UNSPECIFIED TYPE: Primary | ICD-10-CM

## 2022-01-23 DIAGNOSIS — R07.9 CHEST PAIN: ICD-10-CM

## 2022-01-23 DIAGNOSIS — E86.0 DEHYDRATION: ICD-10-CM

## 2022-01-23 LAB
B-HCG UR QL: NEGATIVE
CTP QC/QA: YES

## 2022-01-23 PROCEDURE — 81025 URINE PREGNANCY TEST: CPT | Mod: ER | Performed by: EMERGENCY MEDICINE

## 2022-01-23 PROCEDURE — 99285 EMERGENCY DEPT VISIT HI MDM: CPT | Mod: 25,ER

## 2022-01-23 PROCEDURE — 93010 EKG 12-LEAD: ICD-10-PCS | Mod: ,,, | Performed by: INTERNAL MEDICINE

## 2022-01-23 PROCEDURE — 93005 ELECTROCARDIOGRAM TRACING: CPT | Mod: ER

## 2022-01-23 PROCEDURE — 93010 ELECTROCARDIOGRAM REPORT: CPT | Mod: ,,, | Performed by: INTERNAL MEDICINE

## 2022-01-24 VITALS
WEIGHT: 188 LBS | DIASTOLIC BLOOD PRESSURE: 80 MMHG | BODY MASS INDEX: 29.51 KG/M2 | HEART RATE: 75 BPM | TEMPERATURE: 98 F | OXYGEN SATURATION: 100 % | RESPIRATION RATE: 19 BRPM | HEIGHT: 67 IN | SYSTOLIC BLOOD PRESSURE: 128 MMHG

## 2022-01-24 LAB
ALBUMIN SERPL-MCNC: 4.2 G/DL (ref 3.3–5.5)
ALP SERPL-CCNC: 60 U/L (ref 42–141)
BILIRUB SERPL-MCNC: 0.7 MG/DL (ref 0.2–1.6)
BUN SERPL-MCNC: 17 MG/DL (ref 7–22)
CALCIUM SERPL-MCNC: 9.9 MG/DL (ref 8–10.3)
CHLORIDE SERPL-SCNC: 107 MMOL/L (ref 98–108)
CREAT SERPL-MCNC: 0.6 MG/DL (ref 0.6–1.2)
GLUCOSE SERPL-MCNC: 98 MG/DL (ref 73–118)
POC ALT (SGPT): 18 U/L (ref 10–47)
POC AST (SGOT): 20 U/L (ref 11–38)
POC CARDIAC TROPONIN I: 0.01 NG/ML
POC D-DI: <100 NG/ML (ref 0–450)
POC TCO2: 29 MMOL/L (ref 18–33)
POTASSIUM BLD-SCNC: 4 MMOL/L (ref 3.6–5.1)
PROTEIN, POC: 7.3 G/DL (ref 6.4–8.1)
SAMPLE: NORMAL
SODIUM BLD-SCNC: 142 MMOL/L (ref 128–145)

## 2022-01-24 PROCEDURE — 96361 HYDRATE IV INFUSION ADD-ON: CPT | Mod: ER

## 2022-01-24 PROCEDURE — 25000003 PHARM REV CODE 250: Mod: ER | Performed by: EMERGENCY MEDICINE

## 2022-01-24 PROCEDURE — 85025 COMPLETE CBC W/AUTO DIFF WBC: CPT | Mod: ER

## 2022-01-24 PROCEDURE — 96360 HYDRATION IV INFUSION INIT: CPT | Mod: ER

## 2022-01-24 PROCEDURE — 85379 FIBRIN DEGRADATION QUANT: CPT | Mod: ER

## 2022-01-24 PROCEDURE — 84484 ASSAY OF TROPONIN QUANT: CPT | Mod: ER

## 2022-01-24 PROCEDURE — 80053 COMPREHEN METABOLIC PANEL: CPT | Mod: ER

## 2022-01-24 RX ADMIN — SODIUM CHLORIDE 1000 ML: 0.9 INJECTION, SOLUTION INTRAVENOUS at 12:01

## 2022-01-24 NOTE — ED PROVIDER NOTES
Encounter Date: 1/23/2022    SCRIBE #1 NOTE: I, Gabriel Ruiz, am scribing for, and in the presence of,  April Urias MD. I have scribed the following portions of the note - Other sections scribed: HPI, PE, ROS.       History     Chief Complaint   Patient presents with    Palpitations     Pt c/o intermittant dizziness/near syncope over past few days; today started having painful palpitations and lower than normal HR in 50's-60's; hx of tachycardia & PE     Snow Barney 36 y.o. female, with POTS, PE (no longer on blood thinners now), and others, presents to the ED with acute on chronic fatigue, intermittent palpitations, dizziness with standing, and slight nausea. Patient reports standing and walking after being in the squatted position prior to the episode of dizziness.  Patient also reports same symptoms after standing up suddenly. Patient denies vomiting, diarrhea, fever, cough, congestion, or other associated symptoms. Former drug use (quit 2 week ago).  Denies use of any diet supplements or medications. Patient notes trying to lose weight for her upcoming wedding and admits to a self-induced calorie deficit diet and caffeine use. Patient reports previous blood transfusion after the delivery of her second child. LMP 01/23/2022. Patient notes the use of 12 pads within a 24 hour period.      The history is provided by the patient. No  was used.     Review of patient's allergies indicates:   Allergen Reactions    Demerol (pf) [meperidine (pf)] Hives     Past Medical History:   Diagnosis Date    Anxiety     Asthma     Depression     Hernia, hiatal     Inappropriate sinus tachycardia     POTS (postural orthostatic tachycardia syndrome)     Pulmonary emboli 2020    Sepsis      History reviewed. No pertinent surgical history.  Family History   Problem Relation Age of Onset    Hypertension Mother     Diabetes Mother     Hypertension Father     Diabetes Father      Social History      Tobacco Use    Smoking status: Current Every Day Smoker     Packs/day: 1.00     Types: Cigarettes     Start date: 7/15/1996    Smokeless tobacco: Never Used    Tobacco comment: varies from 7 cigarettes to whole pack in day   Substance Use Topics    Alcohol use: Yes     Comment: ocassionally    Drug use: No     Review of Systems   Constitutional: Positive for fatigue. Negative for fever.   HENT: Negative for congestion.    Respiratory: Negative for cough and shortness of breath.    Cardiovascular: Positive for palpitations. Negative for leg swelling.   Gastrointestinal: Positive for nausea. Negative for diarrhea and vomiting.   Neurological: Positive for dizziness. Negative for syncope and weakness.   All other systems reviewed and are negative.      Physical Exam     Initial Vitals   BP Pulse Resp Temp SpO2   01/23/22 2240 01/23/22 2238 01/23/22 2238 01/23/22 2238 01/23/22 2238   134/75 71 18 97.5 °F (36.4 °C) 100 %      MAP       --                Physical Exam    Nursing note and vitals reviewed.  Constitutional: She appears well-developed and well-nourished. She is not diaphoretic. No distress.   HENT:   Head: Normocephalic and atraumatic.   Right Ear: External ear normal.   Left Ear: External ear normal.   Eyes: Conjunctivae are normal.   Neck: Phonation normal. Neck supple.   Normal range of motion.  Cardiovascular: Normal rate and intact distal pulses.   Pulmonary/Chest: Effort normal. No accessory muscle usage or stridor. No tachypnea. No respiratory distress.   Abdominal: Abdomen is soft. She exhibits no distension. There is no abdominal tenderness.   Musculoskeletal:         General: No tenderness or edema. Normal range of motion.      Cervical back: Normal range of motion and neck supple.     Neurological: She is alert and oriented to person, place, and time. She has normal strength. Gait normal. GCS eye subscore is 4. GCS verbal subscore is 5. GCS motor subscore is 6.   Skin: Skin is warm and  dry. Capillary refill takes less than 2 seconds. No rash noted. No pallor.   Psychiatric: She has a normal mood and affect.         ED Course   Procedures  Labs Reviewed   TROPONIN ISTAT   POCT URINE PREGNANCY   POCT CBC   POCT CMP   POCT TROPONIN   POCT D DIMER   POCT CMP   POCT D DIMER     EKG Readings: (Independently Interpreted)   Initial Reading: No STEMI. Rhythm: Normal Sinus Rhythm. Heart Rate: 78. Ectopy: No Ectopy. Conduction: Normal. ST Segments: Normal ST Segments. T Waves: Normal. Axis: Normal. Clinical Impression: Normal Sinus Rhythm       Imaging Results          X-Ray Chest PA And Lateral (Final result)  Result time 01/24/22 01:08:53    Final result by Jairo Colmenares MD (01/24/22 01:08:53)                 Impression:      No obvious evidence of an acute pulmonary process.      Electronically signed by: Jairo Colmenares  Date:    01/24/2022  Time:    01:08             Narrative:    EXAMINATION:  XR CHEST PA AND LATERAL    CLINICAL HISTORY:  Chest Pain;    TECHNIQUE:  PA and lateral views of the chest were performed.    COMPARISON:  December 31, 2021    FINDINGS:  Multiple views of the chest reveals that the lungs are well aerated.  No indication of a consolidative process or pleural effusion.  No pulmonary nodules.  The heart is normal in size and contour.  The patient has bilateral nipple piercings.  The lateral projection is unremarkable.                                 Medications   sodium chloride 0.9% bolus 1,000 mL (0 mLs Intravenous Stopped 1/24/22 0200)     Medical Decision Making:   Clinical Tests:   Lab Tests: Ordered and Reviewed  The following lab test(s) were unremarkable: UPT    Labs Reviewed            Admission on 01/23/2022, Discharged on 01/24/2022   Component Date Value Ref Range Status    POC Preg Test, Ur 01/23/2022 Negative  Negative Final     Acceptable 01/23/2022 Yes   Final    Albumin, POC 01/24/2022 4.2  3.3 - 5.5 g/dL Final    Alkaline Phosphatase, POC  01/24/2022 60  42 - 141 U/L Final    ALT (SGPT), POC 01/24/2022 18  10 - 47 U/L Final    AST (SGOT), POC 01/24/2022 20  11 - 38 U/L Final    POC BUN 01/24/2022 17  7 - 22 mg/dL Final    Calcium, POC 01/24/2022 9.9  8.0 - 10.3 mg/dL Final    POC Chloride 01/24/2022 107  98 - 108 mmol/L Final    POC Creatinine 01/24/2022 0.6  0.6 - 1.2 mg/dL Final    POC Glucose 01/24/2022 98  73 - 118 mg/dL Final    POC Potassium 01/24/2022 4.0  3.6 - 5.1 mmol/L Final    POC Sodium 01/24/2022 142  128 - 145 mmol/L Final    Bilirubin, UA 01/24/2022 0.7  0.2 - 1.6 mg/dL Final    POC TCO2 01/24/2022 29  18 - 33 mmol/L Final    Protein, UA 01/24/2022 7.3  6.4 - 8.1 g/dL Final    POC Cardiac Troponin I 01/24/2022 0.01  <0.09 ng/mL Final    Sample 01/24/2022 unknown   Final    Comment: A single negative troponin is insufficient to rule out myocardial infarction.  The use of a serial sampling protocol is recommended practice. Correlate results with reference intervals established for methodology used. Point of care and core laboratory   troponin results are not interchangeable.      POC D-DI 01/24/2022 <100  0.0 - 450.0 ng/mL Final        Imaging Reviewed    Imaging Results          X-Ray Chest PA And Lateral (Final result)  Result time 01/24/22 01:08:53    Final result by Jairo Colmenares MD (01/24/22 01:08:53)                 Impression:      No obvious evidence of an acute pulmonary process.      Electronically signed by: Jairo Colmenares  Date:    01/24/2022  Time:    01:08             Narrative:    EXAMINATION:  XR CHEST PA AND LATERAL    CLINICAL HISTORY:  Chest Pain;    TECHNIQUE:  PA and lateral views of the chest were performed.    COMPARISON:  December 31, 2021    FINDINGS:  Multiple views of the chest reveals that the lungs are well aerated.  No indication of a consolidative process or pleural effusion.  No pulmonary nodules.  The heart is normal in size and contour.  The patient has bilateral nipple piercings.   The lateral projection is unremarkable.                                Medications given in ED    Medications   sodium chloride 0.9% bolus 1,000 mL (0 mLs Intravenous Stopped 1/24/22 0200)         Note was created using voice recognition software. Note may have occasional typographical errors that may not have been identified and edited despite good main initial review prior to signing.    I, April Urias MD, personally performed the services described in this documentation. All medical record entries made by the scribe were at my direction and in my presence.  I have reviewed the chart and agree that the record reflects my personal performance and is accurate and complete.            Scribe Attestation:   Scribe #1: I performed the above scribed service and the documentation accurately describes the services I performed. I attest to the accuracy of the note.                 Clinical Impression:   Final diagnoses:  [R07.9] Chest pain  [R53.83] Fatigue, unspecified type (Primary)  [E86.0] Dehydration          ED Disposition Condition    Discharge Stable        ED Prescriptions     None        Follow-up Information     Follow up With Specialties Details Why Contact Info    Betty Elmore MD Internal Medicine Call today to schedule an appointment, for re-evaluation of today's complaint, and ongoing care [address] [place] [address] [phone]      The nearest emergency department.  Go to  As needed, If symptoms worsen            April Urias MD  01/24/22 [id number]

## 2022-01-30 ENCOUNTER — HOSPITAL ENCOUNTER (EMERGENCY)
Facility: HOSPITAL | Age: 37
Discharge: HOME | End: 2022-01-30
Attending: EMERGENCY MEDICINE
Payer: MEDICAID

## 2022-01-30 DIAGNOSIS — R07.9 CHEST PAIN: ICD-10-CM

## 2022-01-30 DIAGNOSIS — Z78.9 ALCOHOL USE: ICD-10-CM

## 2022-01-30 DIAGNOSIS — R11.2 NON-INTRACTABLE VOMITING WITH NAUSEA, UNSPECIFIED VOMITING TYPE: Primary | ICD-10-CM

## 2022-01-30 DIAGNOSIS — R00.0 TACHYCARDIA: ICD-10-CM

## 2022-01-30 LAB
ALBUMIN SERPL BCP-MCNC: 4 G/DL (ref 3.5–5.2)
ALP SERPL-CCNC: 61 U/L (ref 55–135)
ALT SERPL W/O P-5'-P-CCNC: 18 U/L (ref 10–44)
ANION GAP SERPL CALC-SCNC: 11 MMOL/L (ref 8–16)
AST SERPL-CCNC: 14 U/L (ref 10–40)
B-HCG UR QL: NEGATIVE
BASOPHILS # BLD AUTO: 0.06 K/UL (ref 0–0.2)
BASOPHILS NFR BLD: 0.6 % (ref 0–1.9)
BILIRUB SERPL-MCNC: 0.4 MG/DL (ref 0.1–1)
BILIRUB UR QL STRIP: NEGATIVE
BUN SERPL-MCNC: 16 MG/DL (ref 6–20)
CALCIUM SERPL-MCNC: 9.6 MG/DL (ref 8.7–10.5)
CHLORIDE SERPL-SCNC: 106 MMOL/L (ref 95–110)
CLARITY UR: CLEAR
CO2 SERPL-SCNC: 22 MMOL/L (ref 23–29)
COLOR UR: YELLOW
CREAT SERPL-MCNC: 0.9 MG/DL (ref 0.5–1.4)
CTP QC/QA: YES
CTP QC/QA: YES
D DIMER PPP IA.FEU-MCNC: 0.2 MG/L FEU
DIFFERENTIAL METHOD: ABNORMAL
EOSINOPHIL # BLD AUTO: 0.1 K/UL (ref 0–0.5)
EOSINOPHIL NFR BLD: 0.9 % (ref 0–8)
ERYTHROCYTE [DISTWIDTH] IN BLOOD BY AUTOMATED COUNT: 13.2 % (ref 11.5–14.5)
EST. GFR  (AFRICAN AMERICAN): >60 ML/MIN/1.73 M^2
EST. GFR  (NON AFRICAN AMERICAN): >60 ML/MIN/1.73 M^2
GLUCOSE SERPL-MCNC: 129 MG/DL (ref 70–110)
GLUCOSE UR QL STRIP: NEGATIVE
HCT VFR BLD AUTO: 38 % (ref 37–48.5)
HGB BLD-MCNC: 12.1 G/DL (ref 12–16)
HGB UR QL STRIP: NEGATIVE
IMM GRANULOCYTES # BLD AUTO: 0.04 K/UL (ref 0–0.04)
IMM GRANULOCYTES NFR BLD AUTO: 0.4 % (ref 0–0.5)
KETONES UR QL STRIP: NEGATIVE
LACTATE SERPL-SCNC: 1.4 MMOL/L (ref 0.5–2.2)
LACTATE SERPL-SCNC: 2.3 MMOL/L (ref 0.5–2.2)
LEUKOCYTE ESTERASE UR QL STRIP: NEGATIVE
LIPASE SERPL-CCNC: 53 U/L (ref 4–60)
LYMPHOCYTES # BLD AUTO: 2.5 K/UL (ref 1–4.8)
LYMPHOCYTES NFR BLD: 23.9 % (ref 18–48)
MAGNESIUM SERPL-MCNC: 1.5 MG/DL (ref 1.6–2.6)
MCH RBC QN AUTO: 28.7 PG (ref 27–31)
MCHC RBC AUTO-ENTMCNC: 31.8 G/DL (ref 32–36)
MCV RBC AUTO: 90 FL (ref 82–98)
MONOCYTES # BLD AUTO: 0.6 K/UL (ref 0.3–1)
MONOCYTES NFR BLD: 6.1 % (ref 4–15)
NEUTROPHILS # BLD AUTO: 7.1 K/UL (ref 1.8–7.7)
NEUTROPHILS NFR BLD: 68.1 % (ref 38–73)
NITRITE UR QL STRIP: NEGATIVE
NRBC BLD-RTO: 0 /100 WBC
PH UR STRIP: 7 [PH] (ref 5–8)
PLATELET # BLD AUTO: 237 K/UL (ref 150–450)
PMV BLD AUTO: 11 FL (ref 9.2–12.9)
POTASSIUM SERPL-SCNC: 3.9 MMOL/L (ref 3.5–5.1)
PROT SERPL-MCNC: 7.1 G/DL (ref 6–8.4)
PROT UR QL STRIP: NEGATIVE
RBC # BLD AUTO: 4.22 M/UL (ref 4–5.4)
SARS-COV-2 RDRP RESP QL NAA+PROBE: NEGATIVE
SODIUM SERPL-SCNC: 139 MMOL/L (ref 136–145)
SP GR UR STRIP: 1.02 (ref 1–1.03)
TROPONIN I SERPL DL<=0.01 NG/ML-MCNC: <0.006 NG/ML (ref 0–0.03)
TSH SERPL DL<=0.005 MIU/L-ACNC: 1.29 UIU/ML (ref 0.4–4)
URN SPEC COLLECT METH UR: NORMAL
UROBILINOGEN UR STRIP-ACNC: NEGATIVE EU/DL
WBC # BLD AUTO: 10.43 K/UL (ref 3.9–12.7)

## 2022-01-30 PROCEDURE — 84443 ASSAY THYROID STIM HORMONE: CPT | Performed by: PHYSICIAN ASSISTANT

## 2022-01-30 PROCEDURE — 83690 ASSAY OF LIPASE: CPT | Performed by: EMERGENCY MEDICINE

## 2022-01-30 PROCEDURE — 93010 EKG 12-LEAD: ICD-10-PCS | Mod: ,,, | Performed by: INTERNAL MEDICINE

## 2022-01-30 PROCEDURE — 96361 HYDRATE IV INFUSION ADD-ON: CPT

## 2022-01-30 PROCEDURE — 81025 URINE PREGNANCY TEST: CPT | Performed by: EMERGENCY MEDICINE

## 2022-01-30 PROCEDURE — 25000003 PHARM REV CODE 250: Performed by: EMERGENCY MEDICINE

## 2022-01-30 PROCEDURE — 83605 ASSAY OF LACTIC ACID: CPT | Performed by: EMERGENCY MEDICINE

## 2022-01-30 PROCEDURE — 80053 COMPREHEN METABOLIC PANEL: CPT | Performed by: EMERGENCY MEDICINE

## 2022-01-30 PROCEDURE — 63600175 PHARM REV CODE 636 W HCPCS: Performed by: PHYSICIAN ASSISTANT

## 2022-01-30 PROCEDURE — 85025 COMPLETE CBC W/AUTO DIFF WBC: CPT | Performed by: EMERGENCY MEDICINE

## 2022-01-30 PROCEDURE — 25000003 PHARM REV CODE 250: Performed by: PHYSICIAN ASSISTANT

## 2022-01-30 PROCEDURE — 84484 ASSAY OF TROPONIN QUANT: CPT | Performed by: PHYSICIAN ASSISTANT

## 2022-01-30 PROCEDURE — 83735 ASSAY OF MAGNESIUM: CPT | Performed by: PHYSICIAN ASSISTANT

## 2022-01-30 PROCEDURE — 96375 TX/PRO/DX INJ NEW DRUG ADDON: CPT

## 2022-01-30 PROCEDURE — 85379 FIBRIN DEGRADATION QUANT: CPT | Performed by: PHYSICIAN ASSISTANT

## 2022-01-30 PROCEDURE — U0002 COVID-19 LAB TEST NON-CDC: HCPCS | Performed by: PHYSICIAN ASSISTANT

## 2022-01-30 PROCEDURE — 81003 URINALYSIS AUTO W/O SCOPE: CPT | Performed by: EMERGENCY MEDICINE

## 2022-01-30 PROCEDURE — 96365 THER/PROPH/DIAG IV INF INIT: CPT

## 2022-01-30 PROCEDURE — 93010 ELECTROCARDIOGRAM REPORT: CPT | Mod: ,,, | Performed by: INTERNAL MEDICINE

## 2022-01-30 PROCEDURE — 83605 ASSAY OF LACTIC ACID: CPT | Mod: 91 | Performed by: PHYSICIAN ASSISTANT

## 2022-01-30 PROCEDURE — 99284 EMERGENCY DEPT VISIT MOD MDM: CPT | Mod: 25

## 2022-01-30 PROCEDURE — 93005 ELECTROCARDIOGRAM TRACING: CPT

## 2022-01-30 RX ORDER — PROMETHAZINE HYDROCHLORIDE 25 MG/1
25 TABLET ORAL EVERY 6 HOURS PRN
Qty: 15 TABLET | Refills: 0 | Status: SHIPPED | OUTPATIENT
Start: 2022-01-30 | End: 2022-04-13

## 2022-01-30 RX ORDER — FAMOTIDINE 10 MG/ML
20 INJECTION INTRAVENOUS
Status: COMPLETED | OUTPATIENT
Start: 2022-01-30 | End: 2022-01-30

## 2022-01-30 RX ORDER — LORAZEPAM 2 MG/ML
1 INJECTION INTRAMUSCULAR
Status: COMPLETED | OUTPATIENT
Start: 2022-01-30 | End: 2022-01-30

## 2022-01-30 RX ORDER — ONDANSETRON 2 MG/ML
4 INJECTION INTRAMUSCULAR; INTRAVENOUS
Status: DISCONTINUED | OUTPATIENT
Start: 2022-01-30 | End: 2022-01-30

## 2022-01-30 RX ORDER — ONDANSETRON 4 MG/1
8 TABLET, FILM COATED ORAL EVERY 6 HOURS PRN
Qty: 30 TABLET | Refills: 0 | Status: SHIPPED | OUTPATIENT
Start: 2022-01-30 | End: 2022-03-01

## 2022-01-30 RX ADMIN — SODIUM CHLORIDE 1000 ML: 0.9 INJECTION, SOLUTION INTRAVENOUS at 09:01

## 2022-01-30 RX ADMIN — PROMETHAZINE HYDROCHLORIDE 25 MG: 25 INJECTION INTRAMUSCULAR; INTRAVENOUS at 08:01

## 2022-01-30 RX ADMIN — LORAZEPAM 1 MG: 2 INJECTION INTRAMUSCULAR; INTRAVENOUS at 09:01

## 2022-01-30 RX ADMIN — FAMOTIDINE 20 MG: 10 INJECTION INTRAVENOUS at 08:01

## 2022-01-30 RX ADMIN — SODIUM CHLORIDE 1000 ML: 0.9 INJECTION, SOLUTION INTRAVENOUS at 08:01

## 2022-01-30 NOTE — Clinical Note
"Snow Phelan" Ector was seen and treated in our emergency department on 1/30/2022.  She may return to work on 02/02/2022.       If you have any questions or concerns, please don't hesitate to call.      Jhoan Rodriguez PA-C"

## 2022-01-31 VITALS
DIASTOLIC BLOOD PRESSURE: 81 MMHG | SYSTOLIC BLOOD PRESSURE: 130 MMHG | TEMPERATURE: 98 F | RESPIRATION RATE: 19 BRPM | HEIGHT: 67 IN | OXYGEN SATURATION: 97 % | BODY MASS INDEX: 29.03 KG/M2 | WEIGHT: 185 LBS | HEART RATE: 94 BPM

## 2022-01-31 NOTE — DISCHARGE INSTRUCTIONS

## 2022-01-31 NOTE — ED PROVIDER NOTES
Encounter Date: 2022       History     Chief Complaint   Patient presents with    Vomiting     Pt states that she drank unknown amount of alcohol last night and has had numerous episodes of vomiting. Pt is complaining of chest tightness and shortness of breath. Pt AOX4, tachycardiac, ambulatory, actively vomiting in triage      36-year-old female with history of anxiety, sinus tachycardia, and pulmonary embolism (no longer anticoagulated) presents to the emergency department for persistent nausea despite Zofran at home associated with approximately 4 episodes of emesis.  Patient states she consumed a large quantity of alcohol which is unusual for her last night and believes this is causing her symptoms.  She also notes a streak of blood in her emesis in is complaining of chest tightness.  Denies shortness of breath, fever, URI symptoms, abdominal pain, diarrhea/constipation, and urinary symptoms.  No other medications taken today.  States she may have had alcohol withdrawal symptoms in the past.  She is not currently on benzodiazepines.  No history of thyroid disorders.    The history is provided by the patient.     Review of patient's allergies indicates:   Allergen Reactions    Demerol (pf) [meperidine (pf)] Hives     Past Medical History:   Diagnosis Date    Anxiety     Asthma     Depression     GERD (gastroesophageal reflux disease)     Hernia, hiatal     IBS (irritable bowel syndrome)     Inappropriate sinus tachycardia     POTS (postural orthostatic tachycardia syndrome)     Pulmonary emboli 2020    Sepsis      No past surgical history on file.  Family History   Problem Relation Age of Onset    Hypertension Mother     Diabetes Mother     Hypertension Father     Diabetes Father      Social History     Tobacco Use    Smoking status: Former Smoker     Packs/day: 1.00     Types: Cigarettes     Start date: 7/15/1996     Quit date: 2022     Years since quittin.0    Smokeless tobacco:  Never Used    Tobacco comment: varies from 7 cigarettes to whole pack in day   Substance Use Topics    Alcohol use: Yes     Comment: ocassionally    Drug use: No     Review of Systems   Constitutional: Negative for fever.   HENT: Negative for congestion, sore throat and trouble swallowing.    Respiratory: Negative for cough and shortness of breath.    Cardiovascular: Positive for chest pain.   Gastrointestinal: Positive for nausea and vomiting. Negative for abdominal pain, constipation and diarrhea.   Genitourinary: Negative for dysuria, flank pain, frequency and urgency.   Musculoskeletal: Negative for back pain.   Skin: Negative for rash.   Neurological: Negative for headaches.   All other systems reviewed and are negative.      Physical Exam     Initial Vitals [01/30/22 2005]   BP Pulse Resp Temp SpO2   (!) 144/83 (!) 144 20 97.6 °F (36.4 °C) 99 %      MAP       --         Physical Exam    Nursing note and vitals reviewed.  Constitutional: She appears well-developed and well-nourished. She is not diaphoretic. No distress.   HENT:   Head: Normocephalic and atraumatic.   Nose: Nose normal.   Mouth/Throat: Oropharynx is clear and moist.   Eyes: Conjunctivae and EOM are normal. Right eye exhibits no discharge. Left eye exhibits no discharge.   Neck: No tracheal deviation present. No JVD present.   Normal range of motion.  Cardiovascular: Regular rhythm and normal heart sounds. Tachycardia present.  Exam reveals no friction rub.    No murmur heard.  Pulmonary/Chest: Breath sounds normal. No stridor. No respiratory distress. She has no wheezes. She has no rhonchi. She has no rales. She exhibits no tenderness.   Abdominal: Abdomen is soft. She exhibits no distension. There is no abdominal tenderness.   No right CVA tenderness.  No left CVA tenderness. There is no rebound, no guarding, no tenderness at McBurney's point and negative Yeboah's sign. positive Rovsing's sign  Musculoskeletal:         General: Normal range  of motion.      Cervical back: Normal range of motion.      Comments: No lower extremity swelling or tenderness     Neurological: She is alert and oriented to person, place, and time.   Skin: Skin is warm and dry. No rash and no abscess noted. No erythema. No pallor.         ED Course   Procedures  Labs Reviewed   CBC W/ AUTO DIFFERENTIAL - Abnormal; Notable for the following components:       Result Value    MCHC 31.8 (*)     All other components within normal limits   COMPREHENSIVE METABOLIC PANEL - Abnormal; Notable for the following components:    CO2 22 (*)     Glucose 129 (*)     All other components within normal limits   LACTIC ACID, PLASMA - Abnormal; Notable for the following components:    Lactate (Lactic Acid) 2.3 (*)     All other components within normal limits   MAGNESIUM - Abnormal; Notable for the following components:    Magnesium 1.5 (*)     All other components within normal limits   POCT URINE PREGNANCY - Normal   LIPASE   URINALYSIS, REFLEX TO URINE CULTURE    Narrative:     Specimen Source->Urine   TROPONIN I   D DIMER, QUANTITATIVE   TSH   LACTIC ACID, PLASMA    Narrative:     After 2L NS received   SARS-COV-2 RDRP GENE     EKG Readings: (Independently Interpreted)   Initial Reading: No STEMI. Rhythm: Sinus Tachycardia. Heart Rate: 140. Axis: Normal.       Imaging Results          X-Ray Chest AP Portable (Final result)  Result time 01/30/22 20:30:43    Final result by Nela Chaney MD (01/30/22 20:30:43)                 Impression:      No acute cardiopulmonary process identified.      Electronically signed by: Nela Chaney MD  Date:    01/30/2022  Time:    20:30             Narrative:    EXAMINATION:  XR CHEST AP PORTABLE    CLINICAL HISTORY:  Chest pain, unspecified    TECHNIQUE:  Single frontal view of the chest was performed.    COMPARISON:  01/24/2022.    FINDINGS:  Cardiac silhouette is normal in size.  Lungs are symmetrically expanded.  No evidence of new focal consolidative  process, pneumothorax, or significant pleural effusion.  No acute osseous abnormality identified.  Bilateral metallic nipple piercings are seen.                                 Medications   sodium chloride 0.9% bolus 1,000 mL (0 mLs Intravenous Stopped 1/30/22 2148)   promethazine (PHENERGAN) 25 mg in dextrose 5 % 50 mL IVPB (0 mg Intravenous Stopped 1/30/22 2109)   famotidine (PF) injection 20 mg (20 mg Intravenous Given 1/30/22 2048)   lorazepam injection 1 mg (1 mg Intravenous Given 1/30/22 2109)   sodium chloride 0.9% bolus 1,000 mL (0 mLs Intravenous Stopped 1/30/22 2259)     Medical Decision Making:   History:   Old Medical Records: I decided to obtain old medical records.  ED Management:  Very mild elevation of lactic acid.  No and gap.  Aside from sinus tachycardia, no other significant EKG changes.  No significant electrolyte or metabolic disturbance.  No acute kidney injury.  D-dimer and troponin normal.  Symptoms improving and her vitals are normalizing.  Will plan to give 2 L of normal saline and repeat lactic acid.    Lactic normal. No evidence for alcoholic ketoacidosis requiring admission today.  She is now asymptomatic and is sleeping peacefully; easily aroused.  Vitals normalized.  Tolerating p.o. without complication.  Advising follow-up with PCP. Strict return precautions discussed.  Agreeable to plan.                      Clinical Impression:   Final diagnoses:  [R00.0] Tachycardia  [R07.9] Chest pain  [Z72.89] Alcohol use  [R11.2] Non-intractable vomiting with nausea, unspecified vomiting type (Primary)          ED Disposition Condition    Discharge Stable        ED Prescriptions     Medication Sig Dispense Start Date End Date Auth. Provider    ondansetron (ZOFRAN) 4 MG tablet Take 2 tablets (8 mg total) by mouth every 6 (six) hours as needed for Nausea. 30 tablet 1/30/2022 3/1/2022 Jhoan Rodriguez PA-C    promethazine (PHENERGAN) 25 MG tablet Take 1 tablet (25 mg total) by mouth every 6  (six) hours as needed for Nausea. 15 tablet 1/30/2022  Jhoan Rodriguez PA-C        Follow-up Information     Follow up With Specialties Details Why Contact Info    Betty Elmore MD Internal Medicine Schedule an appointment as soon as possible for a visit in 1 day For re-evaluation [address] [facility] [address]  [phone]      [facility] [place] Go to  If symptoms worsen [address] [place], [place] [address]Sebago gurdeep  [facility] [id number] [phone]           Jhoan Rodriguez PA-C  01/30/22 1609

## 2022-02-03 ENCOUNTER — HOSPITAL ENCOUNTER (EMERGENCY)
Facility: HOSPITAL | Age: 37
Discharge: HOME | End: 2022-02-03
Attending: EMERGENCY MEDICINE
Payer: MEDICAID

## 2022-02-03 VITALS
BODY MASS INDEX: 28.72 KG/M2 | SYSTOLIC BLOOD PRESSURE: 134 MMHG | DIASTOLIC BLOOD PRESSURE: 75 MMHG | WEIGHT: 183 LBS | RESPIRATION RATE: 24 BRPM | TEMPERATURE: 98 F | HEART RATE: 82 BPM | HEIGHT: 67 IN | OXYGEN SATURATION: 100 %

## 2022-02-03 DIAGNOSIS — R07.9 CHEST PAIN: ICD-10-CM

## 2022-02-03 DIAGNOSIS — R00.2 PALPITATIONS: ICD-10-CM

## 2022-02-03 LAB
ALBUMIN SERPL BCP-MCNC: 4.2 G/DL (ref 3.5–5.2)
ALP SERPL-CCNC: 68 U/L (ref 55–135)
ALT SERPL W/O P-5'-P-CCNC: 14 U/L (ref 10–44)
ANION GAP SERPL CALC-SCNC: 7 MMOL/L (ref 8–16)
AST SERPL-CCNC: 12 U/L (ref 10–40)
B-HCG UR QL: NEGATIVE
BASOPHILS # BLD AUTO: 0.02 K/UL (ref 0–0.2)
BASOPHILS NFR BLD: 0.4 % (ref 0–1.9)
BILIRUB SERPL-MCNC: 0.6 MG/DL (ref 0.1–1)
BUN SERPL-MCNC: 11 MG/DL (ref 6–20)
CALCIUM SERPL-MCNC: 9.3 MG/DL (ref 8.7–10.5)
CHLORIDE SERPL-SCNC: 108 MMOL/L (ref 95–110)
CO2 SERPL-SCNC: 26 MMOL/L (ref 23–29)
CREAT SERPL-MCNC: 0.7 MG/DL (ref 0.5–1.4)
CTP QC/QA: YES
D DIMER PPP IA.FEU-MCNC: <0.19 MG/L FEU
DIFFERENTIAL METHOD: ABNORMAL
EOSINOPHIL # BLD AUTO: 0.1 K/UL (ref 0–0.5)
EOSINOPHIL NFR BLD: 1.3 % (ref 0–8)
ERYTHROCYTE [DISTWIDTH] IN BLOOD BY AUTOMATED COUNT: 13.1 % (ref 11.5–14.5)
EST. GFR  (AFRICAN AMERICAN): >60 ML/MIN/1.73 M^2
EST. GFR  (NON AFRICAN AMERICAN): >60 ML/MIN/1.73 M^2
GLUCOSE SERPL-MCNC: 110 MG/DL (ref 70–110)
HCT VFR BLD AUTO: 38.6 % (ref 37–48.5)
HGB BLD-MCNC: 12.2 G/DL (ref 12–16)
IMM GRANULOCYTES # BLD AUTO: 0.02 K/UL (ref 0–0.04)
IMM GRANULOCYTES NFR BLD AUTO: 0.4 % (ref 0–0.5)
LYMPHOCYTES # BLD AUTO: 1.4 K/UL (ref 1–4.8)
LYMPHOCYTES NFR BLD: 26.1 % (ref 18–48)
MCH RBC QN AUTO: 28.9 PG (ref 27–31)
MCHC RBC AUTO-ENTMCNC: 31.6 G/DL (ref 32–36)
MCV RBC AUTO: 92 FL (ref 82–98)
MONOCYTES # BLD AUTO: 0.3 K/UL (ref 0.3–1)
MONOCYTES NFR BLD: 5.1 % (ref 4–15)
NEUTROPHILS # BLD AUTO: 3.6 K/UL (ref 1.8–7.7)
NEUTROPHILS NFR BLD: 66.7 % (ref 38–73)
NRBC BLD-RTO: 0 /100 WBC
PLATELET # BLD AUTO: 182 K/UL (ref 150–450)
PMV BLD AUTO: 11.4 FL (ref 9.2–12.9)
POTASSIUM SERPL-SCNC: 3.8 MMOL/L (ref 3.5–5.1)
PROT SERPL-MCNC: 7.5 G/DL (ref 6–8.4)
RBC # BLD AUTO: 4.22 M/UL (ref 4–5.4)
SODIUM SERPL-SCNC: 141 MMOL/L (ref 136–145)
TROPONIN I SERPL DL<=0.01 NG/ML-MCNC: <0.006 NG/ML (ref 0–0.03)
TSH SERPL DL<=0.005 MIU/L-ACNC: 1.22 UIU/ML (ref 0.4–4)
WBC # BLD AUTO: 5.45 K/UL (ref 3.9–12.7)

## 2022-02-03 PROCEDURE — 99285 EMERGENCY DEPT VISIT HI MDM: CPT | Mod: 25

## 2022-02-03 PROCEDURE — 93010 EKG 12-LEAD: ICD-10-PCS | Mod: ,,, | Performed by: INTERNAL MEDICINE

## 2022-02-03 PROCEDURE — 81025 URINE PREGNANCY TEST: CPT | Performed by: EMERGENCY MEDICINE

## 2022-02-03 PROCEDURE — 84484 ASSAY OF TROPONIN QUANT: CPT | Performed by: EMERGENCY MEDICINE

## 2022-02-03 PROCEDURE — 85025 COMPLETE CBC W/AUTO DIFF WBC: CPT | Performed by: EMERGENCY MEDICINE

## 2022-02-03 PROCEDURE — 85379 FIBRIN DEGRADATION QUANT: CPT | Performed by: EMERGENCY MEDICINE

## 2022-02-03 PROCEDURE — 93005 ELECTROCARDIOGRAM TRACING: CPT

## 2022-02-03 PROCEDURE — 80053 COMPREHEN METABOLIC PANEL: CPT | Performed by: EMERGENCY MEDICINE

## 2022-02-03 PROCEDURE — 84443 ASSAY THYROID STIM HORMONE: CPT | Performed by: EMERGENCY MEDICINE

## 2022-02-03 PROCEDURE — 93010 ELECTROCARDIOGRAM REPORT: CPT | Mod: ,,, | Performed by: INTERNAL MEDICINE

## 2022-02-03 RX ORDER — LORAZEPAM 1 MG/1
0.5 TABLET ORAL EVERY 6 HOURS PRN
Qty: 10 TABLET | Refills: 0 | Status: SHIPPED | OUTPATIENT
Start: 2022-02-03 | End: 2023-03-31

## 2022-02-03 NOTE — ED TRIAGE NOTES
Pt. Complains of shortness of breath and palpitations. Pt. Does not show any signs of distress at this time, but she does appear to be a little anxious. Pt. Also states that her watch caught a few episodes of a fib. Pt. Complains of chest tightness and abd pain off and on. Pt. States that she has not been really eating.

## 2022-02-03 NOTE — ED PROVIDER NOTES
Encounter Date: 2/3/2022    SCRIBE #1 NOTE: I, Jairo Espinosa, am scribing for, and in the presence of,  Mati Trejo Jr., MD. I have scribed the following portions of the note - Other sections scribed: HPI, ROS.       History     Chief Complaint   Patient presents with    Shortness of Breath     Pt chief complaint is shortness of breath, pt states has a history of pe's and thinks she may have another.      36 y.o. female, with a pertinent past medical history of pulmonary emboli, asthma, inappropriate sinus tachycardia, POTS, and sepsis presents to the ED with intermittent heart palpitations. Pt states over the last 3 days she has experienced multiple episodes of heart palpitations. Pt states that the episodes wake her up out of her sleep. Pt's last episode of palpitations was at 0824 today. Pt states that her watch told her that she was Afib this morning. Pt has a history of heart palpitations and sees Dr. Braga (Cardiology). Pt reports experiencing associated shortness of breath. Pt states that her last pulmonary embolism was in the summer of 2020. Pt denies being on any medication for her palpitations and denies taking any new medications recently. Pt states that she drinks socially and quit smoking cigarettes 4 weeks ago. No other exacerbating or alleviating factors. Patient denies other associated symptoms.       The history is provided by the patient. No  was used.     Review of patient's allergies indicates:   Allergen Reactions    Demerol (pf) [meperidine (pf)] Hives     Past Medical History:   Diagnosis Date    Anxiety     Asthma     Depression     GERD (gastroesophageal reflux disease)     Hernia, hiatal     IBS (irritable bowel syndrome)     Inappropriate sinus tachycardia     POTS (postural orthostatic tachycardia syndrome)     Pulmonary emboli 2020    Sepsis      History reviewed. No pertinent surgical history.  Family History   Problem Relation Age of Onset     Hypertension Mother     Diabetes Mother     Hypertension Father     Diabetes Father      Social History     Tobacco Use    Smoking status: Former Smoker     Packs/day: 1.00     Types: Cigarettes     Start date: 7/15/1996     Quit date: 2022     Years since quittin.0    Smokeless tobacco: Never Used    Tobacco comment: varies from 7 cigarettes to whole pack in day   Substance Use Topics    Alcohol use: Yes     Comment: ocassionally    Drug use: No     Review of Systems   Constitutional: Negative for chills and fever.   HENT: Negative for congestion, rhinorrhea and sore throat.    Eyes: Negative for visual disturbance.   Respiratory: Positive for shortness of breath. Negative for cough.    Cardiovascular: Positive for palpitations. Negative for chest pain.   Gastrointestinal: Negative for abdominal pain, diarrhea, nausea and vomiting.   Genitourinary: Negative for dysuria, frequency and hematuria.   Musculoskeletal: Negative for back pain.   Skin: Negative for rash.   Neurological: Negative for dizziness, weakness and headaches.       Physical Exam     Initial Vitals [22 0745]   BP Pulse Resp Temp SpO2   124/83 102 18 98.1 °F (36.7 °C) 100 %      MAP       --         Physical Exam    Nursing note and vitals reviewed.  Constitutional: She appears well-developed and well-nourished. She is not diaphoretic. No distress.   HENT:   Head: Normocephalic and atraumatic.   Right Ear: External ear normal.   Left Ear: External ear normal.   Nose: Nose normal.   Mouth/Throat: Oropharynx is clear and moist.   Eyes: Conjunctivae and EOM are normal. Pupils are equal, round, and reactive to light. Right eye exhibits no discharge. Left eye exhibits no discharge. No scleral icterus.   Neck: Neck supple.   Normal range of motion.  Cardiovascular: Normal rate, regular rhythm, normal heart sounds and intact distal pulses.   No murmur heard.  Pulmonary/Chest: Breath sounds normal. No respiratory distress. She has no  wheezes. She has no rhonchi. She has no rales.   Abdominal: Abdomen is soft. Bowel sounds are normal. She exhibits no distension and no mass. There is no abdominal tenderness. There is no rebound and no guarding.   Musculoskeletal:         General: No tenderness or edema. Normal range of motion.      Cervical back: Normal range of motion and neck supple.     Neurological: She is alert and oriented to person, place, and time. She has normal strength. No cranial nerve deficit or sensory deficit.   Skin: Skin is warm and dry. No rash noted. No erythema. No pallor.   Psychiatric: She has a normal mood and affect. Her behavior is normal. Judgment and thought content normal.         ED Course   Procedures  Labs Reviewed   CBC W/ AUTO DIFFERENTIAL - Abnormal; Notable for the following components:       Result Value    MCHC 31.6 (*)     All other components within normal limits   COMPREHENSIVE METABOLIC PANEL - Abnormal; Notable for the following components:    Anion Gap 7 (*)     All other components within normal limits   TROPONIN I   D DIMER, QUANTITATIVE   TSH   POCT URINE PREGNANCY     EKG Readings: (Independently Interpreted)   Initial Reading: No STEMI. Ectopy: No Ectopy.   EKG reviewed and interpreted by me shows sinus tachycardia rate of 107 beats per minute.  The WI, QRS, QTC intervals are within normal limits.  There is a normal axis.  There is normal R-wave progression across the precordium.  There are no ST segment or T-wave ischemic findings.  There are Q-waves in the inferior leads.         Imaging Results          X-Ray Chest AP Portable (Final result)  Result time 02/03/22 09:06:32    Final result by Elmer Rodriguez MD (02/03/22 09:06:32)                 Impression:      No acute cardiopulmonary process.      Electronically signed by: Elmer Rodriguez MD  Date:    02/03/2022  Time:    09:06             Narrative:    EXAMINATION:  XR CHEST AP PORTABLE    CLINICAL HISTORY:  Palpitations    TECHNIQUE:  Single  frontal view of the chest was performed.    COMPARISON:  Chest 01/30/2022    FINDINGS:  Cardiomediastinal silhouette remains within normal limits.  There is no tracheal abnormalities.  Lungs are well expanded.  No lobar consolidations or pneumothorax or pulmonary vascular congestion.  There is no pleural effusions or any cavitary lung masses or any significant pulmonary nodules.    There are cardiac monitoring leads over the chest.  Metallic bilateral nipple piercing devices noted.                              X-Rays:   Independently Interpreted Readings:   Other Readings:  Chest x-ray reveals no evidence of infiltrate, consolidation, pulmonary edema, pleural effusion, or pneumothorax.    Medications - No data to display  Medical Decision Making:   History:   Old Medical Records: I decided to obtain old medical records.  ED Management:  This is the emergent evaluation of a 36-year-old female presents emergency department with palpitations.  Differential diagnosis at the time of initial evaluation included, but was not limited to:   Supraventricular dysrhythmia, PACs, PVCs.  I do not suspect malignant dysrhythmia.  The patient states she has a history of pulmonary embolus.  I do not see any stigmata suggest thromboembolic disease today.  The patient is not tachycardic or hypoxemic.  She does not have any lower extremity edema, erythema, or asymmetry.  I did a D-dimer which was undetectable.  Troponin is also undetectable.  Her TSH is normal.  There is no metabolic derangement.  No evidence of anemia or leukocytosis.  Patient is followed by Dr. Braga at [facility].  I communicated with him and he agrees this patient may be seen for further monitoring as an outpatient.  He will ask his office to call her.  I do believe that this patient is very anxious.  She is very tearful in the room.  She states that she saw PVCs on the monitor.  I did explain that this was not life-threatening although it can be  uncomfortable.  I will give this patient a short course of anxiolytics pending follow-up with Dr. Braga it in the office.  I advised her to return if she gets worse or if new symptoms develop.          Scribe Attestation:   Scribe #1: I performed the above scribed service and the documentation accurately describes the services I performed. I attest to the accuracy of the note.                 Clinical Impression:   Final diagnoses:  [R07.9] Chest pain  [R00.2] Palpitations  [R00.2] Palpitations          ED Disposition Condition    Discharge Stable        ED Prescriptions     Medication Sig Dispense Start Date End Date Auth. Provider    LORazepam (ATIVAN) 1 MG tablet Take 0.5 tablets (0.5 mg total) by mouth every 6 (six) hours as needed for Anxiety (Do not drive when taking.). 10 tablet 2/3/2022 3/5/2022 Mati Trejo Jr., MD        Follow-up Information     Follow up With Specialties Details Why Contact Info    Micah Braga MD Interventional Cardiology, Cardiology Call in 1 day  [address] [place] [id number] [phone]         I, Mati Trejo MD, personally performed the services described in this documentation. All medical record entries made by the scribe were at my direction and in my presence. I have reviewed the chart and agree that the record reflects my personal performance and is accurate and complete.       Mati Trejo Jr., MD  02/03/22 1542

## 2022-02-03 NOTE — DISCHARGE INSTRUCTIONS
Please follow-up with your cardiologist for further evaluation.  They will be getting you and event monitor.  Office should be calling today.  Please return if you get worse or if new symptoms develop.

## 2022-02-15 ENCOUNTER — HOSPITAL ENCOUNTER (EMERGENCY)
Facility: HOSPITAL | Age: 37
Discharge: HOME | End: 2022-02-15
Attending: EMERGENCY MEDICINE
Payer: MEDICAID

## 2022-02-15 VITALS
TEMPERATURE: 98 F | OXYGEN SATURATION: 100 % | RESPIRATION RATE: 19 BRPM | WEIGHT: 178 LBS | BODY MASS INDEX: 27.94 KG/M2 | HEART RATE: 91 BPM | DIASTOLIC BLOOD PRESSURE: 75 MMHG | SYSTOLIC BLOOD PRESSURE: 132 MMHG | HEIGHT: 67 IN

## 2022-02-15 DIAGNOSIS — R10.9 ABDOMINAL PAIN, UNSPECIFIED ABDOMINAL LOCATION: Primary | ICD-10-CM

## 2022-02-15 DIAGNOSIS — R11.0 NAUSEA: ICD-10-CM

## 2022-02-15 LAB
ALBUMIN SERPL BCP-MCNC: 4.4 G/DL (ref 3.5–5.2)
ALP SERPL-CCNC: 76 U/L (ref 55–135)
ALT SERPL W/O P-5'-P-CCNC: 18 U/L (ref 10–44)
ANION GAP SERPL CALC-SCNC: 7 MMOL/L (ref 8–16)
AST SERPL-CCNC: 12 U/L (ref 10–40)
B-HCG UR QL: NEGATIVE
BASOPHILS # BLD AUTO: 0.03 K/UL (ref 0–0.2)
BASOPHILS NFR BLD: 0.6 % (ref 0–1.9)
BILIRUB SERPL-MCNC: 0.8 MG/DL (ref 0.1–1)
BILIRUB UR QL STRIP: NEGATIVE
BUN SERPL-MCNC: 9 MG/DL (ref 6–20)
CALCIUM SERPL-MCNC: 9.7 MG/DL (ref 8.7–10.5)
CHLORIDE SERPL-SCNC: 108 MMOL/L (ref 95–110)
CLARITY UR: CLEAR
CO2 SERPL-SCNC: 25 MMOL/L (ref 23–29)
COLOR UR: YELLOW
CREAT SERPL-MCNC: 0.8 MG/DL (ref 0.5–1.4)
CTP QC/QA: YES
DIFFERENTIAL METHOD: ABNORMAL
EOSINOPHIL # BLD AUTO: 0.1 K/UL (ref 0–0.5)
EOSINOPHIL NFR BLD: 1.6 % (ref 0–8)
ERYTHROCYTE [DISTWIDTH] IN BLOOD BY AUTOMATED COUNT: 13.2 % (ref 11.5–14.5)
EST. GFR  (AFRICAN AMERICAN): >60 ML/MIN/1.73 M^2
EST. GFR  (NON AFRICAN AMERICAN): >60 ML/MIN/1.73 M^2
GLUCOSE SERPL-MCNC: 103 MG/DL (ref 70–110)
GLUCOSE UR QL STRIP: NEGATIVE
HCT VFR BLD AUTO: 39.8 % (ref 37–48.5)
HGB BLD-MCNC: 12.6 G/DL (ref 12–16)
HGB UR QL STRIP: ABNORMAL
IMM GRANULOCYTES # BLD AUTO: 0.02 K/UL (ref 0–0.04)
IMM GRANULOCYTES NFR BLD AUTO: 0.4 % (ref 0–0.5)
KETONES UR QL STRIP: NEGATIVE
LEUKOCYTE ESTERASE UR QL STRIP: NEGATIVE
LIPASE SERPL-CCNC: 33 U/L (ref 4–60)
LYMPHOCYTES # BLD AUTO: 1.5 K/UL (ref 1–4.8)
LYMPHOCYTES NFR BLD: 29.2 % (ref 18–48)
MCH RBC QN AUTO: 28.5 PG (ref 27–31)
MCHC RBC AUTO-ENTMCNC: 31.7 G/DL (ref 32–36)
MCV RBC AUTO: 90 FL (ref 82–98)
MICROSCOPIC COMMENT: ABNORMAL
MONOCYTES # BLD AUTO: 0.3 K/UL (ref 0.3–1)
MONOCYTES NFR BLD: 6 % (ref 4–15)
NEUTROPHILS # BLD AUTO: 3.1 K/UL (ref 1.8–7.7)
NEUTROPHILS NFR BLD: 62.2 % (ref 38–73)
NITRITE UR QL STRIP: NEGATIVE
NRBC BLD-RTO: 0 /100 WBC
PH UR STRIP: 7 [PH] (ref 5–8)
PLATELET # BLD AUTO: 183 K/UL (ref 150–450)
PMV BLD AUTO: 10.8 FL (ref 9.2–12.9)
POTASSIUM SERPL-SCNC: 3.9 MMOL/L (ref 3.5–5.1)
PROT SERPL-MCNC: 8 G/DL (ref 6–8.4)
PROT UR QL STRIP: NEGATIVE
RBC # BLD AUTO: 4.42 M/UL (ref 4–5.4)
RBC #/AREA URNS HPF: 6 /HPF (ref 0–4)
SODIUM SERPL-SCNC: 140 MMOL/L (ref 136–145)
SP GR UR STRIP: 1.01 (ref 1–1.03)
URN SPEC COLLECT METH UR: ABNORMAL
UROBILINOGEN UR STRIP-ACNC: NEGATIVE EU/DL
WBC # BLD AUTO: 5.04 K/UL (ref 3.9–12.7)
WBC #/AREA URNS HPF: 2 /HPF (ref 0–5)

## 2022-02-15 PROCEDURE — 96374 THER/PROPH/DIAG INJ IV PUSH: CPT

## 2022-02-15 PROCEDURE — 85025 COMPLETE CBC W/AUTO DIFF WBC: CPT | Performed by: EMERGENCY MEDICINE

## 2022-02-15 PROCEDURE — 25000003 PHARM REV CODE 250: Performed by: EMERGENCY MEDICINE

## 2022-02-15 PROCEDURE — 80053 COMPREHEN METABOLIC PANEL: CPT | Performed by: EMERGENCY MEDICINE

## 2022-02-15 PROCEDURE — 81000 URINALYSIS NONAUTO W/SCOPE: CPT | Performed by: EMERGENCY MEDICINE

## 2022-02-15 PROCEDURE — 81025 URINE PREGNANCY TEST: CPT | Performed by: EMERGENCY MEDICINE

## 2022-02-15 PROCEDURE — 99285 EMERGENCY DEPT VISIT HI MDM: CPT | Mod: 25

## 2022-02-15 PROCEDURE — 83690 ASSAY OF LIPASE: CPT | Performed by: EMERGENCY MEDICINE

## 2022-02-15 PROCEDURE — 25500020 PHARM REV CODE 255: Performed by: EMERGENCY MEDICINE

## 2022-02-15 RX ORDER — MAG HYDROX/ALUMINUM HYD/SIMETH 200-200-20
30 SUSPENSION, ORAL (FINAL DOSE FORM) ORAL ONCE
Status: COMPLETED | OUTPATIENT
Start: 2022-02-15 | End: 2022-02-15

## 2022-02-15 RX ORDER — FERROUS SULFATE 325(65) MG
TABLET, DELAYED RELEASE (ENTERIC COATED) ORAL DAILY
COMMUNITY
Start: 2022-02-07 | End: 2023-03-31

## 2022-02-15 RX ORDER — ACETAMINOPHEN 500 MG
1000 TABLET ORAL
Status: COMPLETED | OUTPATIENT
Start: 2022-02-15 | End: 2022-02-15

## 2022-02-15 RX ORDER — LIDOCAINE HYDROCHLORIDE 20 MG/ML
10 SOLUTION OROPHARYNGEAL ONCE
Status: COMPLETED | OUTPATIENT
Start: 2022-02-15 | End: 2022-02-15

## 2022-02-15 RX ORDER — FAMOTIDINE 10 MG/ML
20 INJECTION INTRAVENOUS
Status: COMPLETED | OUTPATIENT
Start: 2022-02-15 | End: 2022-02-15

## 2022-02-15 RX ORDER — SUCRALFATE 1 G/10ML
1 SUSPENSION ORAL
Qty: 420 ML | Refills: 0 | Status: SHIPPED | OUTPATIENT
Start: 2022-02-15 | End: 2022-08-05

## 2022-02-15 RX ORDER — PANTOPRAZOLE SODIUM 40 MG/1
40 TABLET, DELAYED RELEASE ORAL 2 TIMES DAILY
COMMUNITY
Start: 2022-02-08 | End: 2023-07-27

## 2022-02-15 RX ADMIN — LIDOCAINE HYDROCHLORIDE 10 ML: 20 SOLUTION ORAL; TOPICAL at 12:02

## 2022-02-15 RX ADMIN — ACETAMINOPHEN 1000 MG: 500 TABLET ORAL at 09:02

## 2022-02-15 RX ADMIN — IOHEXOL 75 ML: 350 INJECTION, SOLUTION INTRAVENOUS at 10:02

## 2022-02-15 RX ADMIN — FAMOTIDINE 20 MG: 10 INJECTION INTRAVENOUS at 09:02

## 2022-02-15 RX ADMIN — ALUMINUM HYDROXIDE, MAGNESIUM HYDROXIDE, AND SIMETHICONE 30 ML: 200; 200; 20 SUSPENSION ORAL at 12:02

## 2022-02-15 NOTE — ED TRIAGE NOTES
Pt reports LUQ x 2 weeks and constant epigastric pain since yesterday. Pt also reports worsening epigastric pain over the past 2 weeks after eating certain foods. History of chronic gastritis, H.pylori, chronic duodenitis.

## 2022-02-15 NOTE — ED PROVIDER NOTES
"Encounter Date: 2/15/2022    SCRIBE #1 NOTE: I, Fidel Dimas, am scribing for, and in the presence of, Macie Phelps MD.       History     Chief Complaint   Patient presents with    Abdominal Pain     Patient reports left sided abdominal pain accompanied by epigastric pain and nausea for the past two weeks. Patient states she had similar symptoms one month ago. Last BM this morning, states steatorrhea. History of chronic gastritis, H.pylori, chronic duodenitis.      36 year old female with a PMHx of Anxiety, GERD, IBS, POTS, PE (in 2020, treated with 3 months of OAC), and Sepsis who presents to the ED with complaints of intermittent epigastric and LUQ abdominal pain with associated nausea for two weeks. Patient reports the pain began after drinking a large quantity of "pickle vodka" on 1/29/22 and seen here on 1/30/22 for nausea and vomiting but discharged home after a negative workup. Abdominal pain has continued to worsen since initial visit. On exam, notes pain is worse after eating. States she ate a cheese burger yesterday but shortly after eating began feeling sharp abdominal pain and nausea. Also endorses one episode of rubbery stool several days ago and an episode of green and fatty stool yesterday. Last meal was tacos last night which she states made her abdominal pain worse and "I felt the onions burning in my stomach." Other symptoms include losing 12 lbs in the last two weeks but denies any recent vomiting or urinary problems. GI is Dr. Boone. Denies PSHx of abdominal surgery. Medications include Zofran prn, Phenergan prn, and Protonix. Has not taken her Zofran or Phenergan in more than two weeks.  She is currently on her menstrual cycle. No other complaints at this time.     The history is provided by the patient. No  was used.     Review of patient's allergies indicates:   Allergen Reactions    Demerol (pf) [meperidine (pf)] Hives     Past Medical History:   Diagnosis " Date    Anxiety     Asthma     Depression     GERD (gastroesophageal reflux disease)     Hernia, hiatal     IBS (irritable bowel syndrome)     Inappropriate sinus tachycardia     POTS (postural orthostatic tachycardia syndrome)     Pulmonary emboli 2020    Sepsis      No past surgical history on file.  Family History   Problem Relation Age of Onset    Hypertension Mother     Diabetes Mother     Hypertension Father     Diabetes Father      Social History     Tobacco Use    Smoking status: Former Smoker     Packs/day: 1.00     Types: Cigarettes     Start date: 7/15/1996     Quit date: 2022     Years since quittin.1    Smokeless tobacco: Never Used    Tobacco comment: varies from 7 cigarettes to whole pack in day   Substance Use Topics    Alcohol use: Yes     Comment: ocassionally    Drug use: No     Review of Systems   Constitutional: Positive for unexpected weight change. Negative for chills and fever.   HENT: Negative for congestion.    Eyes: Negative for discharge.   Respiratory: Negative for cough.    Cardiovascular: Negative for chest pain.   Gastrointestinal: Positive for abdominal pain and nausea. Negative for blood in stool, constipation, diarrhea and vomiting.        Positive for episodes of rubbery stool and green, fatty stool.    Genitourinary: Positive for frequency. Negative for difficulty urinating, dysuria, flank pain, hematuria and vaginal discharge.   Skin: Negative for rash.   Allergic/Immunologic: Negative for immunocompromised state.   Neurological: Negative for headaches.   Psychiatric/Behavioral: Negative for decreased concentration.       Physical Exam     Initial Vitals [02/15/22 0807]   BP Pulse Resp Temp SpO2   127/75 97 16 98.1 °F (36.7 °C) 100 %      MAP       --         Physical Exam    Nursing note and vitals reviewed.  Constitutional: She appears well-developed and well-nourished. She is not diaphoretic. No distress.   HENT:   Mouth/Throat: Oropharynx is clear  and moist.   Eyes: Pupils are equal, round, and reactive to light.   Neck: Neck supple.   Cardiovascular: Normal rate and regular rhythm.   Pulmonary/Chest: Breath sounds normal.   Abdominal: Abdomen is soft. There is abdominal tenderness.   Tenderness in epigastric abdomen, LUQ, and left mid-abdomen. Mild RUQ tenderness.    Musculoskeletal:         General: No edema.      Cervical back: Neck supple.     Neurological: She is alert and oriented to person, place, and time.   Skin: Skin is warm and dry.   Psychiatric: Her mood appears anxious.         ED Course   Procedures  Labs Reviewed   CBC W/ AUTO DIFFERENTIAL - Abnormal; Notable for the following components:       Result Value    MCHC 31.7 (*)     All other components within normal limits   COMPREHENSIVE METABOLIC PANEL - Abnormal; Notable for the following components:    Anion Gap 7 (*)     All other components within normal limits   URINALYSIS, REFLEX TO URINE CULTURE - Abnormal; Notable for the following components:    Occult Blood UA 2+ (*)     All other components within normal limits    Narrative:     Specimen Source->Urine   URINALYSIS MICROSCOPIC - Abnormal; Notable for the following components:    RBC, UA 6 (*)     All other components within normal limits    Narrative:     Specimen Source->Urine   LIPASE   POCT URINE PREGNANCY          Imaging Results          CT Abdomen Pelvis With Contrast (Final result)  Result time 02/15/22 10:44:17    Final result by Rishabh Ortega MD (02/15/22 10:44:17)                 Impression:      No acute findings in the abdomen or pelvis.      Electronically signed by: Rishabh Ortega MD  Date:    02/15/2022  Time:    10:44             Narrative:    EXAMINATION:  CT ABDOMEN PELVIS WITH CONTRAST    CLINICAL HISTORY:  LLQ abdominal pain;Nausea/vomiting;    TECHNIQUE:  Low dose axial images, sagittal and coronal reformations were obtained from the lung bases to the pubic symphysis following the IV administration of 75 mL  of Omnipaque 350 .  Oral contrast was not given.    COMPARISON:  None.    FINDINGS:  Lower Chest:    Lung bases are clear.  Heart size is normal.    Abdomen:    Liver is at the upper limits of normal in size.  No focal hepatic mass.  Gallbladder is unremarkable. No intrahepatic biliary ductal dilatation.    Spleen, adrenals, and pancreas are unremarkable.    The kidneys are symmetric.  No hydronephrosis. Mild distension of the bilateral collecting system, likely related to distended urinary bladder.  Subcentimeter renal hypodensities, too small to definitively characterize, but likely simple cysts.    No small bowel obstruction.  No inflammatory changes identified involving the gastrointestinal tract.  The appendix is normal.    No pneumoperitoneum or organized fluid collection.    No bulky lymphadenopathy.    Abdominal aorta is normal in caliber.    Portal, splenic, and superior mesenteric veins are patent.    Pelvis:    Urinary bladder is distended but otherwise unremarkable.  Intrauterine device noted in the uterus.  Small volume pelvic free fluid.  Rectum is unremarkable.  No pelvic lymphadenopathy.    Bones and soft tissues:    No aggressive osseous lesions.  Extraperitoneal soft tissues are negative for acute finding.                                 Medications   acetaminophen tablet 1,000 mg (1,000 mg Oral Given 2/15/22 0932)   famotidine (PF) injection 20 mg (20 mg Intravenous Given 2/15/22 0932)   iohexoL (OMNIPAQUE 350) injection 75 mL (75 mLs Intravenous Given 2/15/22 1027)   aluminum-magnesium hydroxide-simethicone 200-200-20 mg/5 mL suspension 30 mL (30 mLs Oral Given 2/15/22 1206)     And   LIDOcaine HCl 2% oral solution 10 mL (10 mLs Oral Given 2/15/22 1206)     Medical Decision Making:   Initial Assessment:   36-year-old female with history of chronic gastritis, sleep apnea presents with abdominal pain.  Symptoms started approximately 6 weeks ago, they have been intermittent.  Pain seems to get worse  with the eating, associated with nausea.  On exam, patient is well-appearing, vital signs are reassuring.  She has mild tenderness in the right upper quadrant, she is tender in the epigastrium, left upper quadrant and left mid abdomen.  Her abdomen is nondistended.  There is no rigidity or guarding.  Differential includes but not limited to pancreatitis, gastritis, diverticulitis, diverticulosis, constipation.  Workup initiated with labs, will get CT abdomen pelvis.  Will treat with Pepcid and Tylenol.  I did offer patient IV analgesia which she has declined.          Scribe Attestation:   Scribe #1: I performed the above scribed service and the documentation accurately describes the services I performed. I attest to the accuracy of the note.        ED Course as of 02/15/22 1448   Tue Feb 15, 2022   1138 Patient reassessed, she states unchanged symptoms.  She states while she is waiting, her GI doctor, Dr. Tino patrick called, she is positive for H pylori.  She states he will be calling in her antibiotics.  I reviewed her test results, she does have 6 red blood cells in her urine, she is currently on her menstrual cycle.  Otherwise labs are within acceptable limits, CT without acute finding.  I will order a GI cocktail for her symptoms, plan on discharge, advised to continue her PPI, get antibiotics from GI doctor as prescribed, and will try sucralfate at home to see if this provides her any relief. [LH]      ED Course User Index  [LH] Macie Phelps MD             I, Macie Phelps MD, personally performed the services described in this documentation. All medical record entries made by the scribe were at my direction and in my presence. I have reviewed the chart and agree that the record reflects my personal performance and is accurate and complete.    Clinical Impression:   Final diagnoses:  [R10.9] Abdominal pain, unspecified abdominal location (Primary)  [R11.0] Nausea          ED Disposition Condition     Discharge Stable       This dictation has been generated using M-Modal Fluency Direct dictation; some phonetic errors may occur.     ED Prescriptions     Medication Sig Dispense Start Date End Date Auth. Provider    sucralfate (CARAFATE) 100 mg/mL suspension Take 10 mLs (1 g total) by mouth 4 (four) times daily before meals and nightly. 420 mL 2/15/2022  Macie Phelps MD        Follow-up Information     Follow up With Specialties Details Why Contact Info    Dillan Hawley MD Gastroenterology Schedule an appointment as soon as possible for a visit in 1 week  [address] [place] [address]  [address]  [facility]  [facility] [address] [phone]      [facility] [place] [facility] [place] [facility] Emergency Medicine  As needed, If symptoms worsen [address]  [facility] [address] [phone]           Macie Phelps MD  02/15/22 3766

## 2022-02-24 ENCOUNTER — HOSPITAL ENCOUNTER (EMERGENCY)
Facility: HOSPITAL | Age: 37
Discharge: HOME | End: 2022-02-24
Attending: STUDENT IN AN ORGANIZED HEALTH CARE EDUCATION/TRAINING PROGRAM
Payer: MEDICAID

## 2022-02-24 VITALS
SYSTOLIC BLOOD PRESSURE: 111 MMHG | TEMPERATURE: 99 F | HEIGHT: 67 IN | WEIGHT: 180 LBS | RESPIRATION RATE: 18 BRPM | BODY MASS INDEX: 28.25 KG/M2 | HEART RATE: 96 BPM | DIASTOLIC BLOOD PRESSURE: 61 MMHG | OXYGEN SATURATION: 100 %

## 2022-02-24 DIAGNOSIS — R00.0 TACHYCARDIA: ICD-10-CM

## 2022-02-24 DIAGNOSIS — R19.7 DIARRHEA, UNSPECIFIED TYPE: Primary | ICD-10-CM

## 2022-02-24 DIAGNOSIS — R10.9 ABDOMINAL PAIN: ICD-10-CM

## 2022-02-24 LAB
ALBUMIN SERPL BCP-MCNC: 4.3 G/DL (ref 3.5–5.2)
ALP SERPL-CCNC: 73 U/L (ref 55–135)
ALT SERPL W/O P-5'-P-CCNC: 19 U/L (ref 10–44)
ANION GAP SERPL CALC-SCNC: 11 MMOL/L (ref 8–16)
ANION GAP SERPL CALC-SCNC: 16 MMOL/L (ref 8–16)
AST SERPL-CCNC: 13 U/L (ref 10–40)
B-HCG UR QL: NEGATIVE
BASOPHILS # BLD AUTO: 0.03 K/UL (ref 0–0.2)
BASOPHILS NFR BLD: 0.5 % (ref 0–1.9)
BILIRUB SERPL-MCNC: 0.6 MG/DL (ref 0.1–1)
BILIRUB UR QL STRIP: NEGATIVE
BNP SERPL-MCNC: <10 PG/ML (ref 0–99)
BUN SERPL-MCNC: 7 MG/DL (ref 6–30)
BUN SERPL-MCNC: 8 MG/DL (ref 6–20)
CALCIUM SERPL-MCNC: 9.7 MG/DL (ref 8.7–10.5)
CHLORIDE SERPL-SCNC: 104 MMOL/L (ref 95–110)
CHLORIDE SERPL-SCNC: 106 MMOL/L (ref 95–110)
CLARITY UR: CLEAR
CO2 SERPL-SCNC: 23 MMOL/L (ref 23–29)
COLOR UR: COLORLESS
CREAT SERPL-MCNC: 0.7 MG/DL (ref 0.5–1.4)
CREAT SERPL-MCNC: 0.8 MG/DL (ref 0.5–1.4)
CTP QC/QA: YES
DIFFERENTIAL METHOD: ABNORMAL
EOSINOPHIL # BLD AUTO: 0.1 K/UL (ref 0–0.5)
EOSINOPHIL NFR BLD: 1.3 % (ref 0–8)
ERYTHROCYTE [DISTWIDTH] IN BLOOD BY AUTOMATED COUNT: 12.8 % (ref 11.5–14.5)
EST. GFR  (AFRICAN AMERICAN): >60 ML/MIN/1.73 M^2
EST. GFR  (NON AFRICAN AMERICAN): >60 ML/MIN/1.73 M^2
GLUCOSE SERPL-MCNC: 108 MG/DL (ref 70–110)
GLUCOSE SERPL-MCNC: 108 MG/DL (ref 70–110)
GLUCOSE UR QL STRIP: NEGATIVE
HCT VFR BLD AUTO: 39.2 % (ref 37–48.5)
HCT VFR BLD CALC: 37 %PCV (ref 36–54)
HGB BLD-MCNC: 12.5 G/DL (ref 12–16)
HGB UR QL STRIP: NEGATIVE
IMM GRANULOCYTES # BLD AUTO: 0.01 K/UL (ref 0–0.04)
IMM GRANULOCYTES NFR BLD AUTO: 0.2 % (ref 0–0.5)
KETONES UR QL STRIP: NEGATIVE
LEUKOCYTE ESTERASE UR QL STRIP: NEGATIVE
LIPASE SERPL-CCNC: 42 U/L (ref 4–60)
LYMPHOCYTES # BLD AUTO: 1.1 K/UL (ref 1–4.8)
LYMPHOCYTES NFR BLD: 19.4 % (ref 18–48)
MCH RBC QN AUTO: 28.9 PG (ref 27–31)
MCHC RBC AUTO-ENTMCNC: 31.9 G/DL (ref 32–36)
MCV RBC AUTO: 91 FL (ref 82–98)
MONOCYTES # BLD AUTO: 0.4 K/UL (ref 0.3–1)
MONOCYTES NFR BLD: 6.6 % (ref 4–15)
NEUTROPHILS # BLD AUTO: 3.9 K/UL (ref 1.8–7.7)
NEUTROPHILS NFR BLD: 72 % (ref 38–73)
NITRITE UR QL STRIP: NEGATIVE
NRBC BLD-RTO: 0 /100 WBC
PH UR STRIP: 7 [PH] (ref 5–8)
PLATELET # BLD AUTO: 199 K/UL (ref 150–450)
PMV BLD AUTO: 10.6 FL (ref 9.2–12.9)
POC IONIZED CALCIUM: 1.32 MMOL/L (ref 1.06–1.42)
POC TCO2 (MEASURED): 24 MMOL/L (ref 23–29)
POTASSIUM BLD-SCNC: 3.4 MMOL/L (ref 3.5–5.1)
POTASSIUM SERPL-SCNC: 3.7 MMOL/L (ref 3.5–5.1)
PROT SERPL-MCNC: 7.6 G/DL (ref 6–8.4)
PROT UR QL STRIP: NEGATIVE
RBC # BLD AUTO: 4.33 M/UL (ref 4–5.4)
SAMPLE: ABNORMAL
SODIUM BLD-SCNC: 140 MMOL/L (ref 136–145)
SODIUM SERPL-SCNC: 140 MMOL/L (ref 136–145)
SP GR UR STRIP: <1.005 (ref 1–1.03)
TROPONIN I SERPL DL<=0.01 NG/ML-MCNC: <0.006 NG/ML (ref 0–0.03)
URN SPEC COLLECT METH UR: ABNORMAL
UROBILINOGEN UR STRIP-ACNC: NEGATIVE EU/DL
WBC # BLD AUTO: 5.46 K/UL (ref 3.9–12.7)

## 2022-02-24 PROCEDURE — 83880 ASSAY OF NATRIURETIC PEPTIDE: CPT | Performed by: EMERGENCY MEDICINE

## 2022-02-24 PROCEDURE — 84132 ASSAY OF SERUM POTASSIUM: CPT | Mod: 91

## 2022-02-24 PROCEDURE — 93010 EKG 12-LEAD: ICD-10-PCS | Mod: ,,, | Performed by: INTERNAL MEDICINE

## 2022-02-24 PROCEDURE — 83605 ASSAY OF LACTIC ACID: CPT

## 2022-02-24 PROCEDURE — 99900035 HC TECH TIME PER 15 MIN (STAT)

## 2022-02-24 PROCEDURE — 85025 COMPLETE CBC W/AUTO DIFF WBC: CPT | Performed by: EMERGENCY MEDICINE

## 2022-02-24 PROCEDURE — 96375 TX/PRO/DX INJ NEW DRUG ADDON: CPT

## 2022-02-24 PROCEDURE — 25000003 PHARM REV CODE 250: Performed by: STUDENT IN AN ORGANIZED HEALTH CARE EDUCATION/TRAINING PROGRAM

## 2022-02-24 PROCEDURE — 81025 URINE PREGNANCY TEST: CPT | Performed by: EMERGENCY MEDICINE

## 2022-02-24 PROCEDURE — 87449 NOS EACH ORGANISM AG IA: CPT | Performed by: STUDENT IN AN ORGANIZED HEALTH CARE EDUCATION/TRAINING PROGRAM

## 2022-02-24 PROCEDURE — 96361 HYDRATE IV INFUSION ADD-ON: CPT

## 2022-02-24 PROCEDURE — 82565 ASSAY OF CREATININE: CPT

## 2022-02-24 PROCEDURE — 96374 THER/PROPH/DIAG INJ IV PUSH: CPT

## 2022-02-24 PROCEDURE — 84295 ASSAY OF SERUM SODIUM: CPT | Mod: 91

## 2022-02-24 PROCEDURE — 63600175 PHARM REV CODE 636 W HCPCS: Performed by: STUDENT IN AN ORGANIZED HEALTH CARE EDUCATION/TRAINING PROGRAM

## 2022-02-24 PROCEDURE — 83690 ASSAY OF LIPASE: CPT | Performed by: EMERGENCY MEDICINE

## 2022-02-24 PROCEDURE — 85014 HEMATOCRIT: CPT | Mod: 91

## 2022-02-24 PROCEDURE — 87338 HPYLORI STOOL AG IA: CPT | Performed by: STUDENT IN AN ORGANIZED HEALTH CARE EDUCATION/TRAINING PROGRAM

## 2022-02-24 PROCEDURE — 99285 EMERGENCY DEPT VISIT HI MDM: CPT | Mod: 25

## 2022-02-24 PROCEDURE — 80053 COMPREHEN METABOLIC PANEL: CPT | Performed by: EMERGENCY MEDICINE

## 2022-02-24 PROCEDURE — 81003 URINALYSIS AUTO W/O SCOPE: CPT | Performed by: EMERGENCY MEDICINE

## 2022-02-24 PROCEDURE — 93010 ELECTROCARDIOGRAM REPORT: CPT | Mod: ,,, | Performed by: INTERNAL MEDICINE

## 2022-02-24 PROCEDURE — 85347 COAGULATION TIME ACTIVATED: CPT

## 2022-02-24 PROCEDURE — 82330 ASSAY OF CALCIUM: CPT

## 2022-02-24 PROCEDURE — 84484 ASSAY OF TROPONIN QUANT: CPT | Performed by: EMERGENCY MEDICINE

## 2022-02-24 PROCEDURE — 93005 ELECTROCARDIOGRAM TRACING: CPT

## 2022-02-24 RX ORDER — ONDANSETRON 2 MG/ML
4 INJECTION INTRAMUSCULAR; INTRAVENOUS
Status: COMPLETED | OUTPATIENT
Start: 2022-02-24 | End: 2022-02-24

## 2022-02-24 RX ORDER — FAMOTIDINE 20 MG/1
20 TABLET, FILM COATED ORAL 2 TIMES DAILY
Qty: 20 TABLET | Refills: 0 | Status: SHIPPED | OUTPATIENT
Start: 2022-02-24 | End: 2023-01-05 | Stop reason: SDUPTHER

## 2022-02-24 RX ORDER — VANCOMYCIN HYDROCHLORIDE 125 MG/1
125 CAPSULE ORAL EVERY 6 HOURS
Qty: 40 CAPSULE | Refills: 0 | Status: SHIPPED | OUTPATIENT
Start: 2022-02-24 | End: 2022-03-06

## 2022-02-24 RX ORDER — FAMOTIDINE 10 MG/ML
20 INJECTION INTRAVENOUS
Status: COMPLETED | OUTPATIENT
Start: 2022-02-24 | End: 2022-02-24

## 2022-02-24 RX ADMIN — ONDANSETRON 4 MG: 2 INJECTION INTRAMUSCULAR; INTRAVENOUS at 07:02

## 2022-02-24 RX ADMIN — FAMOTIDINE 20 MG: 10 INJECTION INTRAVENOUS at 09:02

## 2022-02-24 RX ADMIN — SODIUM CHLORIDE 1000 ML: 0.9 INJECTION, SOLUTION INTRAVENOUS at 07:02

## 2022-02-24 NOTE — Clinical Note
"Snow Phelan" Ector was seen and treated in our emergency department on 2/24/2022.  She may return to work on 02/28/2022.       If you have any questions or concerns, please don't hesitate to call.      Can Haq MD"

## 2022-02-25 ENCOUNTER — HOSPITAL ENCOUNTER (EMERGENCY)
Facility: HOSPITAL | Age: 37
Discharge: HOME | End: 2022-02-25
Attending: STUDENT IN AN ORGANIZED HEALTH CARE EDUCATION/TRAINING PROGRAM
Payer: MEDICAID

## 2022-02-25 VITALS
RESPIRATION RATE: 18 BRPM | WEIGHT: 180 LBS | TEMPERATURE: 99 F | HEART RATE: 105 BPM | SYSTOLIC BLOOD PRESSURE: 134 MMHG | DIASTOLIC BLOOD PRESSURE: 84 MMHG | HEIGHT: 67 IN | OXYGEN SATURATION: 99 % | BODY MASS INDEX: 28.25 KG/M2

## 2022-02-25 DIAGNOSIS — R10.13 EPIGASTRIC ABDOMINAL PAIN: Primary | ICD-10-CM

## 2022-02-25 LAB
C DIFF GDH STL QL: NEGATIVE
C DIFF TOX A+B STL QL IA: NEGATIVE

## 2022-02-25 PROCEDURE — 25000003 PHARM REV CODE 250: Performed by: STUDENT IN AN ORGANIZED HEALTH CARE EDUCATION/TRAINING PROGRAM

## 2022-02-25 PROCEDURE — 99283 EMERGENCY DEPT VISIT LOW MDM: CPT

## 2022-02-25 RX ORDER — LIDOCAINE HYDROCHLORIDE 20 MG/ML
10 SOLUTION OROPHARYNGEAL ONCE
Status: COMPLETED | OUTPATIENT
Start: 2022-02-25 | End: 2022-02-25

## 2022-02-25 RX ORDER — MAG HYDROX/ALUMINUM HYD/SIMETH 200-200-20
30 SUSPENSION, ORAL (FINAL DOSE FORM) ORAL ONCE
Status: COMPLETED | OUTPATIENT
Start: 2022-02-25 | End: 2022-02-25

## 2022-02-25 RX ADMIN — MAGNESIUM HYDROXIDE/ALUMINUM HYDROXICE/SIMETHICONE 30 ML: 120; 1200; 1200 SUSPENSION ORAL at 01:02

## 2022-02-25 RX ADMIN — LIDOCAINE HYDROCHLORIDE 10 ML: 20 SOLUTION ORAL; TOPICAL at 01:02

## 2022-02-25 NOTE — DISCHARGE INSTRUCTIONS
Please return to the ER if you have worsening symptoms, chest pain, shortness of breath, if you are unable to keep down fluids, if you are unable urinate, if you pass out, if you have persistent high fevers with temperature greater than 101 despite treatment if you have other concerning symptoms.

## 2022-02-25 NOTE — EKG INTERPRETATIONS - EMERGENCY DEPT.
EKG obtained at 1835 on February 24, 2022 shows sinus tachycardia with a rate of 121 beats per minute, normal axis and intervals, nonspecific T-wave inversions in leads aVL and V1, nonspecific Q-waves in inferior leads, no obvious acute ST segment changes.  T-wave inversions new when compared to previous EKG from February 3, 2022.    Violet Russell D.O  EMERGENCY MEDICINE  1:05 AM 02/25/2022

## 2022-02-25 NOTE — ED PROVIDER NOTES
Encounter Date: 2/24/2022       History     Chief Complaint   Patient presents with    Abdominal Pain    Diarrhea     Patient reports that she is being tx with Amoxicilin for 9-10 days and patient developed diarrhea and upper abdominal pain. Patient states that she was seen at an Urgent Care and was referred to the ED. The states that she started having a tight chest pain in the mid sternal area during triage.     Chest Pain     Ms. Barney is a 36-year-old female with a history of asthma, depression, GERD and H pylori who presents to the emergency department due to abdominal pain as well as diarrhea. She states that 10 days ago she was having epigastric abdominal pain, she had seen her primary care physician who told her that he had  H pylori. She then states that she started her on multiple medications including amoxicillin. She had taken amoxicillin for the past 9 days and states that she had been doing well but then this morning she started having large volume diarrhea which was foul smelling. She states that whenever she would eat anything she would immediately have diarrhea.  She was concerned that she might possibly have C diff and so she came to the emergency department for evaluation. She also states that she has been extremely fatigued and feels dehydrated.         Review of patient's allergies indicates:   Allergen Reactions    Demerol (pf) [meperidine (pf)] Hives     Past Medical History:   Diagnosis Date    Anxiety     Asthma     Depression     GERD (gastroesophageal reflux disease)     Hernia, hiatal     IBS (irritable bowel syndrome)     Inappropriate sinus tachycardia     POTS (postural orthostatic tachycardia syndrome)     Pulmonary emboli 2020    Sepsis      No past surgical history on file.  Family History   Problem Relation Age of Onset    Hypertension Mother     Diabetes Mother     Hypertension Father     Diabetes Father      Social History     Tobacco Use    Smoking status:  Former Smoker     Packs/day: 1.00     Types: Cigarettes     Start date: 7/15/1996     Quit date: 2022     Years since quittin.1    Smokeless tobacco: Never Used    Tobacco comment: varies from 7 cigarettes to whole pack in day   Substance Use Topics    Alcohol use: Yes     Comment: ocassionally    Drug use: No     Review of Systems   Constitutional: Positive for appetite change and fatigue. Negative for activity change, chills and fever.   HENT: Negative for congestion, sore throat and trouble swallowing.    Eyes: Negative for photophobia and visual disturbance.   Respiratory: Negative for cough, chest tightness, shortness of breath and wheezing.    Cardiovascular: Negative for chest pain, palpitations and leg swelling.   Gastrointestinal: Positive for abdominal pain, diarrhea and nausea. Negative for constipation and vomiting.   Genitourinary: Negative for difficulty urinating, dysuria, flank pain, pelvic pain and urgency.   Musculoskeletal: Negative for arthralgias, back pain, joint swelling and myalgias.   Skin: Negative for color change and wound.   Neurological: Negative for dizziness, seizures, weakness, light-headedness, numbness and headaches.   Psychiatric/Behavioral: Negative for agitation and confusion.       Physical Exam     Initial Vitals [22 1826]   BP Pulse Resp Temp SpO2   128/82 (!) 116 16 98.6 °F (37 °C) 100 %      MAP       --         Physical Exam    Nursing note and vitals reviewed.  Constitutional: She appears well-developed and well-nourished. She is not diaphoretic. No distress.   HENT:   Head: Normocephalic and atraumatic.   Mouth/Throat: Oropharynx is clear and moist. No oropharyngeal exudate.   Eyes: Conjunctivae and EOM are normal. Pupils are equal, round, and reactive to light. Right eye exhibits no discharge. Left eye exhibits no discharge. No scleral icterus.   Neck: No thyromegaly present. No tracheal deviation present. No JVD present.   Normal range of  motion.  Cardiovascular: Normal rate, regular rhythm, normal heart sounds and intact distal pulses. Exam reveals no gallop and no friction rub.    No murmur heard.  Pulmonary/Chest: Breath sounds normal. No respiratory distress. She has no wheezes. She has no rales. She exhibits no tenderness.   Abdominal: Abdomen is soft. Bowel sounds are normal. She exhibits no distension. There is abdominal tenderness.   Epigastric abdominal tenderness There is no guarding.   Musculoskeletal:         General: No tenderness or edema. Normal range of motion.      Cervical back: Normal range of motion.     Neurological: She is alert and oriented to person, place, and time. She has normal strength. No cranial nerve deficit or sensory deficit.   Skin: Skin is warm. Capillary refill takes less than 2 seconds. No abscess noted. No erythema. No pallor.         ED Course   Procedures  Labs Reviewed   CBC W/ AUTO DIFFERENTIAL - Abnormal; Notable for the following components:       Result Value    MCHC 31.9 (*)     All other components within normal limits   URINALYSIS, REFLEX TO URINE CULTURE - Abnormal; Notable for the following components:    Color, UA Colorless (*)     Specific Gravity, UA <1.005 (*)     All other components within normal limits    Narrative:     Specimen Source->Urine   ISTAT PROCEDURE - Abnormal; Notable for the following components:    POC Potassium 3.4 (*)     All other components within normal limits   CLOSTRIDIUM DIFFICILE   COMPREHENSIVE METABOLIC PANEL   LIPASE   TROPONIN I   B-TYPE NATRIURETIC PEPTIDE   H. PYLORI ANTIGEN, STOOL   POCT URINE PREGNANCY   ISTAT CHEM8          Imaging Results          X-Ray Chest PA And Lateral (Final result)  Result time 02/24/22 18:49:03    Final result by Ezra Sandhu DO (02/24/22 18:49:03)                 Impression:      No acute abnormality.      Electronically signed by: Ezra Sandhu  Date:    02/24/2022  Time:    18:49             Narrative:    EXAMINATION:  XR CHEST  PA AND LATERAL    CLINICAL HISTORY:  Unspecified abdominal pain    TECHNIQUE:  PA and lateral views of the chest were performed.    COMPARISON:  02/03/2022.    FINDINGS:  The lungs are well expanded and clear. No focal opacities are seen. The pleural spaces are clear.    The cardiac silhouette is unremarkable.    The visualized osseous structures are unremarkable.                                 Medications   sodium chloride 0.9% bolus 1,000 mL (1,000 mLs Intravenous Not Given 2/24/22 2101)   sodium chloride 0.9% bolus 1,000 mL (0 mLs Intravenous Stopped 2/24/22 2033)   ondansetron injection 4 mg (4 mg Intravenous Given 2/24/22 1939)   famotidine (PF) injection 20 mg (20 mg Intravenous Given 2/24/22 2149)     Medical Decision Making:   History:   Old Medical Records: I decided to obtain old medical records.  Old Records Summarized: records from previous admission(s).  Initial Assessment:   Ms. Barney is a 36-year-old female with a history of asthma, depression, GERD and H pylori who presents to the emergency department due to abdominal pain as well as diarrhea.  Differential Diagnosis:   C diff infection  Pancreatitis  Cholecystitis/Cholelithiasis  PUD/GERD  Intra-abdominal Abscess  Nephrolithiasis  Clinical Tests:   Lab Tests: Ordered and Reviewed  Radiological Study: Ordered and Reviewed  ED Management:  Patient was examined,  Abdominal exam was significant for tenderness in her epigastric region  Labs were ordered including:  CBC which was non-significant   CMP which was non-significant   Lipase was normal   Urinalysis did not show signs of infection  Patient was given fluids ,famotidine and Zofran and on re-evaluation, she reported symptomatic improvement.   She was able to tolerate p.o. while in the emergency department  She was given a prescription for vancomycin for possible C diff and advised to follow-up with her GI doctor soon as possible.  She was discharged in stable condition and return precautions were  given.               Attending Attestation:   Physician Attestation Statement for Resident:  As the supervising MD   Physician Attestation Statement: I have personally seen and examined this patient.   I agree with the above history. -:   As the supervising MD I agree with the above treatment, course, plan, and disposition.  I have reviewed and agree with the residents interpretation of the following: lab data, x-rays and EKG.            Attending ED Notes:    MDM  This is an emergent evaluation of a 36-year-old female with a history of asthma, depression, GERD, IBS and H pylori who presents to the emergency department due to abdominal pain with associated diarrhea. Initial vitals in the ED with a pulse of 116 bpm, with remainder of vitals unremarkable. Physical exam notable for non-toxic appearing female with mild epigastric tenderness with palpation. Remainder of exam benign. DDx includes but is not limited to C diff, medication induced diarrhea, IBS. Labs and imaging including a cardiac work-up was obtained in triage. We also obtained C Diff EIA and  H pylori stool antigen. Work-up unremarkable. She was treated with IV pepcid and discharged with GI follow-up and PO Vanc to treat C diff if test positive. C diff pending. Patient is aware of plan and is amenable.     Violet Russell D.O  EMERGENCY MEDICINE  12:28 AM 02/25/2022                 Clinical Impression:   Final diagnoses:  [R00.0] Tachycardia  [R10.9] Abdominal pain  [R19.7] Diarrhea, unspecified type (Primary)          ED Disposition Condition    Discharge Stable        ED Prescriptions     Medication Sig Dispense Start Date End Date Auth. Provider    vancomycin (VANCOCIN) 125 MG capsule Take 1 capsule (125 mg total) by mouth every 6 (six) hours. for 10 days 40 capsule 2/24/2022 3/6/2022 Can Haq MD    famotidine (PEPCID) 20 MG tablet Take 1 tablet (20 mg total) by mouth 2 (two) times daily. for 10 days 20 tablet 2/24/2022 3/6/2022  Can Haq MD        Follow-up Information     Follow up With Specialties Details Why Contact Info Additional Information    [facility] Gastroenterology Schedule an appointment as soon as possible for a visit in 3 days  [address] [facility] [address]  [facility] [address] [phone] Please park in garage or surface lot and use Medical Office Bldg entrance. Check in at [address]           Can Haq MD  Resident  02/24/22 2206       Violet Russell,   02/25/22 0035

## 2022-02-25 NOTE — ED TRIAGE NOTES
Pt presents to ED reports that she's been taking Amoxicilin x 9-10 days and developed diarrhea and upper abdominal pain. Patient states she was seen at Urgent Care and was referred to the ED. Also reports mid sternal chest pain.     Denies vomiting, fever, sob.     AAOx4

## 2022-02-25 NOTE — DISCHARGE INSTRUCTIONS
Thank you for visiting us Today!    Please follow up with your primary care physician and Gastroenterology concerning your symptoms.

## 2022-02-25 NOTE — ED PROVIDER NOTES
Encounter Date: 2/25/2022    SCRIBE #1 NOTE: IBuffy am scribing for, and in the presence of, Violet Gamez DO.       History     Chief Complaint   Patient presents with    Abdominal Pain     Pt here with reports of epigastric pain radiating to back with nausea and vomiting. Pt was seen here earlier today for same pain, pt reports pain worsened and she is now vomiting.     36 y.o. female with past medical history of asthma, GERD, IBS and H. pylori presenting with 10 day history of worsening epigastric abdominal pain. Pain is 10/10 today. Pain is alleviated with walking and standing. Patient reports she was evaluated in ED yesterday but notes pain has worsened and is now radiating to right shoulder and endorses nausea and vomiting. Patient notes diarrhea has resolved. Patient was evaluated by her PCP 10 days ago and diagnosed with H.pylori and is on amoxicillin and other medications. She has been taking amoxicillin for 9 days and states she has been doing well but this morning she started having multiple episodes of diarrhea(which are now resolved) which were foul smelling. She denies feverl, chills, chest pain, SOB or any further complaints.     The history is provided by the patient. No  was used.     Review of patient's allergies indicates:   Allergen Reactions    Demerol (pf) [meperidine (pf)] Hives     Past Medical History:   Diagnosis Date    Anxiety     Asthma     Depression     GERD (gastroesophageal reflux disease)     Hernia, hiatal     IBS (irritable bowel syndrome)     Inappropriate sinus tachycardia     POTS (postural orthostatic tachycardia syndrome)     Pulmonary emboli 2020    Sepsis      No past surgical history on file.  Family History   Problem Relation Age of Onset    Hypertension Mother     Diabetes Mother     Hypertension Father     Diabetes Father      Social History     Tobacco Use    Smoking status: Former Smoker     Packs/day: 1.00      Types: Cigarettes     Start date: 7/15/1996     Quit date: 2022     Years since quittin.1    Smokeless tobacco: Never Used    Tobacco comment: varies from 7 cigarettes to whole pack in day   Substance Use Topics    Alcohol use: Yes     Comment: ocassionally    Drug use: No     Review of Systems   Constitutional: Negative for chills and fever.   HENT: Negative for congestion and sore throat.    Eyes: Negative for visual disturbance.   Respiratory: Negative for cough and shortness of breath.    Cardiovascular: Negative for chest pain.   Gastrointestinal: Positive for abdominal pain, nausea and vomiting. Negative for diarrhea.   Genitourinary: Negative for dysuria and vaginal discharge.   Musculoskeletal: Positive for arthralgias.   Skin: Negative for rash.   Neurological: Negative for headaches.   Psychiatric/Behavioral: Negative for decreased concentration.       Physical Exam     Initial Vitals [22 0035]   BP Pulse Resp Temp SpO2   134/84 105 18 98.7 °F (37.1 °C) 99 %      MAP       --         Physical Exam    Nursing note and vitals reviewed.  Constitutional: She appears well-developed and well-nourished. She is not diaphoretic.  Non-toxic appearance. She does not have a sickly appearance. She does not appear ill.   HENT:   Mouth/Throat: Oropharynx is clear and moist.   Eyes: Conjunctivae are normal. No scleral icterus.   Neck: Neck supple.   Normal range of motion.  Cardiovascular: Normal rate, regular rhythm, normal heart sounds and intact distal pulses.   Pulmonary/Chest: Breath sounds normal.   Abdominal: Abdomen is soft. There is abdominal tenderness.   Mild epigastric tenderness on palpation.    Musculoskeletal:         General: No tenderness or edema. Normal range of motion.      Cervical back: Normal range of motion and neck supple.     Neurological: She is alert and oriented to person, place, and time. She has normal strength.   Skin: Skin is warm and dry. No rash noted. No erythema.    Psychiatric: She has a normal mood and affect.         ED Course   Procedures  Labs Reviewed - No data to display       Imaging Results    None          Medications   aluminum-magnesium hydroxide-simethicone 200-200-20 mg/5 mL suspension 30 mL (30 mLs Oral Given 2/25/22 0117)     And   LIDOcaine HCl 2% oral solution 10 mL (10 mLs Oral Given 2/25/22 0116)     Medical Decision Making:   ED Management:   [facility]  This is an emergent evaluation of a 36 y.o. female with past medical history of asthma, GERD, IBS and H. pylori presenting with 10 day history of worsening epigastric abdominal pain. Initial vitals in the ED is unremarkable. Physical exam notable for a non-toxic appearing female with mild epigastric pain with palpation. Remainder of exam benign. DDx includes but is not limited to IBS, gastritis, H pylori. No labs or imaging clinically indicated at this time, given patient just had a work-up yesterday. Labs from yesterday, including a cardiac and abdominal pain workup unremarkable. C diff studies were also sent but are pending. Patient reports that she was treated with pepcid during her previous visit last night and she felt it made her symptoms worse. She states she usually is treated with a GI cocktail for her epigastric pain. Patient had a recent CT scan of the abdomen pelvis on 2/15/2022, that showed no acute abnormality. She was treated with a GI cocktail and on reassessment, her symptoms were resolved.  Will discharge with instructions for symptomatic treatment with Maalox and GI follow-up. Patient is aware of plan and is amenable.     Violet Russell D.O  EMERGENCY MEDICINE  1:59 AM 02/25/2022            Scribe Attestation:   Scribe #1: I performed the above scribed service and the documentation accurately describes the services I performed. I attest to the accuracy of the note.                 Clinical Impression:   Final diagnoses:  [R10.13] Epigastric abdominal pain (Primary)          ED  Disposition Condition    Discharge Stable        ED Prescriptions     None        Follow-up Information     Follow up With Specialties Details Why Contact Info    [facility] Emergency Dept Emergency Medicine Go to  If symptoms worsen [place] [address] [phone]    Your GI doctor  Schedule an appointment as soon as possible for a visit in 3 days Emergency Room Follow-up Van Collins I, Violet Russell DO, personally performed the services described in this documentation. All medical record entries made by the scribe were at my direction and in my presence. I have reviewed the chart and agree that the record reflects my personal performance and is accurate and complete.     Violet Russell DO  02/25/22 0246

## 2022-02-25 NOTE — ED NOTES
Pt was here for earlier pt is complaining of epigastric pain, back pain and shoulder pain intermittet  Started 1030pm      SCDs

## 2022-03-03 LAB
H PYLORI AG STL QL IA: NORMAL
SPECIMEN SOURCE: NORMAL

## 2022-04-02 ENCOUNTER — PATIENT MESSAGE (OUTPATIENT)
Dept: OBSTETRICS AND GYNECOLOGY | Facility: CLINIC | Age: 37
End: 2022-04-02
Payer: MEDICAID

## 2022-04-02 DIAGNOSIS — N93.9 ABNORMAL UTERINE BLEEDING (AUB): Primary | ICD-10-CM

## 2022-04-05 ENCOUNTER — PATIENT MESSAGE (OUTPATIENT)
Dept: OBSTETRICS AND GYNECOLOGY | Facility: CLINIC | Age: 37
End: 2022-04-05
Payer: MEDICAID

## 2022-04-05 ENCOUNTER — LAB VISIT (OUTPATIENT)
Dept: LAB | Facility: HOSPITAL | Age: 37
End: 2022-04-05
Attending: OBSTETRICS & GYNECOLOGY
Payer: MEDICAID

## 2022-04-05 DIAGNOSIS — N93.9 ABNORMAL UTERINE BLEEDING (AUB): ICD-10-CM

## 2022-04-05 LAB
T4 FREE SERPL-MCNC: 0.92 NG/DL (ref 0.71–1.51)
TSH SERPL DL<=0.005 MIU/L-ACNC: 0.8 UIU/ML (ref 0.4–4)

## 2022-04-05 PROCEDURE — 84146 ASSAY OF PROLACTIN: CPT | Performed by: OBSTETRICS & GYNECOLOGY

## 2022-04-05 PROCEDURE — 36415 COLL VENOUS BLD VENIPUNCTURE: CPT | Performed by: OBSTETRICS & GYNECOLOGY

## 2022-04-05 PROCEDURE — 82670 ASSAY OF TOTAL ESTRADIOL: CPT | Performed by: OBSTETRICS & GYNECOLOGY

## 2022-04-05 PROCEDURE — 84443 ASSAY THYROID STIM HORMONE: CPT | Performed by: OBSTETRICS & GYNECOLOGY

## 2022-04-05 PROCEDURE — 83001 ASSAY OF GONADOTROPIN (FSH): CPT | Performed by: OBSTETRICS & GYNECOLOGY

## 2022-04-05 PROCEDURE — 83002 ASSAY OF GONADOTROPIN (LH): CPT | Performed by: OBSTETRICS & GYNECOLOGY

## 2022-04-05 PROCEDURE — 83520 IMMUNOASSAY QUANT NOS NONAB: CPT | Performed by: OBSTETRICS & GYNECOLOGY

## 2022-04-05 PROCEDURE — 84439 ASSAY OF FREE THYROXINE: CPT | Performed by: OBSTETRICS & GYNECOLOGY

## 2022-04-06 LAB
ESTRADIOL SERPL-MCNC: 16 PG/ML
FSH SERPL-ACNC: 6.5 MIU/ML
LH SERPL-ACNC: 2.2 MIU/ML
PROLACTIN SERPL IA-MCNC: 7.1 NG/ML (ref 5.2–26.5)

## 2022-04-07 LAB — MIS SERPL-MCNC: 3.2 NG/ML (ref 0.15–7.5)

## 2022-04-11 ENCOUNTER — PATIENT MESSAGE (OUTPATIENT)
Dept: OBSTETRICS AND GYNECOLOGY | Facility: CLINIC | Age: 37
End: 2022-04-11
Payer: MEDICAID

## 2022-04-12 ENCOUNTER — PATIENT MESSAGE (OUTPATIENT)
Dept: OBSTETRICS AND GYNECOLOGY | Facility: CLINIC | Age: 37
End: 2022-04-12
Payer: MEDICAID

## 2022-04-13 ENCOUNTER — OFFICE VISIT (OUTPATIENT)
Dept: OBSTETRICS AND GYNECOLOGY | Facility: CLINIC | Age: 37
End: 2022-04-13
Payer: MEDICAID

## 2022-04-13 VITALS
SYSTOLIC BLOOD PRESSURE: 126 MMHG | BODY MASS INDEX: 29.14 KG/M2 | WEIGHT: 186.06 LBS | DIASTOLIC BLOOD PRESSURE: 68 MMHG

## 2022-04-13 DIAGNOSIS — N92.3 INTERMENSTRUAL BLEEDING: Primary | ICD-10-CM

## 2022-04-13 DIAGNOSIS — Z97.5 IUD (INTRAUTERINE DEVICE) IN PLACE: ICD-10-CM

## 2022-04-13 DIAGNOSIS — Z11.51 SCREENING FOR HUMAN PAPILLOMAVIRUS (HPV): ICD-10-CM

## 2022-04-13 DIAGNOSIS — Z12.4 ENCOUNTER FOR PAPANICOLAOU SMEAR FOR CERVICAL CANCER SCREENING: ICD-10-CM

## 2022-04-13 LAB
B-HCG UR QL: NEGATIVE
BILIRUB SERPL-MCNC: NORMAL MG/DL
BLOOD URINE, POC: 250
CLARITY, POC UA: NORMAL
COLOR, POC UA: YELLOW
CTP QC/QA: YES
GLUCOSE UR QL STRIP: NORMAL
KETONES UR QL STRIP: NORMAL
LEUKOCYTE ESTERASE URINE, POC: NORMAL
NITRITE, POC UA: NORMAL
PH, POC UA: 6
PROTEIN, POC: NORMAL
SPECIFIC GRAVITY, POC UA: 1.02
UROBILINOGEN, POC UA: NORMAL

## 2022-04-13 PROCEDURE — 1159F MED LIST DOCD IN RCRD: CPT | Mod: CPTII,,, | Performed by: PHYSICIAN ASSISTANT

## 2022-04-13 PROCEDURE — 87491 CHLMYD TRACH DNA AMP PROBE: CPT | Performed by: PHYSICIAN ASSISTANT

## 2022-04-13 PROCEDURE — 3008F PR BODY MASS INDEX (BMI) DOCUMENTED: ICD-10-PCS | Mod: CPTII,,, | Performed by: PHYSICIAN ASSISTANT

## 2022-04-13 PROCEDURE — 99213 OFFICE O/P EST LOW 20 MIN: CPT | Mod: PBBFAC | Performed by: PHYSICIAN ASSISTANT

## 2022-04-13 PROCEDURE — 3078F PR MOST RECENT DIASTOLIC BLOOD PRESSURE < 80 MM HG: ICD-10-PCS | Mod: CPTII,,, | Performed by: PHYSICIAN ASSISTANT

## 2022-04-13 PROCEDURE — 99999 PR PBB SHADOW E&M-EST. PATIENT-LVL III: CPT | Mod: PBBFAC,,, | Performed by: PHYSICIAN ASSISTANT

## 2022-04-13 PROCEDURE — 87801 DETECT AGNT MULT DNA AMPLI: CPT | Performed by: PHYSICIAN ASSISTANT

## 2022-04-13 PROCEDURE — 3074F PR MOST RECENT SYSTOLIC BLOOD PRESSURE < 130 MM HG: ICD-10-PCS | Mod: CPTII,,, | Performed by: PHYSICIAN ASSISTANT

## 2022-04-13 PROCEDURE — 1159F PR MEDICATION LIST DOCUMENTED IN MEDICAL RECORD: ICD-10-PCS | Mod: CPTII,,, | Performed by: PHYSICIAN ASSISTANT

## 2022-04-13 PROCEDURE — 81002 URINALYSIS NONAUTO W/O SCOPE: CPT | Mod: PBBFAC | Performed by: PHYSICIAN ASSISTANT

## 2022-04-13 PROCEDURE — 3074F SYST BP LT 130 MM HG: CPT | Mod: CPTII,,, | Performed by: PHYSICIAN ASSISTANT

## 2022-04-13 PROCEDURE — 3008F BODY MASS INDEX DOCD: CPT | Mod: CPTII,,, | Performed by: PHYSICIAN ASSISTANT

## 2022-04-13 PROCEDURE — 87591 N.GONORRHOEAE DNA AMP PROB: CPT | Mod: 59 | Performed by: PHYSICIAN ASSISTANT

## 2022-04-13 PROCEDURE — 81025 URINE PREGNANCY TEST: CPT | Mod: PBBFAC | Performed by: PHYSICIAN ASSISTANT

## 2022-04-13 PROCEDURE — 99214 PR OFFICE/OUTPT VISIT, EST, LEVL IV, 30-39 MIN: ICD-10-PCS | Mod: S$PBB,,, | Performed by: PHYSICIAN ASSISTANT

## 2022-04-13 PROCEDURE — 87624 HPV HI-RISK TYP POOLED RSLT: CPT | Performed by: PHYSICIAN ASSISTANT

## 2022-04-13 PROCEDURE — 88175 CYTOPATH C/V AUTO FLUID REDO: CPT | Performed by: PHYSICIAN ASSISTANT

## 2022-04-13 PROCEDURE — 87481 CANDIDA DNA AMP PROBE: CPT | Mod: 59 | Performed by: PHYSICIAN ASSISTANT

## 2022-04-13 PROCEDURE — 99999 PR PBB SHADOW E&M-EST. PATIENT-LVL III: ICD-10-PCS | Mod: PBBFAC,,, | Performed by: PHYSICIAN ASSISTANT

## 2022-04-13 PROCEDURE — 99214 OFFICE O/P EST MOD 30 MIN: CPT | Mod: S$PBB,,, | Performed by: PHYSICIAN ASSISTANT

## 2022-04-13 PROCEDURE — 3078F DIAST BP <80 MM HG: CPT | Mod: CPTII,,, | Performed by: PHYSICIAN ASSISTANT

## 2022-04-13 NOTE — PROGRESS NOTES
Subjective:     CC: No chief complaint on file.       Snow Barney is a 36 y.o. female who presents for evaluation of INTERMENSTRUAL BLEEDING. States last cycle was 4/2, started spotting 2 days ago. States last few cycles have been very irregular-flow and days are irregular. Patient has no relevant history of abnormal sexual development. Pt has been having PVCs since January and does not know the cause, thinks it may be yoan-menopause.   Factors that may be contributory to menstrual abnormalities include: recent stressors - nursing student, PVCs.   Previous treatments for menstrual abnormalities include: copper IUD.  COPPER IUD PLACED ON     Menstrual History:  OB History        6    Para   3    Term   2       1    AB   3    Living   3       SAB        IAB        Ectopic        Multiple        Live Births               Obstetric Comments   GYN Hx: Menarche at 13  History of abnormal pap with colposcopy  History of chlamydia.            No LMP recorded.       The following portions of the patient's history were reviewed and updated as appropriate: allergies, current medications, past family history, past medical history, past social history, past surgical history and problem list.    Review of Systems  Constitutional: negative for fatigue, fevers, night sweats and weight loss  Respiratory: negative for cough and dyspnea on exertion  Cardiovascular: negative for chest pain, palpitations and lower extremity edema  Gastrointestinal: negative for abdominal pain, change in bowel habits, constipation, diarrhea, nausea and vomiting  Genitourinary:negative for genital lesions, hot flashes, sexual problems and vaginal discharge  Integument/breast: negative for breast lump, breast tenderness, nipple discharge and skin color change  Hematologic/lymphatic: negative for easy bruising and lymphadenopathy  Neurological: negative for dizziness and headaches  Endocrine: negative for temperature  intolerance    Objective:      There were no vitals taken for this visit.  General:   alert, appears stated age and cooperative   Neuro:  Alert, oriented x4, normal speech.   Skin:   no rash or abnormalities, no acne. No hirsutism.    Lungs:   Unlabored, even breaths.  Clear to auscultation bilaterally   Heart:   Regular rate and rhythm   Breasts:   deferred   Abdomen:  soft, non-tender; bowel sounds normal; no masses, no organomegaly   Pelvis:  Vulva and vagina appear normal. Bimanual exam reveals normal uterus and adnexa.  Cervix: IUD string visualized        Assessment and plan:      The patient has metrorrhagia (midcycle).      Plan:        1. Intermenstrual bleeding  - US Pelvis Comp with Transvag NON-OB (xpd; Future  - POCT URINE DIPSTICK WITHOUT MICROSCOPE. Urine dipstick shows negative for all components.    - Vaginosis Screen by DNA Probe  - C. trachomatis/N. gonorrhoeae by AMP DNA [facility]; Cervicovaginal  - HPV High Risk Genotypes, PCR  - Liquid-Based Pap Smear, Screening  - POCT Urine Pregnancy    2. Encounter for Papanicolaou smear for cervical cancer screening  - Liquid-Based Pap Smear, Screening    3. Screening for human papillomavirus (HPV)  - HPV High Risk Genotypes, PCR

## 2022-04-14 ENCOUNTER — TELEPHONE (OUTPATIENT)
Dept: OBSTETRICS AND GYNECOLOGY | Facility: CLINIC | Age: 37
End: 2022-04-14
Payer: MEDICAID

## 2022-04-14 NOTE — TELEPHONE ENCOUNTER
----- Message from Diana Elam sent at 4/14/2022  9:47 AM CDT -----  Type:  Patient Returning Call    Who Called: Self    Who Left Message for Patient: Dr. Rios    Does the patient know what this is regarding?:yes    Would the patient rather a call back or a response via [facility]? call    Best Call Back Number:[phone] (home)

## 2022-04-15 LAB
C TRACH DNA SPEC QL NAA+PROBE: NOT DETECTED
N GONORRHOEA DNA SPEC QL NAA+PROBE: NOT DETECTED

## 2022-04-20 ENCOUNTER — HOSPITAL ENCOUNTER (OUTPATIENT)
Dept: RADIOLOGY | Facility: HOSPITAL | Age: 37
Discharge: HOME | End: 2022-04-20
Attending: PHYSICIAN ASSISTANT
Payer: MEDICAID

## 2022-04-20 DIAGNOSIS — N92.3 INTERMENSTRUAL BLEEDING: ICD-10-CM

## 2022-04-20 PROCEDURE — 76830 TRANSVAGINAL US NON-OB: CPT | Mod: 26,,, | Performed by: RADIOLOGY

## 2022-04-20 PROCEDURE — 76856 US EXAM PELVIC COMPLETE: CPT | Mod: 26,,, | Performed by: RADIOLOGY

## 2022-04-20 PROCEDURE — 76830 US PELVIS COMP WITH TRANSVAG NON-OB (XPD): ICD-10-PCS | Mod: 26,,, | Performed by: RADIOLOGY

## 2022-04-20 PROCEDURE — 76856 US PELVIS COMP WITH TRANSVAG NON-OB (XPD): ICD-10-PCS | Mod: 26,,, | Performed by: RADIOLOGY

## 2022-04-20 PROCEDURE — 76830 TRANSVAGINAL US NON-OB: CPT | Mod: TC

## 2022-04-21 ENCOUNTER — PATIENT MESSAGE (OUTPATIENT)
Dept: OBSTETRICS AND GYNECOLOGY | Facility: CLINIC | Age: 37
End: 2022-04-21
Payer: MEDICAID

## 2022-04-21 LAB
FINAL PATHOLOGIC DIAGNOSIS: NORMAL
Lab: NORMAL

## 2022-04-22 LAB
HPV HR 12 DNA SPEC QL NAA+PROBE: POSITIVE
HPV16 AG SPEC QL: NEGATIVE
HPV18 DNA SPEC QL NAA+PROBE: NEGATIVE

## 2022-04-25 ENCOUNTER — PATIENT MESSAGE (OUTPATIENT)
Dept: OBSTETRICS AND GYNECOLOGY | Facility: CLINIC | Age: 37
End: 2022-04-25
Payer: MEDICAID

## 2022-04-28 ENCOUNTER — TELEPHONE (OUTPATIENT)
Dept: OBSTETRICS AND GYNECOLOGY | Facility: CLINIC | Age: 37
End: 2022-04-28
Payer: MEDICAID

## 2022-04-28 NOTE — TELEPHONE ENCOUNTER
Ultrasound and lab results were given to pt. Pt still wanted to speak to Dr. Rios. Message routed.       ----- Message from Sammi Kennedy sent at 4/28/2022  1:55 PM CDT -----  Type: Patient Call Back    Who called:pt    What is the request in detail:pt requesting to over labs and to find out if she has any fibroids. Call pt    Can the clinic reply by [facility]?    Would the patient rather a call back or a response via [facility]? call    Best call back number:[phone] (home)       Additional Information:

## 2022-06-20 ENCOUNTER — PATIENT MESSAGE (OUTPATIENT)
Dept: OBSTETRICS AND GYNECOLOGY | Facility: CLINIC | Age: 37
End: 2022-06-20
Payer: MEDICAID

## 2022-07-08 ENCOUNTER — PATIENT MESSAGE (OUTPATIENT)
Dept: OBSTETRICS AND GYNECOLOGY | Facility: CLINIC | Age: 37
End: 2022-07-08
Payer: MEDICAID

## 2022-07-12 DIAGNOSIS — R51.9 BILATERAL HEADACHES: Primary | ICD-10-CM

## 2022-07-12 LAB
CTP QC/QA: YES
SARS-COV-2 AG RESP QL IA.RAPID: NEGATIVE

## 2022-07-14 DIAGNOSIS — R09.81 CONGESTION OF NASAL SINUS: Primary | ICD-10-CM

## 2022-07-14 LAB
CTP QC/QA: YES
SARS-COV-2 AG RESP QL IA.RAPID: POSITIVE

## 2022-07-21 ENCOUNTER — PROCEDURE VISIT (OUTPATIENT)
Dept: OBSTETRICS AND GYNECOLOGY | Facility: CLINIC | Age: 37
End: 2022-07-21
Payer: MEDICAID

## 2022-07-21 VITALS — SYSTOLIC BLOOD PRESSURE: 110 MMHG | DIASTOLIC BLOOD PRESSURE: 72 MMHG | WEIGHT: 190.5 LBS | BODY MASS INDEX: 29.83 KG/M2

## 2022-07-21 DIAGNOSIS — Z30.433 ENCOUNTER FOR IUD REMOVAL AND REINSERTION: Primary | ICD-10-CM

## 2022-07-21 PROCEDURE — 58301 REMOVAL OF IUD: ICD-10-PCS | Mod: S$PBB,,, | Performed by: OBSTETRICS & GYNECOLOGY

## 2022-07-21 PROCEDURE — 58300 INSERT INTRAUTERINE DEVICE: CPT | Mod: 59,PBBFAC | Performed by: OBSTETRICS & GYNECOLOGY

## 2022-07-21 PROCEDURE — 58300 INSERTION OF IUD: ICD-10-PCS | Mod: 59,S$PBB,, | Performed by: OBSTETRICS & GYNECOLOGY

## 2022-07-21 PROCEDURE — 58301 REMOVE INTRAUTERINE DEVICE: CPT | Mod: PBBFAC | Performed by: OBSTETRICS & GYNECOLOGY

## 2022-07-21 RX ADMIN — LEVONORGESTREL 1 INTRA UTERINE DEVICE: 52 INTRAUTERINE DEVICE INTRAUTERINE at 10:07

## 2022-07-21 NOTE — PROCEDURES
Insertion of IUD    Date/Time: 7/21/2022 10:45 AM  Performed by: Brenda Rios MD  Authorized by: Brenda Rios MD     Consent:     Consent obtained:  Written    Consent given by:  Patient    Procedure risks and benefits discussed: yes      Patient questions answered: yes      Patient agrees, verbalizes understanding, and wants to proceed: yes      Educational handouts given: yes      Instructions and paperwork completed: yes    Procedure:     Pelvic exam performed: yes      Negative GC/chlamydia test: no      Negative urine pregnancy test: yes      Negative serum pregnancy test: no      Cervix cleaned and prepped: yes      Speculum placed in vagina: yes      Tenaculum applied to cervix: yes      Uterus sounded: yes      Uterus sound depth (cm):  8    IUD inserted with no complications: yes      Strings trimmed: yes    1 Intra Uterine Device levonorgestreL 20 mcg/24 hours (7 yrs) 52 mg       Post-procedure:     Patient tolerated procedure well: yes      Patient will follow up after next period: yes

## 2022-07-21 NOTE — PROCEDURES
Removal of IUD    Date/Time: 7/21/2022 10:45 AM  Performed by: Brenda Rios MD  Authorized by: Brenda Rios MD     Consent obtained:  Written  Consent given by:  Patient  Procedure risks and benefits discussed: yes    Patient questions answered: yes    Patient agrees, verbalizes understanding, and wants to proceed: yes    Educational handouts given: yes    Instructions and paperwork completed: yes    IUD grasped by: forceps  Removal due to infection and inflammatory reaction: no    Other reason for removal:  Desires Mirena  Removal due to mechanical complications of IUD/Nexplanon: no    Removed with no complications: yes

## 2022-07-30 ENCOUNTER — HOSPITAL ENCOUNTER (EMERGENCY)
Facility: HOSPITAL | Age: 37
Discharge: HOME | End: 2022-07-30
Attending: EMERGENCY MEDICINE
Payer: MEDICAID

## 2022-07-30 VITALS
HEIGHT: 67 IN | SYSTOLIC BLOOD PRESSURE: 122 MMHG | HEART RATE: 74 BPM | RESPIRATION RATE: 18 BRPM | WEIGHT: 190 LBS | TEMPERATURE: 98 F | DIASTOLIC BLOOD PRESSURE: 79 MMHG | OXYGEN SATURATION: 98 % | BODY MASS INDEX: 29.82 KG/M2

## 2022-07-30 DIAGNOSIS — R11.0 NAUSEA: ICD-10-CM

## 2022-07-30 DIAGNOSIS — N83.201 BILATERAL OVARIAN CYSTS: Primary | ICD-10-CM

## 2022-07-30 DIAGNOSIS — N83.202 BILATERAL OVARIAN CYSTS: Primary | ICD-10-CM

## 2022-07-30 DIAGNOSIS — R10.2 SUPRAPUBIC ABDOMINAL PAIN: ICD-10-CM

## 2022-07-30 LAB
ALBUMIN SERPL BCP-MCNC: 3.7 G/DL (ref 3.5–5.2)
ALP SERPL-CCNC: 57 U/L (ref 55–135)
ALT SERPL W/O P-5'-P-CCNC: 12 U/L (ref 10–44)
ANION GAP SERPL CALC-SCNC: 8 MMOL/L (ref 8–16)
AST SERPL-CCNC: 13 U/L (ref 10–40)
B-HCG UR QL: NEGATIVE
BACTERIA #/AREA URNS HPF: NORMAL /HPF
BASOPHILS # BLD AUTO: 0.05 K/UL (ref 0–0.2)
BASOPHILS NFR BLD: 0.8 % (ref 0–1.9)
BILIRUB SERPL-MCNC: 0.3 MG/DL (ref 0.1–1)
BILIRUB UR QL STRIP: NEGATIVE
BUN SERPL-MCNC: 13 MG/DL (ref 6–20)
CALCIUM SERPL-MCNC: 9.3 MG/DL (ref 8.7–10.5)
CHLORIDE SERPL-SCNC: 108 MMOL/L (ref 95–110)
CLARITY UR: CLEAR
CO2 SERPL-SCNC: 25 MMOL/L (ref 23–29)
COLOR UR: YELLOW
CREAT SERPL-MCNC: 0.7 MG/DL (ref 0.5–1.4)
CTP QC/QA: YES
DIFFERENTIAL METHOD: ABNORMAL
EOSINOPHIL # BLD AUTO: 0.2 K/UL (ref 0–0.5)
EOSINOPHIL NFR BLD: 2.4 % (ref 0–8)
ERYTHROCYTE [DISTWIDTH] IN BLOOD BY AUTOMATED COUNT: 14.4 % (ref 11.5–14.5)
EST. GFR  (AFRICAN AMERICAN): >60 ML/MIN/1.73 M^2
EST. GFR  (NON AFRICAN AMERICAN): >60 ML/MIN/1.73 M^2
GLUCOSE SERPL-MCNC: 102 MG/DL (ref 70–110)
GLUCOSE UR QL STRIP: NEGATIVE
HCT VFR BLD AUTO: 37.1 % (ref 37–48.5)
HGB BLD-MCNC: 11.7 G/DL (ref 12–16)
HGB UR QL STRIP: NEGATIVE
IMM GRANULOCYTES # BLD AUTO: 0.01 K/UL (ref 0–0.04)
IMM GRANULOCYTES NFR BLD AUTO: 0.2 % (ref 0–0.5)
KETONES UR QL STRIP: NEGATIVE
LEUKOCYTE ESTERASE UR QL STRIP: ABNORMAL
LYMPHOCYTES # BLD AUTO: 1.4 K/UL (ref 1–4.8)
LYMPHOCYTES NFR BLD: 23 % (ref 18–48)
MCH RBC QN AUTO: 27.7 PG (ref 27–31)
MCHC RBC AUTO-ENTMCNC: 31.5 G/DL (ref 32–36)
MCV RBC AUTO: 88 FL (ref 82–98)
MICROSCOPIC COMMENT: NORMAL
MONOCYTES # BLD AUTO: 0.4 K/UL (ref 0.3–1)
MONOCYTES NFR BLD: 6.9 % (ref 4–15)
NEUTROPHILS # BLD AUTO: 4.2 K/UL (ref 1.8–7.7)
NEUTROPHILS NFR BLD: 66.7 % (ref 38–73)
NITRITE UR QL STRIP: NEGATIVE
NRBC BLD-RTO: 0 /100 WBC
PH UR STRIP: 7 [PH] (ref 5–8)
PLATELET # BLD AUTO: 154 K/UL (ref 150–450)
PMV BLD AUTO: 10.7 FL (ref 9.2–12.9)
POTASSIUM SERPL-SCNC: 4.2 MMOL/L (ref 3.5–5.1)
PROT SERPL-MCNC: 7 G/DL (ref 6–8.4)
PROT UR QL STRIP: NEGATIVE
RBC # BLD AUTO: 4.22 M/UL (ref 4–5.4)
RBC #/AREA URNS HPF: 0 /HPF (ref 0–4)
SODIUM SERPL-SCNC: 141 MMOL/L (ref 136–145)
SP GR UR STRIP: 1.01 (ref 1–1.03)
SQUAMOUS #/AREA URNS HPF: 7 /HPF
URN SPEC COLLECT METH UR: ABNORMAL
UROBILINOGEN UR STRIP-ACNC: NEGATIVE EU/DL
WBC # BLD AUTO: 6.27 K/UL (ref 3.9–12.7)
WBC #/AREA URNS HPF: 2 /HPF (ref 0–5)

## 2022-07-30 PROCEDURE — 96374 THER/PROPH/DIAG INJ IV PUSH: CPT

## 2022-07-30 PROCEDURE — 81025 URINE PREGNANCY TEST: CPT | Performed by: EMERGENCY MEDICINE

## 2022-07-30 PROCEDURE — 81000 URINALYSIS NONAUTO W/SCOPE: CPT | Performed by: EMERGENCY MEDICINE

## 2022-07-30 PROCEDURE — 96375 TX/PRO/DX INJ NEW DRUG ADDON: CPT

## 2022-07-30 PROCEDURE — 80053 COMPREHEN METABOLIC PANEL: CPT | Performed by: EMERGENCY MEDICINE

## 2022-07-30 PROCEDURE — 63600175 PHARM REV CODE 636 W HCPCS: Performed by: EMERGENCY MEDICINE

## 2022-07-30 PROCEDURE — 85025 COMPLETE CBC W/AUTO DIFF WBC: CPT | Performed by: EMERGENCY MEDICINE

## 2022-07-30 PROCEDURE — 99284 EMERGENCY DEPT VISIT MOD MDM: CPT | Mod: 25

## 2022-07-30 RX ORDER — ONDANSETRON 4 MG/1
4 TABLET, ORALLY DISINTEGRATING ORAL EVERY 6 HOURS PRN
Qty: 10 TABLET | Refills: 0 | Status: SHIPPED | OUTPATIENT
Start: 2022-07-30 | End: 2022-08-05

## 2022-07-30 RX ORDER — ONDANSETRON 2 MG/ML
4 INJECTION INTRAMUSCULAR; INTRAVENOUS
Status: COMPLETED | OUTPATIENT
Start: 2022-07-30 | End: 2022-07-30

## 2022-07-30 RX ORDER — MORPHINE SULFATE 4 MG/ML
4 INJECTION, SOLUTION INTRAMUSCULAR; INTRAVENOUS
Status: COMPLETED | OUTPATIENT
Start: 2022-07-30 | End: 2022-07-30

## 2022-07-30 RX ORDER — HYDROCODONE BITARTRATE AND ACETAMINOPHEN 5; 325 MG/1; MG/1
1 TABLET ORAL EVERY 6 HOURS PRN
Qty: 12 TABLET | Refills: 0 | Status: SHIPPED | OUTPATIENT
Start: 2022-07-30 | End: 2023-03-31

## 2022-07-30 RX ORDER — METOPROLOL SUCCINATE 25 MG/1
TABLET, EXTENDED RELEASE ORAL 2 TIMES DAILY
COMMUNITY
Start: 2022-05-19 | End: 2023-03-31

## 2022-07-30 RX ORDER — SERTRALINE HYDROCHLORIDE 50 MG/1
50 TABLET, FILM COATED ORAL DAILY
COMMUNITY
End: 2023-07-27

## 2022-07-30 RX ADMIN — MORPHINE SULFATE 4 MG: 4 INJECTION INTRAVENOUS at 08:07

## 2022-07-30 RX ADMIN — ONDANSETRON 4 MG: 2 INJECTION INTRAMUSCULAR; INTRAVENOUS at 08:07

## 2022-07-30 NOTE — ED PROVIDER NOTES
Encounter Date: 7/30/2022    SCRIBE #1 NOTE: I, Keiry Delvalle, am scribing for, and in the presence of,  Haresh Parker III, MD. I have scribed the following portions of the note - Other sections scribed: HPI, TALIA.       History     Chief Complaint   Patient presents with    Pelvic Pain     Pt reports sudden onset of pelvic pain/pressure radiating to her back starting this morning; pt reports hx of ovarian cysts with similar pain but not as intense; pt also reports having IUD placed last week; denies vaginal bleeding; pt appears uncomfortable     The patient is a 36-year-old who complains of severe suprapubic abdominal pain that began at around 06:00.  The pain began when she stepped out of bed to prepare for work. It was sharp and persistent. She has associated pain to the left side of her lower back. She's experienced similar pain with rupture of an ovarian cyst but not as prolonged. She has associated nausea. The pain is worsened with standing and walking and improves with lying on either side of her body. She had an IUD place a week ago and was unable to feel the strings of device when she felt for them this morning. Her LMP was on 7/13. She developed palpitations due to the pain and took a dose of metoprolol before coming to the ED. She did not take anything for pain or nausea prior to arrival. She denies dysuria, difficulty urinating, diarrhea, constipation, and fever. She denies headache and dizziness.    She has a past medical history of Anxiety, Asthma, Depression, GERD (gastroesophageal reflux disease), Hernia, hiatal, IBS (irritable bowel syndrome), Inappropriate sinus tachycardia, POTS (postural orthostatic tachycardia syndrome), Pulmonary emboli (2020), and Sepsis.    The history is provided by the patient. No  was used.     Review of patient's allergies indicates:   Allergen Reactions    Demerol (pf) [meperidine (pf)] Hives     Past Medical History:   Diagnosis Date    Anxiety      Asthma     Depression     GERD (gastroesophageal reflux disease)     Hernia, hiatal     IBS (irritable bowel syndrome)     Inappropriate sinus tachycardia     POTS (postural orthostatic tachycardia syndrome)     Pulmonary emboli 2020    Sepsis      History reviewed. No pertinent surgical history.  Family History   Problem Relation Age of Onset    Hypertension Mother     Diabetes Mother     Hypertension Father     Diabetes Father      Social History     Tobacco Use    Smoking status: Former Smoker     Packs/day: 1.00     Types: Cigarettes     Start date: 7/15/1996     Quit date: 2022     Years since quittin.5    Smokeless tobacco: Never Used    Tobacco comment: varies from 7 cigarettes to whole pack in day   Substance Use Topics    Alcohol use: Yes     Comment: ocassionally    Drug use: No     Review of Systems   Constitutional: Negative for fever.   Gastrointestinal: Positive for abdominal pain and nausea. Negative for constipation and vomiting.   Genitourinary: Negative for difficulty urinating and dysuria.   Neurological: Negative for dizziness and headaches.       Physical Exam     Initial Vitals [22 0651]   BP Pulse Resp Temp SpO2   130/80 88 20 98.2 °F (36.8 °C) 99 %      MAP       --         Physical Exam    Nursing note and vitals reviewed.  Constitutional: She is not diaphoretic. No distress.   Eyes: No scleral icterus.   Cardiovascular: Normal rate, regular rhythm and normal heart sounds.   No murmur heard.  Pulmonary/Chest: No respiratory distress. She has no wheezes. She has no rhonchi. She has no rales.   Abdominal: Abdomen is soft. She exhibits no distension. There is abdominal tenderness in the suprapubic area.   No right CVA tenderness.  No left CVA tenderness. There is guarding.     Neurological: She is alert and oriented to person, place, and time. GCS eye subscore is 4. GCS verbal subscore is 5. GCS motor subscore is 6.   Skin: Skin is warm and dry. No pallor.    Psychiatric: She is not actively hallucinating. She is attentive.         ED Course   Procedures  Labs Reviewed   CBC W/ AUTO DIFFERENTIAL - Abnormal; Notable for the following components:       Result Value    Hemoglobin 11.7 (*)     MCHC 31.5 (*)     All other components within normal limits   URINALYSIS, REFLEX TO URINE CULTURE - Abnormal; Notable for the following components:    Leukocytes, UA 1+ (*)     All other components within normal limits    Narrative:     Specimen Source->Urine   COMPREHENSIVE METABOLIC PANEL   URINALYSIS MICROSCOPIC    Narrative:     Specimen Source->Urine   POCT URINE PREGNANCY          Imaging Results          US Pelvis Comp with Transvag NON-OB (xpd (Final result)  Result time 07/30/22 09:38:31    Final result by Giovanny Masterson MD (07/30/22 09:38:31)                 Impression:      1. Bilateral ovarian complex appearing cysts could relate to involving hemorrhagic corpus luteum and/or follicular cyst.  2. Additional details, as provided in the body of report.      Electronically signed by: Giovanny Masterson  Date:    07/30/2022  Time:    09:38             Narrative:    EXAMINATION:  US PELVIS COMP WITH TRANSVAG NON-OB (XPD)    CLINICAL HISTORY:  Pelvic pain;    TECHNIQUE:  Transabdominal sonography of the pelvis was performed, followed by transvaginal sonography to better evaluate the uterus and ovaries.    COMPARISON:  Pelvic ultrasound 04/20/2022    FINDINGS:  Uterus:    Size: 8.8 x 5.1 x 6.2 cm    Masses: None    Endometrium: Limited assessment due to IUD in-situ at the level of the fundus.    Right ovary:    Size: 4.2 x 4 x 3.8 cm    Appearance: Somewhat complex appearing mixed echogenicity cyst measures 3.3 x 2.8 x 3.2 cm.  Preserved arterial and venous flow.    Vascular flow: Normal.    Left ovary:    Size: 5.3 x 3.7 x 4.9 cm    Appearance: Somewhat complex appearing mixed echogenicity cyst measures 4.3 x 3.2 x 4.5    Vascular Flow: Normal.    Free  Fluid:    None.    Additional comment: Cervical nabothian cysts noted.                                 Medications   morphine injection 4 mg (4 mg Intravenous Given 7/30/22 0820)   ondansetron injection 4 mg (4 mg Intravenous Given 7/30/22 0817)     Medical Decision Making:   History:   Old Medical Records: I decided to obtain old medical records.  Old Records Summarized: other records.       <> Summary of Records: Past medical history reviewed as above.  Differential Diagnosis:   Intrauterine pregnancy, ectopic pregnancy, ruptured ovarian cyst, ovarian torsion, tubo-ovarian abscess, cystitis  Clinical Tests:   Lab Tests: Ordered and Reviewed  Radiological Study: Ordered and Reviewed    MDM:  The patient underwent evaluation for acute abdominal pain and nausea.  Differential includes the above as well as other conditions.  Workup consisted of labs and pelvic ultrasound.  There are no concerning lab abnormalities.  Pelvic ultrasound revealed large bilateral ovarian cysts without signs of rupture.  She had significant improvement with ED management and was discharged.  She was prescribed oral pain and nausea medications.             Attending Attestation:             Attending ED Notes:   Portions of this chart were completed by the scribe by interpretive transcription of statements made by the patient as a result of my questions at the bedside. Other portions were completed by the scribe from statements made by me for direct transcription into the medical record. Following completion of the charting by the scribe, I made modifications for both correctness and proper phrasing.      ED Course as of 07/30/22 1152   Sat Jul 30, 2022   1043 She is feeling much better. [LP]      ED Course User Index  [LP] Haresh Parker III, MD             Clinical Impression:   Final diagnoses:  [R10.2] Suprapubic abdominal pain  [R11.0] Nausea  [N83.201, N83.202] Bilateral ovarian cysts (Primary)          ED Disposition Condition     Discharge Stable        ED Prescriptions     Medication Sig Dispense Start Date End Date Auth. Provider    HYDROcodone-acetaminophen (NORCO) 5-325 mg per tablet Take 1 tablet by mouth every 6 (six) hours as needed (Moderate to severe pain). 12 tablet 7/30/2022  Haresh Parker III, MD    ondansetron (ZOFRAN-ODT) 4 MG TbDL Take 1 tablet (4 mg total) by mouth every 6 (six) hours as needed (nausea). 10 tablet 7/30/2022  Haresh Parker III, MD        Follow-up Information     Follow up With Specialties Details Why Contact Info    Brenda Rios MD Obstetrics and Gynecology  Call to schedule follow-up if your pain has not mostly resolved in a week. [address]  [address]  [facility] [address] [phone]      ER   Return to this ER or visit any other ER should you have any concerns that you feel need immediate attention.            Haresh Parker III, MD  07/30/22 0379

## 2022-07-30 NOTE — ED NOTES
"Pt to ED c/o sudden onset lower abd/pelvic pain since 0600 this morning. Pt states pain is in mid to lower abd, sharp and aching. PT states if she lies on her left side pain is subsiding and feels like "period cramps." Had IUD placed last Thursday.   "

## 2022-08-02 ENCOUNTER — TELEPHONE (OUTPATIENT)
Dept: SMOKING CESSATION | Facility: CLINIC | Age: 37
End: 2022-08-02
Payer: MEDICAID

## 2022-08-03 ENCOUNTER — TELEPHONE (OUTPATIENT)
Dept: SMOKING CESSATION | Facility: CLINIC | Age: 37
End: 2022-08-03
Payer: MEDICAID

## 2022-08-05 ENCOUNTER — PATIENT MESSAGE (OUTPATIENT)
Dept: OBSTETRICS AND GYNECOLOGY | Facility: CLINIC | Age: 37
End: 2022-08-05

## 2022-08-05 ENCOUNTER — OFFICE VISIT (OUTPATIENT)
Dept: OBSTETRICS AND GYNECOLOGY | Facility: CLINIC | Age: 37
End: 2022-08-05
Payer: MEDICAID

## 2022-08-05 VITALS
DIASTOLIC BLOOD PRESSURE: 72 MMHG | WEIGHT: 194.25 LBS | SYSTOLIC BLOOD PRESSURE: 120 MMHG | BODY MASS INDEX: 30.42 KG/M2

## 2022-08-05 DIAGNOSIS — R10.2 VULVAR PAIN: Primary | ICD-10-CM

## 2022-08-05 PROCEDURE — 87481 CANDIDA DNA AMP PROBE: CPT | Mod: 59 | Performed by: PHYSICIAN ASSISTANT

## 2022-08-05 PROCEDURE — 3078F DIAST BP <80 MM HG: CPT | Mod: CPTII,,, | Performed by: PHYSICIAN ASSISTANT

## 2022-08-05 PROCEDURE — 3074F PR MOST RECENT SYSTOLIC BLOOD PRESSURE < 130 MM HG: ICD-10-PCS | Mod: CPTII,,, | Performed by: PHYSICIAN ASSISTANT

## 2022-08-05 PROCEDURE — 99214 OFFICE O/P EST MOD 30 MIN: CPT | Mod: S$PBB,,, | Performed by: PHYSICIAN ASSISTANT

## 2022-08-05 PROCEDURE — 3008F BODY MASS INDEX DOCD: CPT | Mod: CPTII,,, | Performed by: PHYSICIAN ASSISTANT

## 2022-08-05 PROCEDURE — 3074F SYST BP LT 130 MM HG: CPT | Mod: CPTII,,, | Performed by: PHYSICIAN ASSISTANT

## 2022-08-05 PROCEDURE — 99212 OFFICE O/P EST SF 10 MIN: CPT | Mod: PBBFAC | Performed by: PHYSICIAN ASSISTANT

## 2022-08-05 PROCEDURE — 87491 CHLMYD TRACH DNA AMP PROBE: CPT | Performed by: PHYSICIAN ASSISTANT

## 2022-08-05 PROCEDURE — 1159F MED LIST DOCD IN RCRD: CPT | Mod: CPTII,,, | Performed by: PHYSICIAN ASSISTANT

## 2022-08-05 PROCEDURE — 99999 PR PBB SHADOW E&M-EST. PATIENT-LVL II: ICD-10-PCS | Mod: PBBFAC,,, | Performed by: PHYSICIAN ASSISTANT

## 2022-08-05 PROCEDURE — 3078F PR MOST RECENT DIASTOLIC BLOOD PRESSURE < 80 MM HG: ICD-10-PCS | Mod: CPTII,,, | Performed by: PHYSICIAN ASSISTANT

## 2022-08-05 PROCEDURE — 1159F PR MEDICATION LIST DOCUMENTED IN MEDICAL RECORD: ICD-10-PCS | Mod: CPTII,,, | Performed by: PHYSICIAN ASSISTANT

## 2022-08-05 PROCEDURE — 99214 PR OFFICE/OUTPT VISIT, EST, LEVL IV, 30-39 MIN: ICD-10-PCS | Mod: S$PBB,,, | Performed by: PHYSICIAN ASSISTANT

## 2022-08-05 PROCEDURE — 3008F PR BODY MASS INDEX (BMI) DOCUMENTED: ICD-10-PCS | Mod: CPTII,,, | Performed by: PHYSICIAN ASSISTANT

## 2022-08-05 PROCEDURE — 99999 PR PBB SHADOW E&M-EST. PATIENT-LVL II: CPT | Mod: PBBFAC,,, | Performed by: PHYSICIAN ASSISTANT

## 2022-08-05 PROCEDURE — 87591 N.GONORRHOEAE DNA AMP PROB: CPT | Mod: 59 | Performed by: PHYSICIAN ASSISTANT

## 2022-08-05 RX ORDER — NYSTATIN AND TRIAMCINOLONE ACETONIDE 100000; 1 [USP'U]/G; MG/G
CREAM TOPICAL
Qty: 30 G | Refills: 1 | Status: SHIPPED | OUTPATIENT
Start: 2022-08-05 | End: 2023-03-31

## 2022-08-05 NOTE — PROGRESS NOTES
Snow Barney is a 36 y.o. female  presents with complaint of vaginal pain for 1 days. States she used monistat today which caused burning and swelling. Prior to monistat she was experiencing irritation following TVUS. She also tried gentian violet on a tampon for symptoms. She denies odor or discharge..  She denies h/o vaginitis. Denies nausea, vomiting, diarrhea, constipation, dysuria, dyspareunia, abdominal pain. She would like STD screening at this visit. No other concerns or complaints at this visit.     Past Medical History:   Diagnosis Date    Anxiety     Asthma     Depression     GERD (gastroesophageal reflux disease)     Hernia, hiatal     IBS (irritable bowel syndrome)     Inappropriate sinus tachycardia     POTS (postural orthostatic tachycardia syndrome)     Pulmonary emboli 2020    Sepsis      No past surgical history on file.  Social History     Tobacco Use    Smoking status: Former Smoker     Packs/day: 1.00     Types: Cigarettes     Start date: 7/15/1996     Quit date: 2022     Years since quittin.5    Smokeless tobacco: Never Used    Tobacco comment: varies from 7 cigarettes to whole pack in day   Substance Use Topics    Alcohol use: Yes     Comment: ocassionally    Drug use: No     Family History   Problem Relation Age of Onset    Hypertension Mother     Diabetes Mother     Hypertension Father     Diabetes Father      OB History    Para Term  AB Living   6 3 2 1 3 3   SAB IAB Ectopic Multiple Live Births                  # Outcome Date GA Lbr Benjamin/2nd Weight Sex Delivery Anes PTL Lv   6 Term            5 Term            4 AB            3 AB            2 AB            1                Obstetric Comments   GYN Hx: Menarche at 13   History of abnormal pap with colposcopy   History of chlamydia.       /72   Wt 88.1 kg (194 lb 3.6 oz)   LMP 2022   BMI 30.42 kg/m²     ROS:  GENERAL: No fever, chills, fatigability or weight loss.  VULVAR:  No pain, no lesions and + itching.  VAGINAL: No relaxation, no abnormal bleeding and no lesions. + itching, irritation, pain  ABDOMEN: No abdominal pain. Denies nausea. Denies vomiting. No diarrhea. No constipation  BREAST: Denies pain. No lumps. No discharge.  URINARY: No incontinence, no nocturia, no frequency and no dysuria.  CARDIOVASCULAR: No chest pain. No shortness of breath. No leg cramps.  NEUROLOGICAL: No headaches. No vision changes.    PHYSICAL EXAM:  VULVA: purple vulva (from gentian violet)with no masses, tenderness or lesions   VAGINA: normal appearing vagina with normal color. bloody discharge, no lesions   CERVIX: normal appearing cervix without discharge or lesions   UTERUS: uterus is normal size, shape, consistency and nontender   ADNEXA: normal adnexa in size, nontender and no masses    ASSESSMENT and PLAN:    ICD-10-CM ICD-9-CM    1. Vulvar pain  R10.2 625.9 Vaginosis Screen by DNA Probe      C. trachomatis/N. gonorrhoeae by AMP DNA [facility]; Cervicovaginal      nystatin-triamcinolone (MYCOLOG II) cream       Vaginitis Prevention:  - Avoiding feminine products such as deoderant soaps, body wash, bubble bath, douches, scented toilet paper, deoderant tampons or pads, feminine wipes, chronic pad use, etc. If you wash with soap make sure its hypo allergic (free of added scents or colors)   - Avoiding other vulvovaginal irritants such as long hot baths, humidity, tight, synthetic clothing, chlorine and sitting around in wet bathing suits  - Wearing cotton underwear, avoiding thong underwear and no underwear to bed-wear loose cotton bottoms to bed   - Taking showers instead of baths and use a hair dryer on cool setting afterwards to dry  - Wearing cotton to exercise and shower immediately after exercise and change clothes  - Using polyurethane condoms without spermicide if sexually active and symptoms are triggered by intercourse  - Use laundry detergent without added perfumes or colors   - Do not have  sexual intercourse until symptoms have gone away   - Increase your intake of probiotics--you can get these by eating yogurt/greek yogurt or by buying over the counter probiotic supplements     Probiotic Information:   Any probiotic you choose needs to have ALL of the following components (Each needs to be >10 colony forming units [CFUs]):   - L. Acidophilus  - L. Rhamnosus  - L. Fermentum       FOLLOW UP: PRN lack of improvement.  Bettina Gage PA-C

## 2022-08-07 LAB
C TRACH DNA SPEC QL NAA+PROBE: NOT DETECTED
N GONORRHOEA DNA SPEC QL NAA+PROBE: NOT DETECTED

## 2022-08-10 ENCOUNTER — PATIENT MESSAGE (OUTPATIENT)
Dept: OBSTETRICS AND GYNECOLOGY | Facility: CLINIC | Age: 37
End: 2022-08-10
Payer: MEDICAID

## 2022-08-10 LAB
BACTERIAL VAGINOSIS DNA: POSITIVE
CANDIDA GLABRATA DNA: NEGATIVE
CANDIDA KRUSEI DNA: NEGATIVE
CANDIDA RRNA VAG QL PROBE: POSITIVE
T VAGINALIS RRNA GENITAL QL PROBE: NEGATIVE

## 2022-08-10 RX ORDER — METRONIDAZOLE 7.5 MG/G
1 GEL VAGINAL DAILY
Qty: 70 G | Refills: 0 | Status: SHIPPED | OUTPATIENT
Start: 2022-08-10 | End: 2022-08-16

## 2022-08-10 RX ORDER — FLUCONAZOLE 150 MG/1
150 TABLET ORAL ONCE
Qty: 2 TABLET | Refills: 1 | Status: SHIPPED | OUTPATIENT
Start: 2022-08-10 | End: 2022-08-13

## 2022-08-10 RX ORDER — METRONIDAZOLE 500 MG/1
500 TABLET ORAL 2 TIMES DAILY
Qty: 14 TABLET | Refills: 0 | Status: SHIPPED | OUTPATIENT
Start: 2022-08-10 | End: 2022-08-10

## 2022-08-26 ENCOUNTER — CLINICAL SUPPORT (OUTPATIENT)
Dept: SMOKING CESSATION | Facility: CLINIC | Age: 37
End: 2022-08-26

## 2022-08-26 DIAGNOSIS — F17.200 NICOTINE DEPENDENCE: Primary | ICD-10-CM

## 2022-08-26 PROCEDURE — 99407 PR TOBACCO USE CESSATION INTENSIVE >10 MINUTES: ICD-10-PCS | Mod: S$GLB,,,

## 2022-08-26 PROCEDURE — 99407 BEHAV CHNG SMOKING > 10 MIN: CPT | Mod: S$GLB,,,

## 2022-08-26 NOTE — PROGRESS NOTES
Spoke with patient today in regard to smoking cessation progress for 12-month telephone follow up. Patient states that she is tobacco free. Commended patient on their quit.  Patient states she quit on her own this year after being diagnosed with a heart arrhythmia.  Informed patient of benefit period, future follow up, and contact information if any further help or support is needed.  Will complete smart form for 3/6/12 month follow up and resolve Quit attempt #1.

## 2023-01-04 ENCOUNTER — HOSPITAL ENCOUNTER (EMERGENCY)
Facility: HOSPITAL | Age: 38
Discharge: HOME | End: 2023-01-05
Attending: EMERGENCY MEDICINE
Payer: MEDICAID

## 2023-01-04 DIAGNOSIS — E83.39 HYPOPHOSPHATEMIA: ICD-10-CM

## 2023-01-04 DIAGNOSIS — K20.90 ESOPHAGITIS: ICD-10-CM

## 2023-01-04 DIAGNOSIS — R07.9 CHEST PAIN: Primary | ICD-10-CM

## 2023-01-04 DIAGNOSIS — R06.02 SOB (SHORTNESS OF BREATH): ICD-10-CM

## 2023-01-04 LAB
ALBUMIN SERPL BCP-MCNC: 4.3 G/DL (ref 3.5–5.2)
ALP SERPL-CCNC: 63 U/L (ref 55–135)
ALT SERPL W/O P-5'-P-CCNC: 17 U/L (ref 10–44)
ANION GAP SERPL CALC-SCNC: 12 MMOL/L (ref 8–16)
AST SERPL-CCNC: 14 U/L (ref 10–40)
B-HCG UR QL: NEGATIVE
BACTERIA #/AREA URNS HPF: ABNORMAL /HPF
BASOPHILS # BLD AUTO: 0.05 K/UL (ref 0–0.2)
BASOPHILS NFR BLD: 0.7 % (ref 0–1.9)
BILIRUB SERPL-MCNC: 0.5 MG/DL (ref 0.1–1)
BILIRUB UR QL STRIP: NEGATIVE
BNP SERPL-MCNC: 11 PG/ML (ref 0–99)
BUN SERPL-MCNC: 17 MG/DL (ref 6–20)
CALCIUM SERPL-MCNC: 9.6 MG/DL (ref 8.7–10.5)
CAOX CRY URNS QL MICRO: ABNORMAL
CHLORIDE SERPL-SCNC: 107 MMOL/L (ref 95–110)
CLARITY UR: ABNORMAL
CO2 SERPL-SCNC: 21 MMOL/L (ref 23–29)
COLOR UR: YELLOW
CREAT SERPL-MCNC: 0.8 MG/DL (ref 0.5–1.4)
CTP QC/QA: YES
DIFFERENTIAL METHOD: NORMAL
EOSINOPHIL # BLD AUTO: 0.1 K/UL (ref 0–0.5)
EOSINOPHIL NFR BLD: 1.5 % (ref 0–8)
ERYTHROCYTE [DISTWIDTH] IN BLOOD BY AUTOMATED COUNT: 13.2 % (ref 11.5–14.5)
EST. GFR  (NO RACE VARIABLE): >60 ML/MIN/1.73 M^2
GLUCOSE SERPL-MCNC: 132 MG/DL (ref 70–110)
GLUCOSE UR QL STRIP: NEGATIVE
HCT VFR BLD AUTO: 41.7 % (ref 37–48.5)
HGB BLD-MCNC: 13.7 G/DL (ref 12–16)
HGB UR QL STRIP: ABNORMAL
IMM GRANULOCYTES # BLD AUTO: 0.02 K/UL (ref 0–0.04)
IMM GRANULOCYTES NFR BLD AUTO: 0.3 % (ref 0–0.5)
KETONES UR QL STRIP: NEGATIVE
LEUKOCYTE ESTERASE UR QL STRIP: ABNORMAL
LYMPHOCYTES # BLD AUTO: 2.3 K/UL (ref 1–4.8)
LYMPHOCYTES NFR BLD: 31.7 % (ref 18–48)
MAGNESIUM SERPL-MCNC: 1.9 MG/DL (ref 1.6–2.6)
MCH RBC QN AUTO: 29.3 PG (ref 27–31)
MCHC RBC AUTO-ENTMCNC: 32.9 G/DL (ref 32–36)
MCV RBC AUTO: 89 FL (ref 82–98)
MICROSCOPIC COMMENT: ABNORMAL
MONOCYTES # BLD AUTO: 0.4 K/UL (ref 0.3–1)
MONOCYTES NFR BLD: 5.5 % (ref 4–15)
NEUTROPHILS # BLD AUTO: 4.4 K/UL (ref 1.8–7.7)
NEUTROPHILS NFR BLD: 60.3 % (ref 38–73)
NITRITE UR QL STRIP: NEGATIVE
NRBC BLD-RTO: 0 /100 WBC
PH UR STRIP: 7 [PH] (ref 5–8)
PHOSPHATE SERPL-MCNC: 1.8 MG/DL (ref 2.7–4.5)
PLATELET # BLD AUTO: 192 K/UL (ref 150–450)
PMV BLD AUTO: 10.5 FL (ref 9.2–12.9)
POC MOLECULAR INFLUENZA A AGN: NEGATIVE
POC MOLECULAR INFLUENZA B AGN: NEGATIVE
POTASSIUM SERPL-SCNC: 4 MMOL/L (ref 3.5–5.1)
PROT SERPL-MCNC: 7.7 G/DL (ref 6–8.4)
PROT UR QL STRIP: NEGATIVE
RBC # BLD AUTO: 4.68 M/UL (ref 4–5.4)
RBC #/AREA URNS HPF: 13 /HPF (ref 0–4)
SARS-COV-2 RDRP RESP QL NAA+PROBE: NEGATIVE
SODIUM SERPL-SCNC: 140 MMOL/L (ref 136–145)
SP GR UR STRIP: 1.02 (ref 1–1.03)
SQUAMOUS #/AREA URNS HPF: 17 /HPF
TROPONIN I SERPL DL<=0.01 NG/ML-MCNC: <0.006 NG/ML (ref 0–0.03)
URN SPEC COLLECT METH UR: ABNORMAL
UROBILINOGEN UR STRIP-ACNC: NEGATIVE EU/DL
WBC # BLD AUTO: 7.22 K/UL (ref 3.9–12.7)
WBC #/AREA URNS HPF: 15 /HPF (ref 0–5)

## 2023-01-04 PROCEDURE — 81000 URINALYSIS NONAUTO W/SCOPE: CPT | Performed by: EMERGENCY MEDICINE

## 2023-01-04 PROCEDURE — 84100 ASSAY OF PHOSPHORUS: CPT | Performed by: EMERGENCY MEDICINE

## 2023-01-04 PROCEDURE — 80053 COMPREHEN METABOLIC PANEL: CPT | Performed by: EMERGENCY MEDICINE

## 2023-01-04 PROCEDURE — 83880 ASSAY OF NATRIURETIC PEPTIDE: CPT | Performed by: EMERGENCY MEDICINE

## 2023-01-04 PROCEDURE — 93005 ELECTROCARDIOGRAM TRACING: CPT

## 2023-01-04 PROCEDURE — 87086 URINE CULTURE/COLONY COUNT: CPT | Performed by: EMERGENCY MEDICINE

## 2023-01-04 PROCEDURE — 25500020 PHARM REV CODE 255: Performed by: EMERGENCY MEDICINE

## 2023-01-04 PROCEDURE — 81025 URINE PREGNANCY TEST: CPT | Performed by: EMERGENCY MEDICINE

## 2023-01-04 PROCEDURE — 63600175 PHARM REV CODE 636 W HCPCS: Performed by: EMERGENCY MEDICINE

## 2023-01-04 PROCEDURE — 87635 SARS-COV-2 COVID-19 AMP PRB: CPT | Performed by: EMERGENCY MEDICINE

## 2023-01-04 PROCEDURE — 93010 ELECTROCARDIOGRAM REPORT: CPT | Mod: ,,, | Performed by: INTERNAL MEDICINE

## 2023-01-04 PROCEDURE — 25000003 PHARM REV CODE 250: Performed by: EMERGENCY MEDICINE

## 2023-01-04 PROCEDURE — 93010 EKG 12-LEAD: ICD-10-PCS | Mod: ,,, | Performed by: INTERNAL MEDICINE

## 2023-01-04 PROCEDURE — 84484 ASSAY OF TROPONIN QUANT: CPT | Performed by: EMERGENCY MEDICINE

## 2023-01-04 PROCEDURE — 96375 TX/PRO/DX INJ NEW DRUG ADDON: CPT

## 2023-01-04 PROCEDURE — 99285 EMERGENCY DEPT VISIT HI MDM: CPT | Mod: 25

## 2023-01-04 PROCEDURE — 96372 THER/PROPH/DIAG INJ SC/IM: CPT | Mod: 59 | Performed by: EMERGENCY MEDICINE

## 2023-01-04 PROCEDURE — 87502 INFLUENZA DNA AMP PROBE: CPT

## 2023-01-04 PROCEDURE — 83735 ASSAY OF MAGNESIUM: CPT | Performed by: EMERGENCY MEDICINE

## 2023-01-04 PROCEDURE — 96365 THER/PROPH/DIAG IV INF INIT: CPT

## 2023-01-04 PROCEDURE — 85025 COMPLETE CBC W/AUTO DIFF WBC: CPT | Performed by: EMERGENCY MEDICINE

## 2023-01-04 RX ORDER — MAGNESIUM SULFATE 1 G/100ML
1 INJECTION INTRAVENOUS
Status: COMPLETED | OUTPATIENT
Start: 2023-01-04 | End: 2023-01-05

## 2023-01-04 RX ORDER — ASPIRIN 325 MG
325 TABLET ORAL
Status: COMPLETED | OUTPATIENT
Start: 2023-01-04 | End: 2023-01-04

## 2023-01-04 RX ORDER — LORAZEPAM 2 MG/ML
1 INJECTION INTRAMUSCULAR
Status: COMPLETED | OUTPATIENT
Start: 2023-01-04 | End: 2023-01-04

## 2023-01-04 RX ORDER — ENOXAPARIN SODIUM 100 MG/ML
1 INJECTION SUBCUTANEOUS
Status: COMPLETED | OUTPATIENT
Start: 2023-01-04 | End: 2023-01-04

## 2023-01-04 RX ADMIN — ENOXAPARIN SODIUM 90 MG: 100 INJECTION SUBCUTANEOUS at 08:01

## 2023-01-04 RX ADMIN — IOHEXOL 75 ML: 350 INJECTION, SOLUTION INTRAVENOUS at 10:01

## 2023-01-04 RX ADMIN — ASPIRIN 325 MG ORAL TABLET 325 MG: 325 PILL ORAL at 08:01

## 2023-01-04 RX ADMIN — MAGNESIUM SULFATE 1 G: 1 INJECTION INTRAVENOUS at 11:01

## 2023-01-04 RX ADMIN — LORAZEPAM 1 MG: 2 INJECTION INTRAMUSCULAR; INTRAVENOUS at 09:01

## 2023-01-05 VITALS
TEMPERATURE: 98 F | HEART RATE: 81 BPM | OXYGEN SATURATION: 99 % | RESPIRATION RATE: 24 BRPM | BODY MASS INDEX: 30.23 KG/M2 | DIASTOLIC BLOOD PRESSURE: 67 MMHG | SYSTOLIC BLOOD PRESSURE: 113 MMHG | WEIGHT: 193 LBS

## 2023-01-05 PROCEDURE — 25000003 PHARM REV CODE 250: Performed by: EMERGENCY MEDICINE

## 2023-01-05 RX ORDER — FAMOTIDINE 20 MG/1
20 TABLET, FILM COATED ORAL 2 TIMES DAILY
Qty: 60 TABLET | Refills: 0 | Status: SHIPPED | OUTPATIENT
Start: 2023-01-05 | End: 2023-03-31

## 2023-01-05 RX ADMIN — POTASSIUM PHOSPHATE, MONOBASIC 1000 MG: 500 TABLET, SOLUBLE ORAL at 12:01

## 2023-01-05 NOTE — PROVIDER PROGRESS NOTES - EMERGENCY DEPT.
"Encounter Date: 1/4/2023    ED Physician Progress Notes            Patient case re-evaluated.  Patient has no chest pain at this time.  EMR reviewed.  Patient had nuclear stress test 5/23 with slight defect suspected to be due to attenuation artifact.  Patient also had cardiac MRI 5/10 which revealed no perfusion deficit.  Patient had mild pericardial effusion and mild bilateral pleural effusion.  Patient has since been evaluated by Cardiology in the outpatient setting with low suspicion for ACS.  Patient has follow up with Cardiology within the next 2 weeks.    CT imaging today reveals signs of distal esophagitis.  Patient has history of GERD.  Patient has no pain at this time.  Patient states that she had previously been on Zantac but has since stopped.  Patient is established with Gastroenterology for stated "bad reflux".     Heart score of 1 for family history, previous tobacco use.  Patient is established with Cardiology and Gastroenterology.  Patient will be discharged on famotidine.  Patient is to follow up with her cardiologist.    Patient is taking magnesium pills.  "

## 2023-01-05 NOTE — ED PROVIDER NOTES
"Encounter Date: 2023       History     Chief Complaint   Patient presents with    Shortness of Breath     Today, was lying on the sofa and had a pain to chest, denies emesis but is nauseous     37 y.o. female Past Medical History:  No date: Anxiety  No date: Asthma  No date: Depression  No date: GERD (gastroesophageal reflux disease)  No date: Hernia, hiatal  No date: IBS (irritable bowel syndrome)  No date: Inappropriate sinus tachycardia  No date: POTS (postural orthostatic tachycardia syndrome)  2020: Pulmonary emboli  No date: Sepsis      Patient states that she has a history of prior PE for which she was anticoagulated for 3 months states that a cause was never found also states that she has a history of early cardiac death in an aunt who  of a massive MI at age 53 states that today she was sitting on the couch proximally in an hour ago and started to get sharp chest pain.  She also endorses "being short Of breath all day today ". Denies fevers chills nausea vomiting diarrhea dysuria states that she had an abnormal stress test in the past which showed T-wave  depressions and has had numerous PVCs for which she is been evaluated by Cardiology      Testing    2022 EKG normal sinus rhythm normal EKG heart rate 72    22 EKG sinus rhythm rate 80 flattened T-wave in lead 3 otherwise with normal limits.     22 cardiac MRI is with great EP. Negative for myocarditis. Mild pericardial effusion with mild bilateral pleural effusions    3/10/22 EKG: sinus rhythm, rate 74, ND interval 120, QRS 92, .     22 48 Hr holter:   Maximum heart rate  142 bpm  Minimum heart rate  60 bpm   Average heart rate  90 bpm     Sinus rhythm  PVCs - 140 with short bursts of bigeminy and trigeminy  SVEs - 0     Ischemia: Negative  Arrhythmias:  Negative     22 EKG sinus rhythm rate 96     22 EKG: normal sinus rhythm, rate 73, ND interval 140, QRS 84, .      EKG 2022 normal sinus " rhythm      EKG 2021 normal sinus rhythm     10/12/2020 Holter:  The patient was monitored for 24 hours.  The maximum heart rate was 148 bpm, minimum heart rate was 51 bpm, and the average heart rate was 94 bpm.  Sinus rhythm with rare VEs (2) and rare (2) SVEs.  Ischemia: Negative Arrhythmias:  Negative      10/12/2020 Stress:  Ran for 8.5 min achieving 9.2 Mets  Negative for ischemia arrhythmias  No chest pain voiced     10/12/2020 Echo:    Normal left ventricular size, systolic function and wall thickness, with no regional wall motion abnormalities.     Calculated left ventricular ejection fraction by 2D tracking is 57%.     Normal diastolic function.     Normal right ventricular size and systolic function, RVSP 18 mmHg.     Structurally normal mitral valve.     Structurally normal trileaflet aortic valve.     Structurally normal tricuspid valve.      EKG showed normal sinus rhythm nonspecific ST-T       Review of patient's allergies indicates:   Allergen Reactions    Demerol (pf) [meperidine (pf)] Hives     Past Medical History:   Diagnosis Date    Anxiety     Asthma     Depression     GERD (gastroesophageal reflux disease)     Hernia, hiatal     IBS (irritable bowel syndrome)     Inappropriate sinus tachycardia     POTS (postural orthostatic tachycardia syndrome)     Pulmonary emboli     Sepsis      No past surgical history on file.  Family History   Problem Relation Age of Onset    Hypertension Mother     Diabetes Mother     Hypertension Father     Diabetes Father      Social History     Tobacco Use    Smoking status: Former     Packs/day: 1.00     Types: Cigarettes     Start date: 7/15/1996     Quit date: 2022     Years since quittin.9    Smokeless tobacco: Never    Tobacco comments:     varies from 7 cigarettes to whole pack in day   Substance Use Topics    Alcohol use: Yes     Comment: ocassionally    Drug use: No     Review of Systems   Constitutional:  Negative for fever.   HENT:   Negative for sore throat.    Respiratory:  Positive for cough and shortness of breath.    Cardiovascular:  Negative for chest pain.   Gastrointestinal:  Negative for nausea.   Genitourinary:  Negative for dysuria.   Musculoskeletal:  Negative for back pain.   Skin:  Negative for rash.   Neurological:  Negative for weakness.   Hematological:  Does not bruise/bleed easily.   All other systems reviewed and are negative.    Physical Exam     Initial Vitals [01/04/23 1945]   BP Pulse Resp Temp SpO2   (!) 159/95 (!) 119 18 98.3 °F (36.8 °C) 100 %      MAP       --         Physical Exam    Nursing note and vitals reviewed.  Constitutional: She appears well-developed and well-nourished.   HENT:   Head: Normocephalic and atraumatic.   Eyes: Conjunctivae and EOM are normal. Pupils are equal, round, and reactive to light.   Neck:   Normal range of motion.  Cardiovascular:            tachy   Pulmonary/Chest: No respiratory distress.   Abdominal: She exhibits no distension.   Musculoskeletal:         General: Normal range of motion.      Cervical back: Normal range of motion.     Neurological: She is alert. No cranial nerve deficit. GCS score is 15. GCS eye subscore is 4. GCS verbal subscore is 5. GCS motor subscore is 6.   Skin: Skin is warm and dry.   Psychiatric: She has a normal mood and affect. Thought content normal.   No pitting edema/jvd, or calf ttp    ED Course   Procedures  Labs Reviewed   POCT URINE PREGNANCY     EKG Readings: (Independently Interpreted)   Hr 122, sinus tach, nl axis/intervals, no rafa/twi, non acute, no stemi     Imaging Results    None          Medications - No data to display              ED Course as of 01/05/23 1632   Wed Jan 04, 2023   2310 [facility]    Chest pain patient pending CTA for rule out of PE.  Recommendation is for admission for ACS evaluation if CT negative.  Patient pending CT imaging, re-evaluation, disposition.    ECG reviewed, appears sinus  tachycardia with no ST changes.  No Q-waves or T-wave inversions. [JM]      ED Course User Index  [JM] Sanya Wagner MD             After review of the patient's physical exam, ED testing, and history/symptoms, relevant labs, imaging, available outside records  a wide differential was considered including but not limited to: infectious, traumatic, vascular, toxicological, malignant, ischemic, embolic, psychological, genetic, idiopathic and other etiologies.  labs/imaging/interventions include:  Lovenox/aspirin    Labs Reviewed   COMPREHENSIVE METABOLIC PANEL - Abnormal; Notable for the following components:       Result Value    CO2 21 (*)     Glucose 132 (*)     All other components within normal limits   URINALYSIS, REFLEX TO URINE CULTURE - Abnormal; Notable for the following components:    Appearance, UA Hazy (*)     Occult Blood UA 1+ (*)     Leukocytes, UA 2+ (*)     All other components within normal limits    Narrative:     Specimen Source->Urine   PHOSPHORUS - Abnormal; Notable for the following components:    Phosphorus 1.8 (*)     All other components within normal limits   URINALYSIS MICROSCOPIC - Abnormal; Notable for the following components:    RBC, UA 13 (*)     WBC, UA 15 (*)     All other components within normal limits    Narrative:     Specimen Source->Urine   CULTURE, URINE   CBC W/ AUTO DIFFERENTIAL   TROPONIN I   B-TYPE NATRIURETIC PEPTIDE   MAGNESIUM   POCT URINE PREGNANCY   SARS-COV-2 RDRP GENE   POCT INFLUENZA A/B MOLECULAR     .  .  CTA Chest Non-Coronary (PE Studies)    (Results Pending)     Ua contaminated, hypophos  add magnesium noted repleted.  1st cardiac enzyme is negative     Pt will be signed out to Dr. Wagner pending CTA chest to elucidate if pt has PE or other potential sources of her pain. She has had an extensive cardiac workup and has followup scheduled with cardiology in 2 weeks.         The suspected diagnosis, treatment and plan were discussed with the patient. All  questions or concerns have been addressed.      Clinical Impression:   Final diagnoses:  [R07.9] Chest pain               Deisy Staples MD  01/05/23 6320

## 2023-01-05 NOTE — ED NOTES
Pt presents with mid-upper cp that started while resting and spread to left chest and left shoulder. Denies having pain similar in the past. Non-reproducible. Pt has extensive cardiac hx for her age. Nausea noted today with fatigue for a few days

## 2023-01-05 NOTE — DISCHARGE INSTRUCTIONS
Keep your current appointment with the cardiologist for follow up.  Follow up with the gastroenterologist for further evaluation esophagitis.  Recommend Tums, Maalox, or Pepto-Bismol as needed for abdominal pain.  Recommend regular use of famotidine.  Seek medical care if symptoms worsen or any concerns.

## 2023-01-06 LAB — BACTERIA UR CULT: NORMAL

## 2023-02-10 ENCOUNTER — HOSPITAL ENCOUNTER (EMERGENCY)
Facility: HOSPITAL | Age: 38
Discharge: HOME | End: 2023-02-10
Attending: EMERGENCY MEDICINE
Payer: MEDICAID

## 2023-02-10 VITALS
RESPIRATION RATE: 19 BRPM | HEART RATE: 94 BPM | SYSTOLIC BLOOD PRESSURE: 104 MMHG | BODY MASS INDEX: 29.82 KG/M2 | DIASTOLIC BLOOD PRESSURE: 65 MMHG | TEMPERATURE: 98 F | HEIGHT: 67 IN | WEIGHT: 190 LBS | OXYGEN SATURATION: 98 %

## 2023-02-10 DIAGNOSIS — J69.0 ASPIRATION PNEUMONIA OF LEFT LOWER LOBE, UNSPECIFIED ASPIRATION PNEUMONIA TYPE: Primary | ICD-10-CM

## 2023-02-10 DIAGNOSIS — A41.9 SEPSIS: ICD-10-CM

## 2023-02-10 LAB
ALBUMIN SERPL BCP-MCNC: 4.4 G/DL (ref 3.5–5.2)
ALLENS TEST: NORMAL
ALP SERPL-CCNC: 54 U/L (ref 55–135)
ALT SERPL W/O P-5'-P-CCNC: 17 U/L (ref 10–44)
ANION GAP SERPL CALC-SCNC: 10 MMOL/L (ref 8–16)
AST SERPL-CCNC: 16 U/L (ref 10–40)
B-HCG UR QL: NEGATIVE
BASOPHILS # BLD AUTO: 0.03 K/UL (ref 0–0.2)
BASOPHILS NFR BLD: 0.2 % (ref 0–1.9)
BILIRUB SERPL-MCNC: 0.7 MG/DL (ref 0.1–1)
BILIRUB UR QL STRIP: NEGATIVE
BUN SERPL-MCNC: 13 MG/DL (ref 6–20)
CALCIUM SERPL-MCNC: 9.5 MG/DL (ref 8.7–10.5)
CHLORIDE SERPL-SCNC: 109 MMOL/L (ref 95–110)
CLARITY UR: CLEAR
CO2 SERPL-SCNC: 23 MMOL/L (ref 23–29)
COLOR UR: YELLOW
CREAT SERPL-MCNC: 0.8 MG/DL (ref 0.5–1.4)
CTP QC/QA: YES
D DIMER PPP IA.FEU-MCNC: 0.34 MG/L FEU
DELSYS: NORMAL
DIFFERENTIAL METHOD: ABNORMAL
EOSINOPHIL # BLD AUTO: 0.2 K/UL (ref 0–0.5)
EOSINOPHIL NFR BLD: 1.3 % (ref 0–8)
ERYTHROCYTE [DISTWIDTH] IN BLOOD BY AUTOMATED COUNT: 13.4 % (ref 11.5–14.5)
EST. GFR  (NO RACE VARIABLE): >60 ML/MIN/1.73 M^2
GLUCOSE SERPL-MCNC: 100 MG/DL (ref 70–110)
GLUCOSE UR QL STRIP: NEGATIVE
HCT VFR BLD AUTO: 40.1 % (ref 37–48.5)
HGB BLD-MCNC: 13.4 G/DL (ref 12–16)
HGB UR QL STRIP: ABNORMAL
IMM GRANULOCYTES # BLD AUTO: 0.04 K/UL (ref 0–0.04)
IMM GRANULOCYTES NFR BLD AUTO: 0.3 % (ref 0–0.5)
KETONES UR QL STRIP: NEGATIVE
LACTATE SERPL-SCNC: 2.2 MMOL/L (ref 0.5–2.2)
LDH SERPL L TO P-CCNC: 1.8 MMOL/L (ref 0.5–2.2)
LEUKOCYTE ESTERASE UR QL STRIP: NEGATIVE
LYMPHOCYTES # BLD AUTO: 1 K/UL (ref 1–4.8)
LYMPHOCYTES NFR BLD: 7.9 % (ref 18–48)
MCH RBC QN AUTO: 29.8 PG (ref 27–31)
MCHC RBC AUTO-ENTMCNC: 33.4 G/DL (ref 32–36)
MCV RBC AUTO: 89 FL (ref 82–98)
MONOCYTES # BLD AUTO: 0.4 K/UL (ref 0.3–1)
MONOCYTES NFR BLD: 3.5 % (ref 4–15)
NEUTROPHILS # BLD AUTO: 10.9 K/UL (ref 1.8–7.7)
NEUTROPHILS NFR BLD: 86.8 % (ref 38–73)
NITRITE UR QL STRIP: NEGATIVE
NRBC BLD-RTO: 0 /100 WBC
PH UR STRIP: 6 [PH] (ref 5–8)
PLATELET # BLD AUTO: 154 K/UL (ref 150–450)
PMV BLD AUTO: 11.2 FL (ref 9.2–12.9)
POC MOLECULAR INFLUENZA A AGN: NEGATIVE
POC MOLECULAR INFLUENZA B AGN: NEGATIVE
POTASSIUM SERPL-SCNC: 3.9 MMOL/L (ref 3.5–5.1)
PROT SERPL-MCNC: 8.1 G/DL (ref 6–8.4)
PROT UR QL STRIP: NEGATIVE
RBC # BLD AUTO: 4.49 M/UL (ref 4–5.4)
SAMPLE: NORMAL
SARS-COV-2 RDRP RESP QL NAA+PROBE: NEGATIVE
SITE: NORMAL
SODIUM SERPL-SCNC: 142 MMOL/L (ref 136–145)
SP GR UR STRIP: 1.03 (ref 1–1.03)
URN SPEC COLLECT METH UR: ABNORMAL
UROBILINOGEN UR STRIP-ACNC: NEGATIVE EU/DL
WBC # BLD AUTO: 12.54 K/UL (ref 3.9–12.7)

## 2023-02-10 PROCEDURE — 93010 EKG 12-LEAD: ICD-10-PCS | Mod: ,,, | Performed by: INTERNAL MEDICINE

## 2023-02-10 PROCEDURE — 99900035 HC TECH TIME PER 15 MIN (STAT)

## 2023-02-10 PROCEDURE — 99285 EMERGENCY DEPT VISIT HI MDM: CPT | Mod: 25

## 2023-02-10 PROCEDURE — 81003 URINALYSIS AUTO W/O SCOPE: CPT | Performed by: EMERGENCY MEDICINE

## 2023-02-10 PROCEDURE — 87502 INFLUENZA DNA AMP PROBE: CPT

## 2023-02-10 PROCEDURE — 25000003 PHARM REV CODE 250: Performed by: EMERGENCY MEDICINE

## 2023-02-10 PROCEDURE — 96374 THER/PROPH/DIAG INJ IV PUSH: CPT

## 2023-02-10 PROCEDURE — 96375 TX/PRO/DX INJ NEW DRUG ADDON: CPT | Mod: 59

## 2023-02-10 PROCEDURE — 96361 HYDRATE IV INFUSION ADD-ON: CPT

## 2023-02-10 PROCEDURE — 81025 URINE PREGNANCY TEST: CPT | Performed by: EMERGENCY MEDICINE

## 2023-02-10 PROCEDURE — 83605 ASSAY OF LACTIC ACID: CPT

## 2023-02-10 PROCEDURE — 87040 BLOOD CULTURE FOR BACTERIA: CPT | Mod: 59 | Performed by: EMERGENCY MEDICINE

## 2023-02-10 PROCEDURE — 80053 COMPREHEN METABOLIC PANEL: CPT | Performed by: EMERGENCY MEDICINE

## 2023-02-10 PROCEDURE — 25500020 PHARM REV CODE 255: Performed by: EMERGENCY MEDICINE

## 2023-02-10 PROCEDURE — 85379 FIBRIN DEGRADATION QUANT: CPT | Performed by: EMERGENCY MEDICINE

## 2023-02-10 PROCEDURE — 83605 ASSAY OF LACTIC ACID: CPT | Mod: 91 | Performed by: EMERGENCY MEDICINE

## 2023-02-10 PROCEDURE — 93005 ELECTROCARDIOGRAM TRACING: CPT

## 2023-02-10 PROCEDURE — 63600175 PHARM REV CODE 636 W HCPCS: Performed by: EMERGENCY MEDICINE

## 2023-02-10 PROCEDURE — 93010 ELECTROCARDIOGRAM REPORT: CPT | Mod: ,,, | Performed by: INTERNAL MEDICINE

## 2023-02-10 PROCEDURE — 85025 COMPLETE CBC W/AUTO DIFF WBC: CPT | Performed by: EMERGENCY MEDICINE

## 2023-02-10 RX ORDER — ACETAMINOPHEN 500 MG
1000 TABLET ORAL
Status: COMPLETED | OUTPATIENT
Start: 2023-02-10 | End: 2023-02-10

## 2023-02-10 RX ORDER — KETOROLAC TROMETHAMINE 30 MG/ML
15 INJECTION, SOLUTION INTRAMUSCULAR; INTRAVENOUS
Status: COMPLETED | OUTPATIENT
Start: 2023-02-10 | End: 2023-02-10

## 2023-02-10 RX ORDER — LORAZEPAM 2 MG/ML
1 INJECTION INTRAMUSCULAR
Status: COMPLETED | OUTPATIENT
Start: 2023-02-10 | End: 2023-02-10

## 2023-02-10 RX ADMIN — KETOROLAC TROMETHAMINE 15 MG: 30 INJECTION, SOLUTION INTRAMUSCULAR; INTRAVENOUS at 08:02

## 2023-02-10 RX ADMIN — SODIUM CHLORIDE 1450 ML: 9 INJECTION, SOLUTION INTRAVENOUS at 11:02

## 2023-02-10 RX ADMIN — ACETAMINOPHEN 1000 MG: 500 TABLET ORAL at 07:02

## 2023-02-10 RX ADMIN — IOHEXOL 100 ML: 350 INJECTION, SOLUTION INTRAVENOUS at 10:02

## 2023-02-10 RX ADMIN — LORAZEPAM 1 MG: 2 INJECTION INTRAMUSCULAR; INTRAVENOUS at 10:02

## 2023-02-10 RX ADMIN — SODIUM CHLORIDE 1000 ML: 9 INJECTION, SOLUTION INTRAVENOUS at 08:02

## 2023-02-10 NOTE — Clinical Note
"Snow Phelan" Ector was seen and treated in our emergency department on 2/10/2023.  She may return to work on 02/12/2023.       If you have any questions or concerns, please don't hesitate to call.      Kel Arthur MD"

## 2023-02-10 NOTE — Clinical Note
"Snow Phelan" Ector was seen and treated in our emergency department on 2/10/2023.  She may return to work on 02/12/2023.       If you have any questions or concerns, please don't hesitate to call.       RN    "

## 2023-02-11 LAB — LACTATE SERPL-SCNC: 0.6 MMOL/L (ref 0.5–2.2)

## 2023-02-11 NOTE — ED TRIAGE NOTES
Pt reports to the ED following and EGD today. Pt reports complications during procedure with possible aspiration. Pt reports fever/chills, tachtycardia, body aches, HA, nausea, CP (soreness with deep breathing).   Pt denies bladder/bowel issues. Pt AAOX4

## 2023-02-11 NOTE — ED PROVIDER NOTES
Encounter Date: 2/10/2023       History     Chief Complaint   Patient presents with    Post-op Problem     Pt reports had egd around 1200 and has been running fever 102,chills, and ha. Took tylenol at 1630     37-year-old female with a history of PE presenting with left-sided chest pain, cough, fever, body aches.  Patient had an EGD performed to evaluate gastritis seen on a recent CT scan of her chest.  She reports she had a coughing episode and was told she had an asthma attack by staff at the gastroenterologist today.  She reports she is been having left-sided chest pain, worse with deep breathing, intermittent cough, fevers and chills.  Denies nausea, vomiting, abdominal pain.  Denies dysuria, vaginal bleeding or discharge.  Denies lower extremity edema.  Distant history of PE, treated for 3 months with anticoagulation but no longer on.  The PE at that time was thought to be caused by contraception.  She denies any recent travel, lower extremity edema, cough, hemoptysis.    Review of patient's allergies indicates:   Allergen Reactions    Demerol (pf) [meperidine (pf)] Hives     Past Medical History:   Diagnosis Date    Anxiety     Asthma     Depression     GERD (gastroesophageal reflux disease)     Hernia, hiatal     IBS (irritable bowel syndrome)     Inappropriate sinus tachycardia     POTS (postural orthostatic tachycardia syndrome)     Pulmonary emboli 2020    Sepsis      History reviewed. No pertinent surgical history.  Family History   Problem Relation Age of Onset    Hypertension Mother     Diabetes Mother     Hypertension Father     Diabetes Father      Social History     Tobacco Use    Smoking status: Former     Packs/day: 1.00     Types: Cigarettes     Start date: 7/15/1996     Quit date: 2022     Years since quittin.0    Smokeless tobacco: Never    Tobacco comments:     varies from 7 cigarettes to whole pack in day   Substance Use Topics    Alcohol use: Yes     Comment: ocassionally    Drug use:  No     Review of Systems   Constitutional:  Positive for chills and fever.   HENT:  Negative for sore throat.    Respiratory:  Positive for cough and shortness of breath.    Cardiovascular:  Negative for chest pain.   Gastrointestinal:  Negative for nausea and vomiting.   Genitourinary:  Negative for dysuria.   Musculoskeletal:  Negative for back pain.   Skin:  Negative for rash.   Neurological:  Negative for weakness.   Psychiatric/Behavioral:  Negative for confusion.      Physical Exam     Initial Vitals [02/10/23 1812]   BP Pulse Resp Temp SpO2   (!) 141/83 (!) 148 18 (!) 101.1 °F (38.4 °C) 97 %      MAP       --         Physical Exam    Nursing note and vitals reviewed.  Constitutional: She appears well-developed and well-nourished. She is not diaphoretic. No distress.   HENT:   Head: Normocephalic and atraumatic.   Nose: Nose normal.   Eyes: EOM are normal. Pupils are equal, round, and reactive to light. No scleral icterus.   Neck: Neck supple.   Normal range of motion.  Cardiovascular:  Normal rate, regular rhythm, normal heart sounds and intact distal pulses.     Exam reveals no gallop and no friction rub.       No murmur heard.  Pulmonary/Chest: Breath sounds normal. No stridor. No respiratory distress. She has no wheezes. She has no rhonchi. She has no rales.   Abdominal: Abdomen is soft. Bowel sounds are normal. She exhibits no distension. There is no abdominal tenderness. There is no rebound and no guarding.   Musculoskeletal:         General: No tenderness or edema. Normal range of motion.      Cervical back: Normal range of motion and neck supple.     Neurological: She is alert and oriented to person, place, and time. No cranial nerve deficit. GCS score is 15. GCS eye subscore is 4. GCS verbal subscore is 5. GCS motor subscore is 6.   Skin: Skin is warm and dry. No rash noted.   Psychiatric: She has a normal mood and affect. Her behavior is normal.       ED Course   Procedures  Labs Reviewed   CBC W/  AUTO DIFFERENTIAL - Abnormal; Notable for the following components:       Result Value    Gran # (ANC) 10.9 (*)     Gran % 86.8 (*)     Lymph % 7.9 (*)     Mono % 3.5 (*)     All other components within normal limits   COMPREHENSIVE METABOLIC PANEL - Abnormal; Notable for the following components:    Alkaline Phosphatase 54 (*)     All other components within normal limits   URINALYSIS, REFLEX TO URINE CULTURE - Abnormal; Notable for the following components:    Occult Blood UA Trace (*)     All other components within normal limits    Narrative:     Specimen Source->Urine   CULTURE, BLOOD   CULTURE, BLOOD   LACTIC ACID, PLASMA   D DIMER, QUANTITATIVE   LACTIC ACID, PLASMA   SARS-COV-2 RDRP GENE   POCT INFLUENZA A/B MOLECULAR   POCT URINE PREGNANCY   ISTAT LACTATE          Imaging Results              CT Chest With Contrast (Final result)  Result time 02/10/23 22:57:12      Final result by Nela Chaney MD (02/10/23 22:57:12)                   Impression:      1. Scattered multifocal patchy opacities within the left lung, more pronounced within the left lower lobe.  Findings may relate to infectious process or possible aspiration.  No confluent focal consolidation seen.  2. No evidence of esophageal injury, leak, or perforation.      Electronically signed by: Nela Chaney MD  Date:    02/10/2023  Time:    22:57               Narrative:    EXAMINATION:  CT CHEST WITH CONTRAST    CLINICAL HISTORY:  Aspiration;Sepsis;Assess for esophageal perf - Also give few sips of omni PO contrast prior to exam;    TECHNIQUE:  Low dose axial images, sagittal and coronal reformations were obtained from the thoracic inlet to the lung bases following the IV administration of 100 mL of Omnipaque 350.    COMPARISON:  CTA chest from 01/04/2023.    FINDINGS:  Structures at the base of the neck are unremarkable.  Aorta is non-aneurysmal.  The heart is normal in size without pericardial effusion.  Pulmonary arteries are well opacified  with no evidence of pulmonary embolus.  There is no evidence of mediastinal, axillary, or hilar lymph node enlargement.    The esophagus is unremarkable along its course.  Small volume oral contrast was administered just prior to exam.  Contrast extends into the stomach.  No evidence of esophageal injury, leak, or perforation.  No stasis or reflux of contrast seen.  No pneumomediastinum.    The trachea and bronchi are patent.  The lungs are symmetrically expanded.  Multifocal subtle patchy opacities are seen within the left lung, more pronounced within the left lower lobe.  No confluent consolidation seen.  No pleural effusion.  No pneumothorax.    The visualized abdominal structures show no acute abnormalities.  No acute osseous abnormality identified.  Extrathoracic soft tissues are unremarkable.                                       X-Ray Chest AP Portable (Final result)  Result time 02/10/23 20:03:51      Final result by Nela Chaney MD (02/10/23 20:03:51)                   Impression:      No acute cardiopulmonary process identified.      Electronically signed by: Nela Chaney MD  Date:    02/10/2023  Time:    20:03               Narrative:    EXAMINATION:  XR CHEST AP PORTABLE    CLINICAL HISTORY:  Sepsis;    TECHNIQUE:  Single frontal view of the chest was performed.    COMPARISON:  February 2022.    FINDINGS:  Cardiac silhouette is normal in size.  Lungs are symmetrically expanded.  No evidence of focal consolidative process, pneumothorax, or significant pleural effusion.  No acute osseous abnormality identified.                                       Medications   acetaminophen tablet 1,000 mg (1,000 mg Oral Given 2/10/23 1900)   sodium chloride 0.9% bolus 1,000 mL 1,000 mL (0 mLs Intravenous Stopped 2/10/23 2259)   sodium chloride 0.9% bolus 1,450 mL 1,450 mL (0 mLs Intravenous Stopped 2/11/23 0002)   ketorolac injection 15 mg (15 mg Intravenous Given 2/10/23 2044)   LORazepam injection 1 mg (1 mg  Intravenous Given 2/10/23 2201)   iohexoL (OMNIPAQUE 350) injection 100 mL (100 mLs Intravenous Given 2/10/23 2230)     Medical Decision Making:   Initial Assessment:   37-year-old female presenting with fever, tachycardia, cough, left-sided chest pain.  Distant history of small PE.  Reports symptoms feel different today.  Reports symptoms occurred after EGD.  Patient had mild sedation at that time.  Aspiration likely cause.  Pneumonia, PE possible.  Or hives or esophageal rupture less likely.  Patient denies pain with swallowing.  Will get chest x-ray, labs, D-dimer, reassess.  Will check flu and COVID.  ED Management:  D-dimer negative.  Flu and COVID negative.  No UTI.  Chest x-ray negative.  CT chest reveals likely small area of aspiration.  Suspect causes aspiration pneumonitis.  Tachycardia resolved with IV fluids.  Discussed with patient.  She is amenable to discharge home.  Discussed strict return precautions.                        Clinical Impression:   Final diagnoses:  [A41.9] Sepsis  [J69.0] Aspiration pneumonia of left lower lobe, unspecified aspiration pneumonia type (Primary)        ED Disposition Condition    Discharge Stable          ED Prescriptions    None       Follow-up Information       Follow up With Specialties Details Why Contact Info    [facility] [place] [facility] Emergency Medicine  As needed, If symptoms worsen [id number]  [facility] [id number] [phone]             Kel Arthur MD  02/11/23 7416

## 2023-02-14 LAB
BACTERIA BLD CULT: NORMAL
BACTERIA BLD CULT: NORMAL

## 2023-03-08 ENCOUNTER — OCCUPATIONAL HEALTH (OUTPATIENT)
Dept: URGENT CARE | Facility: CLINIC | Age: 38
End: 2023-03-08
Payer: COMMERCIAL

## 2023-03-08 DIAGNOSIS — Z13.9 ENCOUNTER FOR SCREENING: Primary | ICD-10-CM

## 2023-03-08 LAB
CTP QC/QA: YES
SARS-COV-2 AG RESP QL IA.RAPID: POSITIVE

## 2023-03-08 PROCEDURE — 87811 SARS-COV-2 COVID19 W/OPTIC: CPT | Mod: QW,S$GLB,, | Performed by: EMERGENCY MEDICINE

## 2023-03-08 PROCEDURE — 87811 SARS CORONAVIRUS 2 ANTIGEN POCT, MANUAL READ: ICD-10-PCS | Mod: QW,S$GLB,, | Performed by: EMERGENCY MEDICINE

## 2023-03-31 ENCOUNTER — OFFICE VISIT (OUTPATIENT)
Dept: OBSTETRICS AND GYNECOLOGY | Facility: CLINIC | Age: 38
End: 2023-03-31
Payer: COMMERCIAL

## 2023-03-31 VITALS
WEIGHT: 185.19 LBS | HEIGHT: 67 IN | DIASTOLIC BLOOD PRESSURE: 64 MMHG | BODY MASS INDEX: 29.07 KG/M2 | SYSTOLIC BLOOD PRESSURE: 120 MMHG

## 2023-03-31 DIAGNOSIS — Z01.419 WOMEN'S ANNUAL ROUTINE GYNECOLOGICAL EXAMINATION: Primary | ICD-10-CM

## 2023-03-31 DIAGNOSIS — Z12.4 ENCOUNTER FOR PAPANICOLAOU SMEAR FOR CERVICAL CANCER SCREENING: ICD-10-CM

## 2023-03-31 DIAGNOSIS — Z12.39 SCREENING BREAST EXAMINATION: ICD-10-CM

## 2023-03-31 DIAGNOSIS — Z11.3 ENCOUNTER FOR SCREENING EXAMINATION FOR SEXUALLY TRANSMITTED DISEASE: ICD-10-CM

## 2023-03-31 DIAGNOSIS — Z11.51 SCREENING FOR HUMAN PAPILLOMAVIRUS (HPV): ICD-10-CM

## 2023-03-31 DIAGNOSIS — Z30.431 IUD CHECK UP: ICD-10-CM

## 2023-03-31 PROCEDURE — 87624 HPV HI-RISK TYP POOLED RSLT: CPT | Performed by: PHYSICIAN ASSISTANT

## 2023-03-31 PROCEDURE — 87591 N.GONORRHOEAE DNA AMP PROB: CPT | Performed by: PHYSICIAN ASSISTANT

## 2023-03-31 PROCEDURE — 3078F PR MOST RECENT DIASTOLIC BLOOD PRESSURE < 80 MM HG: ICD-10-PCS | Mod: CPTII,S$GLB,, | Performed by: PHYSICIAN ASSISTANT

## 2023-03-31 PROCEDURE — 99999 PR PBB SHADOW E&M-EST. PATIENT-LVL III: CPT | Mod: PBBFAC,,, | Performed by: PHYSICIAN ASSISTANT

## 2023-03-31 PROCEDURE — 81514 NFCT DS BV&VAGINITIS DNA ALG: CPT | Performed by: PHYSICIAN ASSISTANT

## 2023-03-31 PROCEDURE — 1159F MED LIST DOCD IN RCRD: CPT | Mod: CPTII,S$GLB,, | Performed by: PHYSICIAN ASSISTANT

## 2023-03-31 PROCEDURE — 3008F PR BODY MASS INDEX (BMI) DOCUMENTED: ICD-10-PCS | Mod: CPTII,S$GLB,, | Performed by: PHYSICIAN ASSISTANT

## 2023-03-31 PROCEDURE — 3074F PR MOST RECENT SYSTOLIC BLOOD PRESSURE < 130 MM HG: ICD-10-PCS | Mod: CPTII,S$GLB,, | Performed by: PHYSICIAN ASSISTANT

## 2023-03-31 PROCEDURE — 88175 CYTOPATH C/V AUTO FLUID REDO: CPT | Performed by: PHYSICIAN ASSISTANT

## 2023-03-31 PROCEDURE — 99395 PREV VISIT EST AGE 18-39: CPT | Mod: S$GLB,,, | Performed by: PHYSICIAN ASSISTANT

## 2023-03-31 PROCEDURE — 3008F BODY MASS INDEX DOCD: CPT | Mod: CPTII,S$GLB,, | Performed by: PHYSICIAN ASSISTANT

## 2023-03-31 PROCEDURE — 3074F SYST BP LT 130 MM HG: CPT | Mod: CPTII,S$GLB,, | Performed by: PHYSICIAN ASSISTANT

## 2023-03-31 PROCEDURE — 99395 PR PREVENTIVE VISIT,EST,18-39: ICD-10-PCS | Mod: S$GLB,,, | Performed by: PHYSICIAN ASSISTANT

## 2023-03-31 PROCEDURE — 99213 OFFICE O/P EST LOW 20 MIN: CPT | Mod: PBBFAC | Performed by: PHYSICIAN ASSISTANT

## 2023-03-31 PROCEDURE — 99999 PR PBB SHADOW E&M-EST. PATIENT-LVL III: ICD-10-PCS | Mod: PBBFAC,,, | Performed by: PHYSICIAN ASSISTANT

## 2023-03-31 PROCEDURE — 1159F PR MEDICATION LIST DOCUMENTED IN MEDICAL RECORD: ICD-10-PCS | Mod: CPTII,S$GLB,, | Performed by: PHYSICIAN ASSISTANT

## 2023-03-31 PROCEDURE — 3078F DIAST BP <80 MM HG: CPT | Mod: CPTII,S$GLB,, | Performed by: PHYSICIAN ASSISTANT

## 2023-03-31 RX ORDER — ALBUTEROL SULFATE 90 UG/1
2 AEROSOL, METERED RESPIRATORY (INHALATION) EVERY 4 HOURS PRN
COMMUNITY
Start: 2023-03-02

## 2023-03-31 RX ORDER — IBUPROFEN 800 MG/1
800 TABLET ORAL 3 TIMES DAILY
Qty: 30 TABLET | Refills: 1 | Status: SHIPPED | OUTPATIENT
Start: 2023-03-31

## 2023-03-31 RX ORDER — BISOPROLOL FUMARATE 5 MG/1
2.5 TABLET, FILM COATED ORAL 2 TIMES DAILY
COMMUNITY
Start: 2023-02-10 | End: 2023-06-01

## 2023-03-31 RX ORDER — TALC
250 POWDER (GRAM) TOPICAL
COMMUNITY
Start: 2022-10-18 | End: 2023-07-27

## 2023-03-31 RX ORDER — DEXAMETHASONE 4 MG/1
1 TABLET ORAL 2 TIMES DAILY
COMMUNITY
Start: 2023-03-02

## 2023-03-31 RX ORDER — RIMEGEPANT SULFATE 75 MG/75MG
75 TABLET, ORALLY DISINTEGRATING ORAL DAILY PRN
COMMUNITY
Start: 2023-03-02

## 2023-03-31 NOTE — PROGRESS NOTES
HISTORY OF PRESENT ILLNESS:    Snow Barney is a 37 y.o. female, , with h/o PE, No LMP recorded. Patient has had an implant.,  presents for a routine exam. She uses MIRENA IUD for contraception. She does want STD screening.  Reports GYN complaints.    Reports irregular cycle and dysmenorrhea. States cycles started becoming irregular in December. She had Mirena placed in  - had regular cycles until January. Describes bleeding today as only clotting that she notices when she wipes.   The patient participates in regular exercise: Yes.  The patient does not smoke.  The patient wears seatbelts.   Pt denies any domestic violence. Yes -  tattoos or blood transfusions    SCREENING:   Pap:  nilm - HPV   Covid Vaccine Status:  incomplete - booster due   Mammogram: N/A  Colonoscopy: N/A   DEXA:  N/A     GYN FH:  Breast cancer: P Gma (dx 50-60)   Colon cancer: M Gma   Ovarian cancer: M cousin   Endometrial cancer: none    LAST   Snow Barney is a 36 y.o. female  presents with complaint of vaginal pain for 1 days. States she used monistat today which caused burning and swelling. Prior to monistat she was experiencing irritation following TVUS. She also tried gentian violet on a tampon for symptoms. She denies odor or discharge..  She denies h/o vaginitis. Denies nausea, vomiting, diarrhea, constipation, dysuria, dyspareunia, abdominal pain. She would like STD screening at this visit. No other concerns or complaints at this visit.      Snow Barney is a 36 y.o. female who presents for evaluation of INTERMENSTRUAL BLEEDING. States last cycle was , started spotting 2 days ago. States last few cycles have been very irregular-flow and days are irregular. Patient has no relevant history of abnormal sexual development. Pt has been having PVCs since January and does not know the cause, thinks it may be yoan-menopause. Factors that may be contributory to menstrual abnormalities include: recent stressors -  nursing student, [place]. Previous treatments for menstrual abnormalities include: copper IUD. COPPER IUD PLACED ON 11/21 6/21  YELLOW VAGINAL DISCHARGE NOTED LAST WEEK, NO ODOR, FEVER, VAG BURNING.  HER PARTNER WAS IN [place] IN MARCH AND PATIENT CONCERNED ABOUT POTENTIAL EXPOSURE.  WILL REFER FOR STI SCREENING.  ALSO WILL REFER FOR SMOKING CESSATION PROGRAM.  HAS A HISTORY OF QUESTIONABLE PROVOKED PE IN THE SETTING OF CIGARETTES AND PLAN B USE.  CONTRACEPTING WITH NFP, ADVISED RE PHEXXI AS NEEDED NEAR OVULATION AND RXN TO PHARMACY. IS IN [facility] NUR[facility] PROGRAM, AND NEXT ROTATION IS OB.   2/2021 LAURA:  Patient presents for annual exam.  The patient is sexually active. GYN screening history: last pap: was normal. The patient wears seatbelts: yes. The patient participates in regular exercise: yes. Has the patient ever been transfused or tattooed?: no. The patient reports that there is not domestic violence in her life.  Was able to stop taking anticoagulation and her menses have returned to normal.  Hasn't had BV in a while, but she slacked up on her probiotics and boric acid and now thinks she has BV again.      Past Medical History:   Diagnosis Date    Anxiety     Asthma     Depression     Fibrocystic breast disease     GERD (gastroesophageal reflux disease)     Hernia, hiatal     IBS (irritable bowel syndrome)     Inappropriate sinus tachycardia     POTS (postural orthostatic tachycardia syndrome)     Pulmonary emboli 2020    Sepsis        No past surgical history on file.    MEDICATIONS AND ALLERGIES:      Current Outpatient Medications:     albuterol (PROVENTIL/VENTOLIN HFA) 90 mcg/actuation inhaler, Inhale 2 puffs into the lungs every 4 (four) hours as needed., Disp: , Rfl:     bisoprolol (ZEBETA) 5 MG tablet, Take 2.5 mg by mouth 2 (two) times daily., Disp: , Rfl:     FLOVENT  mcg/actuation inhaler, Inhale 1 puff into the lungs 2 (two) times daily., Disp: , Rfl:     LORazepam (ATIVAN) 1 MG  tablet, Take 0.5 tablets (0.5 mg total) by mouth every 6 (six) hours as needed for Anxiety (Do not drive when taking.)., Disp: 10 tablet, Rfl: 0    magnesium oxide (MAG-OX) 250 mg magnesium Tab, Take 250 mg by mouth., Disp: , Rfl:     multivitamin capsule, Take 1 capsule by mouth., Disp: , Rfl:     NURTEC 75 mg odt, Take 75 mg by mouth daily as needed., Disp: , Rfl:     ondansetron (ZOFRAN-ODT) 4 MG TbDL, Take 1 tablet by mouth every 6 hours as needed for nausea, Disp: 10 tablet, Rfl: 0    pantoprazole (PROTONIX) 40 MG tablet, Take 40 mg by mouth 2 (two) times daily., Disp: , Rfl:     semaglutide (OZEMPIC) 1 mg/dose (4 mg/3 mL), INJECT 1MG INTO THE SKIN ONCE WEEKLY, Disp: 1 pen, Rfl: 3    sertraline (ZOLOFT) 50 MG tablet, Take 50 mg by mouth once daily., Disp: , Rfl:     ibuprofen (ADVIL,MOTRIN) 800 MG tablet, Take 1 tablet (800 mg total) by mouth 3 (three) times daily., Disp: 30 tablet, Rfl: 1    Current Facility-Administered Medications:     levonorgestreL (MIRENA) 20 mcg/24 hours (7 yrs) 52 mg IUD 1 Intra Uterine Device, 1 Intra Uterine Device, Intrauterine, , Brenda Rios MD, 1 Intra Uterine Device at 22 1045    Review of patient's allergies indicates:   Allergen Reactions    Demerol (pf) [meperidine (pf)] Hives       Family History   Problem Relation Age of Onset    Hypertension Mother     Diabetes Mother     Hypertension Father     Diabetes Father     Colon cancer Maternal Grandmother     Breast cancer Paternal Grandmother     Ovarian cancer Cousin        Social History     Socioeconomic History    Marital status: Single   Tobacco Use    Smoking status: Former     Packs/day: 1.00     Types: Cigarettes     Start date: 7/15/1996     Quit date: 2022     Years since quittin.2    Smokeless tobacco: Never    Tobacco comments:     varies from 7 cigarettes to whole pack in day   Substance and Sexual Activity    Alcohol use: Yes     Comment: ocassionally    Drug use: No    Sexual activity: Yes      "Partners: Male       COMPREHENSIVE GYN HISTORY:    PAP History: Denies abnormal Paps.  Infection History: Denies PID + hpv .  Benign History: Denies uterine fibroids. Denies ovarian cysts. Denies endometriosis. Denies other conditions. + fibrocystic breast disease   Cancer History: Denies cervical cancer. Denies uterine cancer or hyperplasia. Denies ovarian cancer. Denies vulvar cancer or pre-cancer. Denies vaginal cancer or pre-cancer. Denies breast cancer. Denies colon cancer.  Menstrual History: see hPI  Contraception:IUD    ROS:  GENERAL: No weight changes. No swelling. No fatigue. No fever.  CARDIOVASCULAR: No chest pain. No shortness of breath. No leg cramps.   NEUROLOGICAL: No headaches. No vision changes.  BREASTS: No pain. No lumps. No discharge.  ABDOMEN: No pain. No nausea. No vomiting. No diarrhea. No constipation.  REPRODUCTIVE: No abnormal bleeding.   VULVA: No pain. No lesions. No itching.  VAGINA: No relaxation. No itching. No odor. No discharge. No lesions.  URINARY: No incontinence. No nocturia. No frequency. No dysuria.    /64 (BP Location: Right arm, Patient Position: Sitting, BP Method: Medium (Manual))   Ht 5' 7" (1.702 m)   Wt 84 kg (185 lb 3 oz)   BMI 29.00 kg/m²     PE:  APPEARANCE: Well nourished, well developed, in no acute distress.  AFFECT: WNL, alert and oriented x 3.  SKIN: No acne or hirsutism.  NECK: Neck symmetric, without masses or thyromegaly.  NODES: No inguinal, cervical, axillary or femoral lymph node enlargement.  CHEST: Good respiratory effort.   ABDOMEN: Soft. No tenderness or masses. No hepatosplenomegaly. No hernias.  BREASTS: Symmetrical, no skin changes, visible lesions, palpable masses or nipple discharge bilaterally.  PELVIC: External female genitalia without lesions.  Female hair distribution. Adequate perineal body, Normal urethral meatus. Vagina moist and well rugated without lesions or discharge +blood.  No significant cystocele or rectocele present. " Cervix pink without lesions, discharge or tenderness ? Tip of IUD strings visualized at os.. Uterus is normal size, regular, mobile and nontender. Adnexa without masses or tenderness.  EXTREMITIES: No edema      DIAGNOSIS:  1. Women's annual routine gynecological examination        2. Encounter for Papanicolaou smear for cervical cancer screening  Liquid-Based Pap Smear, Screening      3. Screening breast examination        4. Screening for human papillomavirus (HPV)  HPV High Risk Genotypes, PCR      5. IUD check up  US Pelvis Comp with Transvag NON-OB (xpd      6. Encounter for screening examination for sexually transmitted disease  Vaginosis Screen by DNA Probe    C. trachomatis/N. gonorrhoeae by AMP DNA [facility]; Cervicovaginal          PLAN:  - Pap and HPV done today.  - Screening tests as ordered.  - Diet and exercise encouraged.  Condom use encouraged for STD prevention.  Seat belt use encouraged.    Counseling: Contraception:IUD: mirena    The patient was counseled today on ACS PAP guidelines, with recommendations for yearly pelvic exams unless their uterus, cervix, and ovaries were removed for benign reasons; in that case, examinations every 3-5 years are recommended.  The patient was also counseled regarding monthly breast self-examination, routine STD screening for at-risk populations, prophylactic immunizations for transmitted infections such as  HPV, Pertussis, or Influenza as appropriate, and yearly mammograms when indicated by ACS guidelines.  She was advised to see her primary care physician for all other health maintenance.    FOLLOW-UP with me annually.   Bettina Gage PA-C

## 2023-04-04 LAB
C TRACH DNA SPEC QL NAA+PROBE: NOT DETECTED
N GONORRHOEA DNA SPEC QL NAA+PROBE: NOT DETECTED

## 2023-04-11 ENCOUNTER — PATIENT MESSAGE (OUTPATIENT)
Dept: OBSTETRICS AND GYNECOLOGY | Facility: CLINIC | Age: 38
End: 2023-04-11
Payer: COMMERCIAL

## 2023-04-11 LAB
FINAL PATHOLOGIC DIAGNOSIS: NORMAL
Lab: NORMAL

## 2023-04-14 ENCOUNTER — HOSPITAL ENCOUNTER (OUTPATIENT)
Dept: RADIOLOGY | Facility: HOSPITAL | Age: 38
Discharge: HOME | End: 2023-04-14
Attending: PHYSICIAN ASSISTANT
Payer: COMMERCIAL

## 2023-04-14 DIAGNOSIS — Z30.431 IUD CHECK UP: ICD-10-CM

## 2023-04-14 PROCEDURE — 76830 TRANSVAGINAL US NON-OB: CPT | Mod: 26,,, | Performed by: RADIOLOGY

## 2023-04-14 PROCEDURE — 76856 US PELVIS COMP WITH TRANSVAG NON-OB (XPD): ICD-10-PCS | Mod: 26,,, | Performed by: RADIOLOGY

## 2023-04-14 PROCEDURE — 76856 US EXAM PELVIC COMPLETE: CPT | Mod: TC

## 2023-04-14 PROCEDURE — 76856 US EXAM PELVIC COMPLETE: CPT | Mod: 26,,, | Performed by: RADIOLOGY

## 2023-04-14 PROCEDURE — 76830 US PELVIS COMP WITH TRANSVAG NON-OB (XPD): ICD-10-PCS | Mod: 26,,, | Performed by: RADIOLOGY

## 2023-04-27 ENCOUNTER — PROCEDURE VISIT (OUTPATIENT)
Dept: OBSTETRICS AND GYNECOLOGY | Facility: CLINIC | Age: 38
End: 2023-04-27
Payer: COMMERCIAL

## 2023-04-27 VITALS — SYSTOLIC BLOOD PRESSURE: 114 MMHG | BODY MASS INDEX: 28.66 KG/M2 | DIASTOLIC BLOOD PRESSURE: 62 MMHG | WEIGHT: 183 LBS

## 2023-04-27 DIAGNOSIS — B97.7 HIGH RISK HPV INFECTION: Primary | ICD-10-CM

## 2023-04-27 LAB
B-HCG UR QL: NEGATIVE
CTP QC/QA: YES

## 2023-04-27 PROCEDURE — 81025 POCT URINE PREGNANCY: ICD-10-PCS | Mod: S$GLB,,, | Performed by: OBSTETRICS & GYNECOLOGY

## 2023-04-27 PROCEDURE — 57456 ENDOCERV CURETTAGE W/SCOPE: CPT | Mod: S$GLB,,, | Performed by: OBSTETRICS & GYNECOLOGY

## 2023-04-27 PROCEDURE — 88305 TISSUE EXAM BY PATHOLOGIST: CPT | Performed by: PATHOLOGY

## 2023-04-27 PROCEDURE — 88305 TISSUE EXAM BY PATHOLOGIST: CPT | Mod: 26,,, | Performed by: PATHOLOGY

## 2023-04-27 PROCEDURE — 88305 TISSUE EXAM BY PATHOLOGIST: ICD-10-PCS | Mod: 26,,, | Performed by: PATHOLOGY

## 2023-04-27 PROCEDURE — 81025 URINE PREGNANCY TEST: CPT | Mod: S$GLB,,, | Performed by: OBSTETRICS & GYNECOLOGY

## 2023-04-27 PROCEDURE — 57456 COLPOSCOPY: ICD-10-PCS | Mod: S$GLB,,, | Performed by: OBSTETRICS & GYNECOLOGY

## 2023-04-27 NOTE — PROCEDURES
Colposcopy    Date/Time: 4/27/2023 3:45 PM  Performed by: Brenda Rios MD  Authorized by: Brenda Rios MD     Consent Done?:  Yes (Written)  Timeout:Immediately prior to procedure a time out was called to verify the correct patient, procedure, equipment, support staff and site/side marked as required  Assistants?: No      Colposcopy Site:  Cervix  Position:  Supine  Acrowhite Lesion: No    Atypical Vessels: No    Transformation Zone Adequate?: Yes    Biopsy?: No    ECC Performed?: Yes    LEEP Performed?: No     Patient tolerated the procedure well with no immediate complications.   Post-operative instructions were provided for the patient.   Patient was discharged and will follow up if any complications occur

## 2023-05-03 LAB
FINAL PATHOLOGIC DIAGNOSIS: NORMAL
GROSS: NORMAL
Lab: NORMAL

## 2023-05-09 NOTE — ED NOTES
Bed: 21  Expected date:   Expected time:   Means of arrival:   Comments:  
Respiratory notified of ISTAT needed.   
Pt able to stand and bear weight, although w/ pain. Offered admission for analgesia, PT eval and prob NELIDA. PT does not want to stay. Pt in c/o son whom states he and brother can care for pt and this is preference. D/w son pain control, strict return precautions

## 2023-05-18 ENCOUNTER — TELEPHONE (OUTPATIENT)
Dept: PHARMACY | Facility: CLINIC | Age: 38
End: 2023-05-18
Payer: COMMERCIAL

## 2023-06-01 ENCOUNTER — HOSPITAL ENCOUNTER (EMERGENCY)
Facility: HOSPITAL | Age: 38
Discharge: HOME | End: 2023-06-01
Attending: EMERGENCY MEDICINE
Payer: COMMERCIAL

## 2023-06-01 VITALS
HEIGHT: 67 IN | TEMPERATURE: 98 F | OXYGEN SATURATION: 96 % | RESPIRATION RATE: 19 BRPM | WEIGHT: 177 LBS | BODY MASS INDEX: 27.78 KG/M2 | SYSTOLIC BLOOD PRESSURE: 102 MMHG | HEART RATE: 72 BPM | DIASTOLIC BLOOD PRESSURE: 58 MMHG

## 2023-06-01 DIAGNOSIS — I49.3 PVC (PREMATURE VENTRICULAR CONTRACTION): Primary | ICD-10-CM

## 2023-06-01 DIAGNOSIS — R00.2 PALPITATIONS: ICD-10-CM

## 2023-06-01 DIAGNOSIS — R00.2 PALPITATION: ICD-10-CM

## 2023-06-01 DIAGNOSIS — R55 NEAR SYNCOPE: ICD-10-CM

## 2023-06-01 LAB
ALBUMIN SERPL BCP-MCNC: 4.7 G/DL (ref 3.5–5.2)
ALP SERPL-CCNC: 66 U/L (ref 55–135)
ALT SERPL W/O P-5'-P-CCNC: 13 U/L (ref 10–44)
ANION GAP SERPL CALC-SCNC: 8 MMOL/L (ref 8–16)
AST SERPL-CCNC: 12 U/L (ref 10–40)
B-HCG UR QL: NEGATIVE
BASOPHILS # BLD AUTO: 0.05 K/UL (ref 0–0.2)
BASOPHILS NFR BLD: 0.7 % (ref 0–1.9)
BILIRUB SERPL-MCNC: 0.7 MG/DL (ref 0.1–1)
BNP SERPL-MCNC: <10 PG/ML (ref 0–99)
BUN SERPL-MCNC: 11 MG/DL (ref 6–20)
CALCIUM SERPL-MCNC: 10.2 MG/DL (ref 8.7–10.5)
CHLORIDE SERPL-SCNC: 106 MMOL/L (ref 95–110)
CO2 SERPL-SCNC: 25 MMOL/L (ref 23–29)
CREAT SERPL-MCNC: 1 MG/DL (ref 0.5–1.4)
CTP QC/QA: YES
DIFFERENTIAL METHOD: NORMAL
EOSINOPHIL # BLD AUTO: 0.1 K/UL (ref 0–0.5)
EOSINOPHIL NFR BLD: 1.3 % (ref 0–8)
ERYTHROCYTE [DISTWIDTH] IN BLOOD BY AUTOMATED COUNT: 13.2 % (ref 11.5–14.5)
EST. GFR  (NO RACE VARIABLE): >60 ML/MIN/1.73 M^2
GLUCOSE SERPL-MCNC: 102 MG/DL (ref 70–110)
HCT VFR BLD AUTO: 38 % (ref 37–48.5)
HGB BLD-MCNC: 13 G/DL (ref 12–16)
IMM GRANULOCYTES # BLD AUTO: 0.02 K/UL (ref 0–0.04)
IMM GRANULOCYTES NFR BLD AUTO: 0.3 % (ref 0–0.5)
LYMPHOCYTES # BLD AUTO: 2 K/UL (ref 1–4.8)
LYMPHOCYTES NFR BLD: 26.9 % (ref 18–48)
MAGNESIUM SERPL-MCNC: 1.8 MG/DL (ref 1.6–2.6)
MCH RBC QN AUTO: 29.7 PG (ref 27–31)
MCHC RBC AUTO-ENTMCNC: 34.2 G/DL (ref 32–36)
MCV RBC AUTO: 87 FL (ref 82–98)
MONOCYTES # BLD AUTO: 0.4 K/UL (ref 0.3–1)
MONOCYTES NFR BLD: 5.7 % (ref 4–15)
NEUTROPHILS # BLD AUTO: 4.9 K/UL (ref 1.8–7.7)
NEUTROPHILS NFR BLD: 65.1 % (ref 38–73)
NRBC BLD-RTO: 0 /100 WBC
PLATELET # BLD AUTO: 191 K/UL (ref 150–450)
PMV BLD AUTO: 10.6 FL (ref 9.2–12.9)
POTASSIUM SERPL-SCNC: 3.5 MMOL/L (ref 3.5–5.1)
PROT SERPL-MCNC: 7.8 G/DL (ref 6–8.4)
RBC # BLD AUTO: 4.38 M/UL (ref 4–5.4)
SODIUM SERPL-SCNC: 139 MMOL/L (ref 136–145)
TROPONIN I SERPL DL<=0.01 NG/ML-MCNC: <0.006 NG/ML (ref 0–0.03)
TSH SERPL DL<=0.005 MIU/L-ACNC: 1.08 UIU/ML (ref 0.4–4)
WBC # BLD AUTO: 7.48 K/UL (ref 3.9–12.7)

## 2023-06-01 PROCEDURE — 93005 ELECTROCARDIOGRAM TRACING: CPT

## 2023-06-01 PROCEDURE — 99285 EMERGENCY DEPT VISIT HI MDM: CPT | Mod: 25

## 2023-06-01 PROCEDURE — 84484 ASSAY OF TROPONIN QUANT: CPT | Performed by: EMERGENCY MEDICINE

## 2023-06-01 PROCEDURE — 25500020 PHARM REV CODE 255: Performed by: EMERGENCY MEDICINE

## 2023-06-01 PROCEDURE — 93010 ELECTROCARDIOGRAM REPORT: CPT | Mod: 76,,, | Performed by: INTERNAL MEDICINE

## 2023-06-01 PROCEDURE — 85025 COMPLETE CBC W/AUTO DIFF WBC: CPT | Performed by: EMERGENCY MEDICINE

## 2023-06-01 PROCEDURE — 96375 TX/PRO/DX INJ NEW DRUG ADDON: CPT

## 2023-06-01 PROCEDURE — 96374 THER/PROPH/DIAG INJ IV PUSH: CPT

## 2023-06-01 PROCEDURE — 93010 EKG 12-LEAD: ICD-10-PCS | Mod: 76,,, | Performed by: INTERNAL MEDICINE

## 2023-06-01 PROCEDURE — 63600175 PHARM REV CODE 636 W HCPCS: Performed by: EMERGENCY MEDICINE

## 2023-06-01 PROCEDURE — 83880 ASSAY OF NATRIURETIC PEPTIDE: CPT | Performed by: EMERGENCY MEDICINE

## 2023-06-01 PROCEDURE — 83735 ASSAY OF MAGNESIUM: CPT | Performed by: EMERGENCY MEDICINE

## 2023-06-01 PROCEDURE — 25000003 PHARM REV CODE 250: Performed by: EMERGENCY MEDICINE

## 2023-06-01 PROCEDURE — 80053 COMPREHEN METABOLIC PANEL: CPT | Performed by: EMERGENCY MEDICINE

## 2023-06-01 PROCEDURE — 81025 URINE PREGNANCY TEST: CPT | Performed by: EMERGENCY MEDICINE

## 2023-06-01 PROCEDURE — 84443 ASSAY THYROID STIM HORMONE: CPT | Performed by: EMERGENCY MEDICINE

## 2023-06-01 RX ORDER — METOPROLOL TARTRATE 25 MG/1
12.5 TABLET ORAL
Status: COMPLETED | OUTPATIENT
Start: 2023-06-01 | End: 2023-06-01

## 2023-06-01 RX ORDER — METOPROLOL SUCCINATE 25 MG/1
25 TABLET, EXTENDED RELEASE ORAL DAILY
Qty: 30 TABLET | Refills: 0 | Status: SHIPPED | OUTPATIENT
Start: 2023-06-01 | End: 2023-12-19

## 2023-06-01 RX ORDER — ONDANSETRON 2 MG/ML
4 INJECTION INTRAMUSCULAR; INTRAVENOUS
Status: COMPLETED | OUTPATIENT
Start: 2023-06-01 | End: 2023-06-01

## 2023-06-01 RX ORDER — LORAZEPAM 2 MG/ML
1 INJECTION INTRAMUSCULAR
Status: COMPLETED | OUTPATIENT
Start: 2023-06-01 | End: 2023-06-01

## 2023-06-01 RX ADMIN — ONDANSETRON 4 MG: 2 INJECTION INTRAMUSCULAR; INTRAVENOUS at 05:06

## 2023-06-01 RX ADMIN — SODIUM CHLORIDE, POTASSIUM CHLORIDE, SODIUM LACTATE AND CALCIUM CHLORIDE 1000 ML: 600; 310; 30; 20 INJECTION, SOLUTION INTRAVENOUS at 05:06

## 2023-06-01 RX ADMIN — IOHEXOL 70 ML: 350 INJECTION, SOLUTION INTRAVENOUS at 06:06

## 2023-06-01 RX ADMIN — METOPROLOL TARTRATE 12.5 MG: 25 TABLET, FILM COATED ORAL at 08:06

## 2023-06-01 RX ADMIN — LORAZEPAM 1 MG: 2 INJECTION INTRAMUSCULAR; INTRAVENOUS at 06:06

## 2023-06-01 NOTE — ED NOTES
Adult Physical Assessment  LOC: Snow Barney, 37 y.o. female verified via two identifiers.  The patient is awake, alert, oriented and speaking appropriately at this time.  APPEARANCE: Patient resting comfortably and appears to be in no acute distress at this time. Patient is clean and well groomed, patient's clothing is properly fastened.  SKIN:The skin is warm and dry, intermittent facial flushing noted, hot and cold intermittently. Skin  color consistent with ethnicity, patient has normal skin turgor and moist mucus membranes, skin intact, no breakdown or brusing noted.  MUSCULOSKELETAL: Patient moving all extremities well, no obvious swelling or deformities noted.  RESPIRATORY: Airway is open and patent, respirations are spontaneous, patient has a normal effort and rate, no accessory muscle use noted.  CARDIAC: 12 lead performed and reviewed by MD. Patient has a normal EKG.  No periphreal edema noted in any extremity, capillary refill < 3 seconds in all extremities  ABDOMEN: Soft and non tender to palpation, no abdominal distention noted. Bowel sounds present in all four quadrants.  NEUROLOGIC: Eyes open spontaneously, behavior appropriate to situation, follows commands, facial expression symmetrical, bilateral hand grasp equal and even, purposeful motor response noted, normal sensation in all extremities when touched with a finger.

## 2023-06-01 NOTE — ED PROVIDER NOTES
Encounter Date: 6/1/2023    SCRIBE #1 NOTE: I, Demetria Carson, am scribing for, and in the presence of,  Jayda Chou MD. I have scribed the following portions of the note - Other sections scribed: HPI, ROS, PE.     History     Chief Complaint   Patient presents with    Dizziness     Patient was working her shift and does endorse feeling PVCs earlier but she had a sudden onset of dizziness and SOB approx 20 min prior. Describes a very sudden hot flash and them dizziness as well as SOB. PMH significant for PE/ PVCs currently under the care of Dr. Asiya Stanton. No meds PTA.      37 y.o. female with PMHx of Anxiety, Asthma, Depression, GERD, Hiatal hernia, IBS, Sinus tachycardia, PE, and YUDY on CPAP who presents to the ED for chief complaint of an episode of sudden onset lightheadedness, blurry vision, tingling, hot flashes, and the feeling that she couldn't breathe occurring 30 minutes prior to evaluation. She had just sat down to begin charting when she felt herself having PVCs and episode of symptoms began. The symptoms resolved after a few seconds. The patient says that she has PVCs intermittently throughout the day, most prominent when she sits down after walking. Patient feels that today she went into ventricular tachycardia. She has worn a Holter in the past that captured 1 episode of Ventricular tachycardia of 3 beats. She has had similar episodes to today in the past, but reports that today's was worse than usual. Denies any prodrome and felt in her normal state of health, besides chronic PVCs, before episode began. At present, she is having hot/ cold flashes, shortness of breath (which she is attributing to her anxiety), and nausea. She took Zofran at 2PM. Denies any fever, cough, vomiting, or SOB at present. Patient denies feeling similar when she had her PE in the past. She is no longer on anticoagulation for PE. They felt that her prior PE was d/t birth control (now has Mirena). She consumed 1 medium starbucks  drink today, no other caffiene. Denies history of thyroid disease. Her cardiologist is Dr. Braden. Patient says he believes her PVCs are related to anxiety. Patient reports that she failed recent exercise stress test, had ST depression during nuclear stress test but passed. She expresses anxiety about her health as she had an aunt pass away secondary to an MI at 53. No PSHx. Drug allergy to Demerol. Patient takes protonix, zoloft, ozempic (for weight loss), ativan PRN, zofran PRN, trazadone RPN, melatonin, and albuterol. She is not on a betablocker. She consumes EtOH and smokes cigarettes socially (2-3 x month); denies recreational drug use.     The history is provided by the patient. No  was used.   Review of patient's allergies indicates:   Allergen Reactions    Demerol (pf) [meperidine (pf)] Hives     Past Medical History:   Diagnosis Date    Anxiety     Asthma     Depression     Fibrocystic breast disease     GERD (gastroesophageal reflux disease)     Hernia, hiatal     IBS (irritable bowel syndrome)     Inappropriate sinus tachycardia     POTS (postural orthostatic tachycardia syndrome)     Pulmonary emboli 2020    Sepsis      No past surgical history on file.  Family History   Problem Relation Age of Onset    Hypertension Mother     Diabetes Mother     Hypertension Father     Diabetes Father     Colon cancer Maternal Grandmother     Breast cancer Paternal Grandmother     Ovarian cancer Cousin      Social History     Tobacco Use    Smoking status: Former     Packs/day: 1.00     Types: Cigarettes     Start date: 7/15/1996     Quit date: 2022     Years since quittin.3    Smokeless tobacco: Never    Tobacco comments:     varies from 7 cigarettes to whole pack in day   Substance Use Topics    Alcohol use: Yes     Comment: ocassionally    Drug use: No     Review of Systems   Constitutional:  Negative for chills, diaphoresis and fever.        (+) Hot/ cold flashes   Eyes:  Negative for  photophobia and visual disturbance.   Respiratory:  Positive for shortness of breath (resolved). Negative for cough.    Cardiovascular:  Positive for palpitations. Negative for chest pain and leg swelling.   Gastrointestinal:  Positive for nausea. Negative for abdominal pain, blood in stool, constipation, diarrhea and vomiting.   Genitourinary:  Negative for dysuria, flank pain, frequency, hematuria and urgency.   Musculoskeletal:  Negative for neck pain and neck stiffness.   Skin:  Negative for rash and wound.   Neurological:  Positive for light-headedness. Negative for weakness, numbness and headaches.        (+) Tingling   Psychiatric/Behavioral:  Negative for confusion and suicidal ideas. The patient is nervous/anxious.    All other systems reviewed and are negative.    Physical Exam     Initial Vitals [06/01/23 1647]   BP Pulse Resp Temp SpO2   123/82 91 18 98 °F (36.7 °C) 100 %      MAP       --         Physical Exam    Nursing note and vitals reviewed.  Constitutional: She appears well-developed and well-nourished. She is not diaphoretic. No distress.   Tearful   HENT:   Head: Normocephalic and atraumatic.   Mouth/Throat: Oropharynx is clear and moist. No oropharyngeal exudate.   Eyes: Conjunctivae and EOM are normal. Pupils are equal, round, and reactive to light. Right eye exhibits no discharge. Left eye exhibits no discharge.   Neck: Neck supple. No JVD present.   Normal range of motion.  Cardiovascular:  Normal rate, regular rhythm, normal heart sounds and intact distal pulses.     Exam reveals no gallop and no friction rub.       No murmur heard.  No PVCs currently on monitor    Pulmonary/Chest: Breath sounds normal. No respiratory distress. She has no wheezes. She has no rhonchi. She has no rales.   Abdominal: Abdomen is soft. Bowel sounds are normal. She exhibits no distension. There is no abdominal tenderness. There is no rebound and no guarding.   Musculoskeletal:         General: No tenderness or  edema.      Cervical back: Normal range of motion and neck supple.     Lymphadenopathy:     She has no cervical adenopathy.   Neurological: She is alert and oriented to person, place, and time. She has normal strength. No cranial nerve deficit or sensory deficit. GCS score is 15. GCS eye subscore is 4. GCS verbal subscore is 5. GCS motor subscore is 6.   Moves all extremities and carries on conversation. CN- II: PERRL; III/IV/VI: EOMI w/out evidence of nystagmus; V: no deficits appreciated to light touch bilateral face; VII: no facial weakness, no facial asymmetry. Eyebrow raise symmetric. Smile symmetric; IX/X: palate midline, and raises symmetrically; XI: shoulder shrug 5/5 bilaterally; XII: tongue is midline w/out asymmetry. Strength 5/5 to bilateral upper and lower extremities, sensation intact to light touch,   Skin: Skin is warm and dry.   Psychiatric: Thought content normal. Her mood appears anxious.       ED Course   Procedures  Labs Reviewed   CBC W/ AUTO DIFFERENTIAL   COMPREHENSIVE METABOLIC PANEL   B-TYPE NATRIURETIC PEPTIDE   MAGNESIUM   TROPONIN I   TSH   POCT URINE PREGNANCY          Imaging Results              CTA Chest Non-Coronary (PE Studies) (Final result)  Result time 06/01/23 19:16:45      Final result by Nela Chaney MD (06/01/23 19:16:45)                   Impression:      No evidence of PE.  No acute intrathoracic abnormalities identified.      Electronically signed by: Nela Chaney MD  Date:    06/01/2023  Time:    19:16               Narrative:    EXAMINATION:  CTA CHEST NON CORONARY (PE STUDIES)    CLINICAL HISTORY:  Pulmonary embolism (PE) suspected, high prob;    TECHNIQUE:  Low dose axial images, sagittal and coronal reformations were obtained from the thoracic inlet to the lung bases following the IV administration of 70 mL of Omnipaque 350.  Contrast timing was optimized to evaluate the pulmonary arteries.  MIP images were performed.    COMPARISON:  CT chest  02/10/2023.    FINDINGS:  Structures at the base of the neck are unremarkable.  Aorta is non-aneurysmal.  The heart is normal in size without pericardial effusion.  No intraluminal filling defects within the pulmonary arteries to suggest pulmonary thromboembolism.   There is no evidence of mediastinal, axillary, or hilar lymph node enlargement.  Mild scattered air seen throughout the esophagus, noting the presence of a small hiatal hernia.    The trachea and bronchi are patent.  The lungs are symmetrically expanded.  Lungs show no consolidation, pleural effusion, pulmonary hemorrhage, or infarction.    The visualized abdominal structures show no acute abnormalities.  No acute osseous abnormality identified.  Extrathoracic soft tissues are unremarkable.                                       X-Ray Chest AP Portable (Final result)  Result time 06/01/23 17:37:52   Procedure changed from X-Ray Chest PA And Lateral     Final result by Nela Chaney MD (06/01/23 17:37:52)                   Impression:      No acute cardiopulmonary process identified.      Electronically signed by: Nela Chaney MD  Date:    06/01/2023  Time:    17:37               Narrative:    EXAMINATION:  XR CHEST AP PORTABLE    CLINICAL HISTORY:  chest pain; Palpitations    TECHNIQUE:  Single frontal view of the chest was performed.    COMPARISON:  02/10/2023.    FINDINGS:  Cardiac silhouette is normal in size.  Lungs are symmetrically expanded.  No evidence of focal consolidative process, pneumothorax, or significant pleural effusion.  No acute osseous abnormality identified.                                       Medications   lactated ringers bolus 1,000 mL (1,000 mLs Intravenous New Bag 6/1/23 1732)   ondansetron injection 4 mg (4 mg Intravenous Given 6/1/23 1732)   LORazepam injection 1 mg (1 mg Intravenous Given 6/1/23 1815)   iohexoL (OMNIPAQUE 350) injection 70 mL (70 mLs Intravenous Given 6/1/23 1856)   metoprolol tartrate (LOPRESSOR)  split tablet 12.5 mg (12.5 mg Oral Given 6/1/23 2049)     Medical Decision Making:   History:   Old Medical Records: I decided to obtain old medical records.  Old Records Summarized: records from clinic visits.       <> Summary of Records: External documents reviewed: Cardiology clinic note 3/2/23 patient to f/u with [facility] electrophysiology (no record found in chart). Dr. Braden notes that symptoms are more prevalent during ovulation.  1/17/2023 2 Week BG Mini:  Five days   Normal sinus rhythm average 95 maximum 145 minimum 73   Frequent VPCs 0.8% burden   APCs less than 0.01%  Recording during a period of quiescence for her    2/7/22 48 Hr holter:   Maximum heart rate  142 bpm  Minimum heart rate  60 bpm   Average heart rate  90 bpm  Sinus rhythm  PVCs - 140 with short bursts of bigeminy and trigeminy  SVEs - 0  Ischemia: Negative  Arrhythmias:  Negative    Independently Interpreted Test(s):   I have ordered and independently interpreted EKG Reading(s) - see summary below       <> Summary of EKG Reading(s): EKG independently interpreted by me reads: See ED course.   Clinical Tests:   Lab Tests: Reviewed and Ordered  Radiological Study: Ordered and Reviewed  Medical Tests: Reviewed and Ordered   MDM  36 yo female with PMhx of PVCs from which she is symptomatic, anxiety, remote PE invoked from contraception no longer on AC, depression, YUDY on CPAP, asthma, GERD presents for episode of palpitations associated with near syncope, shortness of breath, followed by anxiety. Has had episodes like this in the past but this was more severe and lasted longer. 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 She checked her BG which was WNL and had an EKG done which did not show PVCs, she then checked in for further evaluation. She states her symptoms have now resolved but it felt like a run of PVCs. She has seen cardiology for this in the past and has had stress test, echo, and Holter monitor. She is awaiting  a CT coronary. She has been told by cardiologist in the past that this is 2/2 anxiety and she would like a second opinion but other cardiologist. They previously tried her on bisprolol without improvements of her symptoms and they had discussed ablation vs flecainide but patient decided against that at the time.     Patient with normal exam by the time of my evaluation. No PVCs on tele monitor. She states these generally occur with ambulating and therefore were walked patient and were able to get EKG with frequent PVCs, no couplets or runs of PVCs noted.     Labs with no acute concerning findings, no metabolic derangement to explain patient's symptoms.     CTA PE without PE.     I discussed her case with cardiologist Dr. Castaneda who was in ED and reviewed her chart/EKGs. Recommends metoprolol 25 mg xl for symptomatic relief and continue follow up with cardiology. Discussed patient's low normal BP however he states metoprolol should not effect her blood pressure severely. We will try a 12.5 metoprolol tartrate here to ensure no severe drop in BP. Patient also have a cortisol test tomorrow morning. 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 I recommend that she check BP TID and return for any low readings (and hold metoprolol). She is in agreement and comfortable with this plan. She is requesting a second opinion by cardiology, will refer her to our cardiologists. Advised to avoid caffeine, tobacco and alcohol. Stay well hydrated and avoid stress. PCP follow up in 2-3 days. Strict return precautions for any repeat of symptoms.     Patient in agreement with plan and all questions answered. Will discharge at this time with her  at bedside.        Scribe Attestation:   Scribe #1: I performed the above scribed service and the documentation accurately describes the services I performed. I attest to the accuracy of the note.      ED Course as of 06/02/23 0025   u Jun 01, 2023 2130 EKG 12-lead  Normal sinus rhythm  with sinus arrhythmia at 86.  No ST elevation or significant depression.  T-wave inversions in V1 and T-wave flattening in aVL.  Normal axis.  QTC is 421.  Repeat EKG after patient walked does show frequent PVCs, she is 3 PVCs during her EKG.  She is sinus rhythm with sinus arrhythmia at 85.  No ST elevation or significant depression.  Normal axis.  T-wave flattening aVL and T-wave inversions in V1.  QTC is 433.  [JT]      ED Course User Index  [JT] Jayda Chou MD               I, Jayda Chou, personally performed the services described in this documentation.  All medical record entries made by the scribe were at my direction and in my presence.  I have reviewed the chart and agree that the record reflects my personal performance and is accurate and complete.  Clinical Impression:   Final diagnoses:  [R00.2] Palpitations  [R00.2] Palpitation  [I49.3] PVC (premature ventricular contraction) (Primary)  [R55] Near syncope        ED Disposition Condition    Discharge Stable          ED Prescriptions       Medication Sig Dispense Start Date End Date Auth. Provider    metoprolol succinate (TOPROL-XL) 25 MG 24 hr tablet Take 1 tablet (25 mg total) by mouth once daily. 30 tablet 6/1/2023 5/31/2024 Jayda Chou MD          Follow-up Information       Follow up With Specialties Details Why Contact Info Additional Information    [facility] Emergency Medicine  As needed, If symptoms worsen [address]  [facility] [address] [phone]     [facility]  Schedule an appointment as soon as possible for a visit in 2 days to discuss recent ED visit, to establish primary doctor [address] [facility] [address]  [phone]       [facility] [place] - Cardiology Cardiology Schedule an appointment as soon as possible for a visit in 2 days to discuss recent ED visit [address]  [facility] [address] [phone] Please park in garage or Medical Office Bldg. surface  lot and use Medical Ofc Bldg elevator. Check in at [facility] [address].             Jayda Chou MD  06/02/23 0035

## 2023-06-02 NOTE — DISCHARGE INSTRUCTIONS
Please return immediately for any lightheadedness, shortness of breath, chest pain, or palpitations. Please follow up closely with cardiology in the next 2-3 days. Please check your blood pressure three times a day and if you notice a low blood pressure, hold your metoprolol and present to the ED. Avoid caffeine, alcohol, and tobacco.     Thank you for coming to our Emergency Department today. As we discussed, it is important to remember that some problems are difficult to diagnose and may not be found during your Emergency Department visit. Be sure to follow up with your primary care doctor and review all labs/imaging/tests that were performed during this visit with them. Some labs/tests may be outside of the normal range and require non-emergent follow-up and further investigation to help diagnose/exclude/prevent complications or other medical conditions.    If you do not have a primary care doctor, you may contact the one listed on your discharge paperwork or you may also call the [facility] Appointment Desk at [phone] to schedule an appointment and establish care with one. It is important to your health that you have a primary care doctor.    Please take all medications as directed. All medications may potentially have side-effects and it is impossible to predict which medications may give you side-effects or what side-effects (if any) they will give you.. If you feel that you are having a negative effect or side-effect of any medication you should immediately stop taking them and seek medical attention. If you feel that you are having a life-threatening reaction call 911.    Return to the ER with any questions/concerns, new/concerning symptoms, worsening or failure to improve.     Do not drive, swim, climb to height, take a bath or make any important decisions for 24 hours if you have received any pain medications, sedatives or mood altering drugs during your ER visit.

## 2023-06-13 ENCOUNTER — OFFICE VISIT (OUTPATIENT)
Dept: CARDIOLOGY | Facility: CLINIC | Age: 38
End: 2023-06-13
Payer: COMMERCIAL

## 2023-06-13 VITALS
RESPIRATION RATE: 15 BRPM | OXYGEN SATURATION: 99 % | WEIGHT: 173.38 LBS | BODY MASS INDEX: 27.21 KG/M2 | HEART RATE: 94 BPM | SYSTOLIC BLOOD PRESSURE: 100 MMHG | DIASTOLIC BLOOD PRESSURE: 62 MMHG | HEIGHT: 67 IN

## 2023-06-13 DIAGNOSIS — I49.3 PVC (PREMATURE VENTRICULAR CONTRACTION): ICD-10-CM

## 2023-06-13 DIAGNOSIS — R00.2 PALPITATIONS: Primary | ICD-10-CM

## 2023-06-13 DIAGNOSIS — F41.9 ANXIETY: ICD-10-CM

## 2023-06-13 DIAGNOSIS — G43.009 MIGRAINE WITHOUT AURA AND WITHOUT STATUS MIGRAINOSUS, NOT INTRACTABLE: ICD-10-CM

## 2023-06-13 DIAGNOSIS — I26.99 PULMONARY EMBOLISM, UNSPECIFIED CHRONICITY, UNSPECIFIED PULMONARY EMBOLISM TYPE, UNSPECIFIED WHETHER ACUTE COR PULMONALE PRESENT: ICD-10-CM

## 2023-06-13 PROCEDURE — 3074F PR MOST RECENT SYSTOLIC BLOOD PRESSURE < 130 MM HG: ICD-10-PCS | Mod: CPTII,S$GLB,, | Performed by: INTERNAL MEDICINE

## 2023-06-13 PROCEDURE — 1159F PR MEDICATION LIST DOCUMENTED IN MEDICAL RECORD: ICD-10-PCS | Mod: CPTII,S$GLB,, | Performed by: INTERNAL MEDICINE

## 2023-06-13 PROCEDURE — 1160F RVW MEDS BY RX/DR IN RCRD: CPT | Mod: CPTII,S$GLB,, | Performed by: INTERNAL MEDICINE

## 2023-06-13 PROCEDURE — 1159F MED LIST DOCD IN RCRD: CPT | Mod: CPTII,S$GLB,, | Performed by: INTERNAL MEDICINE

## 2023-06-13 PROCEDURE — 3074F SYST BP LT 130 MM HG: CPT | Mod: CPTII,S$GLB,, | Performed by: INTERNAL MEDICINE

## 2023-06-13 PROCEDURE — 3008F PR BODY MASS INDEX (BMI) DOCUMENTED: ICD-10-PCS | Mod: CPTII,S$GLB,, | Performed by: INTERNAL MEDICINE

## 2023-06-13 PROCEDURE — 99204 PR OFFICE/OUTPT VISIT, NEW, LEVL IV, 45-59 MIN: ICD-10-PCS | Mod: S$GLB,,, | Performed by: INTERNAL MEDICINE

## 2023-06-13 PROCEDURE — 3078F DIAST BP <80 MM HG: CPT | Mod: CPTII,S$GLB,, | Performed by: INTERNAL MEDICINE

## 2023-06-13 PROCEDURE — 99204 OFFICE O/P NEW MOD 45 MIN: CPT | Mod: S$GLB,,, | Performed by: INTERNAL MEDICINE

## 2023-06-13 PROCEDURE — 3078F PR MOST RECENT DIASTOLIC BLOOD PRESSURE < 80 MM HG: ICD-10-PCS | Mod: CPTII,S$GLB,, | Performed by: INTERNAL MEDICINE

## 2023-06-13 PROCEDURE — 3008F BODY MASS INDEX DOCD: CPT | Mod: CPTII,S$GLB,, | Performed by: INTERNAL MEDICINE

## 2023-06-13 PROCEDURE — 1160F PR REVIEW ALL MEDS BY PRESCRIBER/CLIN PHARMACIST DOCUMENTED: ICD-10-PCS | Mod: CPTII,S$GLB,, | Performed by: INTERNAL MEDICINE

## 2023-06-13 PROCEDURE — 99999 PR PBB SHADOW E&M-EST. PATIENT-LVL V: ICD-10-PCS | Mod: PBBFAC,,, | Performed by: INTERNAL MEDICINE

## 2023-06-13 PROCEDURE — 99999 PR PBB SHADOW E&M-EST. PATIENT-LVL V: CPT | Mod: PBBFAC,,, | Performed by: INTERNAL MEDICINE

## 2023-06-13 RX ORDER — METOPROLOL TARTRATE 50 MG/1
50 TABLET ORAL
Qty: 4 TABLET | Refills: 0 | Status: SHIPPED | OUTPATIENT
Start: 2023-06-13 | End: 2023-12-19

## 2023-06-13 NOTE — PROGRESS NOTES
CARDIOVASCULAR CONSULTATION    REASON FOR CONSULT:   Snow Barney is a 37 y.o. female who presents for evaluation    HISTORY OF PRESENT ILLNESS:     Patient is a very pleasant 37-year-old lady who works as a nurse at [employer].  She states that she is been suffering from PVCs for some years now.  Was undergoing workup at [facility].  Was initiated on Toprol-XL and the dose has been recently increased to 25 mg daily.  States that occasionally gets PVCs, now the frequency has increased to almost daily.  She had an episode where she almost passed out and felt her heart racing very fast.  She was sitting at that time.  There was another episode when she was standing when her heart started beating very fast and she had a presyncopal episode.  Denies orthopnea, PND, swelling of feet.  Drinks about 40 oz of water a day.  Occasionally gets chest tightness.  States has significant family history of premature coronary artery disease and sudden cardiac death in the family had a stress test done at outside hospital which did not show any significant ischemia.  Echo and event monitor had not revealed any significant arrhythmias last year.  States that she had a pulmonary embolism in 2020 and was on anticoagulation for 3 months.  It was felt at that time that the pulmonary embolism was secondary to emergency contraceptive pill as per the patient.  She denies heavy alcohol use.  Used to be a smoker but quit last year.  Drug abuse in drug abuse in her  20s, cocaine and ecstasy.  But no recent drug abuse for many years.    2022       CONCLUSIONS     NSR 75 bpm     Normal right heart size     Mild TR / PAsys ~ 22 mmHg     LA borderline size / LAD 36 mm, Qasim 27 cc/m2     Normal MV / minimal MR - early systolic     Borderline LV internal dimension          LVEDD 53 mm, EPSS 9 mm        LVESD 37 mm     LV EF measures 54%       No LV systolic wall motion abns identified     Normal diastolic parameters     Normal aortic annulus /  three leaflet aortic valve     No AS and no AR     minimal pericardial effusion       REC clinical correlation        CONCLUSIONS   Probably normal treadmill nuclear stress test with breast attenuation.   Normal GATED study - EF 64% without wall motion abnormalities.   Perfusion scans with fixed anteroseptal defect with normal thickening likely attenuation artifact.   No angina at 6 minutes on Law protocol treadmill stress (7 METS).      2/23:      Preliminary Findings   *The observed rhythms are sinus rhythm to sinus tachycardia.   *The Maximum Heart Rate recorded was 145 bpm, Day 1 / 05:22:07 pm, the   Minimum Heart Rate recorded was 73   bpm, Day 2 / 02:32:49 am and the Average Heart Rate was 95 bpm.   *There were 4974 PVCs with a burden of 0.8 %.   *There were 9 PSVCs with a burden of < 0.01 %.   *There were 30 Patient reported events.     Sinus rhythm with intermittent sinus tachycardia with a gradual increase   in rate. No sudden onset or offset occurrences. Rare PACs. Infrequent   PVCs. Ventricular bigeminy and trigeminy.            PAST MEDICAL HISTORY:     Past Medical History:   Diagnosis Date    Anxiety     Asthma     Depression     Fibrocystic breast disease     GERD (gastroesophageal reflux disease)     Hernia, hiatal     IBS (irritable bowel syndrome)     Inappropriate sinus tachycardia     POTS (postural orthostatic tachycardia syndrome)     Pulmonary emboli 2020    Sepsis        PAST SURGICAL HISTORY:   No past surgical history on file.    ALLERGIES AND MEDICATION:     Review of patient's allergies indicates:   Allergen Reactions    Demerol (pf) [meperidine (pf)] Hives        Medication List            Accurate as of June 13, 2023  8:57 AM. If you have any questions, ask your nurse or doctor.                CONTINUE taking these medications      * albuterol 90 mcg/actuation inhaler  Commonly known as: PROVENTIL/VENTOLIN HFA     * albuterol 90 mcg/actuation inhaler  Commonly known as:  PROVENTIL/VENTOLIN HFA  INHALE 2 PUFFS INTO THE LUNGS EVERY 4 HOURS AS NEEDED FOR WHEEZING OR SHORTNESS OF BREATH     * FLOVENT  mcg/actuation inhaler  Generic drug: fluticasone propionate     * FLOVENT  mcg/actuation inhaler  Generic drug: fluticasone propionate  INHALE 1 PUFF INTO THE LUNGS TWICE DAILY     ibuprofen 800 MG tablet  Commonly known as: ADVIL,MOTRIN  Take 1 tablet (800 mg total) by mouth 3 (three) times daily.     LORazepam 1 MG tablet  Commonly known as: ATIVAN  Take 0.5 tablets (0.5 mg total) by mouth every 6 (six) hours as needed for Anxiety (Do not drive when taking.).     magnesium oxide 250 mg magnesium Tab  Commonly known as: MAG-OX     metoprolol succinate 25 MG 24 hr tablet  Commonly known as: TOPROL-XL  Take 1 tablet (25 mg total) by mouth once daily.     multivitamin capsule     * NURTEC 75 mg odt  Generic drug: rimegepant     * NURTEC 75 mg odt  Generic drug: rimegepant  TAKE 1 TABLET BY MOUTH ONCE DAILY AS NEEDED     * ondansetron 4 MG Tbdl  Commonly known as: ZOFRAN-ODT  Take 1 tablet by mouth every 6 hours as needed for nausea     * ondansetron 4 MG Tbdl  Commonly known as: ZOFRAN-ODT  DISSOLVE 1 TABLET BY MOUTH EVERY 6 HOURS AS NEEDED FOR NAUSEA OR VOMITING     OZEMPIC 1 mg/dose (4 mg/3 mL)  Generic drug: semaglutide  INJECT 1MG INTO THE SKIN ONCE WEEKLY     * pantoprazole 40 MG tablet  Commonly known as: PROTONIX     * pantoprazole 40 MG tablet  Commonly known as: PROTONIX  TAKE 1 TABLET BY MOUTH TWICE DAILY     * sertraline 50 MG tablet  Commonly known as: ZOLOFT  TAKE 1 TABLET BY MOUTH EVERY DAY AS DIRECTED     * sertraline 50 MG tablet  Commonly known as: ZOLOFT     traZODone 50 MG tablet  Commonly known as: DESYREL  TAKE 1 TABLET BY MOUTH EVERY DAY           * This list has 12 medication(s) that are the same as other medications prescribed for you. Read the directions carefully, and ask your doctor or other care provider to review them with you.             "      SOCIAL HISTORY:     Social History     Socioeconomic History    Marital status: Single   Tobacco Use    Smoking status: Former     Packs/day: 1.00     Types: Cigarettes     Start date: 7/15/1996     Quit date: 2022     Years since quittin.4    Smokeless tobacco: Never    Tobacco comments:     varies from 7 cigarettes to whole pack in day   Substance and Sexual Activity    Alcohol use: Yes     Comment: ocassionally    Drug use: No    Sexual activity: Yes     Partners: Male       FAMILY HISTORY:     Family History   Problem Relation Age of Onset    Hypertension Mother     Diabetes Mother     Hypertension Father     Diabetes Father     Colon cancer Maternal Grandmother     Breast cancer Paternal Grandmother     Ovarian cancer Cousin        REVIEW OF SYSTEMS:   Review of Systems   Constitutional: Negative.   HENT: Negative.     Eyes: Negative.    Cardiovascular:  Positive for chest pain, near-syncope and palpitations.   Respiratory: Negative.     Endocrine: Negative.    Hematologic/Lymphatic: Negative.    Skin: Negative.    Musculoskeletal: Negative.    Gastrointestinal: Negative.    Genitourinary: Negative.    Neurological: Negative.    Psychiatric/Behavioral: Negative.     Allergic/Immunologic: Negative.      A 10 point review of systems was performed and all the pertinent positives have been mentioned. Rest of review of systems was negative.        PHYSICAL EXAM:     Vitals:    23 0810   BP: 100/62   Pulse: 94   Resp: 15    Body mass index is 27.16 kg/m².  Weight: 78.7 kg (173 lb 6.3 oz)   Height: 5' 7" (170.2 cm)     Physical Exam  Constitutional:       Appearance: Normal appearance. She is well-developed.   HENT:      Head: Normocephalic.   Eyes:      Pupils: Pupils are equal, round, and reactive to light.   Cardiovascular:      Rate and Rhythm: Normal rate and regular rhythm.   Pulmonary:      Effort: Pulmonary effort is normal.      Breath sounds: Normal breath sounds.   Abdominal:      " General: Bowel sounds are normal.      Palpations: Abdomen is soft.      Tenderness: There is no abdominal tenderness.   Musculoskeletal:         General: Normal range of motion.      Cervical back: Normal range of motion and neck supple.   Skin:     General: Skin is warm.   Neurological:      Mental Status: She is alert and oriented to person, place, and time.         DATA:     Laboratory:  CBC:  Recent Labs   Lab 01/04/23  2023 02/10/23  1836 06/01/23  1723   WBC 7.22 12.54 7.48   Hemoglobin 13.7 13.4 13.0   Hematocrit 41.7 40.1 38.0   Platelets 192 154 191       CHEMISTRIES:  Recent Labs   Lab 01/30/22 2036 02/03/22  0855 02/15/22  0919 02/24/22 1900 07/30/22 0816 01/04/23  2023 01/04/23  2043 02/10/23  1836 06/01/23  1723   Glucose 129 H   < > 103 108 102 132 H  --  100 102   Sodium 139   < > 140 140 141 140  --  142 139   Potassium 3.9   < > 3.9 3.7 4.2 4.0  --  3.9 3.5   BUN 16   < > 9 8 13 17  --  13 11   Creatinine 0.9   < > 0.8 0.8 0.7 0.8  --  0.8 1.0   eGFR if  >60   < > >60 >60 >60  --   --   --   --    eGFR if non African American >60   < > >60 >60 >60  --   --   --   --    Calcium 9.6   < > 9.7 9.7 9.3 9.6  --  9.5 10.2   Magnesium 1.5 L  --   --   --   --   --  1.9  --  1.8    < > = values in this interval not displayed.       CARDIAC BIOMARKERS:  Recent Labs   Lab 02/24/22 1900 01/04/23 2023 06/01/23  1723   Troponin I <0.006 <0.006 <0.006       COAGS:  Recent Labs   Lab 08/11/20  0922 11/10/20  0245   INR 1.0 1.0       LIPIDS/LFTS:  Recent Labs   Lab 01/04/23  2023 02/10/23  1836 06/01/23  1723   AST 14 16 12   ALT 17 17 13       No results found for: HGBA1C    TSH  Recent Labs   Lab 02/03/22  0855 04/05/22  1418 06/01/23  1723   TSH 1.223 0.801 1.076       The ASCVD Risk score (Brittney CALVO, et al., 2019) failed to calculate for the following reasons:    The 2019 ASCVD risk score is only valid for ages 40 to 79       BNP    Lab Results   Component Value Date/Time    BNP <10  06/01/2023 05:23 PM    BNP 11 01/04/2023 08:23 PM    BNP <10 02/24/2022 07:00 PM    BNP <10 11/10/2020 01:25 AM    BNP 15 11/02/2020 01:30 PM    BNP 21 07/14/2020 09:05 PM    BNP <10 06/01/2019 01:55 AM    BNP 14 04/22/2018 06:20 PM    BNP <10 05/28/2015 07:03 PM             ASSESSMENT AND PLAN     Patient Active Problem List   Diagnosis    Palpitations    Grief reaction    Self-inflicted injury    Pulmonary embolism    Chest pain    SOB (shortness of breath)    Anxiety    Sinus tachycardia    Tobacco abuse    Migraine without aura and without status migrainosus, not intractable    Vertiginous migraine         Patient with near-syncope.  Family history of premature CAD.  Used to be a smoker in the past, quit last year.  Will do further evaluation with the help of coronary CTA.  Continue Toprol-XL.  Check echocardiogram and Holter    Follow-up after testing        Thank you very much for involving me in the care of your patient.  Please do not hesitate to contact me if there are any questions.      Parrish Mckeon MD, FACC, [facility]  Interventional Cardiologist, [facility].           This note was dictated with the help of speech recognition software.  There might be un-intended errors and/or substitutions.

## 2023-06-28 ENCOUNTER — PATIENT MESSAGE (OUTPATIENT)
Dept: CARDIOLOGY | Facility: CLINIC | Age: 38
End: 2023-06-28
Payer: COMMERCIAL

## 2023-07-03 ENCOUNTER — HOSPITAL ENCOUNTER (OUTPATIENT)
Dept: RADIOLOGY | Facility: HOSPITAL | Age: 38
Discharge: HOME | End: 2023-07-03
Attending: INTERNAL MEDICINE
Payer: COMMERCIAL

## 2023-07-03 VITALS
HEART RATE: 92 BPM | RESPIRATION RATE: 18 BRPM | DIASTOLIC BLOOD PRESSURE: 72 MMHG | OXYGEN SATURATION: 100 % | SYSTOLIC BLOOD PRESSURE: 116 MMHG

## 2023-07-03 DIAGNOSIS — I49.3 PVC (PREMATURE VENTRICULAR CONTRACTION): ICD-10-CM

## 2023-07-03 PROCEDURE — 75574 CT ANGIO HRT W/3D IMAGE: CPT | Mod: 26,,, | Performed by: INTERNAL MEDICINE

## 2023-07-03 PROCEDURE — 25500020 PHARM REV CODE 255: Performed by: INTERNAL MEDICINE

## 2023-07-03 PROCEDURE — 25000242 PHARM REV CODE 250 ALT 637 W/ HCPCS: Performed by: INTERNAL MEDICINE

## 2023-07-03 PROCEDURE — 75574 CTA CARDIAC: ICD-10-PCS | Mod: 26,,, | Performed by: INTERNAL MEDICINE

## 2023-07-03 PROCEDURE — 75574 CT ANGIO HRT W/3D IMAGE: CPT | Mod: TC

## 2023-07-03 RX ORDER — NITROGLYCERIN 0.4 MG/1
TABLET SUBLINGUAL
Status: DISPENSED
Start: 2023-07-03 | End: 2023-07-03

## 2023-07-03 RX ORDER — METOPROLOL TARTRATE 1 MG/ML
INJECTION, SOLUTION INTRAVENOUS
Status: DISCONTINUED
Start: 2023-07-03 | End: 2023-07-03 | Stop reason: WASHOUT

## 2023-07-03 RX ORDER — NITROGLYCERIN 0.4 MG/1
0.4 TABLET SUBLINGUAL ONCE
Status: COMPLETED | OUTPATIENT
Start: 2023-07-03 | End: 2023-07-03

## 2023-07-03 RX ADMIN — NITROGLYCERIN 0.4 MG: 0.4 TABLET, ORALLY DISINTEGRATING SUBLINGUAL at 10:07

## 2023-07-03 RX ADMIN — IOHEXOL 100 ML: 350 INJECTION, SOLUTION INTRAVENOUS at 10:07

## 2023-07-03 NOTE — CARE UPDATE
Pt arrived to CT in NAD, ID x2, allergies , PMH and home meds reviewed. Pt states she did not take lopressor and was instructed to give to take lopressor after arrival to CT. Baselin heart=80's-90's. Oral lopressor given. See Mar and VS in computer.

## 2023-07-03 NOTE — CARE UPDATE
Pt tolerated CT well, did c/o of slight headache post nitro Heart rate in 60's for most of scan. Pt ambulated back to lobby with staff.

## 2023-07-03 NOTE — CARE UPDATE
Heart rate still 80-90's after 50mg lopressor. Dr. Flores notifi  ed and states to have patient take 100mg mores and recheck in one hour.

## 2023-07-10 ENCOUNTER — HOSPITAL ENCOUNTER (OUTPATIENT)
Dept: CARDIOLOGY | Facility: HOSPITAL | Age: 38
Discharge: HOME | End: 2023-07-10
Attending: INTERNAL MEDICINE
Payer: COMMERCIAL

## 2023-07-10 DIAGNOSIS — I49.3 PVC (PREMATURE VENTRICULAR CONTRACTION): ICD-10-CM

## 2023-07-10 LAB
ASCENDING AORTA: 2.78 CM
AV INDEX (PROSTH): 0.8
AV MEAN GRADIENT: 4 MMHG
AV PEAK GRADIENT: 6 MMHG
AV VALVE AREA: 2.73 CM2
AV VELOCITY RATIO: 0.81
CV ECHO LV RWT: 0.32 CM
DOP CALC AO PEAK VEL: 1.19 M/S
DOP CALC AO VTI: 26.6 CM
DOP CALC LVOT AREA: 3.4 CM2
DOP CALC LVOT DIAMETER: 2.08 CM
DOP CALC LVOT PEAK VEL: 0.96 M/S
DOP CALC LVOT STROKE VOLUME: 72.68 CM3
DOP CALCLVOT PEAK VEL VTI: 21.4 CM
E WAVE DECELERATION TIME: 162.93 MSEC
E/A RATIO: 1.63
E/E' RATIO: 6.5 M/S
ECHO LV POSTERIOR WALL: 0.83 CM (ref 0.6–1.1)
EJECTION FRACTION: 55 %
FRACTIONAL SHORTENING: 26 % (ref 28–44)
INTERVENTRICULAR SEPTUM: 0.71 CM (ref 0.6–1.1)
IVC DIAMETER: 1.89 CM
IVRT: 98.95 MSEC
LA MAJOR: 4.83 CM
LA MINOR: 4.85 CM
LA WIDTH: 3.4 CM
LEFT ATRIUM SIZE: 3.6 CM
LEFT ATRIUM VOLUME: 50.36 CM3
LEFT INTERNAL DIMENSION IN SYSTOLE: 3.81 CM (ref 2.1–4)
LEFT VENTRICLE DIASTOLIC VOLUME: 128.02 ML
LEFT VENTRICLE SYSTOLIC VOLUME: 62.36 ML
LEFT VENTRICULAR INTERNAL DIMENSION IN DIASTOLE: 5.17 CM (ref 3.5–6)
LEFT VENTRICULAR MASS: 136.98 G
LV LATERAL E/E' RATIO: 4.88 M/S
LV SEPTAL E/E' RATIO: 9.75 M/S
LVOT MG: 2.44 MMHG
LVOT MV: 0.76 CM/S
MV PEAK A VEL: 0.48 M/S
MV PEAK E VEL: 0.78 M/S
MV STENOSIS PRESSURE HALF TIME: 47.25 MS
MV VALVE AREA P 1/2 METHOD: 4.66 CM2
PISA TR MAX VEL: 1.45 M/S
PULM VEIN S/D RATIO: 1.43
PV PEAK D VEL: 0.3 M/S
PV PEAK S VEL: 0.43 M/S
PV PEAK VELOCITY: 0.95 CM/S
RA MAJOR: 4.26 CM
RA PRESSURE: 3 MMHG
RIGHT VENTRICULAR END-DIASTOLIC DIMENSION: 3.04 CM
RV TISSUE DOPPLER FREE WALL SYSTOLIC VELOCITY 1 (APICAL 4 CHAMBER VIEW): 14.57 CM/S
SINUS: 3.19 CM
STJ: 2.25 CM
TDI LATERAL: 0.16 M/S
TDI SEPTAL: 0.08 M/S
TDI: 0.12 M/S
TR MAX PG: 8 MMHG
TRICUSPID ANNULAR PLANE SYSTOLIC EXCURSION: 2.02 CM
TV PEAK GRADIENT: 1.5 MMHG
TV REST PULMONARY ARTERY PRESSURE: 11 MMHG

## 2023-07-10 PROCEDURE — 93227 XTRNL ECG REC<48 HR R&I: CPT | Mod: ,,, | Performed by: INTERNAL MEDICINE

## 2023-07-10 PROCEDURE — 93226 XTRNL ECG REC<48 HR SCAN A/R: CPT

## 2023-07-10 PROCEDURE — 93227 HOLTER MONITOR - 48 HOUR (CUPID ONLY): ICD-10-PCS | Mod: ,,, | Performed by: INTERNAL MEDICINE

## 2023-07-10 PROCEDURE — 93306 ECHO (CUPID ONLY): ICD-10-PCS | Mod: 26,,, | Performed by: INTERNAL MEDICINE

## 2023-07-10 PROCEDURE — 93306 TTE W/DOPPLER COMPLETE: CPT

## 2023-07-10 PROCEDURE — 93306 TTE W/DOPPLER COMPLETE: CPT | Mod: 26,,, | Performed by: INTERNAL MEDICINE

## 2023-07-11 ENCOUNTER — PATIENT MESSAGE (OUTPATIENT)
Dept: CARDIOLOGY | Facility: CLINIC | Age: 38
End: 2023-07-11
Payer: COMMERCIAL

## 2023-07-12 LAB
OHS CV EVENT MONITOR DAY: 2
OHS CV HOLTER LENGTH DECIMAL HOURS: 96
OHS CV HOLTER LENGTH HOURS: 48
OHS CV HOLTER LENGTH MINUTES: 0
OHS CV HOLTER SINUS AVERAGE HR: 91
OHS CV HOLTER SINUS MAX HR: 143
OHS CV HOLTER SINUS MIN HR: 61

## 2023-07-21 ENCOUNTER — OFFICE VISIT (OUTPATIENT)
Dept: ALLERGY | Facility: CLINIC | Age: 38
End: 2023-07-21
Payer: COMMERCIAL

## 2023-07-21 ENCOUNTER — LAB VISIT (OUTPATIENT)
Dept: LAB | Facility: HOSPITAL | Age: 38
End: 2023-07-21
Attending: ALLERGY & IMMUNOLOGY
Payer: COMMERCIAL

## 2023-07-21 VITALS
HEART RATE: 86 BPM | WEIGHT: 175.5 LBS | SYSTOLIC BLOOD PRESSURE: 104 MMHG | BODY MASS INDEX: 27.49 KG/M2 | OXYGEN SATURATION: 99 % | DIASTOLIC BLOOD PRESSURE: 64 MMHG

## 2023-07-21 DIAGNOSIS — H10.403 CHRONIC CONJUNCTIVITIS OF BOTH EYES, UNSPECIFIED CHRONIC CONJUNCTIVITIS TYPE: ICD-10-CM

## 2023-07-21 DIAGNOSIS — I26.99 PULMONARY EMBOLISM, UNSPECIFIED CHRONICITY, UNSPECIFIED PULMONARY EMBOLISM TYPE, UNSPECIFIED WHETHER ACUTE COR PULMONALE PRESENT: ICD-10-CM

## 2023-07-21 DIAGNOSIS — Z91.038 HISTORY OF INSECT STING ALLERGY: ICD-10-CM

## 2023-07-21 DIAGNOSIS — J31.0 CHRONIC RHINITIS: ICD-10-CM

## 2023-07-21 DIAGNOSIS — R00.2 PALPITATIONS: ICD-10-CM

## 2023-07-21 DIAGNOSIS — T78.3XXA ANGIOEDEMA, INITIAL ENCOUNTER: ICD-10-CM

## 2023-07-21 DIAGNOSIS — G43.009 MIGRAINE WITHOUT AURA AND WITHOUT STATUS MIGRAINOSUS, NOT INTRACTABLE: ICD-10-CM

## 2023-07-21 DIAGNOSIS — J45.20 ASTHMA IN ADULT, MILD INTERMITTENT, UNCOMPLICATED: ICD-10-CM

## 2023-07-21 DIAGNOSIS — L50.1 IDIOPATHIC URTICARIA: Primary | ICD-10-CM

## 2023-07-21 LAB
25(OH)D3+25(OH)D2 SERPL-MCNC: 29 NG/ML (ref 30–96)
IGE SERPL-ACNC: <35 IU/ML (ref 0–100)
THYROGLOB AB SERPL IA-ACNC: <4 IU/ML (ref 0–3.9)
THYROPEROXIDASE IGG SERPL-ACNC: <6 IU/ML
TSH SERPL DL<=0.005 MIU/L-ACNC: 0.89 UIU/ML (ref 0.4–4)

## 2023-07-21 PROCEDURE — 3078F DIAST BP <80 MM HG: CPT | Mod: CPTII,S$GLB,, | Performed by: ALLERGY & IMMUNOLOGY

## 2023-07-21 PROCEDURE — 3074F SYST BP LT 130 MM HG: CPT | Mod: CPTII,S$GLB,, | Performed by: ALLERGY & IMMUNOLOGY

## 2023-07-21 PROCEDURE — 1159F MED LIST DOCD IN RCRD: CPT | Mod: CPTII,S$GLB,, | Performed by: ALLERGY & IMMUNOLOGY

## 2023-07-21 PROCEDURE — 99205 OFFICE O/P NEW HI 60 MIN: CPT | Mod: S$GLB,,, | Performed by: ALLERGY & IMMUNOLOGY

## 2023-07-21 PROCEDURE — 99999 PR PBB SHADOW E&M-EST. PATIENT-LVL IV: ICD-10-PCS | Mod: PBBFAC,,, | Performed by: ALLERGY & IMMUNOLOGY

## 2023-07-21 PROCEDURE — 86003 ALLG SPEC IGE CRUDE XTRC EA: CPT | Performed by: ALLERGY & IMMUNOLOGY

## 2023-07-21 PROCEDURE — 3074F PR MOST RECENT SYSTOLIC BLOOD PRESSURE < 130 MM HG: ICD-10-PCS | Mod: CPTII,S$GLB,, | Performed by: ALLERGY & IMMUNOLOGY

## 2023-07-21 PROCEDURE — 84165 PROTEIN E-PHORESIS SERUM: CPT | Mod: 26,,, | Performed by: PATHOLOGY

## 2023-07-21 PROCEDURE — 84165 PROTEIN E-PHORESIS SERUM: CPT | Performed by: ALLERGY & IMMUNOLOGY

## 2023-07-21 PROCEDURE — 1159F PR MEDICATION LIST DOCUMENTED IN MEDICAL RECORD: ICD-10-PCS | Mod: CPTII,S$GLB,, | Performed by: ALLERGY & IMMUNOLOGY

## 2023-07-21 PROCEDURE — 36415 COLL VENOUS BLD VENIPUNCTURE: CPT | Performed by: ALLERGY & IMMUNOLOGY

## 2023-07-21 PROCEDURE — 88184 FLOWCYTOMETRY/ TC 1 MARKER: CPT | Performed by: ALLERGY & IMMUNOLOGY

## 2023-07-21 PROCEDURE — 86800 THYROGLOBULIN ANTIBODY: CPT | Performed by: ALLERGY & IMMUNOLOGY

## 2023-07-21 PROCEDURE — 86376 MICROSOMAL ANTIBODY EACH: CPT | Performed by: ALLERGY & IMMUNOLOGY

## 2023-07-21 PROCEDURE — 84165 PATHOLOGIST INTERPRETATION SPE: ICD-10-PCS | Mod: 26,,, | Performed by: PATHOLOGY

## 2023-07-21 PROCEDURE — 1160F PR REVIEW ALL MEDS BY PRESCRIBER/CLIN PHARMACIST DOCUMENTED: ICD-10-PCS | Mod: CPTII,S$GLB,, | Performed by: ALLERGY & IMMUNOLOGY

## 2023-07-21 PROCEDURE — 82306 VITAMIN D 25 HYDROXY: CPT | Performed by: ALLERGY & IMMUNOLOGY

## 2023-07-21 PROCEDURE — 86003 ALLG SPEC IGE CRUDE XTRC EA: CPT | Mod: 59 | Performed by: ALLERGY & IMMUNOLOGY

## 2023-07-21 PROCEDURE — 3008F BODY MASS INDEX DOCD: CPT | Mod: CPTII,S$GLB,, | Performed by: ALLERGY & IMMUNOLOGY

## 2023-07-21 PROCEDURE — 99999 PR PBB SHADOW E&M-EST. PATIENT-LVL IV: CPT | Mod: PBBFAC,,, | Performed by: ALLERGY & IMMUNOLOGY

## 2023-07-21 PROCEDURE — 83520 IMMUNOASSAY QUANT NOS NONAB: CPT | Performed by: ALLERGY & IMMUNOLOGY

## 2023-07-21 PROCEDURE — 84443 ASSAY THYROID STIM HORMONE: CPT | Performed by: ALLERGY & IMMUNOLOGY

## 2023-07-21 PROCEDURE — 3008F PR BODY MASS INDEX (BMI) DOCUMENTED: ICD-10-PCS | Mod: CPTII,S$GLB,, | Performed by: ALLERGY & IMMUNOLOGY

## 2023-07-21 PROCEDURE — 1160F RVW MEDS BY RX/DR IN RCRD: CPT | Mod: CPTII,S$GLB,, | Performed by: ALLERGY & IMMUNOLOGY

## 2023-07-21 PROCEDURE — 99205 PR OFFICE/OUTPT VISIT, NEW, LEVL V, 60-74 MIN: ICD-10-PCS | Mod: S$GLB,,, | Performed by: ALLERGY & IMMUNOLOGY

## 2023-07-21 PROCEDURE — 82785 ASSAY OF IGE: CPT | Performed by: ALLERGY & IMMUNOLOGY

## 2023-07-21 PROCEDURE — 3078F PR MOST RECENT DIASTOLIC BLOOD PRESSURE < 80 MM HG: ICD-10-PCS | Mod: CPTII,S$GLB,, | Performed by: ALLERGY & IMMUNOLOGY

## 2023-07-21 NOTE — PROGRESS NOTES
Snow Barney is referred by Dr. PARNELL for a consult.    [facility] ALLERGY QUESTIONNAIRE LONG 7/17/2023   Head or facial pain: No symptoms   Eyes: No symptoms   Do you have difficulty wearing contacts, if applicable?  No   Ears: No symptoms   Do you have ear infections? No   Do you have ear tubes? No   Did you have ear surgery? No   Nose: Itching   Did you have a blocked nose? No   Did you have nasal surgery? No   Has your nose ever been broken? No   Throat: Itching, Frequent clearing, Reflux/heartburn   Sinuses: No symptoms   Have you had x-rays done for your sinuses? No   Have you had a CT scan done for your sinuses? No   Lungs: Apnea or sleep apnea, Use of inhalers   When was your last chest x-ray, if known and applicable? 2023   Was your last chest x-ray normal or abnormal, if applicable? Normal   Have you ever has a tuberculosis skin test?  Yes   When did you have a tuberculosis skin test? 2023   Was your tuberculosis skin test positive or negative? Negative   Have you ever had a lung-function test? Yes   When was your lung-function test? 2020   Have you had a flu shot this year? Yes   When was your flu shot? Spring   Have you had the pneumonia vaccine?  No   Do you have any known problems with your immune system? No   Do you suspect you may have problems with your immune system? Yes   Do you have frequent infections? No   Skin: Itching, Hives, Redness, Rash, Bruising   When did your hives and/or swelling first begin? It happens randomly   Please note the frequency of hives and/or swelling in days, weeks OR months. Months   Body location most commonly affected by hives: Arms neck back face   Body location most commonly affected by swelling: Other   If you chose other, please list the body part that swells most commonly. Arms   Are you taking this medication regularly? Yes   Have you associated the hives or swelling with any of the following? Not applicable   Have you had any other associated symptoms with the hives  or swelling such as: Not applicable   When did these symptoms first occur? 4th grade for wasps, 2010 for random allergic reactions   Are they getting worse or better? Worse   How often do these symptoms occur? Every few months or more   When do these symptoms occur? After I eat   Do they occur year round? Yes   If there is any seasonal variation in your symptoms, when are they worse? No   Is there a particular time of the day or night when the symptoms are worse? No   Is there anything you have identified, which can cause symptoms or make them worse? (such as dust, grass, plant or animal products, mold, heat, cold, strong odors, exercise) No   Is there anything you have identified, which can make symptoms better?  Benadryl   What medications have you tried in the past to help control these symptoms?  Benadryl   Please list all the vitamins or herbal medications you are taking. Womens multi vitamin   Have you ever seen an allergist for these symptoms? No   Have you ever had skin tests? No   Have you ever had any other type of allergy testing? No   Have you ever had allergy shots? No   Do you have food allergies? Yes   Please list the food(s), type of reaction(s) and last date of reaction(s) I don't know yet but the reactions happen after eating   Do you have insect allergies? Yes   Please list the insect](s), type of reaction(s), last date of reaction(s), and whether or not you went to the emergency as a result.  Yellow jackets, anaphylaxis   Do you have latex allergies? Yes   Please list the latex product(s), type of reaction(s), last date of reaction(s), and whether or not you went to the emergency as a result.  Suspected. I will get a rash   Constitution No symptoms   Cardiovascular: Palpitations   Gastrointestinal: Abdominal bloating, Heartburn/ indigestion/ reflux   Genital/ urinary: No symptoms   Musculoskeletal: No symptoms   Endocrine: No symptoms   Hematologic: No symptoms   Please note which family members  have allergies or asthma and specify which they have. Father has asthma, Mother seasonal allergies and cats and bees, Son severe seasonal allergies and animals   How long have you lived at your current address? 2 year   Has your residence ever had water or flood damage? No   Is there any evidence of mold in the house? No   Does your house have: Central air conditioning, Window a/c units, Wood-burning fireplace, Gas heat   Does your bedroom have: Carpeting   What type of pillow do you have, for example feather, foam and fiberfill?  Cotton   Do you have pets? Yes   Does anyone in the house smoke? 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  Yes        Physical Examination:  General: Well-developed, well-nourished, no acute distress.  Head: No sinus tenderness.  Eyes: Conjunctivae:  No bulbar or palpebral conjunctival injection.  Ears: EAC's clear.  TM's clear.  No pre-auricular nodes.  Nose: Nasal Mucosa:  Pink.  Septum: No apparent deviation.  Turbinates:  No significant edema.  Polyps/Mass:  None visible.  Teeth/Gums:  No bleeding noted.  Oropharynx: No exudates.  Neck: Supple without thyromegaly. No cervical lymphadenopathy.    Respiratory/Chest: Effort: Good.  Auscultation:  Clear bilaterally.  Cardiovascular:  No murmur, rubs, or gallop heard.   GI:  Non-tender.  No masses.  No organomegaly.  Extremities:  No cyanosis, clubbing, or edema.  Skin: Good turgor.  No urticaria or angioedema.  Neuro/Psych: Oriented x 3.    Assessment:    Recommendations:

## 2023-07-21 NOTE — PROGRESS NOTES
Snow Barney is a 37-year-old female who presents to clinic today for evaluation of chronic recurrent urticaria. She is here alone. 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    When she was in the fourth grade she went on a field trip for school.  She was with a  who stuck a stick in a hole in the ground.  Many yellow jackets came out and she was stung by about 20.  She does not remember much after this but woke up in the emergency department later.  She was told she had urticaria, angioedema of the lips, and respiratory difficulty.  She was told she had had an anaphylactic reaction.  She had an EpiPen for awhile but does not now.  She does not think she saw an allergist.    She has not been stung by yellow jackets since then.     She does have a bush in her yard that does have them.  She is concerned about being stung again.     She has had a large local reaction after a wasp sting.  She had a large local reaction after an ant sting.    About 13 years ago she had two episodes within two weeks of swelling of her face.  She does not remember having urticaria.  That resolved and she did not have until recently.    2-1/2 years ago she started having a recurrent rash that is red, raised, and pruritic.  It lasts about 30 minutes before resolving.  She takes Benadryl as needed.    Several months ago she would an episode of tongue swelling after eating [place].    She has also had angioedema on the left eyelid not related to food.    She has attributed the urticaria to the act of eating. It may happen with multiple foods but particularly spicy Mexican food.  She also has lesions when she does not eat but that is not as common.    She has had red itching hands after using latex gloves. She has also had lip tingling after blowing up balloons.    She recently had a rash after taking magnesium taurate twice on two different days.  She took Benadryl with resolution.    She has chronic recurrent  itching of the eyes and nose with mild sneezing and clear rhinorrhea.      She does have frequent throat clearing and difficulty swallowing. She has had esophagitis in the past.  She has GERD that is not controlled on Protonix one or two a day.  She may also take Pepcid.    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  She had a Holter monitor recently and had a rash under each of the lead pads.    She was taking metoprolol with that has been discontinued.  She thinks that it increased her depression.    She had a pulmonary embolus in 2020.  She thinks it was due to emergency contraceptives.  She was on anticoagulants for about three months.    She saw a pulmonologist and had a PFT and methacholine challenge test that was mildly positive.  She was diagnosed with asthma and given an inhaler. She does not think she has asthma.     She was also diagnosed with sleep apnea but thinks this has improved since she is lost 30 pounds.     She is now taking Ozempic for weight loss.    She has migraines and takes Zyrtec as needed.     She has a mood disorder and was prescribed Lamictal but has not yet started because of contraindications related to her PVCs.  She takes trazodone.    She has ADD.    She used to smoke.    [facility] ALLERGY QUESTIONNAIRE LONG 7/17/2023   Head or facial pain: No symptoms   Eyes: No symptoms   Do you have difficulty wearing contacts, if applicable?  No   Ears: No symptoms   Do you have ear infections? No   Do you have ear tubes? No   Did you have ear surgery? No   Nose: Itching   Did you have a blocked nose? No   Did you have nasal surgery? No   Has your nose ever been broken? No   Throat: Itching, Frequent clearing, Reflux/heartburn   Sinuses: No symptoms   Have you had x-rays done for your sinuses? No   Have you had a CT scan done for your sinuses? No   Lungs: Apnea or sleep apnea, Use of inhalers   When was your last chest x-ray, if known and applicable? 2023   Was your last chest x-ray normal  or abnormal, if applicable? Normal   Have you ever has a tuberculosis skin test?  Yes   When did you have a tuberculosis skin test? 2023   Was your tuberculosis skin test positive or negative? Negative   Have you ever had a lung-function test? Yes   When was your lung-function test? 2020   Have you had a flu shot this year? Yes   When was your flu shot? Spring   Have you had the pneumonia vaccine?  No   Do you have any known problems with your immune system? No   Do you suspect you may have problems with your immune system? Yes   Do you have frequent infections? No   Skin: Itching, Hives, Redness, Rash, Bruising   When did your hives and/or swelling first begin? It happens randomly   Please note the frequency of hives and/or swelling in days, weeks OR months. Months   Body location most commonly affected by hives: Arms neck back face   Body location most commonly affected by swelling: Other   If you chose other, please list the body part that swells most commonly. Arms   Are you taking this medication regularly? Yes   Have you associated the hives or swelling with any of the following? Not applicable   Have you had any other associated symptoms with the hives or swelling such as: Not applicable   When did these symptoms first occur? 4th grade for wasps, 2010 for random allergic reactions   Are they getting worse or better? Worse   How often do these symptoms occur? Every few months or more   When do these symptoms occur? After I eat   Do they occur year round? Yes   If there is any seasonal variation in your symptoms, when are they worse? No   Is there a particular time of the day or night when the symptoms are worse? No   Is there anything you have identified, which can cause symptoms or make them worse? (such as dust, grass, plant or animal products, mold, heat, cold, strong odors, exercise) No   Is there anything you have identified, which can make symptoms better?  Benadryl   What medications have you tried in  the past to help control these symptoms?  Benadryl   Please list all the vitamins or herbal medications you are taking. Womens multi vitamin   Have you ever seen an allergist for these symptoms? No   Have you ever had skin tests? No   Have you ever had any other type of allergy testing? No   Have you ever had allergy shots? No   Do you have food allergies? Yes   Please list the food(s), type of reaction(s) and last date of reaction(s) I don't know yet but the reactions happen after eating   Do you have insect allergies? Yes   Please list the insect](s), type of reaction(s), last date of reaction(s), and whether or not you went to the emergency as a result.  Yellow jackets, anaphylaxis   Do you have latex allergies? Yes   Please list the latex product(s), type of reaction(s), last date of reaction(s), and whether or not you went to the emergency as a result.  Suspected. I will get a rash   Constitution No symptoms   Cardiovascular: Palpitations   Gastrointestinal: Abdominal bloating, Heartburn/ indigestion/ reflux   Genital/ urinary: No symptoms   Musculoskeletal: No symptoms   Endocrine: No symptoms   Hematologic: No symptoms   Please note which family members have allergies or asthma and specify which they have. Father has asthma, Mother seasonal allergies and cats and bees, Son severe seasonal allergies and animals   How long have you lived at your current address? 2 year   Has your residence ever had water or flood damage? No   Is there any evidence of mold in the house? No   Does your house have: Central air conditioning, Window a/c units, Wood-burning fireplace, Gas heat   Does your bedroom have: Carpeting   What type of pillow do you have, for example feather, foam and fiberfill?  Cotton   Do you have pets? Yes   Does anyone in the house smoke? 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  Yes      Physical Examination:  General: Well-developed,  well-nourished, no acute distress.  Head: No sinus tenderness.  Eyes: Conjunctivae:  No bulbar or palpebral conjunctival injection.  Ears: EAC's clear.  TM's clear.  No pre-auricular nodes.  Nose: Nasal Mucosa:  Pink.  Septum: No apparent deviation.  Turbinates:  No significant edema.  Polyps/Mass:  None visible.  Teeth/Gums:  No bleeding noted.  Oropharynx: No exudates.  Neck: Supple without thyromegaly. No cervical lymphadenopathy.    Respiratory/Chest: Effort: Good.  Auscultation:  Clear bilaterally.  Cardiovascular:  No murmur, rubs, or gallop heard.   GI:  Non-tender.  No masses.  No organomegaly.  Extremities:  No cyanosis, clubbing, or edema.  Skin: Good turgor.  No urticaria or angioedema.  Neuro/Psych: Oriented x 3.    Assessment:  1. Recurrent urticaria and angioedema, probably idiopathic.  2. Doubt food allergy.  3. Chronic rhinitis, consider allergic.  4. Chronic conjunctivitis, consider allergic.   5. History of asthma.   6. History of anaphylactic reaction to yellow jacket sting.   7. Consider latex allergy.  8. GERD, uncontrolled.   9. Probable LPR.  10. Pulmonary embolus 2020.   11. Frequent PVC's.  12. Migraines on Nurtec.   13. Obesity on Ozempic.   14. History of sleep apnea, now controlled.  15. Mood disorder.   16. ADD.    Recommendations:  1. Laboratory as ordered.   2. Take pictures of any further lesions.  3. Take food journal with any lesions.   4. OTC antihistamines as needed.  5. Return to clinic in one week.   6. Consider food skin testing off antihistamines.     I spent a total of 70 minutes on the day of the visit.This includes face to face time and non-face to face time preparing to see the patient (eg, review of tests), obtaining and/or reviewing separately obtained history, documenting clinical information in the electronic or other health record, independently interpreting results and communicating results to the patient/family/caregiver, or care coordinator.

## 2023-07-24 LAB
ALBUMIN SERPL ELPH-MCNC: 4.7 G/DL (ref 3.35–5.55)
ALPHA1 GLOB SERPL ELPH-MCNC: 0.26 G/DL (ref 0.17–0.41)
ALPHA2 GLOB SERPL ELPH-MCNC: 0.65 G/DL (ref 0.43–0.99)
B-GLOBULIN SERPL ELPH-MCNC: 0.89 G/DL (ref 0.5–1.1)
GAMMA GLOB SERPL ELPH-MCNC: 1.01 G/DL (ref 0.67–1.58)
PROT SERPL-MCNC: 7.5 G/DL (ref 6–8.4)

## 2023-07-25 LAB
A ALTERNATA IGE QN: <0.1 KU/L
A FUMIGATUS IGE QN: <0.1 KU/L
ALLERGEN LATEX IGE: <0.1 KU/L
BERMUDA GRASS IGE QN: <0.1 KU/L
CAT DANDER IGE QN: <0.1 KU/L
CEDAR IGE QN: <0.1 KU/L
D FARINAE IGE QN: <0.1 KU/L
D PTERONYSS IGE QN: <0.1 KU/L
DEPRECATED A ALTERNATA IGE RAST QL: NORMAL
DEPRECATED A FUMIGATUS IGE RAST QL: NORMAL
DEPRECATED BERMUDA GRASS IGE RAST QL: NORMAL
DEPRECATED CAT DANDER IGE RAST QL: NORMAL
DEPRECATED CEDAR IGE RAST QL: NORMAL
DEPRECATED D FARINAE IGE RAST QL: NORMAL
DEPRECATED D PTERONYSS IGE RAST QL: NORMAL
DEPRECATED DOG DANDER IGE RAST QL: NORMAL
DEPRECATED ELDER IGE RAST QL: NORMAL
DEPRECATED ENGL PLANTAIN IGE RAST QL: NORMAL
DEPRECATED PECAN/HICK TREE IGE RAST QL: NORMAL
DEPRECATED ROACH IGE RAST QL: NORMAL
DEPRECATED TIMOTHY IGE RAST QL: NORMAL
DEPRECATED WEST RAGWEED IGE RAST QL: NORMAL
DEPRECATED WHITE OAK IGE RAST QL: NORMAL
DEPRECATED YELLOW JACKET IGE RAST QL: ABNORMAL
DOG DANDER IGE QN: <0.1 KU/L
ELDER IGE QN: <0.1 KU/L
ENGL PLANTAIN IGE QN: <0.1 KU/L
LATEX CLASS: NORMAL
PATHOLOGIST INTERPRETATION SPE: NORMAL
PECAN/HICK TREE IGE QN: <0.1 KU/L
ROACH IGE QN: <0.1 KU/L
TIMOTHY IGE QN: <0.1 KU/L
TRYPTASE LEVEL: 3.3 NG/ML
WEST RAGWEED IGE QN: <0.1 KU/L
WHITE OAK IGE QN: <0.1 KU/L
YELLOW JACKET IGE QN: 0.2 KU/L

## 2023-07-27 ENCOUNTER — OFFICE VISIT (OUTPATIENT)
Dept: ALLERGY | Facility: CLINIC | Age: 38
End: 2023-07-27
Payer: COMMERCIAL

## 2023-07-27 ENCOUNTER — HOSPITAL ENCOUNTER (OUTPATIENT)
Dept: PULMONOLOGY | Facility: CLINIC | Age: 38
Discharge: HOME | End: 2023-07-27
Payer: COMMERCIAL

## 2023-07-27 VITALS — DIASTOLIC BLOOD PRESSURE: 63 MMHG | BODY MASS INDEX: 27.49 KG/M2 | WEIGHT: 175.5 LBS | SYSTOLIC BLOOD PRESSURE: 109 MMHG

## 2023-07-27 DIAGNOSIS — Z87.09 HISTORY OF ASTHMA: ICD-10-CM

## 2023-07-27 DIAGNOSIS — L50.1 IDIOPATHIC URTICARIA: ICD-10-CM

## 2023-07-27 DIAGNOSIS — G43.009 MIGRAINE WITHOUT AURA AND WITHOUT STATUS MIGRAINOSUS, NOT INTRACTABLE: ICD-10-CM

## 2023-07-27 DIAGNOSIS — Z91.038 ALLERGY TO INSECT VENOM: ICD-10-CM

## 2023-07-27 DIAGNOSIS — J31.0 CHRONIC RHINITIS: Primary | ICD-10-CM

## 2023-07-27 DIAGNOSIS — T78.3XXD ANGIOEDEMA, SUBSEQUENT ENCOUNTER: ICD-10-CM

## 2023-07-27 DIAGNOSIS — K21.9 GASTROESOPHAGEAL REFLUX DISEASE, UNSPECIFIED WHETHER ESOPHAGITIS PRESENT: ICD-10-CM

## 2023-07-27 DIAGNOSIS — R00.0 SINUS TACHYCARDIA: Chronic | ICD-10-CM

## 2023-07-27 DIAGNOSIS — I26.99 PULMONARY EMBOLISM, UNSPECIFIED CHRONICITY, UNSPECIFIED PULMONARY EMBOLISM TYPE, UNSPECIFIED WHETHER ACUTE COR PULMONALE PRESENT: ICD-10-CM

## 2023-07-27 DIAGNOSIS — J45.20 ASTHMA IN ADULT, MILD INTERMITTENT, UNCOMPLICATED: ICD-10-CM

## 2023-07-27 LAB
DLCO ADJ PRE: 25.72 ML/(MIN*MMHG) (ref 22.24–33.71)
DLCO SINGLE BREATH LLN: 22.24
DLCO SINGLE BREATH PRE REF: 90.8 %
DLCO SINGLE BREATH REF: 27.97
DLCOC SBVA LLN: 3.73
DLCOC SBVA PRE REF: 94.6 %
DLCOC SBVA REF: 5.14
DLCOC SINGLE BREATH LLN: 22.24
DLCOC SINGLE BREATH PRE REF: 92 %
DLCOC SINGLE BREATH REF: 27.97
DLCOCSBVAULN: 6.55
DLCOCSINGLEBREATHULN: 33.71
DLCOSINGLEBREATHULN: 33.71
DLCOVA LLN: 3.73
DLCOVA PRE REF: 93.4 %
DLCOVA PRE: 4.8 ML/(MIN*MMHG*L) (ref 3.73–6.55)
DLCOVA REF: 5.14
DLCOVAULN: 6.55
DLVAADJ PRE: 4.86 ML/(MIN*MMHG*L) (ref 3.73–6.55)
ERV LLN: -16448.82
ERV PRE REF: 98.9 %
ERV REF: 1.18
ERVULN: ABNORMAL
FEF 25 75 LLN: 2.16
FEF 25 75 PRE REF: 60 %
FEF 25 75 REF: 3.5
FET100 CHG: -3.7 %
FEV05 LLN: 1.45
FEV05 REF: 2.31
FEV1 CHG: 8.7 %
FEV1 FVC LLN: 71
FEV1 FVC PRE REF: 87.9 %
FEV1 FVC REF: 83
FEV1 LLN: 2.7
FEV1 PRE REF: 83.1 %
FEV1 REF: 3.39
FEV1 VOL CHG: 0.24
FRCPLETH LLN: 2.03
FRCPLETH PREREF: 98.1 %
FRCPLETH REF: 2.85
FRCPLETHULN: 3.67
FVC CHG: 3.8 %
FVC LLN: 3.3
FVC PRE REF: 93.9 %
FVC REF: 4.13
FVC VOL CHG: 0.15
IVC PRE: 3.91 L (ref 3.3–4.99)
IVC SINGLE BREATH LLN: 3.3
IVC SINGLE BREATH PRE REF: 94.6 %
IVC SINGLE BREATH REF: 4.13
IVCSINGLEBREATHULN: 4.99
LLN IC: -16447.41
PEF LLN: 5.6
PEF PRE REF: 89.1 %
PEF REF: 7.47
PHYSICIAN COMMENT: ABNORMAL
POST FEF 25 75: 2.5 L/S (ref 2.16–5.1)
POST FET 100: 6.31 SEC
POST FEV1 FVC: 75.95 % (ref 71.45–91.56)
POST FEV1: 3.06 L (ref 2.7–4.05)
POST FEV5: 2.22 L (ref 1.45–3.16)
POST FVC: 4.03 L (ref 3.3–4.99)
POST PEF: 6.92 L/S (ref 5.6–9.35)
PRE DLCO: 25.4 ML/(MIN*MMHG) (ref 22.24–33.71)
PRE ERV: 1.16 L (ref -16448.82–16451.18)
PRE FEF 25 75: 2.1 L/S (ref 2.16–5.1)
PRE FET 100: 6.55 SEC
PRE FEV05 REF: 87.6 %
PRE FEV1 FVC: 72.55 % (ref 71.45–91.56)
PRE FEV1: 2.81 L (ref 2.7–4.05)
PRE FEV5: 2.02 L (ref 1.45–3.16)
PRE FRC PL: 2.8 L (ref 2.03–3.67)
PRE FVC: 3.88 L (ref 3.3–4.99)
PRE IC: 2.71 L (ref -16447.41–16452.59)
PRE PEF: 6.66 L/S (ref 5.6–9.35)
PRE REF IC: 105 %
PRE RV: 1.63 L (ref 1.1–2.25)
PRE TLC: 5.51 L (ref 4.45–6.43)
RAW PRE REF: 177 %
RAW PRE: 5.41 CMH2O*S/L (ref 3.06–3.06)
RAW REF: 3.06
REF IC: 2.59
RV LLN: 1.1
RV PRE REF: 97.6 %
RV REF: 1.67
RVTLC LLN: 22
RVTLC PRE REF: 93.9 %
RVTLC PRE: 29.62 % (ref 21.95–41.13)
RVTLC REF: 32
RVTLCULN: 41
RVULN: 2.25
SGAW PRE REF: 55.7 %
SGAW PRE: 0.06 1/(CMH2O*S) (ref 0.1–0.1)
SGAW REF: 0.1
TLC LLN: 4.45
TLC PRE REF: 101.3 %
TLC REF: 5.44
TLC ULN: 6.43
ULN IC: ABNORMAL
VA PRE: 5.29 L (ref 5.29–5.29)
VA SINGLE BREATH LLN: 5.29
VA SINGLE BREATH PRE REF: 100 %
VA SINGLE BREATH REF: 5.29
VASINGLEBREATHULN: 5.29
VC LLN: 3.3
VC PRE REF: 93.9 %
VC PRE: 3.88 L (ref 3.3–4.99)
VC REF: 4.13
VC ULN: 4.99

## 2023-07-27 PROCEDURE — 94726 PLETHYSMOGRAPHY LUNG VOLUMES: CPT | Mod: S$GLB,,, | Performed by: INTERNAL MEDICINE

## 2023-07-27 PROCEDURE — 1159F PR MEDICATION LIST DOCUMENTED IN MEDICAL RECORD: ICD-10-PCS | Mod: CPTII,S$GLB,, | Performed by: ALLERGY & IMMUNOLOGY

## 2023-07-27 PROCEDURE — 95004 PR ALLERGY SKIN TESTS,ALLERGENS: ICD-10-PCS | Mod: S$GLB,,, | Performed by: ALLERGY & IMMUNOLOGY

## 2023-07-27 PROCEDURE — 94060 EVALUATION OF WHEEZING: CPT | Mod: S$GLB,,, | Performed by: INTERNAL MEDICINE

## 2023-07-27 PROCEDURE — 99214 OFFICE O/P EST MOD 30 MIN: CPT | Mod: S$GLB,,, | Performed by: ALLERGY & IMMUNOLOGY

## 2023-07-27 PROCEDURE — 94726 PULM FUNCT TST PLETHYSMOGRAP: ICD-10-PCS | Mod: S$GLB,,, | Performed by: INTERNAL MEDICINE

## 2023-07-27 PROCEDURE — 1160F RVW MEDS BY RX/DR IN RCRD: CPT | Mod: CPTII,S$GLB,, | Performed by: ALLERGY & IMMUNOLOGY

## 2023-07-27 PROCEDURE — 3074F SYST BP LT 130 MM HG: CPT | Mod: CPTII,S$GLB,, | Performed by: ALLERGY & IMMUNOLOGY

## 2023-07-27 PROCEDURE — 94729 DIFFUSING CAPACITY: CPT | Mod: S$GLB,,, | Performed by: INTERNAL MEDICINE

## 2023-07-27 PROCEDURE — 99999 PR PBB SHADOW E&M-EST. PATIENT-LVL IV: ICD-10-PCS | Mod: PBBFAC,,, | Performed by: ALLERGY & IMMUNOLOGY

## 2023-07-27 PROCEDURE — 3078F DIAST BP <80 MM HG: CPT | Mod: CPTII,S$GLB,, | Performed by: ALLERGY & IMMUNOLOGY

## 2023-07-27 PROCEDURE — 3008F PR BODY MASS INDEX (BMI) DOCUMENTED: ICD-10-PCS | Mod: CPTII,S$GLB,, | Performed by: ALLERGY & IMMUNOLOGY

## 2023-07-27 PROCEDURE — 94060 PR EVAL OF BRONCHOSPASM: ICD-10-PCS | Mod: S$GLB,,, | Performed by: INTERNAL MEDICINE

## 2023-07-27 PROCEDURE — 99999 PR PBB SHADOW E&M-EST. PATIENT-LVL IV: CPT | Mod: PBBFAC,,, | Performed by: ALLERGY & IMMUNOLOGY

## 2023-07-27 PROCEDURE — 94729 PR C02/MEMBANE DIFFUSE CAPACITY: ICD-10-PCS | Mod: S$GLB,,, | Performed by: INTERNAL MEDICINE

## 2023-07-27 PROCEDURE — 3074F PR MOST RECENT SYSTOLIC BLOOD PRESSURE < 130 MM HG: ICD-10-PCS | Mod: CPTII,S$GLB,, | Performed by: ALLERGY & IMMUNOLOGY

## 2023-07-27 PROCEDURE — 1160F PR REVIEW ALL MEDS BY PRESCRIBER/CLIN PHARMACIST DOCUMENTED: ICD-10-PCS | Mod: CPTII,S$GLB,, | Performed by: ALLERGY & IMMUNOLOGY

## 2023-07-27 PROCEDURE — 1159F MED LIST DOCD IN RCRD: CPT | Mod: CPTII,S$GLB,, | Performed by: ALLERGY & IMMUNOLOGY

## 2023-07-27 PROCEDURE — 99214 PR OFFICE/OUTPT VISIT, EST, LEVL IV, 30-39 MIN: ICD-10-PCS | Mod: S$GLB,,, | Performed by: ALLERGY & IMMUNOLOGY

## 2023-07-27 PROCEDURE — 3078F PR MOST RECENT DIASTOLIC BLOOD PRESSURE < 80 MM HG: ICD-10-PCS | Mod: CPTII,S$GLB,, | Performed by: ALLERGY & IMMUNOLOGY

## 2023-07-27 PROCEDURE — 3008F BODY MASS INDEX DOCD: CPT | Mod: CPTII,S$GLB,, | Performed by: ALLERGY & IMMUNOLOGY

## 2023-07-27 PROCEDURE — 95004 PERQ TESTS W/ALRGNC XTRCS: CPT | Mod: S$GLB,,, | Performed by: ALLERGY & IMMUNOLOGY

## 2023-07-27 RX ORDER — ERGOCALCIFEROL 1.25 MG/1
50000 CAPSULE ORAL
Qty: 12 CAPSULE | Refills: 1 | Status: SHIPPED | OUTPATIENT
Start: 2023-07-27

## 2023-07-27 RX ORDER — EPINEPHRINE 0.3 MG/.3ML
1 INJECTION SUBCUTANEOUS ONCE
Qty: 2 EACH | Refills: 0 | Status: SHIPPED | OUTPATIENT
Start: 2023-07-27 | End: 2023-07-28

## 2023-07-27 NOTE — PROGRESS NOTES
Snow Barney returns to clinic today for continued evaluation of chronic recurrent urticaria. She is here alone. She was last seen July 21, 2023.     Since her last visit, she has not had any urticaria.  She is not had any angioedema.     She is not been taking any antihistamines.     She continues to be concerned about a food allergy.     She did have a systemic reaction to a yellow jacket sting in the fourth grade.  She is a bush with several in her yard and would be interested in immunotherapy if her skin tests were positive.    Her rhinitis and conjunctivitis have been controlled.     She has a history of a positive methacholine challenge test in the past.  She was diagnosed with asthma and given an inhaler. She does not think that she has.    She has had a rash after contact with latex products when her skin is wet. This is occurred with a mask recently.  It also occurs with latex gloves at work.  She has increased itching with latex condoms.    [facility] PE[facility] ALLERGY QUESTIONNAIRE SHORT 7/25/2023   Head or facial pain: No symptoms   Ears: No symptoms   Nosebleeds? No   Postnasal drip? No   Sneezing? No   Runny nose? Yes   Congestion? No   Throat: No symptoms   Eyes: -   Eye itching? Yes   Eye redness? No   Eye discharge? No   Eye pain?  No   Light sensitivity / light hurts the eyes? No   Lungs: No symptoms   Skin: No symptoms      Physical Examination:  General: Well-developed, well-nourished, no acute distress.  Head: No sinus tenderness.  Eyes: Conjunctivae:  No bulbar or palpebral conjunctival injection.  Ears: EAC's clear.  TM's clear.  No pre-auricular nodes.  Nose: Nasal Mucosa:  Pink.  Septum: No apparent deviation.  Turbinates:  No significant edema.  Polyps/Mass:  None visible.  Teeth/Gums:  No bleeding noted.  Oropharynx: No exudates.  Neck: Supple without thyromegaly. No cervical lymphadenopathy.    Respiratory/Chest: Effort: Good.  Auscultation:  Clear bilaterally.  Skin: Good turgor.  No urticaria or  angioedema.  Neuro/Psych: Oriented x 3.    Laboratory 07/21/2023:  IgE level:  Less than 35.   ImmunoCAP:  Class 0/I: Yellow jacket (0.20).  TSH:  0.887.   Thyroid peroxidase antibody level:  Less than 6.0.   Thyroglobulin antibody level:  Less than 4.0.   Vitamin-D level:  29.   Serum tryptase:  3.3.   SPEP:  Normal.    Food skin tests 07/27/2023: 3+ histamine control. All tests were negative.     Complete PFTs 07/27/2023: Pending.      Assessment:  1. Recurrent urticaria and angioedema, probably idiopathic.  2. Doubt food allergy.  3. Chronic rhinitis, consider allergic.  4. Chronic conjunctivitis, consider allergic.   5. History of asthma.   6. History of anaphylactic reaction to yellow jacket sting.   7. Consider latex allergy.  8. GERD, uncontrolled.   9. Probable LPR.  10. Pulmonary embolus 2020.   11. Frequent PVC's.  12. Migraines on Nurtec.   13. Obesity on Ozempic.   14. History of sleep apnea, now controlled.  15. Mood disorder.   16. ADD.  17. Mild vitamin-D insufficiency.    Recommendations:  1. OTC antihistamines as needed.  2. Take pictures of any further lesions.  3. Take food journal with any lesions.   4. Start vitamin-D replacement.  5. Yellow jacket skin testing scheduled.  6. EpiPen.   7. Return to clinic in several weeks.    Patient education July 27, 2023:   Allergic mechanisms and treatment options were reviewed in detail. Immunotherapy and risks of anaphylaxis were reviewed.    Time attributed to the following activities: preparing to see the patient, obtaining and/or reviewing separately obtained history, performing a medically appropriate examination and/or evaluation, counseling and education the patient/family/caregiver, documenting clinical information in the electronic or other health record, independently interpreting results (not separately reported) and communicating results to the patient/family/caregiver, care coordination (not separately reported), ordering medications, tests, or  procedures, and referring and communications with other health care professionals (not separately reported).  30 minutes.

## 2023-08-04 LAB
BASOPHILS %, CD203C: 3.2 % (ref 0–12)
IGE RECEPTOR AB INTERPRETATION:: NORMAL

## 2023-08-17 ENCOUNTER — OFFICE VISIT (OUTPATIENT)
Dept: CARDIOLOGY | Facility: CLINIC | Age: 38
End: 2023-08-17
Payer: COMMERCIAL

## 2023-08-17 VITALS
RESPIRATION RATE: 15 BRPM | DIASTOLIC BLOOD PRESSURE: 74 MMHG | HEART RATE: 93 BPM | WEIGHT: 171.63 LBS | SYSTOLIC BLOOD PRESSURE: 100 MMHG | BODY MASS INDEX: 26.94 KG/M2 | OXYGEN SATURATION: 99 % | HEIGHT: 67 IN

## 2023-08-17 DIAGNOSIS — R00.2 PALPITATIONS: ICD-10-CM

## 2023-08-17 DIAGNOSIS — Z72.0 TOBACCO ABUSE: ICD-10-CM

## 2023-08-17 DIAGNOSIS — F41.9 ANXIETY: ICD-10-CM

## 2023-08-17 DIAGNOSIS — R00.0 SINUS TACHYCARDIA: Chronic | ICD-10-CM

## 2023-08-17 DIAGNOSIS — I26.99 PULMONARY EMBOLISM, UNSPECIFIED CHRONICITY, UNSPECIFIED PULMONARY EMBOLISM TYPE, UNSPECIFIED WHETHER ACUTE COR PULMONALE PRESENT: Primary | ICD-10-CM

## 2023-08-17 DIAGNOSIS — R06.02 SOB (SHORTNESS OF BREATH): ICD-10-CM

## 2023-08-17 PROCEDURE — 1159F MED LIST DOCD IN RCRD: CPT | Mod: CPTII,S$GLB,, | Performed by: INTERNAL MEDICINE

## 2023-08-17 PROCEDURE — 1160F RVW MEDS BY RX/DR IN RCRD: CPT | Mod: CPTII,S$GLB,, | Performed by: INTERNAL MEDICINE

## 2023-08-17 PROCEDURE — 99214 PR OFFICE/OUTPT VISIT, EST, LEVL IV, 30-39 MIN: ICD-10-PCS | Mod: S$GLB,,, | Performed by: INTERNAL MEDICINE

## 2023-08-17 PROCEDURE — 3078F PR MOST RECENT DIASTOLIC BLOOD PRESSURE < 80 MM HG: ICD-10-PCS | Mod: CPTII,S$GLB,, | Performed by: INTERNAL MEDICINE

## 2023-08-17 PROCEDURE — 99999 PR PBB SHADOW E&M-EST. PATIENT-LVL V: ICD-10-PCS | Mod: PBBFAC,,, | Performed by: INTERNAL MEDICINE

## 2023-08-17 PROCEDURE — 3008F BODY MASS INDEX DOCD: CPT | Mod: CPTII,S$GLB,, | Performed by: INTERNAL MEDICINE

## 2023-08-17 PROCEDURE — 3074F SYST BP LT 130 MM HG: CPT | Mod: CPTII,S$GLB,, | Performed by: INTERNAL MEDICINE

## 2023-08-17 PROCEDURE — 3074F PR MOST RECENT SYSTOLIC BLOOD PRESSURE < 130 MM HG: ICD-10-PCS | Mod: CPTII,S$GLB,, | Performed by: INTERNAL MEDICINE

## 2023-08-17 PROCEDURE — 99999 PR PBB SHADOW E&M-EST. PATIENT-LVL V: CPT | Mod: PBBFAC,,, | Performed by: INTERNAL MEDICINE

## 2023-08-17 PROCEDURE — 99214 OFFICE O/P EST MOD 30 MIN: CPT | Mod: S$GLB,,, | Performed by: INTERNAL MEDICINE

## 2023-08-17 PROCEDURE — 1160F PR REVIEW ALL MEDS BY PRESCRIBER/CLIN PHARMACIST DOCUMENTED: ICD-10-PCS | Mod: CPTII,S$GLB,, | Performed by: INTERNAL MEDICINE

## 2023-08-17 PROCEDURE — 1159F PR MEDICATION LIST DOCUMENTED IN MEDICAL RECORD: ICD-10-PCS | Mod: CPTII,S$GLB,, | Performed by: INTERNAL MEDICINE

## 2023-08-17 PROCEDURE — 3008F PR BODY MASS INDEX (BMI) DOCUMENTED: ICD-10-PCS | Mod: CPTII,S$GLB,, | Performed by: INTERNAL MEDICINE

## 2023-08-17 PROCEDURE — 3078F DIAST BP <80 MM HG: CPT | Mod: CPTII,S$GLB,, | Performed by: INTERNAL MEDICINE

## 2023-08-17 NOTE — PROGRESS NOTES
CARDIOVASCULAR CONSULTATION    REASON FOR CONSULT:   Snow Barney is a 37 y.o. female who presents for evaluation    HISTORY OF PRESENT ILLNESS:     Patient is a very pleasant 37-year-old lady who works as a nurse at [employer].  She states that she is been suffering from PVCs for some years now.  Was undergoing workup at [facility].  Was initiated on Toprol-XL and the dose has been recently increased to 25 mg daily.  States that occasionally gets PVCs, now the frequency has increased to almost daily.  She had an episode where she almost passed out and felt her heart racing very fast.  She was sitting at that time.  There was another episode when she was standing when her heart started beating very fast and she had a presyncopal episode.  Denies orthopnea, PND, swelling of feet.  Drinks about 40 oz of water a day.  Occasionally gets chest tightness.  States has significant family history of premature coronary artery disease and sudden cardiac death in the family had a stress test done at outside hospital which did not show any significant ischemia.  Echo and event monitor had not revealed any significant arrhythmias last year.  States that she had a pulmonary embolism in 2020 and was on anticoagulation for 3 months.  It was felt at that time that the pulmonary embolism was secondary to emergency contraceptive pill as per the patient.  She denies heavy alcohol use.  Used to be a smoker but quit last year.  Drug abuse in drug abuse in her  20s, cocaine and ecstasy.  But no recent drug abuse for many years.    2022       CONCLUSIONS     NSR 75 bpm     Normal right heart size     Mild TR / PAsys ~ 22 mmHg     LA borderline size / LAD 36 mm, Qasim 27 cc/m2     Normal MV / minimal MR - early systolic     Borderline LV internal dimension          LVEDD 53 mm, EPSS 9 mm        LVESD 37 mm     LV EF measures 54%       No LV systolic wall motion abns identified     Normal diastolic parameters     Normal aortic annulus /  three leaflet aortic valve     No AS and no AR     minimal pericardial effusion       REC clinical correlation        CONCLUSIONS   Probably normal treadmill nuclear stress test with breast attenuation.   Normal GATED study - EF 64% without wall motion abnormalities.   Perfusion scans with fixed anteroseptal defect with normal thickening likely attenuation artifact.   No angina at 6 minutes on Law protocol treadmill stress (7 METS).      2/23:      Preliminary Findings   *The observed rhythms are sinus rhythm to sinus tachycardia.   *The Maximum Heart Rate recorded was 145 bpm, Day 1 / 05:22:07 pm, the   Minimum Heart Rate recorded was 73   bpm, Day 2 / 02:32:49 am and the Average Heart Rate was 95 bpm.   *There were 4974 PVCs with a burden of 0.8 %.   *There were 9 PSVCs with a burden of < 0.01 %.   *There were 30 Patient reported events.     Sinus rhythm with intermittent sinus tachycardia with a gradual increase   in rate. No sudden onset or offset occurrences. Rare PACs. Infrequent   PVCs. Ventricular bigeminy and trigeminy.          Notes from August 23:  Patient here for follow-up.  Doing much better.  States that her PVCs have gone away.  CTA showed normal coronaries with 0 calcium score        PAST MEDICAL HISTORY:     Past Medical History:   Diagnosis Date    Anxiety     Asthma     Depression     Fibrocystic breast disease     GERD (gastroesophageal reflux disease)     Hernia, hiatal     IBS (irritable bowel syndrome)     Inappropriate sinus tachycardia     POTS (postural orthostatic tachycardia syndrome)     Pulmonary emboli 2020    Sepsis     Urticaria        PAST SURGICAL HISTORY:     Past Surgical History:   Procedure Laterality Date    TYMPANOSTOMY TUBE PLACEMENT         ALLERGIES AND MEDICATION:     Review of patient's allergies indicates:   Allergen Reactions    Demerol (pf) [meperidine (pf)] Hives        Medication List            Accurate as of August 17, 2023  8:57 AM. If you have any questions,  ask your nurse or doctor.                CONTINUE taking these medications      * albuterol 90 mcg/actuation inhaler  Commonly known as: PROVENTIL/VENTOLIN HFA     * albuterol 90 mcg/actuation inhaler  Commonly known as: PROVENTIL/VENTOLIN HFA  INHALE 2 PUFFS INTO THE LUNGS EVERY 4 HOURS AS NEEDED FOR WHEEZING OR SHORTNESS OF BREATH     EPINEPHrine 0.3 mg/0.3 mL Atin  Commonly known as: EPIPEN  Inject 0.3 mLs (0.3 mg total) into the muscle once. for 1 dose     * FLOVENT  mcg/actuation inhaler  Generic drug: fluticasone propionate     * FLOVENT  mcg/actuation inhaler  Generic drug: fluticasone propionate  INHALE 1 PUFF INTO THE LUNGS TWICE DAILY     ibuprofen 800 MG tablet  Commonly known as: ADVIL,MOTRIN  Take 1 tablet (800 mg total) by mouth 3 (three) times daily.     lamoTRIgine 25 MG tablet  Commonly known as: LAMICTAL  TAKE 1 TABLET BY MOUTH ONCE DAILY FOR 2 WEEKS, THEN 2 TABLETS BY MOUTH DAILY THEREAFTER     LORazepam 1 MG tablet  Commonly known as: ATIVAN  TAKE 1 TABLET BY MOUTH ONCE DAILY AS NEEDED FOR ANXIETY     metoprolol succinate 25 MG 24 hr tablet  Commonly known as: TOPROL-XL  Take 1 tablet (25 mg total) by mouth once daily.     metoprolol tartrate 50 MG tablet  Commonly known as: LOPRESSOR  Take 1 tablet (50 mg total) by mouth On call Procedure (as directed prior to cta).     multivitamin capsule     * NURTEC 75 mg odt  Generic drug: rimegepant     * NURTEC 75 mg odt  Generic drug: rimegepant  TAKE 1 TABLET BY MOUTH ONCE DAILY AS NEEDED     ondansetron 4 MG Tbdl  Commonly known as: ZOFRAN-ODT  DISSOLVE 1 TABLET BY MOUTH EVERY 6 HOURS AS NEEDED FOR NAUSEA OR VOMITING     OZEMPIC 1 mg/dose (4 mg/3 mL)  Generic drug: semaglutide  INJECT 1MG INTO THE SKIN ONCE WEEKLY     pantoprazole 40 MG tablet  Commonly known as: PROTONIX  TAKE 1 TABLET BY MOUTH TWICE DAILY     sertraline 50 MG tablet  Commonly known as: ZOLOFT  TAKE 1 TABLET BY MOUTH EVERY DAY AS DIRECTED     traZODone 50 MG  tablet  Commonly known as: DESYREL  TAKE 1 TABLET BY MOUTH EVERY DAY     VITAMIN D2 50,000 unit Cap  Generic drug: ergocalciferol  Take 1 capsule (50,000 Units total) by mouth every 7 days.           * This list has 6 medication(s) that are the same as other medications prescribed for you. Read the directions carefully, and ask your doctor or other care provider to review them with you.                  SOCIAL HISTORY:     Social History     Socioeconomic History    Marital status: Single   Tobacco Use    Smoking status: Former     Current packs/day: 0.00     Average packs/day: 1 pack/day for 25.5 years (25.5 ttl pk-yrs)     Types: Cigarettes     Start date: 7/15/1996     Quit date: 2022     Years since quittin.6    Smokeless tobacco: Never    Tobacco comments:     varies from 7 cigarettes to whole pack in day   Substance and Sexual Activity    Alcohol use: Yes     Comment: ocassionally    Drug use: No    Sexual activity: Yes     Partners: Male       FAMILY HISTORY:     Family History   Problem Relation Age of Onset    Allergies Mother     Hypertension Mother     Diabetes Mother     Allergies Father     Asthma Father     Hypertension Father     Diabetes Father     Colon cancer Maternal Grandmother     Breast cancer Paternal Grandmother     Ovarian cancer Cousin     Allergies Son        REVIEW OF SYSTEMS:   Review of Systems   Constitutional: Negative.   HENT: Negative.     Eyes: Negative.    Respiratory: Negative.     Endocrine: Negative.    Hematologic/Lymphatic: Negative.    Skin: Negative.    Musculoskeletal: Negative.    Gastrointestinal: Negative.    Genitourinary: Negative.    Neurological: Negative.    Psychiatric/Behavioral: Negative.     Allergic/Immunologic: Negative.        A 10 point review of systems was performed and all the pertinent positives have been mentioned. Rest of review of systems was negative.        PHYSICAL EXAM:     Vitals:    23 0827   BP: 100/74   Pulse: 93   Resp: 15     "Body mass index is 26.88 kg/m².  Weight: 77.9 kg (171 lb 10.1 oz)   Height: 5' 7" (170.2 cm)     Physical Exam  Constitutional:       Appearance: Normal appearance. She is well-developed.   HENT:      Head: Normocephalic.   Eyes:      Pupils: Pupils are equal, round, and reactive to light.   Cardiovascular:      Rate and Rhythm: Normal rate and regular rhythm.   Pulmonary:      Effort: Pulmonary effort is normal.      Breath sounds: Normal breath sounds.   Abdominal:      General: Bowel sounds are normal.      Palpations: Abdomen is soft.      Tenderness: There is no abdominal tenderness.   Musculoskeletal:         General: Normal range of motion.      Cervical back: Normal range of motion and neck supple.   Skin:     General: Skin is warm.   Neurological:      Mental Status: She is alert and oriented to person, place, and time.           DATA:     Laboratory:  CBC:  Recent Labs   Lab 01/04/23  2023 02/10/23  1836 06/01/23  1723   WBC 7.22 12.54 7.48   Hemoglobin 13.7 13.4 13.0   Hematocrit 41.7 40.1 38.0   Platelets 192 154 191         CHEMISTRIES:  Recent Labs   Lab 01/30/22 2036 02/03/22  0855 02/15/22  0919 02/24/22  1900 07/30/22  0816 01/04/23  2023 01/04/23  2043 02/10/23  1836 06/01/23  1723   Glucose 129 H   < > 103 108 102 132 H  --  100 102   Sodium 139   < > 140 140 141 140  --  142 139   Potassium 3.9   < > 3.9 3.7 4.2 4.0  --  3.9 3.5   BUN 16   < > 9 8 13 17  --  13 11   Creatinine 0.9   < > 0.8 0.8 0.7 0.8  --  0.8 1.0   eGFR if  >60   < > >60 >60 >60  --   --   --   --    eGFR if non African American >60   < > >60 >60 >60  --   --   --   --    Calcium 9.6   < > 9.7 9.7 9.3 9.6  --  9.5 10.2   Magnesium 1.5 L  --   --   --   --   --  1.9  --  1.8    < > = values in this interval not displayed.         CARDIAC BIOMARKERS:  Recent Labs   Lab 02/24/22  1900 01/04/23  2023 06/01/23  1723   Troponin I <0.006 <0.006 <0.006         COAGS:  Recent Labs   Lab 11/10/20  0245   INR 1.0 " "        LIPIDS/LFTS:  Recent Labs   Lab 01/04/23 2023 02/10/23  1836 06/01/23  1723   AST 14 16 12   ALT 17 17 13         No results found for: "HGBA1C"    TSH  Recent Labs   Lab 04/05/22  1418 06/01/23  1723 07/21/23  1042   TSH 0.801 1.076 0.887         The ASCVD Risk score (Brittney DK, et al., 2019) failed to calculate for the following reasons:    The 2019 ASCVD risk score is only valid for ages 40 to 79       BNP    Lab Results   Component Value Date/Time    BNP <10 06/01/2023 05:23 PM    BNP 11 01/04/2023 08:23 PM    BNP <10 02/24/2022 07:00 PM    BNP <10 11/10/2020 01:25 AM    BNP 15 11/02/2020 01:30 PM    BNP 21 07/14/2020 09:05 PM    BNP <10 06/01/2019 01:55 AM    BNP 14 04/22/2018 06:20 PM    BNP <10 05/28/2015 07:03 PM             ASSESSMENT AND PLAN     Patient Active Problem List   Diagnosis    Palpitations    Grief reaction    Self-inflicted injury    Pulmonary embolism    Chest pain    SOB (shortness of breath)    Anxiety    Sinus tachycardia    Tobacco abuse    Migraine without aura and without status migrainosus, not intractable    Vertiginous migraine    Asthma in adult, mild intermittent, uncomplicated         Patient with near-syncope.  Family history of premature CAD.  Used to be a smoker in the past, quit last year.  CTA showed normal coronaries with 0 calcium score.  Frequent PVCs on Holter monitoring, which the patient states have gone away.  She did try metoprolol, but could not tolerate it.    Orthostatic hypotension:  I have asked her to stay well hydrated and to wear compression stockings.  If that does not help and she continues having symptoms despite these, initiate midodrine          Thank you very much for involving me in the care of your patient.  Please do not hesitate to contact me if there are any questions.      Parrish Mckeon MD, FACC, [facility]  Interventional Cardiologist, [facility].           This note was dictated with the help of speech recognition software.  There " might be un-intended errors and/or substitutions.

## 2023-08-30 ENCOUNTER — HOSPITAL ENCOUNTER (EMERGENCY)
Facility: HOSPITAL | Age: 38
Discharge: HOME | End: 2023-08-30
Attending: EMERGENCY MEDICINE
Payer: COMMERCIAL

## 2023-08-30 ENCOUNTER — PATIENT MESSAGE (OUTPATIENT)
Dept: CARDIOLOGY | Facility: CLINIC | Age: 38
End: 2023-08-30

## 2023-08-30 VITALS
WEIGHT: 165 LBS | BODY MASS INDEX: 25.9 KG/M2 | DIASTOLIC BLOOD PRESSURE: 83 MMHG | OXYGEN SATURATION: 100 % | SYSTOLIC BLOOD PRESSURE: 126 MMHG | HEART RATE: 87 BPM | HEIGHT: 67 IN | RESPIRATION RATE: 16 BRPM | TEMPERATURE: 98 F

## 2023-08-30 DIAGNOSIS — R00.2 PALPITATIONS: ICD-10-CM

## 2023-08-30 DIAGNOSIS — N30.90 CYSTITIS: Primary | ICD-10-CM

## 2023-08-30 DIAGNOSIS — R42 ORTHOSTATIC DIZZINESS: ICD-10-CM

## 2023-08-30 LAB
ALBUMIN SERPL BCP-MCNC: 4.7 G/DL (ref 3.5–5.2)
ALP SERPL-CCNC: 72 U/L (ref 55–135)
ALT SERPL W/O P-5'-P-CCNC: 15 U/L (ref 10–44)
ANION GAP SERPL CALC-SCNC: 13 MMOL/L (ref 8–16)
AST SERPL-CCNC: 15 U/L (ref 10–40)
B-HCG UR QL: NEGATIVE
BACTERIA #/AREA URNS HPF: ABNORMAL /HPF
BASOPHILS # BLD AUTO: 0.04 K/UL (ref 0–0.2)
BASOPHILS NFR BLD: 0.6 % (ref 0–1.9)
BILIRUB SERPL-MCNC: 0.9 MG/DL (ref 0.1–1)
BILIRUB UR QL STRIP: NEGATIVE
BUN SERPL-MCNC: 11 MG/DL (ref 6–20)
CALCIUM SERPL-MCNC: 9.9 MG/DL (ref 8.7–10.5)
CHLORIDE SERPL-SCNC: 109 MMOL/L (ref 95–110)
CK SERPL-CCNC: 51 U/L (ref 20–180)
CLARITY UR: CLEAR
CO2 SERPL-SCNC: 21 MMOL/L (ref 23–29)
COLOR UR: COLORLESS
CREAT SERPL-MCNC: 0.8 MG/DL (ref 0.5–1.4)
CTP QC/QA: YES
CTP QC/QA: YES
D DIMER PPP IA.FEU-MCNC: <0.19 MG/L FEU
DIFFERENTIAL METHOD: NORMAL
EOSINOPHIL # BLD AUTO: 0.1 K/UL (ref 0–0.5)
EOSINOPHIL NFR BLD: 0.9 % (ref 0–8)
ERYTHROCYTE [DISTWIDTH] IN BLOOD BY AUTOMATED COUNT: 12.7 % (ref 11.5–14.5)
EST. GFR  (NO RACE VARIABLE): >60 ML/MIN/1.73 M^2
GLUCOSE SERPL-MCNC: 105 MG/DL (ref 70–110)
GLUCOSE UR QL STRIP: NEGATIVE
HCG INTACT+B SERPL-ACNC: <1.2 MIU/ML
HCT VFR BLD AUTO: 41.9 % (ref 37–48.5)
HGB BLD-MCNC: 13.9 G/DL (ref 12–16)
HGB UR QL STRIP: ABNORMAL
IMM GRANULOCYTES # BLD AUTO: 0.02 K/UL (ref 0–0.04)
IMM GRANULOCYTES NFR BLD AUTO: 0.3 % (ref 0–0.5)
KETONES UR QL STRIP: NEGATIVE
LEUKOCYTE ESTERASE UR QL STRIP: ABNORMAL
LYMPHOCYTES # BLD AUTO: 1.7 K/UL (ref 1–4.8)
LYMPHOCYTES NFR BLD: 25.6 % (ref 18–48)
MCH RBC QN AUTO: 29.1 PG (ref 27–31)
MCHC RBC AUTO-ENTMCNC: 33.2 G/DL (ref 32–36)
MCV RBC AUTO: 88 FL (ref 82–98)
MICROSCOPIC COMMENT: ABNORMAL
MONOCYTES # BLD AUTO: 0.4 K/UL (ref 0.3–1)
MONOCYTES NFR BLD: 5.7 % (ref 4–15)
NEUTROPHILS # BLD AUTO: 4.3 K/UL (ref 1.8–7.7)
NEUTROPHILS NFR BLD: 66.9 % (ref 38–73)
NITRITE UR QL STRIP: NEGATIVE
NRBC BLD-RTO: 0 /100 WBC
PH UR STRIP: 8 [PH] (ref 5–8)
PLATELET # BLD AUTO: 190 K/UL (ref 150–450)
PMV BLD AUTO: 10.8 FL (ref 9.2–12.9)
POTASSIUM SERPL-SCNC: 3.6 MMOL/L (ref 3.5–5.1)
PROT SERPL-MCNC: 7.9 G/DL (ref 6–8.4)
PROT UR QL STRIP: NEGATIVE
RBC # BLD AUTO: 4.77 M/UL (ref 4–5.4)
RBC #/AREA URNS HPF: 0 /HPF (ref 0–4)
SARS-COV-2 RDRP RESP QL NAA+PROBE: NEGATIVE
SODIUM SERPL-SCNC: 143 MMOL/L (ref 136–145)
SP GR UR STRIP: 1.01 (ref 1–1.03)
SQUAMOUS #/AREA URNS HPF: 4 /HPF
TROPONIN I SERPL DL<=0.01 NG/ML-MCNC: <0.006 NG/ML (ref 0–0.03)
URN SPEC COLLECT METH UR: ABNORMAL
UROBILINOGEN UR STRIP-ACNC: NEGATIVE EU/DL
WBC # BLD AUTO: 6.49 K/UL (ref 3.9–12.7)
WBC #/AREA URNS HPF: 11 /HPF (ref 0–5)

## 2023-08-30 PROCEDURE — 63600175 PHARM REV CODE 636 W HCPCS: Performed by: EMERGENCY MEDICINE

## 2023-08-30 PROCEDURE — 93005 ELECTROCARDIOGRAM TRACING: CPT

## 2023-08-30 PROCEDURE — 84484 ASSAY OF TROPONIN QUANT: CPT | Performed by: EMERGENCY MEDICINE

## 2023-08-30 PROCEDURE — 85025 COMPLETE CBC W/AUTO DIFF WBC: CPT | Performed by: EMERGENCY MEDICINE

## 2023-08-30 PROCEDURE — 93010 ELECTROCARDIOGRAM REPORT: CPT | Mod: ,,, | Performed by: INTERNAL MEDICINE

## 2023-08-30 PROCEDURE — 80053 COMPREHEN METABOLIC PANEL: CPT | Performed by: EMERGENCY MEDICINE

## 2023-08-30 PROCEDURE — 87086 URINE CULTURE/COLONY COUNT: CPT | Performed by: EMERGENCY MEDICINE

## 2023-08-30 PROCEDURE — 93010 ELECTROCARDIOGRAM REPORT: CPT | Mod: 76,,, | Performed by: INTERNAL MEDICINE

## 2023-08-30 PROCEDURE — 99285 EMERGENCY DEPT VISIT HI MDM: CPT | Mod: 25

## 2023-08-30 PROCEDURE — 81025 URINE PREGNANCY TEST: CPT | Performed by: EMERGENCY MEDICINE

## 2023-08-30 PROCEDURE — 81000 URINALYSIS NONAUTO W/SCOPE: CPT | Performed by: EMERGENCY MEDICINE

## 2023-08-30 PROCEDURE — 25000003 PHARM REV CODE 250: Performed by: EMERGENCY MEDICINE

## 2023-08-30 PROCEDURE — 96361 HYDRATE IV INFUSION ADD-ON: CPT

## 2023-08-30 PROCEDURE — 84702 CHORIONIC GONADOTROPIN TEST: CPT | Performed by: EMERGENCY MEDICINE

## 2023-08-30 PROCEDURE — 87635 SARS-COV-2 COVID-19 AMP PRB: CPT | Performed by: EMERGENCY MEDICINE

## 2023-08-30 PROCEDURE — 85379 FIBRIN DEGRADATION QUANT: CPT | Performed by: EMERGENCY MEDICINE

## 2023-08-30 PROCEDURE — 93010 EKG 12-LEAD: ICD-10-PCS | Mod: ,,, | Performed by: INTERNAL MEDICINE

## 2023-08-30 PROCEDURE — 96365 THER/PROPH/DIAG IV INF INIT: CPT

## 2023-08-30 PROCEDURE — 82550 ASSAY OF CK (CPK): CPT | Performed by: EMERGENCY MEDICINE

## 2023-08-30 RX ORDER — NITROFURANTOIN 25; 75 MG/1; MG/1
100 CAPSULE ORAL 2 TIMES DAILY
Qty: 10 CAPSULE | Refills: 0 | Status: SHIPPED | OUTPATIENT
Start: 2023-08-30 | End: 2023-09-05

## 2023-08-30 RX ADMIN — CEFTRIAXONE 1 G: 1 INJECTION, POWDER, FOR SOLUTION INTRAMUSCULAR; INTRAVENOUS at 08:08

## 2023-08-30 RX ADMIN — SODIUM CHLORIDE, POTASSIUM CHLORIDE, SODIUM LACTATE AND CALCIUM CHLORIDE 2000 ML: 600; 310; 30; 20 INJECTION, SOLUTION INTRAVENOUS at 06:08

## 2023-08-30 NOTE — ED PROVIDER NOTES
"Encounter Date: 8/30/2023    SCRIBE #1 NOTE: I, Joseph Burr, am scribing for, and in the presence of,  Sanya Wagner MD. I have scribed the following portions of the note - Other sections scribed: HPI, ROS, PE.       History     Chief Complaint   Patient presents with    Dizziness     Pt c/o dizziness, diarrhea and SOB starting today. Denies any fever. Pt c/o increased palpitations with standing       Snow Barney is a 37 y.o. female with a PMHx of asthma, GERD, IBS, PE, who presents to the ED for evaluation of dizziness today. Patient reports complaints of dizziness, palpitations, pressure headache, a sensation that she is "underwater, like her ears are muffled," and shortness of breath. She reports her complaints began this morning after she took her first ever dosage of strattera 40 mg, and have progressively exacerbated since onset. She also reports an episode of lightheadedness PTA. She states her complaints are exacerbated by standing. Reports attempting treatment with ativan at home with no immediate relief noted. She also reports urinary frequency. Compliant with ozempic, noting 1 diarrhea episode earlier today she attributes to her compliance with ozempic. No other medications taken PTA. No other alleviating or exacerbating factors noted. Denies current SOB, N/V, CP, or other associated symptoms.     The history is provided by the patient. No  was used.     Review of patient's allergies indicates:   Allergen Reactions    Demerol (pf) [meperidine (pf)] Hives     Past Medical History:   Diagnosis Date    Anxiety     Asthma     Depression     Fibrocystic breast disease     GERD (gastroesophageal reflux disease)     Hernia, hiatal     IBS (irritable bowel syndrome)     Inappropriate sinus tachycardia     POTS (postural orthostatic tachycardia syndrome)     Pulmonary emboli 2020    Sepsis     Urticaria      Past Surgical History:   Procedure Laterality Date    TYMPANOSTOMY TUBE " PLACEMENT       Family History   Problem Relation Age of Onset    Allergies Mother     Hypertension Mother     Diabetes Mother     Allergies Father     Asthma Father     Hypertension Father     Diabetes Father     Colon cancer Maternal Grandmother     Breast cancer Paternal Grandmother     Ovarian cancer Cousin     Allergies Son      Social History     Tobacco Use    Smoking status: Former     Current packs/day: 0.00     Average packs/day: 1 pack/day for 25.5 years (25.5 ttl pk-yrs)     Types: Cigarettes     Start date: 7/15/1996     Quit date: 2022     Years since quittin.6    Smokeless tobacco: Never    Tobacco comments:     varies from 7 cigarettes to whole pack in day   Substance Use Topics    Alcohol use: Yes     Comment: ocassionally    Drug use: No     Review of Systems   Constitutional:  Negative for chills and fever.   HENT:  Negative for sore throat.    Respiratory:  Positive for shortness of breath. Negative for cough.    Cardiovascular:  Positive for palpitations. Negative for chest pain.   Gastrointestinal:  Positive for diarrhea. Negative for abdominal pain, nausea and vomiting.   Genitourinary:  Positive for frequency. Negative for dysuria.   Musculoskeletal:  Negative for back pain.   Skin:  Negative for rash.   Neurological:  Positive for dizziness, light-headedness and headaches. Negative for weakness.   Psychiatric/Behavioral:  Negative for confusion.        Physical Exam     Initial Vitals [23 1734]   BP Pulse Resp Temp SpO2   119/80 (!) 153 18 98.7 °F (37.1 °C) 100 %      MAP       --         Physical Exam    Nursing note and vitals reviewed.  Constitutional: She appears well-developed and well-nourished.   HENT:   Head: Normocephalic and atraumatic.   Eyes: Conjunctivae are normal. Pupils are equal, round, and reactive to light.   Neck: Neck supple.   Normal range of motion.  Cardiovascular:  Normal rate, regular rhythm and normal heart sounds.           Pulmonary/Chest: Breath  sounds normal.   Abdominal: Abdomen is soft. She exhibits no distension. There is no abdominal tenderness.   Musculoskeletal:         General: Normal range of motion.      Cervical back: Normal range of motion and neck supple.      Right lower leg: No edema.      Left lower leg: No edema.     Neurological: She is alert and oriented to person, place, and time. GCS score is 15. GCS eye subscore is 4. GCS verbal subscore is 5. GCS motor subscore is 6.   Skin: Skin is warm and dry. Capillary refill takes less than 2 seconds.   Psychiatric: She has a normal mood and affect. Her behavior is normal. Judgment and thought content normal.         ED Course   Procedures  Labs Reviewed   COMPREHENSIVE METABOLIC PANEL - Abnormal; Notable for the following components:       Result Value    CO2 21 (*)     All other components within normal limits   URINALYSIS, REFLEX TO URINE CULTURE - Abnormal; Notable for the following components:    Color, UA Colorless (*)     Occult Blood UA 3+ (*)     Leukocytes, UA 2+ (*)     All other components within normal limits    Narrative:     Specimen Source->Urine   URINALYSIS MICROSCOPIC - Abnormal; Notable for the following components:    WBC, UA 11 (*)     Bacteria Few (*)     All other components within normal limits    Narrative:     Specimen Source->Urine   CULTURE, URINE   CBC W/ AUTO DIFFERENTIAL   TROPONIN I   CK   HCG, QUANTITATIVE   HCG, QUANTITATIVE   D DIMER, QUANTITATIVE   POCT URINE PREGNANCY   SARS-COV-2 RDRP GENE          Imaging Results              X-Ray Chest AP Portable (Final result)  Result time 08/30/23 18:56:33      Final result by Andi Nieto MD (08/30/23 18:56:33)                   Impression:      1. No acute cardiopulmonary process.      Electronically signed by: Andi Nieto MD  Date:    08/30/2023  Time:    18:56               Narrative:    EXAMINATION:  XR CHEST AP PORTABLE    CLINICAL HISTORY:  Palpitations    TECHNIQUE:  Single frontal view of the chest  was performed.    COMPARISON:  06/01/2023    FINDINGS:  The cardiomediastinal silhouette is not enlarged.  There is no pleural effusion.  The trachea is midline.  The lungs are symmetrically expanded bilaterally without evidence of acute parenchymal process. No large focal consolidation seen.  There is no pneumothorax.  The osseous structures are unremarkable.                                       Medications   lactated ringers bolus 2,000 mL (0 mLs Intravenous Stopped 8/30/23 1954)   cefTRIAXone (ROCEPHIN) 1 g in dextrose 5 % in water (D5W) 100 mL IVPB (MB+) (0 g Intravenous Stopped 8/30/23 2036)     Medical Decision Making    37-year-old female presenting with static dizziness and palpitations.  Patient has had similar event past for many months prior patient has been evaluated by Cardiology no conclusive cause.  Most recently patient denies any heat exhaustion.  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  Patient has had negative D-dimer past.  Negative D-dimer today.  No troponin elevation.  Initial ECG did show right axis deviation that improved following rate improvement from sinus tachycardia to normal rate.  No sign of acute renal failure suggest end-organ damage secondary to volume depletion.  Chest x-ray shows no cardiomegaly, mass, or infiltrate.  Patient has been sinus rhythm throughout ER stay.  Previous normal TSH studies.  Low suspicion for blood clot as the patient's cause of symptoms.  Given symptoms resolved following IV fluids suspect some component of volume depletion.    Patient did report urinary frequency.  Given UA results suspicion of urinary tract infection.  Patient non ill-appearing.  Patient afebrile.  Patient not meeting sepsis criteria at this time patient started on Macrobid.  Discussed monitoring for symptoms for any worsening.  Patient encouraged to seek medical care if symptoms worsen    Amount and/or  Complexity of Data Reviewed  Labs: ordered. Decision-making details documented in ED Course.  Radiology: ordered.  ECG/medicine tests: ordered and independent interpretation performed. Decision-making details documented in ED Course.    Risk  Prescription drug management.            Scribe Attestation:   Scribe #1: I performed the above scribed service and the documentation accurately describes the services I performed. I attest to the accuracy of the note.        ED Course as of 08/31/23 0509   Wed Aug 30, 2023   1823 Echocardiogram 6/13    · The left ventricle is normal in size with normal systolic function.  · The estimated ejection fraction is 55%.  · Normal left ventricular diastolic function.  · Normal right ventricular size with normal right ventricular systolic function.  · Normal central venous pressure (3 mmHg).  · The estimated PA systolic pressure is 11 mmHg.      [JM]   1823 Holter 6/13    · Sinus rhythm with heart rates varying between 61 and 143 BPM with an average of 91 BPM.  · There were frequent PVCs totalling 3660 and averaging 38.13 per hour. There were 2 couplets. There were 28 triplets.  · There were very rare PACs totalling 10 and averaging 0.1 per hour   [JM]   1824 CTA chest  6/13    Impression:     No evidence of PE.  No acute intrathoracic abnormalities identified. [JM]   1826 Patient is no longer on metoprolol. [JM]   1827 TSH 7/21    0.887 (WNL) [JM]   1833 EKG 12-lead  Time 5:39 a.m.     Rate 136, sinus, right axis deviation.  KS less than 120 QRS 72 .  No ST elevation or depression.  No T-wave inversion no hyperacute T-wave.  No Q-waves present     Sinus tachycardia with right axis deviation   [JM]   1847 EKG 12-lead  Repeat EKG   Time 6:37 p.m.     Rate 105, sinus, regular rhythm, normal axis.   QRS 72 .  No ST elevation or depression no T-wave inversion no Q-waves present no hyperacute T-waves.      Sinus tachycardia. [JM]   1903 SARS-CoV-2 RNA, Amplification,  Qual: Negative []   1903 Preg Test, Ur: Negative []   1903 HCG Quant: <1.2 []   1935 D-Dimer: <0.19 []   1953 Patient did report some suprapubic cramping at times with urinary frequency.  Suspect urinary tract infection.  Patient reports feeling improved at this time following fluids. [JM]      ED Course User Index  [] Sanya Wagner MD                    I, Sanya Wagner, personally performed the services described in this documentation. All medical record entries made by the scribe were at my direction and in my presence. I have reviewed the chart and agree that the record reflects my personal performance and is accurate and complete.      Clinical Impression:   Final diagnoses:  [R00.2] Palpitations  [N30.90] Cystitis (Primary)  [R42] Orthostatic dizziness        ED Disposition Condition    Discharge Stable          ED Prescriptions       Medication Sig Dispense Start Date End Date Auth. Provider    nitrofurantoin, macrocrystal-monohydrate, (MACROBID) 100 MG capsule Take 1 capsule (100 mg total) by mouth 2 (two) times daily. for 5 days 10 capsule 8/30/2023 9/4/2023 Sanya Wagner MD          Follow-up Information       Follow up With Specialties Details Why Contact Info    [facility] [employer]  Schedule an appointment as soon as possible for a visit in 3 days Primary care [address]  [facility] [id number]    [facility] [place] - Emergency Dept Emergency Medicine  If symptoms worsen [address]  [facility] [id number] [phone]             Sanya Wagner MD  08/31/23 05

## 2023-08-30 NOTE — Clinical Note
"Snow Phelan" Ector was seen and treated in our emergency department on 8/30/2023.  She may return to work on 09/01/2023.       If you have any questions or concerns, please don't hesitate to call.      Sanya Wagner MD"

## 2023-08-31 NOTE — ED TRIAGE NOTES
Pt complaining of dizziness that started on today. Pt reports not falling or hitting head. Pt states she has hx of PVC's tachycardic on arrival. When she's having palpitations she has midsternal pressure. Pt also states she had some diarrhea. Pt denies any other symptoms. Pt AAO x 4.

## 2023-09-01 LAB — BACTERIA UR CULT: NORMAL

## 2023-11-08 ENCOUNTER — PATIENT MESSAGE (OUTPATIENT)
Dept: OBSTETRICS AND GYNECOLOGY | Facility: CLINIC | Age: 38
End: 2023-11-08
Payer: COMMERCIAL

## 2023-11-08 DIAGNOSIS — N92.3 INTERMENSTRUAL BLEEDING: Primary | ICD-10-CM

## 2023-11-15 ENCOUNTER — LAB VISIT (OUTPATIENT)
Dept: LAB | Facility: HOSPITAL | Age: 38
End: 2023-11-15
Attending: OBSTETRICS & GYNECOLOGY
Payer: COMMERCIAL

## 2023-11-15 DIAGNOSIS — N92.3 INTERMENSTRUAL BLEEDING: ICD-10-CM

## 2023-11-15 LAB
ESTRADIOL SERPL-MCNC: 80 PG/ML
FSH SERPL-ACNC: 5.29 MIU/ML
LH SERPL-ACNC: 4.5 MIU/ML
PROLACTIN SERPL IA-MCNC: 7.1 NG/ML (ref 5.2–26.5)
T4 FREE SERPL-MCNC: 0.87 NG/DL (ref 0.71–1.51)
TSH SERPL DL<=0.005 MIU/L-ACNC: 0.8 UIU/ML (ref 0.4–4)

## 2023-11-15 PROCEDURE — 84439 ASSAY OF FREE THYROXINE: CPT | Performed by: OBSTETRICS & GYNECOLOGY

## 2023-11-15 PROCEDURE — 83002 ASSAY OF GONADOTROPIN (LH): CPT | Performed by: OBSTETRICS & GYNECOLOGY

## 2023-11-15 PROCEDURE — 82670 ASSAY OF TOTAL ESTRADIOL: CPT | Performed by: OBSTETRICS & GYNECOLOGY

## 2023-11-15 PROCEDURE — 84443 ASSAY THYROID STIM HORMONE: CPT | Performed by: OBSTETRICS & GYNECOLOGY

## 2023-11-15 PROCEDURE — 84146 ASSAY OF PROLACTIN: CPT | Performed by: OBSTETRICS & GYNECOLOGY

## 2023-11-15 PROCEDURE — 36415 COLL VENOUS BLD VENIPUNCTURE: CPT | Performed by: OBSTETRICS & GYNECOLOGY

## 2023-11-15 PROCEDURE — 83001 ASSAY OF GONADOTROPIN (FSH): CPT | Performed by: OBSTETRICS & GYNECOLOGY

## 2023-12-19 ENCOUNTER — OFFICE VISIT (OUTPATIENT)
Dept: OBSTETRICS AND GYNECOLOGY | Facility: CLINIC | Age: 38
End: 2023-12-19
Payer: COMMERCIAL

## 2023-12-19 VITALS
SYSTOLIC BLOOD PRESSURE: 110 MMHG | BODY MASS INDEX: 26.24 KG/M2 | WEIGHT: 167.56 LBS | DIASTOLIC BLOOD PRESSURE: 80 MMHG

## 2023-12-19 DIAGNOSIS — N63.22 MASS OF UPPER INNER QUADRANT OF LEFT BREAST: Primary | ICD-10-CM

## 2023-12-19 PROCEDURE — 3079F DIAST BP 80-89 MM HG: CPT | Mod: CPTII,S$GLB,, | Performed by: PHYSICIAN ASSISTANT

## 2023-12-19 PROCEDURE — 3008F BODY MASS INDEX DOCD: CPT | Mod: CPTII,S$GLB,, | Performed by: PHYSICIAN ASSISTANT

## 2023-12-19 PROCEDURE — 3079F PR MOST RECENT DIASTOLIC BLOOD PRESSURE 80-89 MM HG: ICD-10-PCS | Mod: CPTII,S$GLB,, | Performed by: PHYSICIAN ASSISTANT

## 2023-12-19 PROCEDURE — 99212 OFFICE O/P EST SF 10 MIN: CPT | Mod: S$GLB,,, | Performed by: PHYSICIAN ASSISTANT

## 2023-12-19 PROCEDURE — 1159F PR MEDICATION LIST DOCUMENTED IN MEDICAL RECORD: ICD-10-PCS | Mod: CPTII,S$GLB,, | Performed by: PHYSICIAN ASSISTANT

## 2023-12-19 PROCEDURE — 3074F SYST BP LT 130 MM HG: CPT | Mod: CPTII,S$GLB,, | Performed by: PHYSICIAN ASSISTANT

## 2023-12-19 PROCEDURE — 99999 PR PBB SHADOW E&M-EST. PATIENT-LVL V: CPT | Mod: PBBFAC,,, | Performed by: PHYSICIAN ASSISTANT

## 2023-12-19 PROCEDURE — 1160F PR REVIEW ALL MEDS BY PRESCRIBER/CLIN PHARMACIST DOCUMENTED: ICD-10-PCS | Mod: CPTII,S$GLB,, | Performed by: PHYSICIAN ASSISTANT

## 2023-12-19 PROCEDURE — 1159F MED LIST DOCD IN RCRD: CPT | Mod: CPTII,S$GLB,, | Performed by: PHYSICIAN ASSISTANT

## 2023-12-19 PROCEDURE — 1160F RVW MEDS BY RX/DR IN RCRD: CPT | Mod: CPTII,S$GLB,, | Performed by: PHYSICIAN ASSISTANT

## 2023-12-19 PROCEDURE — 3008F PR BODY MASS INDEX (BMI) DOCUMENTED: ICD-10-PCS | Mod: CPTII,S$GLB,, | Performed by: PHYSICIAN ASSISTANT

## 2023-12-19 PROCEDURE — 99212 PR OFFICE/OUTPT VISIT, EST, LEVL II, 10-19 MIN: ICD-10-PCS | Mod: S$GLB,,, | Performed by: PHYSICIAN ASSISTANT

## 2023-12-19 PROCEDURE — 99999 PR PBB SHADOW E&M-EST. PATIENT-LVL V: ICD-10-PCS | Mod: PBBFAC,,, | Performed by: PHYSICIAN ASSISTANT

## 2023-12-19 PROCEDURE — 3074F PR MOST RECENT SYSTOLIC BLOOD PRESSURE < 130 MM HG: ICD-10-PCS | Mod: CPTII,S$GLB,, | Performed by: PHYSICIAN ASSISTANT

## 2023-12-19 NOTE — PATIENT INSTRUCTIONS
[phone]    Continue with OTC therapies such as acetaminophen or nonsteroidal anti-inflammatory drugs (NSAIDs). They can both be used to relieve breast pain. Topical NSAIDS such as OTC Diclofenac (Volteran) gel can be used.   Other recommendations include use of a supportive bra. Wearing a soft, supportive bra at night prevents the breasts from pulling down on the chest wall. Some women obtain relief from application of warm compresses or ice packs.      Recommend lifestyle recommendations such as decrease or elimination of caffeine. Also low-fat diet, high complex carbohydrate diet has been shown to be effective.       Alternative therapies such as Evening Primerose Oil (EPO) 1000mg twice daily has been found to be helpful for some patients, results are seen after 3-6 months. Vitamin D may also help.

## 2023-12-19 NOTE — PROGRESS NOTES
CC:   Chief Complaint   Patient presents with    Breast Pain     Left Side       HPI:   Snow Barney  complains of left breast lump that she noticed 2-3 months ago. It is located upper inner quadrant.  She denies pain. States the mass is constant and does not increase in size premenstrually. She denies skin changes.She  reports  nipple discharge - but states she has always had this since her last child, states discharge is not abnormal.  She is not breastfeeding or pumping. She is not on OCPs or HRT, is using mirena IUD.  She denies any pain to her right breast.  Denies any recent vigorous or repetitive use of pectoral muscles.  No associated neck, back, or shoulder problems.  No associated fever or erythema.  No recent history of trauma to the chest.  She has not tried any alleviating factors.  The pain does not affect her ability to perform daily activities.  Reports family history of breast cancer. Breast Cancer-related family history includes Breast cancer in her paternal aunt and paternal grandmother. She also reports breast cancer on mothers side, but not sure who.     ROS:  GENERAL: No chills, fatigability or weight loss.  NEUROLOGICAL: No headaches. No vision changes.  CARDIOVASCULAR: No chest pain. No shortness of breath. No leg cramps.  ABDOMEN: No abdominal pain. Denies nausea. Denies vomiting. No diarrhea. No constipation  BREAST: See HPI  URINARY: No incontinence, no nocturia, no frequency and no dysuria.  VULVAR: No pain, no lesions and no itching.  VAGINA: No relaxation, no itching, no discharge, no abnormal bleeding and no lesions.      Vitals:    23 1439   BP: 110/80     GENERAL: healthy, alert, no distress  Neck: normal to inspection  LYMPH: No epitrochlear, supraclavicular, or axillary lymphadenopathy  BREAST: negative findings: normal in size and symmetry, normal contour with no evidence of flattening or dimpling, skin normal, nipples everted without rashes or discharge and positive  findings: 2X1 cm, irregular, firm, rubbery, mobile, non-tender, and elonaged nodule located on the left upper inner quadrant  ABDOMEN: Normal, benign.    Snow was seen today for breast pain.    Diagnoses and all orders for this visit:    Mass of upper inner quadrant of left breast  -     Mammo Digital Diagnostic Left with Avinash; Future  -     US Breast Left Complete; Future      Discussed supportive measures with a good support bra, anti-inflammatories (ibuprofen) as needed, warm compresses      Bettina Gage PA-C

## 2024-01-03 ENCOUNTER — HOSPITAL ENCOUNTER (OUTPATIENT)
Dept: RADIOLOGY | Facility: HOSPITAL | Age: 39
Discharge: HOME | End: 2024-01-03
Attending: PHYSICIAN ASSISTANT
Payer: COMMERCIAL

## 2024-01-03 DIAGNOSIS — N63.22 MASS OF UPPER INNER QUADRANT OF LEFT BREAST: ICD-10-CM

## 2024-01-03 PROCEDURE — 76642 ULTRASOUND BREAST LIMITED: CPT | Mod: 26,LT,, | Performed by: RADIOLOGY

## 2024-01-03 PROCEDURE — 77066 DX MAMMO INCL CAD BI: CPT | Mod: 26,,, | Performed by: RADIOLOGY

## 2024-01-03 PROCEDURE — 77066 DX MAMMO INCL CAD BI: CPT | Mod: TC

## 2024-01-03 PROCEDURE — 76642 ULTRASOUND BREAST LIMITED: CPT | Mod: TC,LT

## 2024-01-03 PROCEDURE — 77062 BREAST TOMOSYNTHESIS BI: CPT | Mod: 26,,, | Performed by: RADIOLOGY

## 2024-01-09 ENCOUNTER — HOSPITAL ENCOUNTER (EMERGENCY)
Facility: OTHER | Age: 39
Discharge: HOME | End: 2024-01-09
Attending: EMERGENCY MEDICINE
Payer: COMMERCIAL

## 2024-01-09 VITALS
HEART RATE: 102 BPM | WEIGHT: 168 LBS | TEMPERATURE: 98 F | OXYGEN SATURATION: 100 % | SYSTOLIC BLOOD PRESSURE: 138 MMHG | RESPIRATION RATE: 17 BRPM | HEIGHT: 67 IN | BODY MASS INDEX: 26.37 KG/M2 | DIASTOLIC BLOOD PRESSURE: 73 MMHG

## 2024-01-09 DIAGNOSIS — R55 SYNCOPE: Primary | ICD-10-CM

## 2024-01-09 LAB
ALBUMIN SERPL BCP-MCNC: 4.5 G/DL (ref 3.5–5.2)
ALP SERPL-CCNC: 62 U/L (ref 55–135)
ALT SERPL W/O P-5'-P-CCNC: 12 U/L (ref 10–44)
ANION GAP SERPL CALC-SCNC: 12 MMOL/L (ref 8–16)
AST SERPL-CCNC: 12 U/L (ref 10–40)
B-HCG UR QL: NEGATIVE
BASOPHILS # BLD AUTO: 0.03 K/UL (ref 0–0.2)
BASOPHILS NFR BLD: 0.5 % (ref 0–1.9)
BILIRUB SERPL-MCNC: 0.6 MG/DL (ref 0.1–1)
BUN SERPL-MCNC: 5 MG/DL (ref 6–20)
CALCIUM SERPL-MCNC: 9.3 MG/DL (ref 8.7–10.5)
CHLORIDE SERPL-SCNC: 104 MMOL/L (ref 95–110)
CO2 SERPL-SCNC: 21 MMOL/L (ref 23–29)
CREAT SERPL-MCNC: 0.8 MG/DL (ref 0.5–1.4)
CTP QC/QA: YES
DIFFERENTIAL METHOD BLD: NORMAL
EOSINOPHIL # BLD AUTO: 0.1 K/UL (ref 0–0.5)
EOSINOPHIL NFR BLD: 1.9 % (ref 0–8)
ERYTHROCYTE [DISTWIDTH] IN BLOOD BY AUTOMATED COUNT: 12.7 % (ref 11.5–14.5)
EST. GFR  (NO RACE VARIABLE): >60 ML/MIN/1.73 M^2
GLUCOSE SERPL-MCNC: 122 MG/DL (ref 70–110)
HCT VFR BLD AUTO: 42.2 % (ref 37–48.5)
HGB BLD-MCNC: 13.7 G/DL (ref 12–16)
IMM GRANULOCYTES # BLD AUTO: 0.01 K/UL (ref 0–0.04)
IMM GRANULOCYTES NFR BLD AUTO: 0.2 % (ref 0–0.5)
LYMPHOCYTES # BLD AUTO: 2.1 K/UL (ref 1–4.8)
LYMPHOCYTES NFR BLD: 36 % (ref 18–48)
MCH RBC QN AUTO: 28.9 PG (ref 27–31)
MCHC RBC AUTO-ENTMCNC: 32.5 G/DL (ref 32–36)
MCV RBC AUTO: 89 FL (ref 82–98)
MONOCYTES # BLD AUTO: 0.3 K/UL (ref 0.3–1)
MONOCYTES NFR BLD: 5.2 % (ref 4–15)
NEUTROPHILS # BLD AUTO: 3.3 K/UL (ref 1.8–7.7)
NEUTROPHILS NFR BLD: 56.2 % (ref 38–73)
NRBC BLD-RTO: 0 /100 WBC
PLATELET # BLD AUTO: 191 K/UL (ref 150–450)
PMV BLD AUTO: 10.7 FL (ref 9.2–12.9)
POCT GLUCOSE: 100 MG/DL (ref 70–110)
POTASSIUM SERPL-SCNC: 3.6 MMOL/L (ref 3.5–5.1)
PROT SERPL-MCNC: 7.7 G/DL (ref 6–8.4)
RBC # BLD AUTO: 4.74 M/UL (ref 4–5.4)
SODIUM SERPL-SCNC: 137 MMOL/L (ref 136–145)
TROPONIN I SERPL DL<=0.01 NG/ML-MCNC: <0.006 NG/ML (ref 0–0.03)
WBC # BLD AUTO: 5.92 K/UL (ref 3.9–12.7)

## 2024-01-09 PROCEDURE — 25000003 PHARM REV CODE 250: Performed by: EMERGENCY MEDICINE

## 2024-01-09 PROCEDURE — 84484 ASSAY OF TROPONIN QUANT: CPT | Performed by: EMERGENCY MEDICINE

## 2024-01-09 PROCEDURE — 80053 COMPREHEN METABOLIC PANEL: CPT | Performed by: EMERGENCY MEDICINE

## 2024-01-09 PROCEDURE — 96374 THER/PROPH/DIAG INJ IV PUSH: CPT

## 2024-01-09 PROCEDURE — 93010 ELECTROCARDIOGRAM REPORT: CPT | Mod: ,,, | Performed by: INTERNAL MEDICINE

## 2024-01-09 PROCEDURE — 99285 EMERGENCY DEPT VISIT HI MDM: CPT | Mod: 25

## 2024-01-09 PROCEDURE — 63600175 PHARM REV CODE 636 W HCPCS: Performed by: EMERGENCY MEDICINE

## 2024-01-09 PROCEDURE — 85025 COMPLETE CBC W/AUTO DIFF WBC: CPT | Performed by: EMERGENCY MEDICINE

## 2024-01-09 PROCEDURE — 81025 URINE PREGNANCY TEST: CPT | Performed by: EMERGENCY MEDICINE

## 2024-01-09 PROCEDURE — 25500020 PHARM REV CODE 255: Performed by: EMERGENCY MEDICINE

## 2024-01-09 PROCEDURE — 96375 TX/PRO/DX INJ NEW DRUG ADDON: CPT | Mod: 59

## 2024-01-09 PROCEDURE — 93005 ELECTROCARDIOGRAM TRACING: CPT

## 2024-01-09 PROCEDURE — 82962 GLUCOSE BLOOD TEST: CPT

## 2024-01-09 RX ORDER — ONDANSETRON 2 MG/ML
4 INJECTION INTRAMUSCULAR; INTRAVENOUS
Status: DISCONTINUED | OUTPATIENT
Start: 2024-01-09 | End: 2024-01-09 | Stop reason: HOSPADM

## 2024-01-09 RX ORDER — SODIUM CHLORIDE 9 MG/ML
1000 INJECTION, SOLUTION INTRAVENOUS
Status: COMPLETED | OUTPATIENT
Start: 2024-01-09 | End: 2024-01-09

## 2024-01-09 RX ORDER — LORAZEPAM 2 MG/ML
0.5 INJECTION INTRAMUSCULAR
Status: DISCONTINUED | OUTPATIENT
Start: 2024-01-09 | End: 2024-01-09 | Stop reason: HOSPADM

## 2024-01-09 RX ORDER — BUPROPION HYDROCHLORIDE 150 MG/1
150 TABLET ORAL DAILY
COMMUNITY

## 2024-01-09 RX ADMIN — SODIUM CHLORIDE 1000 ML: 9 INJECTION, SOLUTION INTRAVENOUS at 12:01

## 2024-01-09 RX ADMIN — IOHEXOL 100 ML: 350 INJECTION, SOLUTION INTRAVENOUS at 01:01

## 2024-01-09 RX ADMIN — ONDANSETRON 4 MG: 2 INJECTION INTRAMUSCULAR; INTRAVENOUS at 12:01

## 2024-01-09 RX ADMIN — LORAZEPAM 0.5 MG: 2 INJECTION INTRAMUSCULAR; INTRAVENOUS at 01:01

## 2024-01-09 NOTE — ED PROVIDER NOTES
Encounter Date: 1/9/2024    SCRIBE #1 NOTE: I, Diamond Liza, am scribing for, and in the presence of,  Dorian Hernández II, MD. I have scribed the following portions of the note - Other sections scribed: HPI, PE.       History     Chief Complaint   Patient presents with    Loss of Consciousness     Pt was at a patient's house when everything became black around here and she began to pass out. Pt stating her patient's family member caught her mid-fall. Denies hitting head. Denies blood thinner use. Endorses manjaro use. Hx of PE, v-tach, and excessive PVCs. At this time, pt reporting chest tightness and SOB. Pt's digits on L hand appearing cold to touch and slight purple.      Patient is a 38 y.o. female presenting to the ED after a syncopal episode while at her patient's house for hospice care. Pt states that everything went back, and she began to fall forward before someone caught her. She endorses lightheadedness and tightness/heaviness after that. She reports that she felt fine this morning, just a bit tired. She did not eat breakfast, which is normal for her. She states that she ate a piece of candy cane in case her blood sugar was low, which happened once before, when she was on Ozempic. Pt started Mounjaro last Friday, after being on Ozempic for a year. Pt has a history of a pulmonary embolism 4 years ago, which her hematologist attributed to taking a Plan B pill. She states that the embolism was small, but very symptomatic. Pt is currently on Mirena for birth control. Pt endorses smoking, after quitting previously, and occasional alcohol. She denies other drugs. 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 She reports that she did not feel any PVCs today. She is not currently on any heart medications. Pt states that she has been diagnosed with asthma, but an allergist last year told her she does not have asthma. She denies cough or  fever. She denies current dizziness. Pt notes that 4 days ago, she felt a pain in her left elbow that spread through her whole arm for a few hours before moving to her neck and the back of her head. This has resolved since yesterday. Pt states that she gets anxious with the feeling of CAT scan contrast, and it has made her vomit in the past, so she wants anxiety medication. She takes Ativan 1 mg at home as needed. This is the extent of the patient's complaints at this time.        The history is provided by the patient and medical records. No  was used.     Review of patient's allergies indicates:   Allergen Reactions    Demerol (pf) [meperidine (pf)] Hives     Past Medical History:   Diagnosis Date    Anxiety     Asthma     Depression     Fibrocystic breast disease     GERD (gastroesophageal reflux disease)     Hernia, hiatal     IBS (irritable bowel syndrome)     Inappropriate sinus tachycardia     POTS (postural orthostatic tachycardia syndrome)     Pulmonary emboli 2020    Sepsis     Urticaria      Past Surgical History:   Procedure Laterality Date    TYMPANOSTOMY TUBE PLACEMENT       Family History   Problem Relation Age of Onset    Allergies Mother     Hypertension Mother     Diabetes Mother     Allergies Father     Asthma Father     Hypertension Father     Diabetes Father     Breast cancer Maternal Aunt     Breast cancer Paternal Aunt     Colon cancer Maternal Grandmother     Breast cancer Paternal Grandmother     Allergies Son     Ovarian cancer Cousin      Social History     Tobacco Use    Smoking status: Former     Current packs/day: 0.00     Average packs/day: 1 pack/day for 25.5 years (25.5 ttl pk-yrs)     Types: Cigarettes     Start date: 7/15/1996     Quit date: 2022     Years since quittin.0    Smokeless tobacco: Never    Tobacco comments:     varies from 7 cigarettes to whole pack in day   Substance Use Topics    Alcohol use: Yes     Comment: ocassionally    Drug use: No      Review of Systems  See HPI    Physical Exam     Initial Vitals [01/09/24 1156]   BP Pulse Resp Temp SpO2   130/84 (!) 115 19 97.8 °F (36.6 °C) 100 %      MAP       --         Physical Exam    Nursing note and vitals reviewed.  Constitutional: She appears well-developed and well-nourished. She is not diaphoretic. No distress.   HENT:   Head: Normocephalic and atraumatic.   Moist mucous membranes.   Eyes: EOM are normal. Pupils are equal, round, and reactive to light.   No pallor or icterus.   Neck: Neck supple.   Normal range of motion.  Cardiovascular:  Regular rhythm and normal heart sounds.     Exam reveals no gallop and no friction rub.       No murmur heard.  Tachycardic. 2+ pulses in bilateral upper extremities. Normal capillary refill of fingers.   Pulmonary/Chest: Breath sounds normal. No respiratory distress. She has no wheezes. She has no rhonchi. She has no rales.   Good air exchange. No expiratory wheezes or basilar rales.   Abdominal: Abdomen is soft. There is no abdominal tenderness.   Musculoskeletal:         General: No tenderness or edema. Normal range of motion.      Cervical back: Normal range of motion and neck supple.     Lymphadenopathy:     She has no cervical adenopathy.   Neurological: She is alert and oriented to person, place, and time.   Skin: Skin is warm and dry.   Psychiatric: She has a normal mood and affect. Her behavior is normal. Judgment and thought content normal.         ED Course   Procedures  Labs Reviewed   COMPREHENSIVE METABOLIC PANEL - Abnormal; Notable for the following components:       Result Value    CO2 21 (*)     Glucose 122 (*)     BUN 5 (*)     All other components within normal limits   CBC W/ AUTO DIFFERENTIAL   TROPONIN I   POCT URINE PREGNANCY   POCT GLUCOSE     EKG Readings: (Independently Interpreted)   Sinus tachycardia. Rate of 120. Unchanged from August 30. No ischemia or arrythmia. No S1Q3T3 pattern.     ECG Results              EKG 12-lead (In  process)  Result time 01/10/24 10:42:34      In process by Interface, Lab In [facility] (01/10/24 10:42:34)                   Narrative:    Test Reason : R55,    Vent. Rate : 117 BPM     Atrial Rate : 117 BPM     P-R Int : 154 ms          QRS Dur : 076 ms      QT Int : 304 ms       P-R-T Axes : 078 090 046 degrees     QTc Int : 424 ms    Sinus tachycardia  Rightward axis  Borderline Abnormal ECG      Referred By: [email or web address]   SELF           Confirmed By:                                   Imaging Results              CTA Chest Non-Coronary (PE Studies) (Final result)  Result time 01/09/24 14:03:38      Final result by Nikolay Townsend III, MD (01/09/24 14:03:38)                   Impression:      No acute process seen and no significant abnormality identified.  There is no evidence of pulmonary embolus or acute aortic syndrome.      Electronically signed by: Nikolay Townsend MD  Date:    01/09/2024  Time:    14:03               Narrative:    EXAMINATION:  CTA CHEST NON CORONARY (PE STUDIES)    CLINICAL HISTORY:  Pulmonary embolism (PE) suspected, high prob;    FINDINGS:  Comparison is 06/01/2023.    Patient was administered 85 cc of Omnipaque 350 intravenously.  The aortic arch and great vessels are unremarkable.  Pulmonary vessels are very well opacified and there is no evidence of pulmonary embolus.  No mediastinal, hilar, or axillary adenopathy is seen.  Upper abdominal organs demonstrate nothing unusual.  The trachea and bronchi are patent.  There is no evidence of interstitial lung disease, bronchiectasis, emphysema, or airway trapping.  Lungs are clear.  No pulmonary nodules, masses, or infiltrates are seen.  Is bones demonstrate mild DJD.                                       Medications   0.9%  NaCl infusion (1,000 mLs Intravenous New Bag 1/9/24 1247)   iohexoL (OMNIPAQUE 350) injection 100 mL (100 mLs Intravenous Given 1/9/24 1349)     Medical Decision Making  Amount and/or Complexity of Data Reviewed  Labs:  ordered. Decision-making details documented in ED Course.  Radiology: ordered and independent interpretation performed. Decision-making details documented in ED Course.  ECG/medicine tests: ordered and independent interpretation performed. Decision-making details documented in ED Course.    Risk  Prescription drug management.    Patient presents after syncopal event.  She did have some premonitory symptoms of lightheadedness.  No associated chest pain or palpitations.  Does not feel similar to prior episode of SVT.  EKG was unremarkable and patient had no arrhythmia while on the monitor.  She does have history of PE in the past.  She was tachycardic upon arrival.  Given this history, today's presentation I did not think that she was low risk for D-dimer.  We therefore obtained a CTA along with laboratory studies.  Blood work does not show anemia electrolyte disturbance.  Troponin was negative.  CT scan of the chest does not reveal PE or other acute findings.  Patient has remained asymptomatic during her stay.  No further episodes.  She is able to stand and ambulate without dizziness.  She does c report thatccccccc she has been skipping breakfast c, attributes to no appetite due to weight loss medications.  Encouraged her not to skip meals, drink adequate fluids.  She is aware that today's workup is not able to exclude possibility of an arrhythmia as the cause of episode.  Encouraged follow-up with primary care.  Return to the emergency department for any further similar episodes.  Understands return precautions.  Stable for discharge        Scribe Attestation:   Scribe #1: I performed the above scribed service and the documentation accurately describes the services I performed. I attest to the accuracy of the note.         Physician Attestation for Scribe: I, TLH, reviewed documentation as scribed in my presence, which is both accurate and complete.                        Clinical Impression:  Final  diagnoses:  [R55] Syncope (Primary)          ED Disposition Condition    Discharge Stable          ED Prescriptions    None       Follow-up Information       Follow up With Specialties Details Why Contact Info    Caroline Felipe, [facility] In 1 week  [address] [place] [facility]  [place] [address]  [phone]      Parrish Mckeon MD Cardiology, Interventional Cardiology Schedule an appointment as soon as possible for a visit   [address]  [address]  [facility] [address]  [phone]               Dorian Hernández II, MD  01/10/24 0207

## 2024-01-09 NOTE — ED TRIAGE NOTES
"Pt to ED with extensive medical history c/o near syncopal episode today while working. Pt states she became dizzy and possibly "blacked out" Pt states she was caught by her patients family. Remembers incident. Pt states if she blacked out it was only for a second. Denies dizziness, nausea, vomiting, fever, CP and SOB at present. RR even and unlabored. Pt able AAOx4 VSS PERRLA. Connected to cardiac and O2 monitor. EKG completed and shown.   "

## 2024-03-14 ENCOUNTER — TELEPHONE (OUTPATIENT)
Dept: GASTROENTEROLOGY | Facility: CLINIC | Age: 39
End: 2024-03-14

## 2024-03-14 ENCOUNTER — OFFICE VISIT (OUTPATIENT)
Dept: GASTROENTEROLOGY | Facility: CLINIC | Age: 39
End: 2024-03-14
Payer: COMMERCIAL

## 2024-03-14 DIAGNOSIS — R19.5 FECAL OCCULT BLOOD TEST POSITIVE: Primary | ICD-10-CM

## 2024-03-14 PROCEDURE — 99204 OFFICE O/P NEW MOD 45 MIN: CPT | Mod: 95,,,

## 2024-03-14 RX ORDER — SODIUM, POTASSIUM,MAG SULFATES 17.5-3.13G
1 SOLUTION, RECONSTITUTED, ORAL ORAL DAILY
Qty: 1 KIT | Refills: 0 | Status: SHIPPED | OUTPATIENT
Start: 2024-03-14 | End: 2024-06-05

## 2024-03-14 NOTE — TELEPHONE ENCOUNTER
----- Message from Cathy Malone NP sent at 3/14/2024  9:36 AM CDT -----  Please help schedule colon with suprep. She has been on ozempic and monujaro, currently not taking but let her know if she restarts needs to hold.    Also, I want to get her records from [facility]- not sure if its easier to scan the release form to her portal or have her come in and sign.     thanks

## 2024-03-14 NOTE — PROGRESS NOTES
GASTROENTEROLOGY CLINIC NOTE    The patient location is: [place]  Visit type: audiovisual  Face to Face time with patient: 12mins  20 minutes of total time spent on the encounter, which includes face to face time and non-face to face time preparing to see the patient (eg, review of tests), Obtaining and/or reviewing separately obtained history, Documenting clinical information in the electronic or other health record, Independently interpreting results (not separately reported) and communicating results to the patient/family/caregiver, or Care coordination (not separately reported).     HPI:  Snow Barney is a 38 y.o. female presents today for (+) OB stool. She was previously seen at [facility], tx with 3 rounds of abx. Eradication was confirmed. Taking pantoprazole once daily. She reports having an EGD in 2022- had sliding HH and chronic inflammation. Colonoscopy in 2021- no abnormalities. She was taking ozempic which was leading to constipation. Would have to manually disimpact stool when having a BM. She started taking prune juice which has helped, also has not been on GLP-1 for past two weeks. She did take a occult blood stool test which was positive. She is unsure if she sees overt blood in stool.     Prior Endoscopy:  EGD:  Colon:    (Portions of this note were dictated using voice recognition software and may contain dictation related errors in spelling/grammar/syntax not found on text review)    Review of Systems   Gastrointestinal:  Positive for blood in stool and heartburn (controlled). Negative for abdominal pain.       Past Medical History: has a past medical history of Anxiety, Asthma, Depression, Fibrocystic breast disease, GERD (gastroesophageal reflux disease), Hernia, hiatal, IBS (irritable bowel syndrome), Inappropriate sinus tachycardia, POTS (postural orthostatic tachycardia syndrome), Pulmonary emboli, Sepsis, and Urticaria.    Past Surgical History: has a past surgical history  that includes Tympanostomy tube placement.    Home medications:   Current Outpatient Medications on File Prior to Visit   Medication Sig Dispense Refill    albuterol (PROVENTIL/VENTOLIN HFA) 90 mcg/actuation inhaler Inhale 2 puffs into the lungs every 4 (four) hours as needed.      albuterol (PROVENTIL/VENTOLIN HFA) 90 mcg/actuation inhaler INHALE 2 PUFFS INTO THE LUNGS EVERY 4 HOURS AS NEEDED FOR WHEEZING OR SHORTNESS OF BREATH 6.7 g 3    atomoxetine (STRATTERA) 40 MG capsule Take 1 capsule every day by mouth in the morning for 30 days. 30 capsule 1    buPROPion (WELLBUTRIN XL) 150 MG TB24 tablet Take 150 mg by mouth once daily.      EPINEPHrine (EPIPEN) 0.3 mg/0.3 mL AtIn Inject 0.3 mLs (0.3 mg total) into the muscle once. for 1 dose 2 each 0    ergocalciferol (VITAMIN D2) 50,000 unit Cap Take 1 capsule (50,000 Units total) by mouth every 7 days. 12 capsule 1    FLOVENT  mcg/actuation inhaler Inhale 1 puff into the lungs 2 (two) times daily.      ibuprofen (ADVIL,MOTRIN) 800 MG tablet Take 1 tablet (800 mg total) by mouth 3 (three) times daily. 30 tablet 1    LORazepam (ATIVAN) 1 MG tablet TAKE 1 TABLET BY MOUTH ONCE DAILY AS NEEDED FOR ANXIETY 30 tablet 0    LORazepam (ATIVAN) 1 MG tablet TAKE 1 TABLET BY MOUTH ONCE DAILY AS NEEDED FOR ANXIETY 10 tablet 0    multivitamin capsule Take 1 capsule by mouth.      NURTEC 75 mg odt Take 75 mg by mouth daily as needed.      ondansetron (ZOFRAN-ODT) 4 MG TbDL DISSOLVE 1 TABLET BY MOUTH EVERY 6 HOURS AS NEEDED FOR NAUSEA OR VOMITING 90 tablet 1    pantoprazole (PROTONIX) 40 MG tablet TAKE 1 TABLET BY MOUTH TWICE DAILY 180 tablet 3    rimegepant 75 mg odt TAKE 1 TABLET BY MOUTH ONCE DAILY AS NEEDED 8 tablet 1    semaglutide (OZEMPIC) 1 mg/dose (4 mg/3 mL) INJECT 1MG INTO THE SKIN ONCE WEEKLY 1 pen 3    sertraline (ZOLOFT) 25 MG tablet TAKE 1 TABLET BY MOUTH ONCE DAILY AS DIRECTED 90 tablet 0    sertraline (ZOLOFT) 50 MG tablet TAKE ONE TABLET BY MOUTH EVERY DAY AS  DIRECTED 30 tablet 2    sertraline (ZOLOFT) 50 MG tablet TAKE ONE TABLET BY MOUTH EVERY DAY AS DIRECTED 30 tablet 0    traZODone (DESYREL) 50 MG tablet TAKE 1 TABLET BY MOUTH EVERY DAY 30 tablet 5    traZODone (DESYREL) 50 MG tablet Take 1 tablet every day by mouth for 30 days. 30 tablet 2    traZODone (DESYREL) 50 MG tablet Take 1 tablet every day by oral route for 30 days. 30 tablet 0    [DISCONTINUED] ALPRAZolam (XANAX) 0.5 MG tablet Take 1 tablet (0.5 mg total) by mouth 3 (three) times daily as needed for Anxiety. (Patient not taking: Reported on 8/5/2021) 9 tablet 0     Current Facility-Administered Medications on File Prior to Visit   Medication Dose Route Frequency Provider Last Rate Last Admin    levonorgestreL (MIRENA) 20 mcg/24 hours (7 yrs) 52 mg IUD 1 Intra Uterine Device  1 Intra Uterine Device Intrauterine  Brenda Rios MD   1 Intra Uterine Device at 07/21/22 1045       Vital signs:  There were no vitals taken for this visit.    Physical Exam  Vitals reviewed: limited d/t telemed.   Constitutional:       Appearance: Normal appearance.   Neurological:      Mental Status: She is alert.       Each patient to whom he or she provides medical services by telemedicine is:  (1) informed of the relationship between the physician and patient and the respective role of any other health care provider with respect to management of the patient; and (2) notified that he or she may decline to receive medical services by telemedicine and may withdraw from such care at any time.    I have reviewed associated labs, imaging and notes.     Assessment:  1. Fecal occult blood test positive    Previously seen at [facility]   Hx of hpylori- requiring multiple tx, eradication confirmed per pt   Taking PPI daily  Constipation-   ozempic   Taking prune juice   (+) OB    Schedule colonoscopy d/t (+) OB. Request medical records from previous GI provider    Plan:  Orders Placed This Encounter    sodium,potassium,mag sulfates  (SUPREP BOWEL PREP KIT) 17.5-3.13-1.6 gram SolR    Case Request Endoscopy: COLONOSCOPY     Schedule colonoscopy   Rebecca Malone NP  [facility]

## 2024-03-28 ENCOUNTER — TELEPHONE (OUTPATIENT)
Dept: ENDOSCOPY | Facility: HOSPITAL | Age: 39
End: 2024-03-28
Payer: COMMERCIAL

## 2024-03-28 NOTE — TELEPHONE ENCOUNTER
Left message instructing patient to call dept @ [phone] between 8am-4pm.    Arrival time to be given @ *580  (Message sent via [facility] portal)

## 2024-03-29 ENCOUNTER — PATIENT MESSAGE (OUTPATIENT)
Dept: GASTROENTEROLOGY | Facility: CLINIC | Age: 39
End: 2024-03-29
Payer: COMMERCIAL

## 2024-04-01 ENCOUNTER — TELEPHONE (OUTPATIENT)
Dept: ENDOSCOPY | Facility: HOSPITAL | Age: 39
End: 2024-04-01
Payer: COMMERCIAL

## 2024-04-01 ENCOUNTER — OFFICE VISIT (OUTPATIENT)
Dept: OBSTETRICS AND GYNECOLOGY | Facility: CLINIC | Age: 39
End: 2024-04-01
Payer: COMMERCIAL

## 2024-04-01 VITALS
SYSTOLIC BLOOD PRESSURE: 120 MMHG | DIASTOLIC BLOOD PRESSURE: 84 MMHG | WEIGHT: 174.19 LBS | BODY MASS INDEX: 27.28 KG/M2

## 2024-04-01 DIAGNOSIS — Z12.39 SCREENING BREAST EXAMINATION: ICD-10-CM

## 2024-04-01 DIAGNOSIS — Z01.419 WOMEN'S ANNUAL ROUTINE GYNECOLOGICAL EXAMINATION: Primary | ICD-10-CM

## 2024-04-01 DIAGNOSIS — Z12.4 ENCOUNTER FOR PAPANICOLAOU SMEAR FOR CERVICAL CANCER SCREENING: ICD-10-CM

## 2024-04-01 DIAGNOSIS — Z11.51 SCREENING FOR HUMAN PAPILLOMAVIRUS (HPV): ICD-10-CM

## 2024-04-01 DIAGNOSIS — Z86.19 HISTORY OF HPV INFECTION: ICD-10-CM

## 2024-04-01 PROCEDURE — 3074F SYST BP LT 130 MM HG: CPT | Mod: CPTII,S$GLB,, | Performed by: PHYSICIAN ASSISTANT

## 2024-04-01 PROCEDURE — 88175 CYTOPATH C/V AUTO FLUID REDO: CPT | Performed by: PHYSICIAN ASSISTANT

## 2024-04-01 PROCEDURE — 99999 PR PBB SHADOW E&M-EST. PATIENT-LVL II: CPT | Mod: PBBFAC,,, | Performed by: PHYSICIAN ASSISTANT

## 2024-04-01 PROCEDURE — 3079F DIAST BP 80-89 MM HG: CPT | Mod: CPTII,S$GLB,, | Performed by: PHYSICIAN ASSISTANT

## 2024-04-01 PROCEDURE — 99395 PREV VISIT EST AGE 18-39: CPT | Mod: S$GLB,,, | Performed by: PHYSICIAN ASSISTANT

## 2024-04-01 PROCEDURE — 87624 HPV HI-RISK TYP POOLED RSLT: CPT | Performed by: PHYSICIAN ASSISTANT

## 2024-04-01 PROCEDURE — 3008F BODY MASS INDEX DOCD: CPT | Mod: CPTII,S$GLB,, | Performed by: PHYSICIAN ASSISTANT

## 2024-04-01 NOTE — TELEPHONE ENCOUNTER
Spoke with patient about arrival time @ 0930.   Colon/Golytely    Prep instructions reviewed: the day before the procedure, follow a clear liquid diet all day, then start the first 1/2 of prep at 5pm and take 2nd 1/2 of prep @ 0430.  Pt must be completely NPO when prep completed @ 0630.              Medications: Do not take Insulin or oral diabetic medications the day of the procedure.  Off Mounjaro x3-4 wks.  Take as prescribed: heart, seizure and blood pressure medication in the morning with a sip of water (less than an ounce).  Take any breathing medications and bring inhalers to hospital with you Leave all valuables and jewelry at home.     Wear comfortable clothes to procedure to change into hospital gown You cannot drive for 24 hours after your procedure because you will receive sedation for your procedure to make you comfortable.  A ride must be provided at discharge.

## 2024-04-01 NOTE — PROGRESS NOTES
HISTORY OF PRESENT ILLNESS:    Snow Barney is a 38 y.o. female, , Patient's last menstrual period was 2024.,  presents for a routine exam. She uses MIRENA IUD for contraception. She does not want STD screening.  Reports GYN complaints.  Reports spotting this month with increased acne, mood changes, night sweats. Recently d/c ozempic for upcoming colonoscopy.   The patient participates in regular exercise: yes.  The patient does not smoke.  The patient wears seatbelts.   Pt denies any domestic violence. YES -  tattoos or blood transfusions    SCREENING:   Pap:  NILM + HR HPV COLP NEG   Mammogram:  , TC Score: 18.58 %   Colonoscopy: N/A   DEXA:  N/A     GYN FH:  Breast cancer: P Gma (dx 50-60)   Colon cancer: M Gma   Ovarian cancer: M cousin   Endometrial cancer: none    LAST   Snow Barney  complains of left breast lump that she noticed 2-3 months ago. It is located upper inner quadrant.  She denies pain. States the mass is constant and does not increase in size premenstrually. She denies skin changes.She  reports  nipple discharge - but states she has always had this since her last child, states discharge is not abnormal.  She is not breastfeeding or pumping. She is not on OCPs or HRT, is using mirena IUD.  She denies any pain to her right breast.  Denies any recent vigorous or repetitive use of pectoral muscles.  No associated neck, back, or shoulder problems.  No associated fever or erythema.  No recent history of trauma to the chest.  She has not tried any alleviating factors.  The pain does not affect her ability to perform daily activities.  Reports family history of breast cancer. Breast Cancer-related family history includes Breast cancer in her paternal aunt and paternal grandmother. She also reports breast cancer on mothers side, but not sure who.   3/23 WWE  Snow Barney is a 37 y.o. female, , with h/o PE, No LMP recorded. Patient has had an implant.,  presents for a  routine exam. She uses MIRENA IUD for contraception. She does want STD screening.  Reports GYN complaints.    Reports irregular cycle and dysmenorrhea. States cycles started becoming irregular in December. She had Mirena placed in  - had regular cycles until January. Describes bleeding today as only clotting that she notices when she wipes.   AST   Snow Barney is a 36 y.o. female  presents with complaint of vaginal pain for 1 days. States she used monistat today which caused burning and swelling. Prior to monistat she was experiencing irritation following TVUS. She also tried gentian violet on a tampon for symptoms. She denies odor or discharge..  She denies h/o vaginitis. Denies nausea, vomiting, diarrhea, constipation, dysuria, dyspareunia, abdominal pain. She would like STD screening at this visit. No other concerns or complaints at this visit.      Snow Barney is a 36 y.o. female who presents for evaluation of INTERMENSTRUAL BLEEDING. States last cycle was , started spotting 2 days ago. States last few cycles have been very irregular-flow and days are irregular. Patient has no relevant history of abnormal sexual development. Pt has been having PVCs since January and does not know the cause, thinks it may be yoan-menopause. Factors that may be contributory to menstrual abnormalities include: recent stressors - nursing student, PVCs. Previous treatments for menstrual abnormalities include: copper IUD. COPPER IUD PLACED ON   YELLOW VAGINAL DISCHARGE NOTED LAST WEEK, NO ODOR, FEVER, VAG BURNING.  HER PARTNER WAS IN [place] IN MARCH AND PATIENT CONCERNED ABOUT POTENTIAL EXPOSURE.  WILL REFER FOR STI SCREENING.  ALSO WILL REFER FOR SMOKING CESSATION PROGRAM.  HAS A HISTORY OF QUESTIONABLE PROVOKED PE IN THE SETTING OF CIGARETTES AND PLAN B USE.  CONTRACEPTING WITH NFP, ADVISED RE PHEXXI AS NEEDED NEAR OVULATION AND RXN TO PHARMACY. IS [facility], AND NEXT ROTATION  IS OB.   2/2021 SANCHEZ:  Patient presents for annual exam.  The patient is sexually active. GYN screening history: last pap: was normal. The patient wears seatbelts: yes. The patient participates in regular exercise: yes. Has the patient ever been transfused or tattooed?: no. The patient reports that there is not domestic violence in her life.  Was able to stop taking anticoagulation and her menses have returned to normal.  Hasn't had BV in a while, but she slacked up on her probiotics and boric acid and now thinks she has BV again.         Past Medical History:   Diagnosis Date    Anxiety     Asthma     Depression     Fibrocystic breast disease     GERD (gastroesophageal reflux disease)     Hernia, hiatal     IBS (irritable bowel syndrome)     Inappropriate sinus tachycardia     POTS (postural orthostatic tachycardia syndrome)     Pulmonary emboli 2020    Sepsis     Urticaria        Past Surgical History:   Procedure Laterality Date    TYMPANOSTOMY TUBE PLACEMENT         MEDICATIONS AND ALLERGIES:      Current Outpatient Medications:     albuterol (PROVENTIL/VENTOLIN HFA) 90 mcg/actuation inhaler, Inhale 2 puffs into the lungs every 4 (four) hours as needed., Disp: , Rfl:     albuterol (PROVENTIL/VENTOLIN HFA) 90 mcg/actuation inhaler, INHALE 2 PUFFS INTO THE LUNGS EVERY 4 HOURS AS NEEDED FOR WHEEZING OR SHORTNESS OF BREATH, Disp: 6.7 g, Rfl: 3    buPROPion (WELLBUTRIN XL) 150 MG TB24 tablet, Take 150 mg by mouth once daily., Disp: , Rfl:     EPINEPHrine (EPIPEN) 0.3 mg/0.3 mL AtIn, Inject 0.3 mLs (0.3 mg total) into the muscle once. for 1 dose, Disp: 2 each, Rfl: 0    ergocalciferol (VITAMIN D2) 50,000 unit Cap, Take 1 capsule (50,000 Units total) by mouth every 7 days., Disp: 12 capsule, Rfl: 1    FLOVENT  mcg/actuation inhaler, Inhale 1 puff into the lungs 2 (two) times daily., Disp: , Rfl:     ibuprofen (ADVIL,MOTRIN) 800 MG tablet, Take 1 tablet (800 mg total) by mouth 3 (three) times daily., Disp: 30  tablet, Rfl: 1    LORazepam (ATIVAN) 1 MG tablet, TAKE 1 TABLET BY MOUTH ONCE DAILY AS NEEDED FOR ANXIETY, Disp: 30 tablet, Rfl: 0    LORazepam (ATIVAN) 1 MG tablet, TAKE 1 TABLET BY MOUTH ONCE DAILY AS NEEDED FOR ANXIETY, Disp: 10 tablet, Rfl: 0    multivitamin capsule, Take 1 capsule by mouth., Disp: , Rfl:     NURTEC 75 mg odt, Take 75 mg by mouth daily as needed., Disp: , Rfl:     ondansetron (ZOFRAN-ODT) 4 MG TbDL, DISSOLVE 1 TABLET BY MOUTH EVERY 6 HOURS AS NEEDED FOR NAUSEA OR VOMITING, Disp: 90 tablet, Rfl: 1    pantoprazole (PROTONIX) 40 MG tablet, TAKE 1 TABLET BY MOUTH TWICE DAILY, Disp: 180 tablet, Rfl: 3    polyethylene glycol (COLYTE) 240-22.72-6.72 -5.84 gram SolR, Take 4,000 mLs by mouth once. for 1 dose, Disp: 4000 mL, Rfl: 0    rimegepant 75 mg odt, TAKE 1 TABLET BY MOUTH ONCE DAILY AS NEEDED, Disp: 8 tablet, Rfl: 1    semaglutide (OZEMPIC) 1 mg/dose (4 mg/3 mL), INJECT 1MG INTO THE SKIN ONCE WEEKLY, Disp: 1 pen, Rfl: 3    sertraline (ZOLOFT) 25 MG tablet, TAKE 1 TABLET BY MOUTH ONCE DAILY AS DIRECTED, Disp: 90 tablet, Rfl: 0    sertraline (ZOLOFT) 50 MG tablet, TAKE ONE TABLET BY MOUTH EVERY DAY AS DIRECTED, Disp: 30 tablet, Rfl: 2    sertraline (ZOLOFT) 50 MG tablet, TAKE ONE TABLET BY MOUTH EVERY DAY AS DIRECTED, Disp: 30 tablet, Rfl: 0    sodium,potassium,mag sulfates (SUPREP BOWEL PREP KIT) 17.5-3.13-1.6 gram SolR, Take 177 mLs by mouth once daily., Disp: 1 kit, Rfl: 0    traZODone (DESYREL) 50 MG tablet, TAKE 1 TABLET BY MOUTH EVERY DAY, Disp: 30 tablet, Rfl: 5    traZODone (DESYREL) 50 MG tablet, Take 1 tablet every day by mouth for 30 days., Disp: 30 tablet, Rfl: 2    traZODone (DESYREL) 50 MG tablet, Take 1 tablet every day by oral route for 30 days., Disp: 30 tablet, Rfl: 0    Current Facility-Administered Medications:     levonorgestreL (MIRENA) 20 mcg/24 hours (7 yrs) 52 mg IUD 1 Intra Uterine Device, 1 Intra Uterine Device, Intrauterine, , Brenda Rios MD, 1 Intra Uterine Device at  22 1045    Review of patient's allergies indicates:   Allergen Reactions    Demerol (pf) [meperidine (pf)] Hives       Social History     Socioeconomic History    Marital status: Single   Tobacco Use    Smoking status: Former     Current packs/day: 0.00     Average packs/day: 1 pack/day for 25.5 years (25.5 ttl pk-yrs)     Types: Cigarettes     Start date: 7/15/1996     Quit date: 2022     Years since quittin.2    Smokeless tobacco: Never    Tobacco comments:     varies from 7 cigarettes to whole pack in day   Substance and Sexual Activity    Alcohol use: Yes     Comment: ocassionally    Drug use: No    Sexual activity: Yes     Partners: Male     Social Determinants of Health     Financial Resource Strain: Low Risk  (2023)    Overall Financial Resource Strain (CARDIA)     Difficulty of Paying Living Expenses: Not hard at all   Food Insecurity: No Food Insecurity (2023)    Hunger Vital Sign     Worried About Running Out of Food in the Last Year: Never true     Ran Out of Food in the Last Year: Never true   Transportation Needs: No Transportation Needs (2023)    PRAPARE - Transportation     Lack of Transportation (Medical): No     Lack of Transportation (Non-Medical): No   Physical Activity: Unknown (2023)    Exercise Vital Sign     Days of Exercise per Week: Patient declined   Social Connections: Unknown (2023)    Social Connection and Isolation Panel [NHANES]     Frequency of Communication with Friends and Family: More than three times a week     Frequency of Social Gatherings with Friends and Family: Once a week     Active Member of Clubs or Organizations: Yes     Marital Status:    Housing Stability: Unknown (2023)    Housing Stability Vital Sign     Unable to Pay for Housing in the Last Year: No     Unstable Housing in the Last Year: No       COMPREHENSIVE GYN HISTORY:    PAP History: + abnormal Paps.  Infection History: + STDs. Denies PID.  Benign History:  Denies uterine fibroids. Denies ovarian cysts. Denies endometriosis. Denies other conditions.  Cancer History: Denies cervical cancer. Denies uterine cancer or hyperplasia. Denies ovarian cancer. Denies vulvar cancer or pre-cancer. Denies vaginal cancer or pre-cancer. Denies breast cancer. Denies colon cancer.  Sexual Activity History: Reports currently being sexually active  Menstrual History: Monthly, mild-moderate.  Contraception:IUD    ROS:  GENERAL: No weight changes. No swelling. No fatigue. No fever.  CARDIOVASCULAR: No chest pain. No shortness of breath. No leg cramps.   NEUROLOGICAL: No headaches. No vision changes.  BREASTS: No pain. No lumps. No discharge.  ABDOMEN: No pain. No nausea. No vomiting. No diarrhea. No constipation.  REPRODUCTIVE: No abnormal bleeding.   VULVA: No pain. No lesions. No itching.  VAGINA: No relaxation. No itching. No odor. No discharge. No lesions.  URINARY: No incontinence. No nocturia. No frequency. No dysuria.    /84   Wt 79 kg (174 lb 2.6 oz)   LMP 03/02/2024   BMI 27.28 kg/m²     PE:  APPEARANCE: Well nourished, well developed, in no acute distress.  AFFECT: WNL, alert and oriented x 3.  SKIN: No acne or hirsutism.  NECK: Neck symmetric, without masses or thyromegaly.  NODES: No inguinal, cervical, axillary or femoral lymph node enlargement.  CHEST: Good respiratory effort.   ABDOMEN: Soft. No tenderness or masses. No hepatosplenomegaly. No hernias.  BREASTS: Symmetrical, no skin changes, visible lesions, palpable masses or nipple discharge bilaterally.  PELVIC: External female genitalia without lesions.  Female hair distribution. Adequate perineal body, Normal urethral meatus. Vagina moist and well rugated without lesions or discharge.  No significant cystocele or rectocele present. Cervix pink without lesions, discharge or tenderness, IUD strings visualized at os- 1 cm out.  . Uterus is normal size, regular, mobile and nontender. Adnexa without masses or  tenderness.  EXTREMITIES: No edema      DIAGNOSIS:  1. Women's annual routine gynecological examination        2. Screening breast examination        3. Encounter for Papanicolaou smear for cervical cancer screening  Liquid-Based Pap Smear, Screening      4. Screening for human papillomavirus (HPV)  HPV High Risk Genotypes, PCR      5. History of HPV infection  HPV High Risk Genotypes, PCR          PLAN:  - Pap and HPV done today.  - Screening tests as ordered.  - Diet and exercise encouraged.  Seat belt use encouraged.  Reviewed ASCCP Pap guidelines and screening recommendations.    Counseling: indications for and frequency of periodic gynecologic exam    The patient was counseled today on ACS PAP guidelines, with recommendations for yearly pelvic exams unless their uterus, cervix, and ovaries were removed for benign reasons; in that case, examinations every 3-5 years are recommended.  The patient was also counseled regarding monthly breast self-examination, routine STD screening for at-risk populations, prophylactic immunizations for transmitted infections such as  HPV, Pertussis, or Influenza as appropriate, and yearly mammograms when indicated by ACS guidelines.  She was advised to see her primary care physician for all other health maintenance.    FOLLOW-UP with me annually.   Bettina Gage PA-C

## 2024-04-02 ENCOUNTER — HOSPITAL ENCOUNTER (OUTPATIENT)
Facility: HOSPITAL | Age: 39
Discharge: HOME | End: 2024-04-02
Attending: INTERNAL MEDICINE | Admitting: INTERNAL MEDICINE
Payer: COMMERCIAL

## 2024-04-02 ENCOUNTER — ANESTHESIA (OUTPATIENT)
Dept: ENDOSCOPY | Facility: HOSPITAL | Age: 39
End: 2024-04-02
Payer: COMMERCIAL

## 2024-04-02 ENCOUNTER — ANESTHESIA EVENT (OUTPATIENT)
Dept: ENDOSCOPY | Facility: HOSPITAL | Age: 39
End: 2024-04-02
Payer: COMMERCIAL

## 2024-04-02 VITALS
WEIGHT: 170 LBS | HEART RATE: 58 BPM | RESPIRATION RATE: 12 BRPM | TEMPERATURE: 98 F | BODY MASS INDEX: 26.68 KG/M2 | SYSTOLIC BLOOD PRESSURE: 104 MMHG | HEIGHT: 67 IN | DIASTOLIC BLOOD PRESSURE: 70 MMHG | OXYGEN SATURATION: 100 %

## 2024-04-02 DIAGNOSIS — Z12.11 SCREEN FOR COLON CANCER: ICD-10-CM

## 2024-04-02 LAB
B-HCG UR QL: NEGATIVE
CTP QC/QA: YES

## 2024-04-02 PROCEDURE — 45380 COLONOSCOPY AND BIOPSY: CPT | Performed by: INTERNAL MEDICINE

## 2024-04-02 PROCEDURE — 88305 TISSUE EXAM BY PATHOLOGIST: CPT | Mod: 26,,, | Performed by: PATHOLOGY

## 2024-04-02 PROCEDURE — 45380 COLONOSCOPY AND BIOPSY: CPT | Mod: ,,, | Performed by: INTERNAL MEDICINE

## 2024-04-02 PROCEDURE — 37000008 HC ANESTHESIA 1ST 15 MINUTES: Performed by: INTERNAL MEDICINE

## 2024-04-02 PROCEDURE — 37000009 HC ANESTHESIA EA ADD 15 MINS: Performed by: INTERNAL MEDICINE

## 2024-04-02 PROCEDURE — 25000003 PHARM REV CODE 250: Performed by: INTERNAL MEDICINE

## 2024-04-02 PROCEDURE — 63600175 PHARM REV CODE 636 W HCPCS: Performed by: NURSE ANESTHETIST, CERTIFIED REGISTERED

## 2024-04-02 PROCEDURE — 88305 TISSUE EXAM BY PATHOLOGIST: CPT | Performed by: PATHOLOGY

## 2024-04-02 PROCEDURE — 25000003 PHARM REV CODE 250: Performed by: NURSE ANESTHETIST, CERTIFIED REGISTERED

## 2024-04-02 PROCEDURE — 27201012 HC FORCEPS, HOT/COLD, DISP: Performed by: INTERNAL MEDICINE

## 2024-04-02 PROCEDURE — D9220A PRA ANESTHESIA: Mod: CRNA,,, | Performed by: NURSE ANESTHETIST, CERTIFIED REGISTERED

## 2024-04-02 PROCEDURE — 81025 URINE PREGNANCY TEST: CPT | Performed by: INTERNAL MEDICINE

## 2024-04-02 PROCEDURE — D9220A PRA ANESTHESIA: Mod: ANES,,, | Performed by: STUDENT IN AN ORGANIZED HEALTH CARE EDUCATION/TRAINING PROGRAM

## 2024-04-02 RX ORDER — LIDOCAINE HYDROCHLORIDE 20 MG/ML
INJECTION INTRAVENOUS
Status: DISCONTINUED | OUTPATIENT
Start: 2024-04-02 | End: 2024-04-02

## 2024-04-02 RX ORDER — SODIUM CHLORIDE 9 MG/ML
INJECTION, SOLUTION INTRAVENOUS CONTINUOUS
Status: DISCONTINUED | OUTPATIENT
Start: 2024-04-02 | End: 2024-04-02 | Stop reason: HOSPADM

## 2024-04-02 RX ORDER — ONDANSETRON HYDROCHLORIDE 2 MG/ML
INJECTION, SOLUTION INTRAVENOUS
Status: DISCONTINUED | OUTPATIENT
Start: 2024-04-02 | End: 2024-04-02

## 2024-04-02 RX ORDER — PROPOFOL 10 MG/ML
VIAL (ML) INTRAVENOUS CONTINUOUS PRN
Status: DISCONTINUED | OUTPATIENT
Start: 2024-04-02 | End: 2024-04-02

## 2024-04-02 RX ORDER — SODIUM CHLORIDE 0.9 % (FLUSH) 0.9 %
10 SYRINGE (ML) INJECTION
Status: DISCONTINUED | OUTPATIENT
Start: 2024-04-02 | End: 2024-04-02 | Stop reason: HOSPADM

## 2024-04-02 RX ORDER — PROPOFOL 10 MG/ML
VIAL (ML) INTRAVENOUS
Status: DISCONTINUED | OUTPATIENT
Start: 2024-04-02 | End: 2024-04-02

## 2024-04-02 RX ORDER — DEXMEDETOMIDINE HYDROCHLORIDE 100 UG/ML
INJECTION, SOLUTION INTRAVENOUS
Status: DISCONTINUED | OUTPATIENT
Start: 2024-04-02 | End: 2024-04-02

## 2024-04-02 RX ADMIN — PROPOFOL 175 MCG/KG/MIN: 10 INJECTION, EMULSION INTRAVENOUS at 11:04

## 2024-04-02 RX ADMIN — LIDOCAINE HYDROCHLORIDE 100 MG: 20 INJECTION, SOLUTION INTRAVENOUS at 11:04

## 2024-04-02 RX ADMIN — PROPOFOL 100 MG: 10 INJECTION, EMULSION INTRAVENOUS at 11:04

## 2024-04-02 RX ADMIN — ONDANSETRON 4 MG: 2 INJECTION, SOLUTION INTRAMUSCULAR; INTRAVENOUS at 11:04

## 2024-04-02 RX ADMIN — ONDANSETRON 4 MG: 2 INJECTION, SOLUTION INTRAMUSCULAR; INTRAVENOUS at 10:04

## 2024-04-02 RX ADMIN — SODIUM CHLORIDE: 9 INJECTION, SOLUTION INTRAVENOUS at 10:04

## 2024-04-02 RX ADMIN — DEXMEDETOMIDINE HYDROCHLORIDE 10 MCG: 100 INJECTION, SOLUTION, CONCENTRATE INTRAVENOUS at 10:04

## 2024-04-02 NOTE — ANESTHESIA PREPROCEDURE EVALUATION
04/02/2024  Snow Barney is a 38 y.o., female.      Pre-op Assessment    I have reviewed the Patient Summary Reports.     I have reviewed the Nursing Notes. I have reviewed the NPO Status.   I have reviewed the Medications.     Review of Systems  Anesthesia Hx:              Personal Hx of Anesthesia complications (significant desat event during last endo procedure)                    Social:  Smoker       Cardiovascular:         Dysrhythmias          ECG has been reviewed. Frequent PVCs with associated dizziness                          Pulmonary:    Asthma                    Renal/:  Renal/ Normal                 Hepatic/GI:    Hiatal Hernia, GERD             Neurological:    Neuromuscular Disease,  Headaches                                 Endocrine:  Endocrine Normal            Psych:  Psychiatric History                  Physical Exam  General: Well nourished, Cooperative, Alert and Oriented    Airway:  Mallampati: I   Mouth Opening: Normal  TM Distance: Normal  Tongue: Normal  Neck ROM: Normal ROM    Dental:  Intact        Anesthesia Plan  Type of Anesthesia, risks & benefits discussed:    Anesthesia Type: Gen Natural Airway  Intra-op Monitoring Plan: Standard ASA Monitors  Post Op Pain Control Plan: multimodal analgesia  Induction:  IV  Informed Consent: Informed consent signed with the Patient and all parties understand the risks and agree with anesthesia plan.  All questions answered.   ASA Score: 2  Day of Surgery Review of History & Physical: H&P Update referred to the surgeon/provider.    Ready For Surgery From Anesthesia Perspective.     .

## 2024-04-02 NOTE — PROVATION PATIENT INSTRUCTIONS
Discharge Summary/Instructions after an Endoscopic Procedure  Patient Name: Snow Barney  Patient MRN: [id number]  Patient YOB: 1985 Tuesday, April 2, 2024  Luis Naik MD  Dear patient,  As a result of recent federal legislation (The Federal Cures Act), you may   receive lab or pathology results from your procedure in your [facility]   account before your physician is able to contact you. Your physician or   their representative will relay the results to you with their   recommendations at their soonest availability.  Thank you,  Your health is very important to us during the Covid Crisis. Following your   procedure today, you will receive a daily text for 2 weeks asking about   signs or symptoms of Covid 19.  Please respond to this text when you   receive it so we can follow up and keep you as safe as possible.   RESTRICTIONS:  During your procedure today, you received medications for sedation.  These   medications may affect your judgment, balance and coordination.  Therefore,   for 24 hours, you have the following restrictions:   - DO NOT drive a car, operate machinery, make legal/financial decisions,   sign important papers or drink alcohol.    ACTIVITY:  Today: no heavy lifting, straining or running due to procedural   sedation/anesthesia.  The following day: return to full activity including work.  DIET:  Eat and drink normally unless instructed otherwise.     TREATMENT FOR COMMON SIDE EFFECTS:  - Mild abdominal pain, nausea, belching, bloating or excessive gas:  rest,   eat lightly and use a heating pad.  - Sore Throat: treat with throat lozenges and/or gargle with warm salt   water.  - Because air was used during the procedure, expelling large amounts of air   from your rectum or belching is normal.  - If a bowel prep was taken, you may not have a bowel movement for 1-3 days.    This is normal.  SYMPTOMS TO WATCH FOR AND REPORT TO YOUR PHYSICIAN:  1. Abdominal pain or bloating, other than gas  cramps.  2. Chest pain.  3. Back pain.  4. Signs of infection such as: chills or fever occurring within 24 hours   after the procedure.  5. Rectal bleeding, which would show as bright red, maroon, or black stools.   (A tablespoon of blood from the rectum is not serious, especially if   hemorrhoids are present.)  6. Vomiting.  7. Weakness or dizziness.  GO DIRECTLY TO THE NEAREST EMERGENCY ROOM IF YOU HAVE ANY OF THE FOLLOWING:      Difficulty breathing              Chills and/or fever over 101 F   Persistent vomiting and/or vomiting blood   Severe abdominal pain   Severe chest pain   Black, tarry stools   Bleeding- more than one tablespoon   Any other symptom or condition that you feel may need urgent attention  Your doctor recommends these additional instructions:  If any biopsies were taken, your doctors clinic will contact you in 1 to 2   weeks with any results.  - Discharge patient to home.   - Patient has a contact number available for emergencies.  The signs and   symptoms of potential delayed complications were discussed with the   patient.  Return to normal activities tomorrow.  Written discharge   instructions were provided to the patient.   - Resume previous diet.   - Continue present medications.   - Await pathology results.   - Repeat colonoscopy at age 45 for screening purposes.  For questions, problems or results please call your physician - Luis Naik MD.  EMERGENCY PHONE NUMBER: [phone],  LAB RESULTS: [phone]  IF A COMPLICATION OR EMERGENCY SITUATION ARISES AND YOU ARE UNABLE TO REACH   YOUR PHYSICIAN - GO DIRECTLY TO THE EMERGENCY ROOM.  Luis Naik MD  4/2/2024 11:22:39 AM  This report has been verified and signed electronically.  Dear patient,  As a result of recent federal legislation (The Federal Cures Act), you may   receive lab or pathology results from your procedure in your [facility]   account before your physician is able to contact you. Your physician or   their  representative will relay the results to you with their   recommendations at their soonest availability.  Thank you,  PROVATION

## 2024-04-02 NOTE — H&P
Short Stay Endoscopy History and Physical    PCP - No, Primary Doctor    Procedure - Colonoscopy  ASA - per anesthesia  Mallampati - per anesthesia  History of Anesthesia problems - no  Family history Anesthesia problems - no   Plan of anesthesia - General    HPI:  This is a 38 y.o. female here for evaluation of :   + FOBT      ROS:  Constitutional: No fevers, chills, No weight loss  CV: No chest pain  Pulm: No cough, No shortness of breath  GI: see HPI  Derm: No rash    Medical History:  has a past medical history of Anxiety, Asthma, Depression, Fibrocystic breast disease, GERD (gastroesophageal reflux disease), Hernia, hiatal, IBS (irritable bowel syndrome), Inappropriate sinus tachycardia, POTS (postural orthostatic tachycardia syndrome), Pulmonary emboli (2020), Sepsis, and Urticaria.    Surgical History:  has a past surgical history that includes Tympanostomy tube placement.    Family History: family history includes Allergies in her father, mother, and son; Asthma in her father; Breast cancer in her maternal aunt, paternal aunt, and paternal grandmother; Colon cancer in her maternal grandmother; Diabetes in her father and mother; Hypertension in her father and mother; Ovarian cancer in her cousin.. Otherwise no colon cancer, inflammatory bowel disease, or GI malignancies.    Social History:  reports that she quit smoking about 2 years ago. Her smoking use included cigarettes. She started smoking about 27 years ago. She has a 25.5 pack-year smoking history. She has never used smokeless tobacco. She reports current alcohol use. She reports that she does not use drugs.    Review of patient's allergies indicates:   Allergen Reactions    Demerol (pf) [meperidine (pf)] Hives       Medications:   Facility-Administered Medications Prior to Admission   Medication Dose Route Frequency Provider Last Rate Last Admin    levonorgestreL (MIRENA) 20 mcg/24 hours (7 yrs) 52 mg IUD 1 Intra Uterine Device  1 Intra Uterine  Device Intrauterine  Brenda Rios MD   1 Intra Uterine Device at 07/21/22 1045     Medications Prior to Admission   Medication Sig Dispense Refill Last Dose    albuterol (PROVENTIL/VENTOLIN HFA) 90 mcg/actuation inhaler Inhale 2 puffs into the lungs every 4 (four) hours as needed.   Past Month    albuterol (PROVENTIL/VENTOLIN HFA) 90 mcg/actuation inhaler INHALE 2 PUFFS INTO THE LUNGS EVERY 4 HOURS AS NEEDED FOR WHEEZING OR SHORTNESS OF BREATH 6.7 g 3 Past Month    ibuprofen (ADVIL,MOTRIN) 800 MG tablet Take 1 tablet (800 mg total) by mouth 3 (three) times daily. 30 tablet 1 Past Month    LORazepam (ATIVAN) 1 MG tablet TAKE 1 TABLET BY MOUTH ONCE DAILY AS NEEDED FOR ANXIETY 30 tablet 0 Past Week    LORazepam (ATIVAN) 1 MG tablet TAKE 1 TABLET BY MOUTH ONCE DAILY AS NEEDED FOR ANXIETY 10 tablet 0 Past Week    pantoprazole (PROTONIX) 40 MG tablet TAKE 1 TABLET BY MOUTH TWICE DAILY 180 tablet 3 Past Week    sertraline (ZOLOFT) 25 MG tablet TAKE 1 TABLET BY MOUTH ONCE DAILY AS DIRECTED 90 tablet 0 Past Week    sertraline (ZOLOFT) 50 MG tablet TAKE ONE TABLET BY MOUTH EVERY DAY AS DIRECTED 30 tablet 2 Past Week    sertraline (ZOLOFT) 50 MG tablet TAKE ONE TABLET BY MOUTH EVERY DAY AS DIRECTED 30 tablet 0 Past Week    sodium,potassium,mag sulfates (SUPREP BOWEL PREP KIT) 17.5-3.13-1.6 gram SolR Take 177 mLs by mouth once daily. 1 kit 0 4/2/2024    traZODone (DESYREL) 50 MG tablet TAKE 1 TABLET BY MOUTH EVERY DAY 30 tablet 5 Past Week    traZODone (DESYREL) 50 MG tablet Take 1 tablet every day by mouth for 30 days. 30 tablet 2 Past Month    traZODone (DESYREL) 50 MG tablet Take 1 tablet every day by oral route for 30 days. 30 tablet 0 Past Month    buPROPion (WELLBUTRIN XL) 150 MG TB24 tablet Take 150 mg by mouth once daily.       EPINEPHrine (EPIPEN) 0.3 mg/0.3 mL AtIn Inject 0.3 mLs (0.3 mg total) into the muscle once. for 1 dose 2 each 0     ergocalciferol (VITAMIN D2) 50,000 unit Cap Take 1 capsule (50,000 Units  total) by mouth every 7 days. 12 capsule 1 More than a month    FLOVENT  mcg/actuation inhaler Inhale 1 puff into the lungs 2 (two) times daily.       multivitamin capsule Take 1 capsule by mouth.   More than a month    NURTEC 75 mg odt Take 75 mg by mouth daily as needed.   More than a month    ondansetron (ZOFRAN-ODT) 4 MG TbDL DISSOLVE 1 TABLET BY MOUTH EVERY 6 HOURS AS NEEDED FOR NAUSEA OR VOMITING 90 tablet 1 More than a month    [] polyethylene glycol (COLYTE) 240-22.72-6.72 -5.84 gram SolR Take 4,000 mLs by mouth once. for 1 dose 4000 mL 0     rimegepant 75 mg odt TAKE 1 TABLET BY MOUTH ONCE DAILY AS NEEDED 8 tablet 1     semaglutide (OZEMPIC) 1 mg/dose (4 mg/3 mL) INJECT 1MG INTO THE SKIN ONCE WEEKLY 1 pen 3 More than a month         Physical Exam:    Vital Signs:   Vitals:    24 1028   BP: 125/71   Pulse: 73   Resp: 18   Temp: 98.1 °F (36.7 °C)       General Appearance: Well appearing in no acute distress  Eyes:    No scleral icterus  ENT: Neck supple, Lips, mucosa, and tongue normal; teeth and gums normal  Abdomen: Soft, non tender, non distended with positive bowel sounds. No hepatosplenomegaly, ascites, or mass.  Extremities: 2+ pulses, no clubbing, cyanosis or edema  Skin: No rash      Labs:  Lab Results   Component Value Date    WBC 5.92 2024    HGB 13.7 2024    HCT 42.2 2024     2024    ALT 12 2024    AST 12 2024     2024    K 3.6 2024     2024    CREATININE 0.8 2024    BUN 5 (L) 2024    CO2 21 (L) 2024    TSH 0.797 11/15/2023    INR 1.0 11/10/2020       I have explained the risks and benefits of endoscopy procedures to the patient including but not limited to bleeding, perforation, infection, and death.  The patient was asked if they understand and allowed to ask any further questions to their satisfaction.    Luis Naik MD

## 2024-04-02 NOTE — TRANSFER OF CARE
"Anesthesia Transfer of Care Note    Patient: Snow Barney    Procedure(s) Performed: Procedure(s) (LRB):  COLONOSCOPY (N/A)    Patient location: GI    Anesthesia Type: general    Transport from OR: Transported from OR on room air with adequate spontaneous ventilation    Post pain: adequate analgesia    Post assessment: no apparent anesthetic complications and tolerated procedure well    Post vital signs: stable    Level of consciousness: awake and alert    Nausea/Vomiting: no nausea/vomiting    Complications: none    Transfer of care protocol was followed      Last vitals: Visit Vitals  /71 (BP Location: Left arm, Patient Position: Lying)   Pulse 73   Temp 36.7 °C (98.1 °F) (Temporal)   Resp 18   Ht 5' 7" (1.702 m)   Wt 77.1 kg (170 lb)   SpO2 100%   Breastfeeding No   BMI 26.63 kg/m²     "

## 2024-04-02 NOTE — ANESTHESIA POSTPROCEDURE EVALUATION
Anesthesia Post Evaluation    Patient: Snow Barney    Procedure(s) Performed: Procedure(s) (LRB):  COLONOSCOPY (N/A)    Final Anesthesia Type: general      Patient location during evaluation: PACU  Patient participation: Yes- Able to Participate  Level of consciousness: awake and alert  Post-procedure vital signs: reviewed and stable  Pain management: adequate  Airway patency: patent    PONV status at discharge: No PONV  Anesthetic complications: no      Cardiovascular status: stable  Respiratory status: room air  Hydration status: euvolemic  Follow-up not needed.              Vitals Value Taken Time   /70 04/02/24 1152   Temp 36 04/02/24 1254   Pulse 58 04/02/24 1152   Resp 12 04/02/24 1152   SpO2 100 % 04/02/24 1152         Event Time   Out of Recovery 11:54:51         Pain/Chaya Score: Chaya Score: 9 (4/2/2024 11:32 AM)

## 2024-04-03 LAB
FINAL PATHOLOGIC DIAGNOSIS: NORMAL
GROSS: NORMAL
Lab: NORMAL

## 2024-04-05 ENCOUNTER — PATIENT MESSAGE (OUTPATIENT)
Dept: OBSTETRICS AND GYNECOLOGY | Facility: CLINIC | Age: 39
End: 2024-04-05
Payer: COMMERCIAL

## 2024-04-05 LAB
FINAL PATHOLOGIC DIAGNOSIS: NORMAL
HPV HR 12 DNA SPEC QL NAA+PROBE: POSITIVE
HPV16 AG SPEC QL: NEGATIVE
HPV18 DNA SPEC QL NAA+PROBE: NEGATIVE
Lab: NORMAL

## 2024-04-17 ENCOUNTER — TELEPHONE (OUTPATIENT)
Dept: OBSTETRICS AND GYNECOLOGY | Facility: CLINIC | Age: 39
End: 2024-04-17
Payer: COMMERCIAL

## 2024-04-17 ENCOUNTER — PATIENT MESSAGE (OUTPATIENT)
Dept: OBSTETRICS AND GYNECOLOGY | Facility: CLINIC | Age: 39
End: 2024-04-17
Payer: COMMERCIAL

## 2024-04-17 DIAGNOSIS — B37.9 YEAST INFECTION: Primary | ICD-10-CM

## 2024-04-17 RX ORDER — FLUCONAZOLE 150 MG/1
150 TABLET ORAL DAILY
Qty: 1 TABLET | Refills: 0 | Status: SHIPPED | OUTPATIENT
Start: 2024-04-17 | End: 2024-04-18

## 2024-04-17 NOTE — TELEPHONE ENCOUNTER
Call Pt in regards to her message sent over requesting a refill for Diflucan,Bettina is out of the office will give the message once she return back in clinic kg

## 2024-04-19 ENCOUNTER — PATIENT MESSAGE (OUTPATIENT)
Dept: GASTROENTEROLOGY | Facility: CLINIC | Age: 39
End: 2024-04-19
Payer: COMMERCIAL

## 2024-04-19 DIAGNOSIS — K59.04 CHRONIC IDIOPATHIC CONSTIPATION: Primary | ICD-10-CM

## 2024-04-19 DIAGNOSIS — K21.9 GASTROESOPHAGEAL REFLUX DISEASE WITHOUT ESOPHAGITIS: ICD-10-CM

## 2024-04-23 RX ORDER — PANTOPRAZOLE SODIUM 40 MG/1
40 TABLET, DELAYED RELEASE ORAL DAILY
Qty: 90 TABLET | Refills: 3 | Status: SHIPPED | OUTPATIENT
Start: 2024-04-23

## 2024-05-01 ENCOUNTER — PROCEDURE VISIT (OUTPATIENT)
Dept: OBSTETRICS AND GYNECOLOGY | Facility: CLINIC | Age: 39
End: 2024-05-01
Payer: COMMERCIAL

## 2024-05-01 ENCOUNTER — PATIENT MESSAGE (OUTPATIENT)
Dept: OBSTETRICS AND GYNECOLOGY | Facility: CLINIC | Age: 39
End: 2024-05-01

## 2024-05-01 VITALS
SYSTOLIC BLOOD PRESSURE: 120 MMHG | BODY MASS INDEX: 27.69 KG/M2 | DIASTOLIC BLOOD PRESSURE: 88 MMHG | WEIGHT: 176.81 LBS

## 2024-05-01 DIAGNOSIS — B97.7 HIGH RISK HPV INFECTION: Primary | ICD-10-CM

## 2024-05-01 DIAGNOSIS — R10.2 PELVIC PAIN: Primary | ICD-10-CM

## 2024-05-01 DIAGNOSIS — R68.82 DECREASED LIBIDO: ICD-10-CM

## 2024-05-01 LAB
B-HCG UR QL: NEGATIVE
CTP QC/QA: YES

## 2024-05-01 PROCEDURE — 57454 BX/CURETT OF CERVIX W/SCOPE: CPT | Mod: S$GLB,,, | Performed by: OBSTETRICS & GYNECOLOGY

## 2024-05-01 PROCEDURE — 88342 IMHCHEM/IMCYTCHM 1ST ANTB: CPT | Performed by: PATHOLOGY

## 2024-05-01 PROCEDURE — 88305 TISSUE EXAM BY PATHOLOGIST: CPT | Mod: 26,,, | Performed by: PATHOLOGY

## 2024-05-01 PROCEDURE — 88342 IMHCHEM/IMCYTCHM 1ST ANTB: CPT | Mod: 26,,, | Performed by: PATHOLOGY

## 2024-05-01 PROCEDURE — 81025 URINE PREGNANCY TEST: CPT | Mod: S$GLB,,, | Performed by: OBSTETRICS & GYNECOLOGY

## 2024-05-01 PROCEDURE — 88305 TISSUE EXAM BY PATHOLOGIST: CPT | Mod: 59 | Performed by: PATHOLOGY

## 2024-05-01 NOTE — PROGRESS NOTES
Patient has decreased libido.  It has gotten worse recently and she has not been through menopause.      1. High risk HPV infection  - POCT urine pregnancy  - Specimen to Pathology, Ob/Gyn  - Colposcopy    2. Decreased libido  - flibanserin 100 mg Tab; Take 100 mg by mouth every evening.  Dispense: 30 tablet; Refill: 12

## 2024-05-01 NOTE — PROCEDURES
Colposcopy    Date/Time: 5/1/2024 10:45 AM    Performed by: Brenda Rios MD  Authorized by: Brenda Rios MD    Consent obatined:  Prior to procedure the appropriate consent was completed and verified  Timeout:Immediately prior to procedure a time out was called to verify the correct patient, procedure, equipment, support staff and site/side marked as required  Assistants?: No      Colposcopy Site:  Cervix  Position:  Supine  Acrowhite Lesion? Yes    Atypical Vessels: No    Transformation Zone Adequate?: Yes    Biopsy?: Yes         Location:  Cervix ((12 00))  ECC Performed?: Yes    LEEP Performed?: No    Estimated blood loss (cc):  2   Patient tolerated the procedure well with no immediate complications.   Post-operative instructions were provided for the patient.   Patient was discharged and will follow up if any complications occur

## 2024-05-02 RX ORDER — IBUPROFEN 800 MG/1
800 TABLET ORAL EVERY 8 HOURS PRN
Qty: 60 TABLET | Refills: 2 | Status: SHIPPED | OUTPATIENT
Start: 2024-05-02 | End: 2024-06-05

## 2024-05-02 RX ORDER — OXYCODONE HYDROCHLORIDE 5 MG/1
5 TABLET ORAL EVERY 4 HOURS PRN
Qty: 20 TABLET | Refills: 0 | Status: SHIPPED | OUTPATIENT
Start: 2024-05-02

## 2024-05-08 ENCOUNTER — TELEPHONE (OUTPATIENT)
Dept: OBSTETRICS AND GYNECOLOGY | Facility: CLINIC | Age: 39
End: 2024-05-08
Payer: COMMERCIAL

## 2024-05-08 NOTE — TELEPHONE ENCOUNTER
Pt notified results still pending. Voiced understanding.        ----- Message from Shefali Voss sent at 5/8/2024  2:05 PM CDT -----  Regarding: self  Type:  Test Results     Who Called:self     Name of Test (Lab/Mammo/Etc):colp     Date of Test:5/1     Where the test was performed:in office     Would the patient rather a call back or a response via [facility]?call     Best Call Back Number: [phone]       Additional Information:       For Clinical Team:Has the provider reviewed the results?

## 2024-05-09 LAB
FINAL PATHOLOGIC DIAGNOSIS: NORMAL
GROSS: NORMAL
Lab: NORMAL

## 2024-05-14 DIAGNOSIS — N87.1 CIN II (CERVICAL INTRAEPITHELIAL NEOPLASIA II): Primary | ICD-10-CM

## 2024-05-14 DIAGNOSIS — N94.6 DYSMENORRHEA: ICD-10-CM

## 2024-05-17 ENCOUNTER — OFFICE VISIT (OUTPATIENT)
Dept: CARDIOLOGY | Facility: CLINIC | Age: 39
End: 2024-05-17
Payer: COMMERCIAL

## 2024-05-17 VITALS
HEART RATE: 96 BPM | BODY MASS INDEX: 27.14 KG/M2 | DIASTOLIC BLOOD PRESSURE: 62 MMHG | SYSTOLIC BLOOD PRESSURE: 116 MMHG | OXYGEN SATURATION: 98 % | WEIGHT: 172.94 LBS | RESPIRATION RATE: 15 BRPM | HEIGHT: 67 IN

## 2024-05-17 DIAGNOSIS — F41.9 ANXIETY: ICD-10-CM

## 2024-05-17 DIAGNOSIS — R00.0 SINUS TACHYCARDIA: ICD-10-CM

## 2024-05-17 DIAGNOSIS — I26.99 PULMONARY EMBOLISM, UNSPECIFIED CHRONICITY, UNSPECIFIED PULMONARY EMBOLISM TYPE, UNSPECIFIED WHETHER ACUTE COR PULMONALE PRESENT: Primary | ICD-10-CM

## 2024-05-17 DIAGNOSIS — R06.02 SOB (SHORTNESS OF BREATH): ICD-10-CM

## 2024-05-17 DIAGNOSIS — I49.3 PVC (PREMATURE VENTRICULAR CONTRACTION): ICD-10-CM

## 2024-05-17 DIAGNOSIS — Z72.0 TOBACCO ABUSE: ICD-10-CM

## 2024-05-17 DIAGNOSIS — R00.2 PALPITATIONS: ICD-10-CM

## 2024-05-17 DIAGNOSIS — G43.009 MIGRAINE WITHOUT AURA AND WITHOUT STATUS MIGRAINOSUS, NOT INTRACTABLE: ICD-10-CM

## 2024-05-17 PROCEDURE — 99999 PR PBB SHADOW E&M-EST. PATIENT-LVL III: CPT | Mod: PBBFAC,,, | Performed by: INTERNAL MEDICINE

## 2024-05-17 PROCEDURE — 3074F SYST BP LT 130 MM HG: CPT | Mod: CPTII,S$GLB,, | Performed by: INTERNAL MEDICINE

## 2024-05-17 PROCEDURE — 1159F MED LIST DOCD IN RCRD: CPT | Mod: CPTII,S$GLB,, | Performed by: INTERNAL MEDICINE

## 2024-05-17 PROCEDURE — 3078F DIAST BP <80 MM HG: CPT | Mod: CPTII,S$GLB,, | Performed by: INTERNAL MEDICINE

## 2024-05-17 PROCEDURE — 3008F BODY MASS INDEX DOCD: CPT | Mod: CPTII,S$GLB,, | Performed by: INTERNAL MEDICINE

## 2024-05-17 PROCEDURE — 93000 ELECTROCARDIOGRAM COMPLETE: CPT | Mod: S$GLB,,, | Performed by: INTERNAL MEDICINE

## 2024-05-17 PROCEDURE — 99214 OFFICE O/P EST MOD 30 MIN: CPT | Mod: S$GLB,,, | Performed by: INTERNAL MEDICINE

## 2024-05-17 RX ORDER — SPIRONOLACTONE 100 MG/1
100 TABLET, FILM COATED ORAL DAILY
COMMUNITY

## 2024-05-17 NOTE — PROGRESS NOTES
CARDIOVASCULAR CONSULTATION    REASON FOR CONSULT:   Snow Barney is a 38 y.o. female who presents for evaluation    HISTORY OF PRESENT ILLNESS:     Patient is a very pleasant 37-year-old lady who works as a nurse at [employer].  She states that she is been suffering from PVCs for some years now.  Was undergoing workup at [facility].  Was initiated on Toprol-XL and the dose has been recently increased to 25 mg daily.  States that occasionally gets PVCs, now the frequency has increased to almost daily.  She had an episode where she almost passed out and felt her heart racing very fast.  She was sitting at that time.  There was another episode when she was standing when her heart started beating very fast and she had a presyncopal episode.  Denies orthopnea, PND, swelling of feet.  Drinks about 40 oz of water a day.  Occasionally gets chest tightness.  States has significant family history of premature coronary artery disease and sudden cardiac death in the family had a stress test done at outside hospital which did not show any significant ischemia.  Echo and event monitor had not revealed any significant arrhythmias last year.  States that she had a pulmonary embolism in 2020 and was on anticoagulation for 3 months.  It was felt at that time that the pulmonary embolism was secondary to emergency contraceptive pill as per the patient.  She denies heavy alcohol use.  Used to be a smoker but quit last year.  Drug abuse in drug abuse in her  20s, cocaine and ecstasy.  But no recent drug abuse for many years.    2022       CONCLUSIONS     NSR 75 bpm     Normal right heart size     Mild TR / PAsys ~ 22 mmHg     LA borderline size / LAD 36 mm, Qasim 27 cc/m2     Normal MV / minimal MR - early systolic     Borderline LV internal dimension          LVEDD 53 mm, EPSS 9 mm        LVESD 37 mm     LV EF measures 54%       No LV systolic wall motion abns identified     Normal diastolic parameters     Normal aortic annulus /  three leaflet aortic valve     No AS and no AR     minimal pericardial effusion       REC clinical correlation        CONCLUSIONS   Probably normal treadmill nuclear stress test with breast attenuation.   Normal GATED study - EF 64% without wall motion abnormalities.   Perfusion scans with fixed anteroseptal defect with normal thickening likely attenuation artifact.   No angina at 6 minutes on Law protocol treadmill stress (7 METS).      2/23:      Preliminary Findings   *The observed rhythms are sinus rhythm to sinus tachycardia.   *The Maximum Heart Rate recorded was 145 bpm, Day 1 / 05:22:07 pm, the   Minimum Heart Rate recorded was 73   bpm, Day 2 / 02:32:49 am and the Average Heart Rate was 95 bpm.   *There were 4974 PVCs with a burden of 0.8 %.   *There were 9 PSVCs with a burden of < 0.01 %.   *There were 30 Patient reported events.     Sinus rhythm with intermittent sinus tachycardia with a gradual increase   in rate. No sudden onset or offset occurrences. Rare PACs. Infrequent   PVCs. Ventricular bigeminy and trigeminy.          Notes from August 23:  Patient here for follow-up.  Doing much better.  States that her PVCs have gone away.  CTA showed normal coronaries with 0 calcium score    Notes from May 24:  Patient here for follow-up.  Denies any chest pains at rest on exertion, orthopnea, PND.  Needs to go for hysterectomy    PAST MEDICAL HISTORY:     Past Medical History:   Diagnosis Date    Anxiety     Asthma     Depression     Fibrocystic breast disease     GERD (gastroesophageal reflux disease)     Hernia, hiatal     IBS (irritable bowel syndrome)     Inappropriate sinus tachycardia     POTS (postural orthostatic tachycardia syndrome)     Pulmonary emboli 2020    Sepsis     Urticaria        PAST SURGICAL HISTORY:     Past Surgical History:   Procedure Laterality Date    COLONOSCOPY N/A 4/2/2024    Procedure: COLONOSCOPY;  Surgeon: Luis Naik MD;  Location: [facility];  Service: Endoscopy;   Laterality: N/A;    TYMPANOSTOMY TUBE PLACEMENT         ALLERGIES AND MEDICATION:     Review of patient's allergies indicates:   Allergen Reactions    Demerol (pf) [meperidine (pf)] Hives        Medication List            Accurate as of May 17, 2024  3:16 PM. If you have any questions, ask your nurse or doctor.                CONTINUE taking these medications      * albuterol 90 mcg/actuation inhaler  Commonly known as: PROVENTIL/VENTOLIN HFA     * albuterol 90 mcg/actuation inhaler  Commonly known as: PROVENTIL/VENTOLIN HFA  INHALE 2 PUFFS INTO THE LUNGS EVERY 4 HOURS AS NEEDED FOR WHEEZING OR SHORTNESS OF BREATH     buPROPion 150 MG TB24 tablet  Commonly known as: WELLBUTRIN XL     EPINEPHrine 0.3 mg/0.3 mL Atin  Commonly known as: EPIPEN  Inject 0.3 mLs (0.3 mg total) into the muscle once. for 1 dose     flibanserin 100 mg Tab  Take 1 tablet (100 mg) by mouth every evening.     FLOVENT  mcg/actuation inhaler  Generic drug: fluticasone propionate     * ibuprofen 800 MG tablet  Commonly known as: ADVIL,MOTRIN  Take 1 tablet (800 mg total) by mouth 3 (three) times daily.     * ibuprofen 800 MG tablet  Commonly known as: ADVIL,MOTRIN  Take 1 tablet (800 mg total) by mouth every 8 (eight) hours as needed for Pain.     linaCLOtide 72 mcg Cap capsule  Commonly known as: LINZESS  Take 1 capsule (72 mcg total) by mouth before breakfast.     * LORazepam 1 MG tablet  Commonly known as: ATIVAN  TAKE 1 TABLET BY MOUTH ONCE DAILY AS NEEDED FOR ANXIETY     * LORazepam 1 MG tablet  Commonly known as: ATIVAN  TAKE 1 TABLET BY MOUTH ONCE DAILY AS NEEDED FOR ANXIETY     multivitamin capsule     * NURTEC 75 mg odt  Generic drug: rimegepant     * NURTEC 75 mg odt  Generic drug: rimegepant  TAKE 1 TABLET BY MOUTH ONCE DAILY AS NEEDED     ondansetron 4 MG Tbdl  Commonly known as: ZOFRAN-ODT  DISSOLVE 1 TABLET BY MOUTH EVERY 6 HOURS AS NEEDED FOR NAUSEA OR VOMITING     oxyCODONE 5 MG immediate release tablet  Commonly known as:  ROXICODONE  Take 1 tablet (5 mg total) by mouth every 4 (four) hours as needed for Pain.     OZEMPIC 1 mg/dose (4 mg/3 mL)  Generic drug: semaglutide  INJECT 1MG INTO THE SKIN ONCE WEEKLY     pantoprazole 40 MG tablet  Commonly known as: PROTONIX  Take 1 tablet (40 mg total) by mouth once daily.     * sertraline 50 MG tablet  Commonly known as: ZOLOFT  TAKE ONE TABLET BY MOUTH EVERY DAY AS DIRECTED     * sertraline 50 MG tablet  Commonly known as: ZOLOFT  TAKE ONE TABLET BY MOUTH EVERY DAY AS DIRECTED     * sertraline 25 MG tablet  Commonly known as: ZOLOFT  TAKE 1 TABLET BY MOUTH ONCE DAILY AS DIRECTED     sodium,potassium,mag sulfates 17.5-3.13-1.6 gram Solr  Commonly known as: SUPREP BOWEL PREP KIT  Take 177 mLs by mouth once daily.     spironolactone 100 MG tablet  Commonly known as: ALDACTONE     * traZODone 50 MG tablet  Commonly known as: DESYREL  TAKE 1 TABLET BY MOUTH EVERY DAY     * traZODone 50 MG tablet  Commonly known as: DESYREL  Take 1 tablet every day by mouth for 30 days.     * traZODone 50 MG tablet  Commonly known as: DESYREL  Take 1 tablet every day by oral route for 30 days.     VITAMIN D2 1,250 mcg (50,000 unit) capsule  Generic drug: ergocalciferol  Take 1 capsule (50,000 Units total) by mouth every 7 days.           * This list has 14 medication(s) that are the same as other medications prescribed for you. Read the directions carefully, and ask your doctor or other care provider to review them with you.                  SOCIAL HISTORY:     Social History     Socioeconomic History    Marital status: Single   Tobacco Use    Smoking status: Former     Current packs/day: 0.00     Average packs/day: 1 pack/day for 25.5 years (25.5 ttl pk-yrs)     Types: Cigarettes     Start date: 7/15/1996     Quit date: 2022     Years since quittin.3    Smokeless tobacco: Never    Tobacco comments:     varies from 7 cigarettes to whole pack in day   Substance and Sexual Activity    Alcohol use: Yes      Comment: ocassionally    Drug use: No    Sexual activity: Yes     Partners: Male     Social Determinants of Health     Financial Resource Strain: Low Risk  (11/29/2023)    Overall Financial Resource Strain (CARDIA)     Difficulty of Paying Living Expenses: Not hard at all   Food Insecurity: No Food Insecurity (11/29/2023)    Hunger Vital Sign     Worried About Running Out of Food in the Last Year: Never true     Ran Out of Food in the Last Year: Never true   Transportation Needs: No Transportation Needs (11/29/2023)    PRAPARE - Transportation     Lack of Transportation (Medical): No     Lack of Transportation (Non-Medical): No   Physical Activity: Unknown (11/29/2023)    Exercise Vital Sign     Days of Exercise per Week: Patient declined   Stress: Stress Concern Present (3/9/2022)    Received from [facility], [facility]    [facility] - Occupational Stress Questionnaire     Feeling of Stress : Very much   Housing Stability: Unknown (11/29/2023)    Housing Stability Vital Sign     Unable to Pay for Housing in the Last Year: No     Unstable Housing in the Last Year: No       FAMILY HISTORY:     Family History   Problem Relation Name Age of Onset    Allergies Mother      Hypertension Mother      Diabetes Mother      Allergies Father      Asthma Father      Hypertension Father      Diabetes Father      Breast cancer Maternal Aunt      Breast cancer Paternal Aunt      Colon cancer Maternal Grandmother      Breast cancer Paternal Grandmother      Allergies Son      Ovarian cancer Cousin         REVIEW OF SYSTEMS:   Review of Systems   Constitutional: Negative.   HENT: Negative.     Eyes: Negative.    Respiratory: Negative.     Endocrine: Negative.    Hematologic/Lymphatic: Negative.    Skin: Negative.    Musculoskeletal: Negative.    Gastrointestinal: Negative.    Genitourinary: Negative.    Neurological: Negative.    Psychiatric/Behavioral: Negative.     Allergic/Immunologic: Negative.   "      A 10 point review of systems was performed and all the pertinent positives have been mentioned. Rest of review of systems was negative.        PHYSICAL EXAM:     Vitals:    05/17/24 1508   BP: 116/62   Pulse: 96   Resp: 15    Body mass index is 27.09 kg/m².  Weight: 78.4 kg (172 lb 15.2 oz)   Height: 5' 7" (170.2 cm)     Physical Exam  Constitutional:       Appearance: Normal appearance. She is well-developed.   HENT:      Head: Normocephalic.   Eyes:      Pupils: Pupils are equal, round, and reactive to light.   Cardiovascular:      Rate and Rhythm: Normal rate and regular rhythm.   Pulmonary:      Effort: Pulmonary effort is normal.      Breath sounds: Normal breath sounds.   Abdominal:      General: Bowel sounds are normal.      Palpations: Abdomen is soft.      Tenderness: There is no abdominal tenderness.   Musculoskeletal:         General: Normal range of motion.      Cervical back: Normal range of motion and neck supple.   Skin:     General: Skin is warm.   Neurological:      Mental Status: She is alert and oriented to person, place, and time.           DATA:     Laboratory:  CBC:  Recent Labs   Lab 06/01/23 1723 08/30/23 1802 01/09/24  1236   WBC 7.48 6.49 5.92   Hemoglobin 13.0 13.9 13.7   Hematocrit 38.0 41.9 42.2   Platelets 191 190 191       CHEMISTRIES:  Recent Labs   Lab 01/30/22  2036 02/03/22  0855 02/15/22  0919 02/24/22  1900 04/28/22  1352 07/30/22  0816 08/03/22  1323 10/20/22  0857 12/19/22  1609 01/04/23  2023 01/04/23  2043 02/10/23  1836 06/01/23  1723 08/30/23  1802 01/09/24  1236   Glucose 129 H   < > 103 108  --  102  --   --   --    < >  --    < > 102 105 122 H   Sodium 139   < > 140 140   < > 141 139 138 141   < >  --    < > 139 143 137   Potassium 3.9   < > 3.9 3.7   < > 4.2 3.8 4.5 4.5   < >  --    < > 3.5 3.6 3.6   BUN 16   < > 9 8   < > 13 10.4 19.6 H 12.3   < >  --    < > 11 11 5 L   Creatinine 0.9   < > 0.8 0.8   < > 0.7 0.65 0.71 0.85   < >  --    < > 1.0 0.8 0.8   eGFR if " " >60   < > >60 >60  --  >60  --   --   --   --   --   --   --   --   --    eGFR   --    < >  --   --    < >  --  >105 >105 90  --   --   --   --   --   --    eGFR if non African American >60   < > >60 >60  --  >60  --   --   --   --   --   --   --   --   --    Calcium 9.6   < > 9.7 9.7   < > 9.3 9.0 9.3 9.3   < >  --    < > 10.2 9.9 9.3   Magnesium 1.5 L  --   --   --   --   --   --   --   --   --  1.9  --  1.8  --   --     < > = values in this interval not displayed.       CARDIAC BIOMARKERS:  Recent Labs   Lab 06/01/23 1723 08/30/23 1802 01/09/24  1236   CPK  --  51  --    Troponin I <0.006 <0.006 <0.006       COAGS:        LIPIDS/LFTS:  Recent Labs   Lab 06/01/23 1723 08/30/23  1802 01/09/24  1236   AST 12 15 12   ALT 13 15 12       No results found for: "HGBA1C"    TSH  Recent Labs   Lab 06/01/23 1723 07/21/23  1042 11/15/23  1248   TSH 1.076 0.887 0.797       The ASCVD Risk score (Brittney DK, et al., 2019) failed to calculate for the following reasons:    The 2019 ASCVD risk score is only valid for ages 40 to 79       BNP    Lab Results   Component Value Date/Time    BNP <10 06/01/2023 05:23 PM    BNP 11 01/04/2023 08:23 PM    BNP <10 02/24/2022 07:00 PM    BNP <10 11/10/2020 01:25 AM    BNP 15 11/02/2020 01:30 PM    BNP 21 07/14/2020 09:05 PM    BNP <10 06/01/2019 01:55 AM    BNP 14 04/22/2018 06:20 PM    BNP <10 05/28/2015 07:03 PM             ASSESSMENT AND PLAN     Patient Active Problem List   Diagnosis    Palpitations    Grief reaction    Self-inflicted injury    Pulmonary embolism    Chest pain    SOB (shortness of breath)    Anxiety    Sinus tachycardia    Tobacco abuse    Migraine without aura and without status migrainosus, not intractable    Vertiginous migraine    Asthma in adult, mild intermittent, uncomplicated         Patient with near-syncope.  Family history of premature CAD.  Used to be a smoker in the past, quit last year.  CTA showed normal coronaries with " 0 calcium score.  Frequent PVCs on Holter monitoring, which the patient states have gone away.  She did try metoprolol, but could not tolerate it.    Orthostatic hypotension:  I have asked her to stay well hydrated and to wear compression stockings.  If that does not help and she continues having symptoms despite these, initiate midodrine.  She was recently initiated on spironolactone for acne.  I have asked her to watch out for symptoms of dizziness getting worse.  If her symptoms of dizziness crossed worse, she might have to stop her spironolactone.  She has been without hypotension or episodes of dizziness      Preoperative risk assessment prior to hysterectomy.  Exercise tolerance greater than 4 Mets.  Can go 4 or 5 blocks with without any symptoms.  Can go up 2 flights of stairs without any shortness of breath.  Had a stress test done at outside hospital in May of 2022 which did not show any significant ischemia.  Patient may proceed for surgery at low-to-moderate risk      Follow-up in 6 months      Thank you very much for involving me in the care of your patient.  Please do not hesitate to contact me if there are any questions.      Parrish Mckeon MD, FACC, [facility]  Interventional Cardiologist, [facility].           This note was dictated with the help of speech recognition software.  There might be un-intended errors and/or substitutions.

## 2024-05-21 LAB
OHS QRS DURATION: 78 MS
OHS QTC CALCULATION: 402 MS

## 2024-05-23 ENCOUNTER — TELEPHONE (OUTPATIENT)
Dept: OBSTETRICS AND GYNECOLOGY | Facility: CLINIC | Age: 39
End: 2024-05-23
Payer: COMMERCIAL

## 2024-05-23 ENCOUNTER — PATIENT MESSAGE (OUTPATIENT)
Dept: OBSTETRICS AND GYNECOLOGY | Facility: CLINIC | Age: 39
End: 2024-05-23
Payer: COMMERCIAL

## 2024-05-23 NOTE — TELEPHONE ENCOUNTER
Pt was called and she is thinking of pushing her surgery back until August. She will need to drive 11 hours away to get her kids, two weeks after her sx that is schedule at this time. All other questions were answered.

## 2024-05-27 ENCOUNTER — PATIENT MESSAGE (OUTPATIENT)
Dept: OBSTETRICS AND GYNECOLOGY | Facility: CLINIC | Age: 39
End: 2024-05-27
Payer: COMMERCIAL

## 2024-05-27 DIAGNOSIS — N87.1 CIN II (CERVICAL INTRAEPITHELIAL NEOPLASIA II): Primary | ICD-10-CM

## 2024-05-27 DIAGNOSIS — R10.2 PELVIC PAIN: ICD-10-CM

## 2024-05-27 DIAGNOSIS — N94.6 DYSMENORRHEA: ICD-10-CM

## 2024-06-05 ENCOUNTER — LAB VISIT (OUTPATIENT)
Dept: LAB | Facility: HOSPITAL | Age: 39
End: 2024-06-05
Attending: FAMILY MEDICINE
Payer: COMMERCIAL

## 2024-06-05 ENCOUNTER — PATIENT MESSAGE (OUTPATIENT)
Dept: FAMILY MEDICINE | Facility: CLINIC | Age: 39
End: 2024-06-05

## 2024-06-05 ENCOUNTER — OFFICE VISIT (OUTPATIENT)
Dept: FAMILY MEDICINE | Facility: CLINIC | Age: 39
End: 2024-06-05
Payer: COMMERCIAL

## 2024-06-05 VITALS
HEART RATE: 114 BPM | TEMPERATURE: 99 F | BODY MASS INDEX: 27.51 KG/M2 | SYSTOLIC BLOOD PRESSURE: 100 MMHG | OXYGEN SATURATION: 98 % | HEIGHT: 67 IN | DIASTOLIC BLOOD PRESSURE: 72 MMHG | WEIGHT: 175.25 LBS

## 2024-06-05 DIAGNOSIS — U09.9 COVID-19 LONG HAULER MANIFESTING CHRONIC ANXIETY: ICD-10-CM

## 2024-06-05 DIAGNOSIS — F41.9 COVID-19 LONG HAULER MANIFESTING CHRONIC ANXIETY: ICD-10-CM

## 2024-06-05 DIAGNOSIS — Z00.00 ANNUAL PHYSICAL EXAM: Primary | ICD-10-CM

## 2024-06-05 DIAGNOSIS — F41.9 ANXIETY: ICD-10-CM

## 2024-06-05 LAB — D DIMER PPP IA.FEU-MCNC: 0.25 MG/L FEU

## 2024-06-05 PROCEDURE — 3074F SYST BP LT 130 MM HG: CPT | Mod: CPTII,S$GLB,, | Performed by: FAMILY MEDICINE

## 2024-06-05 PROCEDURE — 3078F DIAST BP <80 MM HG: CPT | Mod: CPTII,S$GLB,, | Performed by: FAMILY MEDICINE

## 2024-06-05 PROCEDURE — 85379 FIBRIN DEGRADATION QUANT: CPT | Performed by: FAMILY MEDICINE

## 2024-06-05 PROCEDURE — 86038 ANTINUCLEAR ANTIBODIES: CPT | Performed by: FAMILY MEDICINE

## 2024-06-05 PROCEDURE — 99385 PREV VISIT NEW AGE 18-39: CPT | Mod: S$GLB,,, | Performed by: FAMILY MEDICINE

## 2024-06-05 PROCEDURE — 99999 PR PBB SHADOW E&M-EST. PATIENT-LVL IV: CPT | Mod: PBBFAC,,, | Performed by: FAMILY MEDICINE

## 2024-06-05 PROCEDURE — 36415 COLL VENOUS BLD VENIPUNCTURE: CPT | Performed by: FAMILY MEDICINE

## 2024-06-05 PROCEDURE — 3008F BODY MASS INDEX DOCD: CPT | Mod: CPTII,S$GLB,, | Performed by: FAMILY MEDICINE

## 2024-06-05 PROCEDURE — 1159F MED LIST DOCD IN RCRD: CPT | Mod: CPTII,S$GLB,, | Performed by: FAMILY MEDICINE

## 2024-06-05 RX ORDER — LORAZEPAM 1 MG/1
TABLET ORAL
Qty: 20 TABLET | Refills: 2 | Status: SHIPPED | OUTPATIENT
Start: 2024-06-05

## 2024-06-05 NOTE — PROGRESS NOTES
Chief Complaint   Patient presents with    Establish Care       SUBJECTIVE:   38 y.o. female for annual routine checkup.    Current Outpatient Medications   Medication Sig Dispense Refill    albuterol (PROVENTIL/VENTOLIN HFA) 90 mcg/actuation inhaler Inhale 2 puffs into the lungs every 4 (four) hours as needed.      albuterol (PROVENTIL/VENTOLIN HFA) 90 mcg/actuation inhaler INHALE 2 PUFFS INTO THE LUNGS EVERY 4 HOURS AS NEEDED FOR WHEEZING OR SHORTNESS OF BREATH 6.7 g 3    ergocalciferol (VITAMIN D2) 50,000 unit Cap Take 1 capsule (50,000 Units total) by mouth every 7 days. 12 capsule 1    linaCLOtide (LINZESS) 72 mcg Cap capsule Take 1 capsule (72 mcg total) by mouth before breakfast. 30 capsule 11    multivitamin capsule Take 1 capsule by mouth.      ondansetron (ZOFRAN-ODT) 4 MG TbDL DISSOLVE 1 TABLET BY MOUTH EVERY 6 HOURS AS NEEDED FOR NAUSEA OR VOMITING 90 tablet 1    oxyCODONE (ROXICODONE) 5 MG immediate release tablet Take 1 tablet (5 mg total) by mouth every 4 (four) hours as needed for Pain. 20 tablet 0    pantoprazole (PROTONIX) 40 MG tablet Take 1 tablet (40 mg total) by mouth once daily. 90 tablet 3    semaglutide (OZEMPIC) 1 mg/dose (4 mg/3 mL) INJECT 1MG INTO THE SKIN ONCE WEEKLY 1 pen 3    sertraline (ZOLOFT) 50 MG tablet TAKE ONE TABLET BY MOUTH EVERY DAY AS DIRECTED 30 tablet 0    spironolactone (ALDACTONE) 100 MG tablet Take 100 mg by mouth once daily.      traZODone (DESYREL) 50 MG tablet TAKE 1 TABLET BY MOUTH EVERY DAY 30 tablet 5    traZODone (DESYREL) 50 MG tablet Take 1 tablet every day by oral route for 30 days. 30 tablet 0    EPINEPHrine (EPIPEN) 0.3 mg/0.3 mL AtIn Inject 0.3 mLs (0.3 mg total) into the muscle once. for 1 dose 2 each 0    flibanserin 100 mg Tab Take 1 tablet (100 mg) by mouth every evening. (Patient not taking: Reported on 6/5/2024) 30 tablet 12    LORazepam (ATIVAN) 1 MG tablet TAKE 1 TABLET BY MOUTH ONCE DAILY AS NEEDED FOR ANXIETY 20 tablet 2    NURTEC 75 mg odt Take  "75 mg by mouth daily as needed. (Patient not taking: Reported on 5/17/2024)       Current Facility-Administered Medications   Medication Dose Route Frequency Provider Last Rate Last Admin    levonorgestreL (MIRENA) 20 mcg/24 hours (7 yrs) 52 mg IUD 1 Intra Uterine Device  1 Intra Uterine Device Intrauterine  Brenda Rios MD   1 Intra Uterine Device at 07/21/22 1045     Allergies: Demerol (pf) [meperidine (pf)]   No LMP recorded. Patient has had an implant.    ROS:  Feeling well. No dyspnea or chest pain on exertion.  No abdominal pain, change in bowel habits, black or bloody stools.  No urinary tract symptoms. GYN ROS: . No neurological complaints.  Having myriad of symptoms. Worse since covid  OBJECTIVE:   The patient appears well, alert, oriented x 3, in no distress.  /72   Pulse (!) 114   Temp 99 °F (37.2 °C) (Oral)   Ht 5' 7" (1.702 m)   Wt 79.5 kg (175 lb 4.3 oz)   SpO2 98%   BMI 27.45 kg/m²   Wt Readings from Last 5 Encounters:   06/05/24 79.5 kg (175 lb 4.3 oz)   05/17/24 78.4 kg (172 lb 15.2 oz)   05/01/24 80.2 kg (176 lb 12.9 oz)   04/02/24 77.1 kg (170 lb)   04/01/24 79 kg (174 lb 2.6 oz)       She appears well, in no apparent distress.  Alert and oriented times three, pleasant and cooperative. Vital signs are as documented in vital signs section.  Weight loss noted  Has tachycardia andPVC      BREAST EXAM: deferred    PELVIC EXAM: deferred    ASSESSMENT:   1. Annual physical exam    2. Anxiety    3. COVID-19 long hauler manifesting chronic anxiety          PLAN:   Counseled on age appropriate medical preventative services, including age appropriate cancer screenings, over all nutritional health, need for a consistent exercise regimen and an over all push towards maintaining a vigorous and active lifestyle.  Counseled on age appropriate vaccines and discussed upcoming health care needs based on age/gender.  Spent time with patient counseling on need for a good patient/doctor relationship " moving forward.  Discussed use of common OTC medications and supplements.  Discussed common dietary aids and use of caffeine and the need for good sleep hygiene and stress management.    Problem List Items Addressed This Visit       Anxiety    Relevant Medications    LORazepam (ATIVAN) 1 MG tablet    Other Relevant Orders    DANIELITO (Completed)    D-Dimer, Quantitative (Completed)     Other Visit Diagnoses       Annual physical exam    -  Primary    COVID-19 long hauler manifesting chronic anxiety                F/u in 1 year for wellness

## 2024-06-07 LAB — ANA SER QL IF: NORMAL

## 2024-06-08 ENCOUNTER — PATIENT MESSAGE (OUTPATIENT)
Dept: FAMILY MEDICINE | Facility: CLINIC | Age: 39
End: 2024-06-08
Payer: COMMERCIAL

## 2024-06-08 DIAGNOSIS — U09.9 COVID-19 LONG HAULER MANIFESTING CHRONIC ANXIETY: ICD-10-CM

## 2024-06-08 DIAGNOSIS — F41.9 COVID-19 LONG HAULER MANIFESTING CHRONIC ANXIETY: ICD-10-CM

## 2024-06-08 DIAGNOSIS — I47.10 SUPRAVENTRICULAR TACHYCARDIA: Primary | ICD-10-CM

## 2024-06-09 PROBLEM — R07.9 CHEST PAIN: Status: RESOLVED | Noted: 2020-07-15 | Resolved: 2024-06-09

## 2024-06-09 PROBLEM — Z86.711 HISTORY OF PULMONARY EMBOLUS (PE): Status: ACTIVE | Noted: 2022-03-10

## 2024-06-09 PROBLEM — G47.33 OSA (OBSTRUCTIVE SLEEP APNEA): Status: ACTIVE | Noted: 2022-03-10

## 2024-06-09 PROBLEM — F41.9 ANXIETY: Status: ACTIVE | Noted: 2018-01-17

## 2024-06-09 PROBLEM — I47.10 SUPRAVENTRICULAR TACHYCARDIA: Status: ACTIVE | Noted: 2018-06-05

## 2024-06-09 PROBLEM — F41.8 MIXED ANXIETY AND DEPRESSIVE DISORDER: Status: ACTIVE | Noted: 2018-06-05

## 2024-06-09 PROBLEM — Z72.0 TOBACCO ABUSE: Status: RESOLVED | Noted: 2020-07-15 | Resolved: 2024-06-09

## 2024-06-09 PROBLEM — I26.99 PULMONARY EMBOLISM: Status: RESOLVED | Noted: 2020-07-15 | Resolved: 2024-06-09

## 2024-06-09 PROBLEM — I49.3 PVC'S (PREMATURE VENTRICULAR CONTRACTIONS): Status: ACTIVE | Noted: 2022-10-18

## 2024-06-09 PROBLEM — R00.2 PALPITATIONS: Status: ACTIVE | Noted: 2022-02-02

## 2024-06-09 PROBLEM — E55.9 VITAMIN D DEFICIENCY: Status: ACTIVE | Noted: 2018-03-01

## 2024-06-09 PROBLEM — G43.109 VERTIGINOUS MIGRAINE: Status: RESOLVED | Noted: 2021-12-08 | Resolved: 2024-06-09

## 2024-06-09 PROBLEM — R06.02 SOB (SHORTNESS OF BREATH): Status: RESOLVED | Noted: 2020-07-15 | Resolved: 2024-06-09

## 2024-06-11 ENCOUNTER — PATIENT MESSAGE (OUTPATIENT)
Dept: ENDOCRINOLOGY | Facility: CLINIC | Age: 39
End: 2024-06-11

## 2024-06-11 ENCOUNTER — OFFICE VISIT (OUTPATIENT)
Dept: ENDOCRINOLOGY | Facility: CLINIC | Age: 39
End: 2024-06-11
Payer: COMMERCIAL

## 2024-06-11 DIAGNOSIS — R00.2 PALPITATIONS: Primary | ICD-10-CM

## 2024-06-11 DIAGNOSIS — R42 ORTHOSTATIC LIGHTHEADEDNESS: ICD-10-CM

## 2024-06-11 PROCEDURE — 1159F MED LIST DOCD IN RCRD: CPT | Mod: CPTII,95,, | Performed by: INTERNAL MEDICINE

## 2024-06-11 PROCEDURE — 1160F RVW MEDS BY RX/DR IN RCRD: CPT | Mod: CPTII,95,, | Performed by: INTERNAL MEDICINE

## 2024-06-11 PROCEDURE — 99204 OFFICE O/P NEW MOD 45 MIN: CPT | Mod: 95,,, | Performed by: INTERNAL MEDICINE

## 2024-06-11 NOTE — PROGRESS NOTES
Subjective:      Patient ID: Snow Barney is a 38 y.o. female.    Chief Complaint:  Other Misc (Hypotension, pre syncope, palpitations)    The patient location is: Home  The chief complaint leading to consultation is: fatigue, palpitations, PVC's, pre syncope    Visit type: audiovisual    Face to Face time with patient: 20 min  35  minutes of total time spent on the encounter, which includes face to face time and non-face to face time preparing to see the patient (eg, review of tests), Obtaining and/or reviewing separately obtained history, Documenting clinical information in the electronic or other health record, Independently interpreting results (not separately reported) and communicating results to the patient/family/caregiver, or Care coordination (not separately reported).     Each patient to whom he or she provides medical services by telemedicine is:  (1) informed of the relationship between the physician and patient and the respective role of any other health care provider with respect to management of the patient; and (2) notified that he or she may decline to receive medical services by telemedicine and may withdraw from such care at any time.    History of Present Illness  Ms. Barney presents for endocrine evaluation in the setting of orthostatic hypotension and PVC's.     Has active history of migraines, anxiety/depression, and YUDY. Has history of PE.     In the past year she has noticed worsening fatigue, hair loss, palpitations and lightheadedness.    Has been running a low normal blood pressure for about 1 year. Has had multiple episodes of presyncope and 1 episode of syncope.  Will get lightheaded and dizzy when standing. Seems to be worse when it's hot outside.     Had normal cardiac workup.     Reports having panic attacks that isn't necessarily triggered by stress.     Was diagnosed with YUDY in 2020. Hasn't worn CPAP in 1 year since losing 40 lbs.     No chronic or recent glucocorticoid  "exposure.    Review of Systems   Constitutional:  Negative for chills and fever.   Gastrointestinal:  Negative for nausea.       Objective:   Physical Exam  Vitals and nursing note reviewed.       BP Readings from Last 3 Encounters:   06/05/24 100/72   05/17/24 116/62   05/01/24 120/88     Wt Readings from Last 1 Encounters:   06/05/24 1459 79.5 kg (175 lb 4.3 oz)       There is no height or weight on file to calculate BMI.    Lab Review:   No results found for: "HGBA1C"  No results found for: "CHOL", "HDL", "LDLCALC", "TRIG", "CHOLHDL"  Lab Results   Component Value Date     01/09/2024    K 3.6 01/09/2024     01/09/2024    CO2 21 (L) 01/09/2024     (H) 01/09/2024    BUN 5 (L) 01/09/2024    CREATININE 0.8 01/09/2024    CALCIUM 9.3 01/09/2024    PROT 7.7 01/09/2024    ALBUMIN 4.5 01/09/2024    BILITOT 0.6 01/09/2024    ALKPHOS 62 01/09/2024    AST 12 01/09/2024    ALT 12 01/09/2024    ANIONGAP 12 01/09/2024    ESTGFRAFRICA 90 12/19/2022    EGFRNONAA >60 07/30/2022    TSH 0.797 11/15/2023         Assessment and Plan     Palpitations  --Patient presenting with multiple symptoms: palpitations, fatigue, hair loss and presyncope  --Has had a mostly negative workup to date including cardiac workup  --Has been felt that some of her symptoms may be due to long Covid  --She is here to rule out and evaluate for any possible endocrine or hormonal causes of these symptoms  --Has had normal thyroid function on multiple occasions  --Will plan to evaluate for pheo with plasma metanephrine and adrenal insufficiency with 8 AM cortisol  --Reviewed with her that she may need an ACTH stimulation test if 8AM cortisol is equivocal      Needs cortisol and metaneprhines drawn at 8 AM on the [facility] when able      Devin Waddell M.D. Staff Endocrinology     "

## 2024-06-12 ENCOUNTER — LAB VISIT (OUTPATIENT)
Dept: LAB | Facility: HOSPITAL | Age: 39
End: 2024-06-12
Attending: INTERNAL MEDICINE
Payer: COMMERCIAL

## 2024-06-12 DIAGNOSIS — R42 ORTHOSTATIC LIGHTHEADEDNESS: ICD-10-CM

## 2024-06-12 DIAGNOSIS — R00.2 PALPITATIONS: ICD-10-CM

## 2024-06-12 LAB — CORTIS SERPL-MCNC: 18.5 UG/DL (ref 4.3–22.4)

## 2024-06-12 PROCEDURE — 36415 COLL VENOUS BLD VENIPUNCTURE: CPT | Performed by: INTERNAL MEDICINE

## 2024-06-12 PROCEDURE — 83835 ASSAY OF METANEPHRINES: CPT | Performed by: INTERNAL MEDICINE

## 2024-06-12 PROCEDURE — 82533 TOTAL CORTISOL: CPT | Performed by: INTERNAL MEDICINE

## 2024-06-13 NOTE — ASSESSMENT & PLAN NOTE
--Patient presenting with multiple symptoms: palpitations, fatigue, hair loss and presyncope  --Has had a mostly negative workup to date including cardiac workup  --Has been felt that some of her symptoms may be due to long Covid  --She is here to rule out and evaluate for any possible endocrine or hormonal causes of these symptoms  --Has had normal thyroid function on multiple occasions  --Will plan to evaluate for pheo with plasma metanephrine and adrenal insufficiency with 8 AM cortisol  --Reviewed with her that she may need an ACTH stimulation test if 8AM cortisol is equivocal

## 2024-06-19 ENCOUNTER — PATIENT MESSAGE (OUTPATIENT)
Dept: ENDOCRINOLOGY | Facility: CLINIC | Age: 39
End: 2024-06-19
Payer: COMMERCIAL

## 2024-06-21 ENCOUNTER — LAB VISIT (OUTPATIENT)
Dept: LAB | Facility: HOSPITAL | Age: 39
End: 2024-06-21
Attending: FAMILY MEDICINE
Payer: COMMERCIAL

## 2024-06-21 DIAGNOSIS — U09.9 COVID-19 LONG HAULER MANIFESTING CHRONIC ANXIETY: ICD-10-CM

## 2024-06-21 DIAGNOSIS — F41.9 COVID-19 LONG HAULER MANIFESTING CHRONIC ANXIETY: ICD-10-CM

## 2024-06-21 DIAGNOSIS — I47.10 SUPRAVENTRICULAR TACHYCARDIA: ICD-10-CM

## 2024-06-21 LAB
D DIMER PPP IA.FEU-MCNC: <0.19 MG/L FEU
ERYTHROCYTE [SEDIMENTATION RATE] IN BLOOD BY PHOTOMETRIC METHOD: 10 MM/HR (ref 0–36)
METANEPH FREE SERPL-MCNC: 43 PG/ML
METANEPHS SERPL-MCNC: 97 PG/ML
NORMETANEPH FREE SERPL-MCNC: 54 PG/ML

## 2024-06-21 PROCEDURE — 36415 COLL VENOUS BLD VENIPUNCTURE: CPT | Performed by: FAMILY MEDICINE

## 2024-06-21 PROCEDURE — 85652 RBC SED RATE AUTOMATED: CPT | Performed by: FAMILY MEDICINE

## 2024-06-21 PROCEDURE — 85379 FIBRIN DEGRADATION QUANT: CPT | Performed by: FAMILY MEDICINE

## 2024-06-21 PROCEDURE — 86141 C-REACTIVE PROTEIN HS: CPT | Performed by: FAMILY MEDICINE

## 2024-06-22 LAB — CRP SERPL-MCNC: 0.59 MG/L (ref 0–3.19)

## 2024-06-25 ENCOUNTER — ANESTHESIA EVENT (OUTPATIENT)
Dept: SURGERY | Facility: HOSPITAL | Age: 39
End: 2024-06-25
Payer: COMMERCIAL

## 2024-06-27 ENCOUNTER — PATIENT MESSAGE (OUTPATIENT)
Dept: FAMILY MEDICINE | Facility: CLINIC | Age: 39
End: 2024-06-27
Payer: COMMERCIAL

## 2024-06-27 DIAGNOSIS — F98.8 ATTENTION DEFICIT DISORDER, UNSPECIFIED HYPERACTIVITY PRESENCE: Primary | ICD-10-CM

## 2024-06-28 PROBLEM — F98.8 ATTENTION DEFICIT DISORDER: Status: ACTIVE | Noted: 2024-06-28

## 2024-06-28 RX ORDER — DEXTROAMPHETAMINE SACCHARATE, AMPHETAMINE ASPARTATE, DEXTROAMPHETAMINE SULFATE AND AMPHETAMINE SULFATE 1.25; 1.25; 1.25; 1.25 MG/1; MG/1; MG/1; MG/1
1 TABLET ORAL 2 TIMES DAILY PRN
Qty: 60 TABLET | Refills: 0 | Status: SHIPPED | OUTPATIENT
Start: 2024-06-28

## 2024-07-12 ENCOUNTER — PATIENT MESSAGE (OUTPATIENT)
Dept: FAMILY MEDICINE | Facility: CLINIC | Age: 39
End: 2024-07-12
Payer: COMMERCIAL

## 2024-07-15 RX ORDER — SERTRALINE HYDROCHLORIDE 50 MG/1
50 TABLET, FILM COATED ORAL
Qty: 30 TABLET | Refills: 0 | Status: SHIPPED | OUTPATIENT
Start: 2024-07-15

## 2024-07-15 NOTE — TELEPHONE ENCOUNTER
Care Due:                  Date            Visit Type   Department     Provider  --------------------------------------------------------------------------------                                 -         [facility] FAMILY                              PRIMARY      MEDICINE/INTERN  Last Visit: 06-      CARE ([facility])   [facility]         Marcelo Joseph  Next Visit: None Scheduled  None         None Found                                                            Last  Test          Frequency    Reason                     Performed    Due Date  --------------------------------------------------------------------------------    HBA1C.......  6 months...  semaglutide,.............  Not Found    Overdue    [employer] Embedded Care Due Messages. Reference number: [id number].   7/15/2024 8:48:08 AM CDT   Discarded in the usual manner

## 2024-07-22 ENCOUNTER — HOSPITAL ENCOUNTER (OUTPATIENT)
Dept: PREADMISSION TESTING | Facility: HOSPITAL | Age: 39
Discharge: HOME | End: 2024-07-22
Attending: OBSTETRICS & GYNECOLOGY
Payer: COMMERCIAL

## 2024-07-22 ENCOUNTER — OFFICE VISIT (OUTPATIENT)
Dept: OBSTETRICS AND GYNECOLOGY | Facility: CLINIC | Age: 39
End: 2024-07-22
Payer: COMMERCIAL

## 2024-07-22 VITALS — WEIGHT: 171.94 LBS | BODY MASS INDEX: 26.99 KG/M2 | HEIGHT: 67 IN

## 2024-07-22 VITALS
BODY MASS INDEX: 26.93 KG/M2 | DIASTOLIC BLOOD PRESSURE: 70 MMHG | WEIGHT: 171.94 LBS | SYSTOLIC BLOOD PRESSURE: 120 MMHG

## 2024-07-22 DIAGNOSIS — Z01.818 PREOPERATIVE TESTING: Primary | ICD-10-CM

## 2024-07-22 DIAGNOSIS — R10.2 PELVIC PAIN: ICD-10-CM

## 2024-07-22 DIAGNOSIS — N87.1 CIN II (CERVICAL INTRAEPITHELIAL NEOPLASIA II): ICD-10-CM

## 2024-07-22 DIAGNOSIS — N94.6 DYSMENORRHEA: ICD-10-CM

## 2024-07-22 DIAGNOSIS — N87.1 CIN II (CERVICAL INTRAEPITHELIAL NEOPLASIA II): Primary | ICD-10-CM

## 2024-07-22 LAB
ALBUMIN SERPL BCP-MCNC: 4.3 G/DL (ref 3.5–5.2)
ALP SERPL-CCNC: 60 U/L (ref 55–135)
ALT SERPL W/O P-5'-P-CCNC: 23 U/L (ref 10–44)
ANION GAP SERPL CALC-SCNC: 8 MMOL/L (ref 8–16)
AST SERPL-CCNC: 16 U/L (ref 10–40)
BASOPHILS # BLD AUTO: 0.04 K/UL (ref 0–0.2)
BASOPHILS NFR BLD: 0.8 % (ref 0–1.9)
BILIRUB SERPL-MCNC: 0.6 MG/DL (ref 0.1–1)
BUN SERPL-MCNC: 17 MG/DL (ref 6–20)
CALCIUM SERPL-MCNC: 9.8 MG/DL (ref 8.7–10.5)
CHLORIDE SERPL-SCNC: 110 MMOL/L (ref 95–110)
CO2 SERPL-SCNC: 22 MMOL/L (ref 23–29)
CREAT SERPL-MCNC: 0.7 MG/DL (ref 0.5–1.4)
DIFFERENTIAL METHOD BLD: ABNORMAL
EOSINOPHIL # BLD AUTO: 0.2 K/UL (ref 0–0.5)
EOSINOPHIL NFR BLD: 3.3 % (ref 0–8)
ERYTHROCYTE [DISTWIDTH] IN BLOOD BY AUTOMATED COUNT: 13 % (ref 11.5–14.5)
EST. GFR  (NO RACE VARIABLE): >60 ML/MIN/1.73 M^2
GLUCOSE SERPL-MCNC: 87 MG/DL (ref 70–110)
HCT VFR BLD AUTO: 42.7 % (ref 37–48.5)
HGB BLD-MCNC: 13.6 G/DL (ref 12–16)
IMM GRANULOCYTES # BLD AUTO: 0.01 K/UL (ref 0–0.04)
IMM GRANULOCYTES NFR BLD AUTO: 0.2 % (ref 0–0.5)
LYMPHOCYTES # BLD AUTO: 1.8 K/UL (ref 1–4.8)
LYMPHOCYTES NFR BLD: 35.2 % (ref 18–48)
MCH RBC QN AUTO: 28.8 PG (ref 27–31)
MCHC RBC AUTO-ENTMCNC: 31.9 G/DL (ref 32–36)
MCV RBC AUTO: 91 FL (ref 82–98)
MONOCYTES # BLD AUTO: 0.3 K/UL (ref 0.3–1)
MONOCYTES NFR BLD: 6.4 % (ref 4–15)
NEUTROPHILS # BLD AUTO: 2.8 K/UL (ref 1.8–7.7)
NEUTROPHILS NFR BLD: 54.1 % (ref 38–73)
NRBC BLD-RTO: 0 /100 WBC
PLATELET # BLD AUTO: 189 K/UL (ref 150–450)
PMV BLD AUTO: 10.9 FL (ref 9.2–12.9)
POTASSIUM SERPL-SCNC: 4.1 MMOL/L (ref 3.5–5.1)
PROT SERPL-MCNC: 7.5 G/DL (ref 6–8.4)
RBC # BLD AUTO: 4.72 M/UL (ref 4–5.4)
SODIUM SERPL-SCNC: 140 MMOL/L (ref 136–145)
WBC # BLD AUTO: 5.17 K/UL (ref 3.9–12.7)

## 2024-07-22 PROCEDURE — 99999 PR PBB SHADOW E&M-EST. PATIENT-LVL III: CPT | Mod: PBBFAC,,, | Performed by: OBSTETRICS & GYNECOLOGY

## 2024-07-22 PROCEDURE — 99499 UNLISTED E&M SERVICE: CPT | Mod: S$GLB,,, | Performed by: OBSTETRICS & GYNECOLOGY

## 2024-07-22 PROCEDURE — 80053 COMPREHEN METABOLIC PANEL: CPT | Performed by: OBSTETRICS & GYNECOLOGY

## 2024-07-22 PROCEDURE — 36415 COLL VENOUS BLD VENIPUNCTURE: CPT | Performed by: OBSTETRICS & GYNECOLOGY

## 2024-07-22 PROCEDURE — 85025 COMPLETE CBC W/AUTO DIFF WBC: CPT | Performed by: OBSTETRICS & GYNECOLOGY

## 2024-07-22 RX ORDER — FAMOTIDINE 20 MG/1
20 TABLET, FILM COATED ORAL
OUTPATIENT
Start: 2024-07-22

## 2024-07-22 RX ORDER — MUPIROCIN 20 MG/G
OINTMENT TOPICAL
OUTPATIENT
Start: 2024-07-22

## 2024-07-22 RX ORDER — CEFAZOLIN SODIUM 2 G/50ML
2 SOLUTION INTRAVENOUS
OUTPATIENT
Start: 2024-07-22

## 2024-07-22 RX ORDER — SODIUM CHLORIDE, SODIUM LACTATE, POTASSIUM CHLORIDE, CALCIUM CHLORIDE 600; 310; 30; 20 MG/100ML; MG/100ML; MG/100ML; MG/100ML
INJECTION, SOLUTION INTRAVENOUS CONTINUOUS
OUTPATIENT
Start: 2024-07-22

## 2024-07-22 NOTE — H&P (VIEW-ONLY)
Subjective     Patient ID: Snow Barney is a 38 y.o. female.    Chief Complaint:  Pre-op Exam      History of Present Illness  HPI   NATALIE II on 12 o'clock biopsy.  Patient also has dysmenorrhea. Given dysmenorrhea and Natalie II, she would like to proceed with definitive surgical management with hysterectomy.  Patient also counseled on option of LEEP.  However, she still would like to proceed with TLH.       GYN & OB History  Patient's last menstrual period was 2024.   Date of Last Pap: 2024    OB History    Para Term  AB Living   7 4 3 1 3 3   SAB IAB Ectopic Multiple Live Births                  # Outcome Date GA Lbr Benjamin/2nd Weight Sex Type Anes PTL Lv   7 Term      Vag-Spont      6 Term      Vag-Spont      5 Term      Vag-Spont      4 AB            3 AB            2 AB            1                Obstetric Comments   GYN Hx: Menarche at 13   History of abnormal pap with colposcopy   History of chlamydia.     Past Medical History:  Past Medical History:   Diagnosis Date    Anxiety     Asthma     Depression     Fibrocystic breast disease     GERD (gastroesophageal reflux disease)     Hernia, hiatal     IBS (irritable bowel syndrome)     Inappropriate sinus tachycardia     POTS (postural orthostatic tachycardia syndrome)     Pulmonary emboli     Pulmonary embolism 07/15/2020    Self-inflicted injury      IMO Regulatory Import      Sepsis     Urticaria        Past Surgical History:  Past Surgical History:   Procedure Laterality Date    COLONOSCOPY N/A 2024    Procedure: COLONOSCOPY;  Surgeon: Luis Naik MD;  Location: [facility];  Service: Endoscopy;  Laterality: N/A;    TYMPANOSTOMY TUBE PLACEMENT         Family History:  Family History   Problem Relation Name Age of Onset    Allergies Mother      Hypertension Mother      Diabetes Mother      Allergies Father      Asthma Father      Hypertension Father      Diabetes Father      Breast cancer Maternal Aunt      Breast cancer  Paternal Aunt      Colon cancer Maternal Grandmother      Breast cancer Paternal Grandmother      Allergies Son      Ovarian cancer Cousin         Allergies:  Review of patient's allergies indicates:   Allergen Reactions    Demerol (pf) [meperidine (pf)] Hives       Medications:  Current Outpatient Medications on File Prior to Visit   Medication Sig Dispense Refill    albuterol (PROVENTIL/VENTOLIN HFA) 90 mcg/actuation inhaler Inhale 2 puffs into the lungs every 4 (four) hours as needed.      albuterol (PROVENTIL/VENTOLIN HFA) 90 mcg/actuation inhaler INHALE 2 PUFFS INTO THE LUNGS EVERY 4 HOURS AS NEEDED FOR WHEEZING OR SHORTNESS OF BREATH 6.7 g 3    dextroamphetamine-amphetamine (ADDERALL) 5 mg Tab Take 5 mg by mouth 2 (two) times daily as needed (add). 60 tablet 0    EPINEPHrine (EPIPEN) 0.3 mg/0.3 mL AtIn Inject 0.3 mLs (0.3 mg total) into the muscle once. for 1 dose 2 each 0    ergocalciferol (VITAMIN D2) 50,000 unit Cap Take 1 capsule (50,000 Units total) by mouth every 7 days. 12 capsule 1    linaCLOtide (LINZESS) 72 mcg Cap capsule Take 1 capsule (72 mcg total) by mouth before breakfast. 30 capsule 11    LORazepam (ATIVAN) 1 MG tablet TAKE 1 TABLET BY MOUTH ONCE DAILY AS NEEDED FOR ANXIETY 20 tablet 2    multivitamin capsule Take 1 capsule by mouth.      ondansetron (ZOFRAN-ODT) 4 MG TbDL DISSOLVE 1 TABLET BY MOUTH EVERY 6 HOURS AS NEEDED FOR NAUSEA OR VOMITING 90 tablet 1    oxyCODONE (ROXICODONE) 5 MG immediate release tablet Take 1 tablet (5 mg total) by mouth every 4 (four) hours as needed for Pain. 20 tablet 0    pantoprazole (PROTONIX) 40 MG tablet Take 1 tablet (40 mg total) by mouth once daily. 90 tablet 3    semaglutide, weight loss, 2.4 mg/0.75 mL PnIj Coumpounded version, 1.2 mL injection for 2.4 mg SC weekly dose 5 mL 2    sertraline (ZOLOFT) 50 MG tablet TAKE ONE TABLET BY MOUTH EVERY DAY AS DIRECTED 30 tablet 0    traZODone (DESYREL) 50 MG tablet TAKE 1 TABLET BY MOUTH EVERY DAY 30 tablet 5     traZODone (DESYREL) 50 MG tablet Take 1 tablet every day by oral route for 30 days. 30 tablet 0    [DISCONTINUED] ALPRAZolam (XANAX) 0.5 MG tablet Take 1 tablet (0.5 mg total) by mouth 3 (three) times daily as needed for Anxiety. (Patient not taking: Reported on 2021) 9 tablet 0    [DISCONTINUED] spironolactone (ALDACTONE) 100 MG tablet Take 100 mg by mouth once daily.       Current Facility-Administered Medications on File Prior to Visit   Medication Dose Route Frequency Provider Last Rate Last Admin    levonorgestreL (MIRENA) 20 mcg/24 hours (7 yrs) 52 mg IUD 1 Intra Uterine Device  1 Intra Uterine Device Intrauterine  Brenda Rios MD   1 Intra Uterine Device at 22 1045       Social History:  Social History     Tobacco Use    Smoking status: Former     Current packs/day: 0.00     Average packs/day: 1 pack/day for 25.5 years (25.5 ttl pk-yrs)     Types: Cigarettes     Start date: 7/15/1996     Quit date: 2022     Years since quittin.5    Smokeless tobacco: Never    Tobacco comments:     varies from 7 cigarettes to whole pack in day   Substance Use Topics    Alcohol use: Yes     Comment: ocassionally    Drug use: No             Review of Systems  Review of Systems   Constitutional: Negative.    HENT: Negative.     Eyes: Negative.    Respiratory: Negative.     Cardiovascular: Negative.    Gastrointestinal: Negative.    Endocrine: Negative.    Genitourinary:  Positive for dysmenorrhea, menorrhagia and pelvic pain.   Musculoskeletal: Negative.    Integumentary:  Negative.   Neurological: Negative.    Hematological: Negative.    Psychiatric/Behavioral: Negative.     Breast: negative.           Objective   Physical Exam:   Constitutional: She is oriented to person, place, and time. She appears well-developed.    HENT:   Head: Normocephalic and atraumatic.    Eyes: EOM are normal.     Cardiovascular:  Normal rate.             Pulmonary/Chest: Effort normal.        Abdominal: Soft. There is no  abdominal tenderness.             Musculoskeletal: Normal range of motion.       Neurological: She is oriented to person, place, and time.    Skin: Skin is warm.    Psychiatric: She has a normal mood and affect.      Results for orders placed during the hospital encounter of 04/14/23    US Pelvis Comp with Transvag NON-OB (xpd    Narrative  EXAMINATION:  US PELVIS COMP WITH TRANSVAG NON-OB (XPD)    CLINICAL HISTORY:  Encounter for routine checking of intrauterine contraceptive device    TECHNIQUE:  Transabdominal sonography of the pelvis was performed, followed by transvaginal sonography to better evaluate the uterus and ovaries.    COMPARISON:  07/30/2022    FINDINGS:  Uterus:    Size: 7.3 x 4.9 x 4.4 cm    Masses: None    Endometrium: IUD in place.  Satisfactory position.    Right ovary:    Size: 2.5 cm    Appearance: Normal    Vascular flow: Normal.    Left ovary:    Size: 2.9 cm    Appearance: Normal    Vascular Flow: Normal.    Free Fluid:    None.    Impression  IUD in satisfactory position      Electronically signed by: Sven Núñez Jr  Date:    04/14/2023  Time:    09:22             Assessment and Plan     1. NATALIE II (cervical intraepithelial neoplasia II)    2. Dysmenorrhea    3. Pelvic pain            Plan:  1. NATALIE II (cervical intraepithelial neoplasia II)  2. Dysmenorrhea  3. Pelvic pain  - Risks, benefits, and alternatives of Total laparoscopic hysterectomy reviewed with patient.  She voiced understanding.  All questions answered.  Consents are signed and on the chart.       - Place in Outpatient; Standing  - Full code; Standing  - Vital signs; Standing  - Insert peripheral IV; Standing  - Glynn to Gravity; Standing  - POCT glucose; Standing  - Notify physician if BS > 180 for hysterectomy patients; Standing  - Chlorhexidine (CHG) 2% Wipes; Standing  - Notify Physician/Vital Signs Parameters Urine output less than 0.5mL/kg/hr (with indwelling catheter) or 30 mL/hr (without indwelling catheter) or  blood glucose greater than 200 mg/dL; Standing  - Notify physician; Standing  - Notify Physician - Potential Need of Opioid Reversal; Standing  - Diet NPO; Standing  - Place sequential compression device; Standing  - Chlorohexidine Gluconate Bath; Standing  - Pregnancy, urine rapid; Standing  - Type & Screen Pre Op; Standing  - Diet NPO; Standing

## 2024-07-22 NOTE — DISCHARGE INSTRUCTIONS
YOUR PROCEDURE WILL BE AT [facility] at [address] [place] [address]                  Before 7 AM, enter through the Emergency Entrance..   After 7 AM enter through the Main Entrance.                 Report to the Same Day Surgery Registration Desk in the hallway.(Just beside the Same Day Surgery Unit)      Your procedure  is scheduled for ___7/30/2024_______.    Call [phone] between 2pm and 5pm on _7/29/2024______to find out your arrival time for the day of surgery.    You may have two visitors.  No children under 12 years old.     You will be going to the Same Day Surgery Unit on the 2nd floor of the hospital.    Important instructions:  Do not eat anything after midnight.  You may have plain water, non carbonated.  You may also have Gatorade or Powerade after midnight.    Stop all fluids 2 hours before your surgery.    It is okay to brush your teeth.  Do not have gum, candy or mints.    SEE MEDICATION SHEET.   TAKE MEDICATIONS AS DIRECTED WITH SIPS OF WATER.      Do not take any diabetic medication on the morning of surgery unless instructed to do so by your doctor or pre op nurse.      All GLP-1 weekly diabetic/weight loss medications must not be taken for one week before your surgery, or your surgery could be canceled.      STOP taking Aspirin, Ibuprofen,  Advil, Motrin, Mobic(meloxicam), Aleve (naproxen), Fish oil, and Vitamin E for at least 7 days before your surgery.     You may take Tylenol if needed which is not a blood thinner.    Please shower the night before and the morning of your surgery.      Use Chlorhexidine soap as instructed by your pre op nurse.   Please place clean linens on your bed the night before surgery. Please wear fresh clean clothing after each shower.    No shaving of procedural area at least 4-5 days before surgery due to increased risk of skin irritation and/or possible infection.    Contact lenses and removable denture work may not be  worn during your procedure.    You may wear deodorant only. If you are having breast surgery, do not wear deodorant on the operative side.    Do not wear powder, body lotion, perfume/cologne or make-up.    Do not wear any jewelry or have any metal on your body.    You will be asked to remove any dentures or partials for the procedure.    If you are going home on the same day of surgery, you must arrange for a family member or a friend to drive you home.  Public transportation is prohibited.  You will not be able to drive home if you were given anesthesia or sedation.    Patients who want to have their Post-op prescriptions filled from our in-house [facility], bring a Credit/Debit Card or cash with you. A co-pay may be required.  The pharmacy closes at 5:30 pm.    Wear loose fitting clothes allowing for bandages.    Please leave money and valuables home.      You may bring your cell phone.    Call the doctor if fever or illness should occur before your surgery.    Call [phone] to contact us here if needed.                            CLOTHES ON DAY OF SURGERY    SHOULDER surgery:  you must have a very oversized shirt.  Very, Very large.  You will probably have a large sling on with your arm strapped to your chest.  You will not be able to put the arm of the operated shoulder into a sleeve.  You can put the arm of the un-operated shoulder into the sleeve, but the shirt will need to be draped over the operated shoulder.       ARM or HAND surgery:  make sure that your sleeves are large and loose enough to pass over large dressings or cast.      BREAST or UNDERARM surgery:  wear a loose, button down shirt so that you can dress without raising your arms over your head.    ABDOMINAL surgery:  wear loose, comfortable clothing.  Nothing tight around the abdomen.  NO JEANS    PENIS or SCROTAL surgery:  loose comfortable clothing.  Large sweat pants, pajama pants or a robe.  ABSOLUTELY NO JEANS      LEG or FOOT surgery:   wear large loose pants that are able to pass over any large dressings or casts.  You could also wear loose shorts or a skirt.

## 2024-07-22 NOTE — H&P
Subjective     Patient ID: Snow Barney is a 38 y.o. female.    Chief Complaint:  Pre-op Exam      History of Present Illness  HPI   NATALIE II on 12 o'clock biopsy.  Patient also has dysmenorrhea. Given dysmenorrhea and Natalie II, she would like to proceed with definitive surgical management with hysterectomy.  Patient also counseled on option of LEEP.  However, she still would like to proceed with TLH.       GYN & OB History  Patient's last menstrual period was 2024.   Date of Last Pap: 2024    OB History    Para Term  AB Living   7 4 3 1 3 3   SAB IAB Ectopic Multiple Live Births                  # Outcome Date GA Lbr Benjamin/2nd Weight Sex Type Anes PTL Lv   7 Term      Vag-Spont      6 Term      Vag-Spont      5 Term      Vag-Spont      4 AB            3 AB            2 AB            1                Obstetric Comments   GYN Hx: Menarche at 13   History of abnormal pap with colposcopy   History of chlamydia.     Past Medical History:  Past Medical History:   Diagnosis Date    Anxiety     Asthma     Depression     Fibrocystic breast disease     GERD (gastroesophageal reflux disease)     Hernia, hiatal     IBS (irritable bowel syndrome)     Inappropriate sinus tachycardia     POTS (postural orthostatic tachycardia syndrome)     Pulmonary emboli     Pulmonary embolism 07/15/2020    Self-inflicted injury      IMO Regulatory Import      Sepsis     Urticaria        Past Surgical History:  Past Surgical History:   Procedure Laterality Date    COLONOSCOPY N/A 2024    Procedure: COLONOSCOPY;  Surgeon: Luis Naik MD;  Location: [facility];  Service: Endoscopy;  Laterality: N/A;    TYMPANOSTOMY TUBE PLACEMENT         Family History:  Family History   Problem Relation Name Age of Onset    Allergies Mother      Hypertension Mother      Diabetes Mother      Allergies Father      Asthma Father      Hypertension Father      Diabetes Father      Breast cancer Maternal Aunt      Breast cancer  Paternal Aunt      Colon cancer Maternal Grandmother      Breast cancer Paternal Grandmother      Allergies Son      Ovarian cancer Cousin         Allergies:  Review of patient's allergies indicates:   Allergen Reactions    Demerol (pf) [meperidine (pf)] Hives       Medications:  Current Outpatient Medications on File Prior to Visit   Medication Sig Dispense Refill    albuterol (PROVENTIL/VENTOLIN HFA) 90 mcg/actuation inhaler Inhale 2 puffs into the lungs every 4 (four) hours as needed.      albuterol (PROVENTIL/VENTOLIN HFA) 90 mcg/actuation inhaler INHALE 2 PUFFS INTO THE LUNGS EVERY 4 HOURS AS NEEDED FOR WHEEZING OR SHORTNESS OF BREATH 6.7 g 3    dextroamphetamine-amphetamine (ADDERALL) 5 mg Tab Take 5 mg by mouth 2 (two) times daily as needed (add). 60 tablet 0    EPINEPHrine (EPIPEN) 0.3 mg/0.3 mL AtIn Inject 0.3 mLs (0.3 mg total) into the muscle once. for 1 dose 2 each 0    ergocalciferol (VITAMIN D2) 50,000 unit Cap Take 1 capsule (50,000 Units total) by mouth every 7 days. 12 capsule 1    linaCLOtide (LINZESS) 72 mcg Cap capsule Take 1 capsule (72 mcg total) by mouth before breakfast. 30 capsule 11    LORazepam (ATIVAN) 1 MG tablet TAKE 1 TABLET BY MOUTH ONCE DAILY AS NEEDED FOR ANXIETY 20 tablet 2    multivitamin capsule Take 1 capsule by mouth.      ondansetron (ZOFRAN-ODT) 4 MG TbDL DISSOLVE 1 TABLET BY MOUTH EVERY 6 HOURS AS NEEDED FOR NAUSEA OR VOMITING 90 tablet 1    oxyCODONE (ROXICODONE) 5 MG immediate release tablet Take 1 tablet (5 mg total) by mouth every 4 (four) hours as needed for Pain. 20 tablet 0    pantoprazole (PROTONIX) 40 MG tablet Take 1 tablet (40 mg total) by mouth once daily. 90 tablet 3    semaglutide, weight loss, 2.4 mg/0.75 mL PnIj Coumpounded version, 1.2 mL injection for 2.4 mg SC weekly dose 5 mL 2    sertraline (ZOLOFT) 50 MG tablet TAKE ONE TABLET BY MOUTH EVERY DAY AS DIRECTED 30 tablet 0    traZODone (DESYREL) 50 MG tablet TAKE 1 TABLET BY MOUTH EVERY DAY 30 tablet 5     traZODone (DESYREL) 50 MG tablet Take 1 tablet every day by oral route for 30 days. 30 tablet 0    [DISCONTINUED] ALPRAZolam (XANAX) 0.5 MG tablet Take 1 tablet (0.5 mg total) by mouth 3 (three) times daily as needed for Anxiety. (Patient not taking: Reported on 2021) 9 tablet 0    [DISCONTINUED] spironolactone (ALDACTONE) 100 MG tablet Take 100 mg by mouth once daily.       Current Facility-Administered Medications on File Prior to Visit   Medication Dose Route Frequency Provider Last Rate Last Admin    levonorgestreL (MIRENA) 20 mcg/24 hours (7 yrs) 52 mg IUD 1 Intra Uterine Device  1 Intra Uterine Device Intrauterine  Brenda Rios MD   1 Intra Uterine Device at 22 1045       Social History:  Social History     Tobacco Use    Smoking status: Former     Current packs/day: 0.00     Average packs/day: 1 pack/day for 25.5 years (25.5 ttl pk-yrs)     Types: Cigarettes     Start date: 7/15/1996     Quit date: 2022     Years since quittin.5    Smokeless tobacco: Never    Tobacco comments:     varies from 7 cigarettes to whole pack in day   Substance Use Topics    Alcohol use: Yes     Comment: ocassionally    Drug use: No             Review of Systems  Review of Systems   Constitutional: Negative.    HENT: Negative.     Eyes: Negative.    Respiratory: Negative.     Cardiovascular: Negative.    Gastrointestinal: Negative.    Endocrine: Negative.    Genitourinary:  Positive for dysmenorrhea, menorrhagia and pelvic pain.   Musculoskeletal: Negative.    Integumentary:  Negative.   Neurological: Negative.    Hematological: Negative.    Psychiatric/Behavioral: Negative.     Breast: negative.           Objective   Physical Exam:   Constitutional: She is oriented to person, place, and time. She appears well-developed.    HENT:   Head: Normocephalic and atraumatic.    Eyes: EOM are normal.     Cardiovascular:  Normal rate.             Pulmonary/Chest: Effort normal.        Abdominal: Soft. There is no  abdominal tenderness.             Musculoskeletal: Normal range of motion.       Neurological: She is oriented to person, place, and time.    Skin: Skin is warm.    Psychiatric: She has a normal mood and affect.      Results for orders placed during the hospital encounter of 04/14/23    US Pelvis Comp with Transvag NON-OB (xpd    Narrative  EXAMINATION:  US PELVIS COMP WITH TRANSVAG NON-OB (XPD)    CLINICAL HISTORY:  Encounter for routine checking of intrauterine contraceptive device    TECHNIQUE:  Transabdominal sonography of the pelvis was performed, followed by transvaginal sonography to better evaluate the uterus and ovaries.    COMPARISON:  07/30/2022    FINDINGS:  Uterus:    Size: 7.3 x 4.9 x 4.4 cm    Masses: None    Endometrium: IUD in place.  Satisfactory position.    Right ovary:    Size: 2.5 cm    Appearance: Normal    Vascular flow: Normal.    Left ovary:    Size: 2.9 cm    Appearance: Normal    Vascular Flow: Normal.    Free Fluid:    None.    Impression  IUD in satisfactory position      Electronically signed by: Sven Núñez Jr  Date:    04/14/2023  Time:    09:22             Assessment and Plan     1. NATALIE II (cervical intraepithelial neoplasia II)    2. Dysmenorrhea    3. Pelvic pain            Plan:  1. NATALIE II (cervical intraepithelial neoplasia II)  2. Dysmenorrhea  3. Pelvic pain  - Risks, benefits, and alternatives of Total laparoscopic hysterectomy reviewed with patient.  She voiced understanding.  All questions answered.  Consents are signed and on the chart.       - Place in Outpatient; Standing  - Full code; Standing  - Vital signs; Standing  - Insert peripheral IV; Standing  - Glynn to Gravity; Standing  - POCT glucose; Standing  - Notify physician if BS > 180 for hysterectomy patients; Standing  - Chlorhexidine (CHG) 2% Wipes; Standing  - Notify Physician/Vital Signs Parameters Urine output less than 0.5mL/kg/hr (with indwelling catheter) or 30 mL/hr (without indwelling catheter) or  blood glucose greater than 200 mg/dL; Standing  - Notify physician; Standing  - Notify Physician - Potential Need of Opioid Reversal; Standing  - Diet NPO; Standing  - Place sequential compression device; Standing  - Chlorohexidine Gluconate Bath; Standing  - Pregnancy, urine rapid; Standing  - Type & Screen Pre Op; Standing  - Diet NPO; Standing

## 2024-07-22 NOTE — PLAN OF CARE
Pre-operative instructions, medication directives and pain scales reviewed with patient. All questions the patient had were answered. Re-assurance about surgical procedure and day of surgery routine given as needed, patient verbalized understanding of the pre-op instructions.  Patient instructed to report to [facility] for surgery.   none

## 2024-07-29 ENCOUNTER — LAB VISIT (OUTPATIENT)
Dept: LAB | Facility: HOSPITAL | Age: 39
End: 2024-07-29
Attending: OBSTETRICS & GYNECOLOGY
Payer: COMMERCIAL

## 2024-07-29 DIAGNOSIS — Z01.818 PREOPERATIVE TESTING: ICD-10-CM

## 2024-07-29 LAB
ABO + RH BLD: NORMAL
BLD GP AB SCN CELLS X3 SERPL QL: NORMAL
SPECIMEN OUTDATE: NORMAL

## 2024-07-29 PROCEDURE — 36415 COLL VENOUS BLD VENIPUNCTURE: CPT | Performed by: OBSTETRICS & GYNECOLOGY

## 2024-07-29 PROCEDURE — 86900 BLOOD TYPING SEROLOGIC ABO: CPT | Performed by: OBSTETRICS & GYNECOLOGY

## 2024-07-30 ENCOUNTER — ANESTHESIA (OUTPATIENT)
Dept: SURGERY | Facility: HOSPITAL | Age: 39
End: 2024-07-30
Payer: COMMERCIAL

## 2024-07-30 ENCOUNTER — HOSPITAL ENCOUNTER (OUTPATIENT)
Facility: HOSPITAL | Age: 39
Discharge: HOME | End: 2024-07-31
Attending: OBSTETRICS & GYNECOLOGY | Admitting: OBSTETRICS & GYNECOLOGY
Payer: COMMERCIAL

## 2024-07-30 DIAGNOSIS — Z90.710 S/P LAPAROSCOPIC HYSTERECTOMY: Primary | ICD-10-CM

## 2024-07-30 DIAGNOSIS — R10.2 PELVIC PAIN: ICD-10-CM

## 2024-07-30 DIAGNOSIS — N94.6 DYSMENORRHEA: ICD-10-CM

## 2024-07-30 DIAGNOSIS — N87.1 CIN II (CERVICAL INTRAEPITHELIAL NEOPLASIA II): ICD-10-CM

## 2024-07-30 PROCEDURE — 25000003 PHARM REV CODE 250: Performed by: STUDENT IN AN ORGANIZED HEALTH CARE EDUCATION/TRAINING PROGRAM

## 2024-07-30 PROCEDURE — 63600175 PHARM REV CODE 636 W HCPCS: Performed by: STUDENT IN AN ORGANIZED HEALTH CARE EDUCATION/TRAINING PROGRAM

## 2024-07-30 PROCEDURE — 36000711: Performed by: OBSTETRICS & GYNECOLOGY

## 2024-07-30 PROCEDURE — 88342 IMHCHEM/IMCYTCHM 1ST ANTB: CPT | Performed by: PATHOLOGY

## 2024-07-30 PROCEDURE — 25000003 PHARM REV CODE 250: Performed by: ANESTHESIOLOGY

## 2024-07-30 PROCEDURE — 27201423 OPTIME MED/SURG SUP & DEVICES STERILE SUPPLY: Performed by: OBSTETRICS & GYNECOLOGY

## 2024-07-30 PROCEDURE — 63600175 PHARM REV CODE 636 W HCPCS: Performed by: ANESTHESIOLOGY

## 2024-07-30 PROCEDURE — 71000039 HC RECOVERY, EACH ADD'L HOUR: Performed by: OBSTETRICS & GYNECOLOGY

## 2024-07-30 PROCEDURE — 37000008 HC ANESTHESIA 1ST 15 MINUTES: Performed by: OBSTETRICS & GYNECOLOGY

## 2024-07-30 PROCEDURE — 94799 UNLISTED PULMONARY SVC/PX: CPT

## 2024-07-30 PROCEDURE — 36000710: Performed by: OBSTETRICS & GYNECOLOGY

## 2024-07-30 PROCEDURE — 25000003 PHARM REV CODE 250: Performed by: OBSTETRICS & GYNECOLOGY

## 2024-07-30 PROCEDURE — 58571 TLH W/T/O 250 G OR LESS: CPT | Mod: ,,, | Performed by: OBSTETRICS & GYNECOLOGY

## 2024-07-30 PROCEDURE — 58571 TLH W/T/O 250 G OR LESS: CPT | Mod: 80,,, | Performed by: OBSTETRICS & GYNECOLOGY

## 2024-07-30 PROCEDURE — C2628 CATHETER, OCCLUSION: HCPCS | Performed by: OBSTETRICS & GYNECOLOGY

## 2024-07-30 PROCEDURE — 71000033 HC RECOVERY, INTIAL HOUR: Performed by: OBSTETRICS & GYNECOLOGY

## 2024-07-30 PROCEDURE — 63600175 PHARM REV CODE 636 W HCPCS: Performed by: OBSTETRICS & GYNECOLOGY

## 2024-07-30 PROCEDURE — 88307 TISSUE EXAM BY PATHOLOGIST: CPT | Performed by: PATHOLOGY

## 2024-07-30 PROCEDURE — 37000009 HC ANESTHESIA EA ADD 15 MINS: Performed by: OBSTETRICS & GYNECOLOGY

## 2024-07-30 RX ORDER — LIDOCAINE HYDROCHLORIDE 10 MG/ML
1 INJECTION, SOLUTION EPIDURAL; INFILTRATION; INTRACAUDAL; PERINEURAL ONCE
Status: DISCONTINUED | OUTPATIENT
Start: 2024-07-30 | End: 2024-07-30 | Stop reason: HOSPADM

## 2024-07-30 RX ORDER — ACETAMINOPHEN 500 MG
1000 TABLET ORAL EVERY 6 HOURS PRN
Status: DISCONTINUED | OUTPATIENT
Start: 2024-07-30 | End: 2024-07-31 | Stop reason: HOSPADM

## 2024-07-30 RX ORDER — LORAZEPAM 2 MG/ML
0.25 INJECTION INTRAMUSCULAR ONCE AS NEEDED
Status: DISCONTINUED | OUTPATIENT
Start: 2024-07-30 | End: 2024-07-30 | Stop reason: HOSPADM

## 2024-07-30 RX ORDER — SODIUM CHLORIDE, SODIUM LACTATE, POTASSIUM CHLORIDE, CALCIUM CHLORIDE 600; 310; 30; 20 MG/100ML; MG/100ML; MG/100ML; MG/100ML
INJECTION, SOLUTION INTRAVENOUS CONTINUOUS
Status: DISCONTINUED | OUTPATIENT
Start: 2024-07-30 | End: 2024-07-30

## 2024-07-30 RX ORDER — ROCURONIUM BROMIDE 10 MG/ML
INJECTION, SOLUTION INTRAVENOUS
Status: DISCONTINUED | OUTPATIENT
Start: 2024-07-30 | End: 2024-07-30

## 2024-07-30 RX ORDER — HEPARIN SODIUM 5000 [USP'U]/ML
5000 INJECTION, SOLUTION INTRAVENOUS; SUBCUTANEOUS ONCE
Status: COMPLETED | OUTPATIENT
Start: 2024-07-30 | End: 2024-07-30

## 2024-07-30 RX ORDER — SCOLOPAMINE TRANSDERMAL SYSTEM 1 MG/1
PATCH, EXTENDED RELEASE TRANSDERMAL
Status: DISCONTINUED | OUTPATIENT
Start: 2024-07-30 | End: 2024-07-30

## 2024-07-30 RX ORDER — KETOROLAC TROMETHAMINE 30 MG/ML
INJECTION, SOLUTION INTRAMUSCULAR; INTRAVENOUS
Status: DISCONTINUED | OUTPATIENT
Start: 2024-07-30 | End: 2024-07-30

## 2024-07-30 RX ORDER — HYDROMORPHONE HYDROCHLORIDE 2 MG/ML
0.2 INJECTION, SOLUTION INTRAMUSCULAR; INTRAVENOUS; SUBCUTANEOUS EVERY 5 MIN PRN
Status: DISCONTINUED | OUTPATIENT
Start: 2024-07-30 | End: 2024-07-30 | Stop reason: HOSPADM

## 2024-07-30 RX ORDER — DIPHENHYDRAMINE HCL 25 MG
25 CAPSULE ORAL EVERY 4 HOURS PRN
Status: DISCONTINUED | OUTPATIENT
Start: 2024-07-30 | End: 2024-07-31 | Stop reason: HOSPADM

## 2024-07-30 RX ORDER — BUPIVACAINE HYDROCHLORIDE 2.5 MG/ML
INJECTION, SOLUTION EPIDURAL; INFILTRATION; INTRACAUDAL
Status: DISCONTINUED | OUTPATIENT
Start: 2024-07-30 | End: 2024-07-30 | Stop reason: HOSPADM

## 2024-07-30 RX ORDER — HYDROMORPHONE HYDROCHLORIDE 2 MG/ML
1 INJECTION, SOLUTION INTRAMUSCULAR; INTRAVENOUS; SUBCUTANEOUS EVERY 6 HOURS PRN
Status: DISCONTINUED | OUTPATIENT
Start: 2024-07-30 | End: 2024-07-31 | Stop reason: HOSPADM

## 2024-07-30 RX ORDER — LIDOCAINE HYDROCHLORIDE 20 MG/ML
INJECTION INTRAVENOUS
Status: DISCONTINUED | OUTPATIENT
Start: 2024-07-30 | End: 2024-07-30

## 2024-07-30 RX ORDER — OXYCODONE HYDROCHLORIDE 5 MG/1
10 TABLET ORAL EVERY 4 HOURS PRN
Status: DISCONTINUED | OUTPATIENT
Start: 2024-07-30 | End: 2024-07-31 | Stop reason: HOSPADM

## 2024-07-30 RX ORDER — DEXAMETHASONE SODIUM PHOSPHATE 4 MG/ML
INJECTION, SOLUTION INTRA-ARTICULAR; INTRALESIONAL; INTRAMUSCULAR; INTRAVENOUS; SOFT TISSUE
Status: DISCONTINUED | OUTPATIENT
Start: 2024-07-30 | End: 2024-07-30

## 2024-07-30 RX ORDER — MUPIROCIN 20 MG/G
OINTMENT TOPICAL
Status: DISCONTINUED | OUTPATIENT
Start: 2024-07-30 | End: 2024-07-30

## 2024-07-30 RX ORDER — DIPHENHYDRAMINE HYDROCHLORIDE 50 MG/ML
25 INJECTION INTRAMUSCULAR; INTRAVENOUS EVERY 4 HOURS PRN
Status: DISCONTINUED | OUTPATIENT
Start: 2024-07-30 | End: 2024-07-30 | Stop reason: SDUPTHER

## 2024-07-30 RX ORDER — SERTRALINE HYDROCHLORIDE 50 MG/1
50 TABLET, FILM COATED ORAL DAILY
Status: DISCONTINUED | OUTPATIENT
Start: 2024-07-31 | End: 2024-07-31 | Stop reason: HOSPADM

## 2024-07-30 RX ORDER — CEFAZOLIN SODIUM 1 G/3ML
INJECTION, POWDER, FOR SOLUTION INTRAMUSCULAR; INTRAVENOUS
Status: DISCONTINUED | OUTPATIENT
Start: 2024-07-30 | End: 2024-07-30

## 2024-07-30 RX ORDER — OXYCODONE HYDROCHLORIDE 5 MG/1
5 TABLET ORAL EVERY 4 HOURS PRN
Status: DISCONTINUED | OUTPATIENT
Start: 2024-07-30 | End: 2024-07-31 | Stop reason: HOSPADM

## 2024-07-30 RX ORDER — BISACODYL 10 MG/1
10 SUPPOSITORY RECTAL DAILY PRN
Status: DISCONTINUED | OUTPATIENT
Start: 2024-07-30 | End: 2024-07-31 | Stop reason: HOSPADM

## 2024-07-30 RX ORDER — ENOXAPARIN SODIUM 100 MG/ML
40 INJECTION SUBCUTANEOUS ONCE
Status: DISCONTINUED | OUTPATIENT
Start: 2024-07-30 | End: 2024-07-30

## 2024-07-30 RX ORDER — PROCHLORPERAZINE EDISYLATE 5 MG/ML
5 INJECTION INTRAMUSCULAR; INTRAVENOUS EVERY 30 MIN PRN
Status: DISCONTINUED | OUTPATIENT
Start: 2024-07-30 | End: 2024-07-30 | Stop reason: HOSPADM

## 2024-07-30 RX ORDER — MEPERIDINE HYDROCHLORIDE 50 MG/ML
12.5 INJECTION INTRAMUSCULAR; INTRAVENOUS; SUBCUTANEOUS EVERY 10 MIN PRN
Status: DISCONTINUED | OUTPATIENT
Start: 2024-07-30 | End: 2024-07-30

## 2024-07-30 RX ORDER — DOCUSATE SODIUM 100 MG/1
100 CAPSULE, LIQUID FILLED ORAL 2 TIMES DAILY
Status: DISCONTINUED | OUTPATIENT
Start: 2024-07-30 | End: 2024-07-31 | Stop reason: HOSPADM

## 2024-07-30 RX ORDER — IBUPROFEN 400 MG/1
800 TABLET ORAL EVERY 8 HOURS PRN
Status: DISCONTINUED | OUTPATIENT
Start: 2024-07-30 | End: 2024-07-31 | Stop reason: HOSPADM

## 2024-07-30 RX ORDER — MUPIROCIN 20 MG/G
OINTMENT TOPICAL 2 TIMES DAILY
Status: DISCONTINUED | OUTPATIENT
Start: 2024-07-30 | End: 2024-07-31 | Stop reason: HOSPADM

## 2024-07-30 RX ORDER — FENTANYL CITRATE 50 UG/ML
INJECTION, SOLUTION INTRAMUSCULAR; INTRAVENOUS
Status: DISCONTINUED | OUTPATIENT
Start: 2024-07-30 | End: 2024-07-30

## 2024-07-30 RX ORDER — ONDANSETRON HYDROCHLORIDE 2 MG/ML
INJECTION, SOLUTION INTRAVENOUS
Status: DISCONTINUED | OUTPATIENT
Start: 2024-07-30 | End: 2024-07-30

## 2024-07-30 RX ORDER — ACETAMINOPHEN 500 MG
1000 TABLET ORAL
Status: COMPLETED | OUTPATIENT
Start: 2024-07-30 | End: 2024-07-30

## 2024-07-30 RX ORDER — ONDANSETRON 4 MG/1
8 TABLET, ORALLY DISINTEGRATING ORAL EVERY 8 HOURS PRN
Status: DISCONTINUED | OUTPATIENT
Start: 2024-07-30 | End: 2024-07-31 | Stop reason: HOSPADM

## 2024-07-30 RX ORDER — ONDANSETRON 4 MG/1
8 TABLET, FILM COATED ORAL EVERY 8 HOURS PRN
Status: DISCONTINUED | OUTPATIENT
Start: 2024-07-30 | End: 2024-07-31 | Stop reason: HOSPADM

## 2024-07-30 RX ORDER — PROPOFOL 10 MG/ML
VIAL (ML) INTRAVENOUS
Status: DISCONTINUED | OUTPATIENT
Start: 2024-07-30 | End: 2024-07-30

## 2024-07-30 RX ORDER — FAMOTIDINE 20 MG/1
20 TABLET, FILM COATED ORAL
Status: COMPLETED | OUTPATIENT
Start: 2024-07-30 | End: 2024-07-30

## 2024-07-30 RX ORDER — DIPHENHYDRAMINE HYDROCHLORIDE 50 MG/ML
25 INJECTION INTRAMUSCULAR; INTRAVENOUS EVERY 6 HOURS PRN
Status: DISCONTINUED | OUTPATIENT
Start: 2024-07-30 | End: 2024-07-30 | Stop reason: HOSPADM

## 2024-07-30 RX ORDER — SIMETHICONE 80 MG
1 TABLET,CHEWABLE ORAL 3 TIMES DAILY PRN
Status: DISCONTINUED | OUTPATIENT
Start: 2024-07-30 | End: 2024-07-31 | Stop reason: HOSPADM

## 2024-07-30 RX ORDER — PHENYLEPHRINE HYDROCHLORIDE 10 MG/ML
INJECTION INTRAVENOUS
Status: DISCONTINUED | OUTPATIENT
Start: 2024-07-30 | End: 2024-07-30

## 2024-07-30 RX ORDER — MIDAZOLAM HYDROCHLORIDE 1 MG/ML
INJECTION INTRAMUSCULAR; INTRAVENOUS
Status: DISCONTINUED | OUTPATIENT
Start: 2024-07-30 | End: 2024-07-30

## 2024-07-30 RX ADMIN — FAMOTIDINE 20 MG: 20 TABLET ORAL at 06:07

## 2024-07-30 RX ADMIN — SODIUM CHLORIDE, POTASSIUM CHLORIDE, SODIUM LACTATE AND CALCIUM CHLORIDE: 600; 310; 30; 20 INJECTION, SOLUTION INTRAVENOUS at 09:07

## 2024-07-30 RX ADMIN — SUGAMMADEX 200 MG: 100 INJECTION, SOLUTION INTRAVENOUS at 08:07

## 2024-07-30 RX ADMIN — OXYCODONE 5 MG: 5 TABLET ORAL at 01:07

## 2024-07-30 RX ADMIN — PROPOFOL 200 MG: 10 INJECTION, EMULSION INTRAVENOUS at 07:07

## 2024-07-30 RX ADMIN — IBUPROFEN 800 MG: 400 TABLET ORAL at 10:07

## 2024-07-30 RX ADMIN — HYDROMORPHONE HYDROCHLORIDE 0.2 MG: 2 INJECTION INTRAMUSCULAR; INTRAVENOUS; SUBCUTANEOUS at 09:07

## 2024-07-30 RX ADMIN — SODIUM CHLORIDE, SODIUM LACTATE, POTASSIUM CHLORIDE, AND CALCIUM CHLORIDE: .6; .31; .03; .02 INJECTION, SOLUTION INTRAVENOUS at 06:07

## 2024-07-30 RX ADMIN — OXYCODONE 10 MG: 5 TABLET ORAL at 09:07

## 2024-07-30 RX ADMIN — SIMETHICONE 80 MG: 80 TABLET, CHEWABLE ORAL at 06:07

## 2024-07-30 RX ADMIN — PHENYLEPHRINE HYDROCHLORIDE 100 MCG: 10 INJECTION INTRAVENOUS at 07:07

## 2024-07-30 RX ADMIN — ROCURONIUM BROMIDE 50 MG: 10 INJECTION INTRAVENOUS at 07:07

## 2024-07-30 RX ADMIN — DOCUSATE SODIUM 100 MG: 100 CAPSULE, LIQUID FILLED ORAL at 08:07

## 2024-07-30 RX ADMIN — IBUPROFEN 800 MG: 400 TABLET ORAL at 02:07

## 2024-07-30 RX ADMIN — LIDOCAINE HYDROCHLORIDE 100 MG: 20 INJECTION, SOLUTION INTRAVENOUS at 07:07

## 2024-07-30 RX ADMIN — FENTANYL CITRATE 100 MCG: 50 INJECTION, SOLUTION INTRAMUSCULAR; INTRAVENOUS at 07:07

## 2024-07-30 RX ADMIN — SCOPALAMINE 1 PATCH: 1 PATCH, EXTENDED RELEASE TRANSDERMAL at 07:07

## 2024-07-30 RX ADMIN — ONDANSETRON HYDROCHLORIDE 8 MG: 4 TABLET, FILM COATED ORAL at 11:07

## 2024-07-30 RX ADMIN — HEPARIN SODIUM 5000 UNITS: 5000 INJECTION INTRAVENOUS; SUBCUTANEOUS at 07:07

## 2024-07-30 RX ADMIN — PROCHLORPERAZINE EDISYLATE 5 MG: 5 INJECTION INTRAMUSCULAR; INTRAVENOUS at 09:07

## 2024-07-30 RX ADMIN — ROCURONIUM BROMIDE 30 MG: 10 INJECTION INTRAVENOUS at 08:07

## 2024-07-30 RX ADMIN — ONDANSETRON 4 MG: 2 INJECTION, SOLUTION INTRAMUSCULAR; INTRAVENOUS at 08:07

## 2024-07-30 RX ADMIN — PHENYLEPHRINE HYDROCHLORIDE 50 MCG: 10 INJECTION INTRAVENOUS at 08:07

## 2024-07-30 RX ADMIN — PHENYLEPHRINE HYDROCHLORIDE 50 MCG: 10 INJECTION INTRAVENOUS at 07:07

## 2024-07-30 RX ADMIN — MIDAZOLAM HYDROCHLORIDE 2 MG: 1 INJECTION INTRAMUSCULAR; INTRAVENOUS at 07:07

## 2024-07-30 RX ADMIN — OXYCODONE 10 MG: 5 TABLET ORAL at 04:07

## 2024-07-30 RX ADMIN — ACETAMINOPHEN 1000 MG: 500 TABLET ORAL at 06:07

## 2024-07-30 RX ADMIN — KETOROLAC TROMETHAMINE 30 MG: 30 INJECTION, SOLUTION INTRAMUSCULAR at 08:07

## 2024-07-30 RX ADMIN — CEFAZOLIN SODIUM 2 G: 1 POWDER, FOR SOLUTION INTRAMUSCULAR; INTRAVENOUS at 07:07

## 2024-07-30 RX ADMIN — ACETAMINOPHEN 1000 MG: 500 TABLET ORAL at 08:07

## 2024-07-30 RX ADMIN — DEXAMETHASONE SODIUM PHOSPHATE 4 MG: 4 INJECTION, SOLUTION INTRAMUSCULAR; INTRAVENOUS at 07:07

## 2024-07-30 RX ADMIN — ACETAMINOPHEN 1000 MG: 500 TABLET ORAL at 02:07

## 2024-07-30 RX ADMIN — FENTANYL CITRATE 50 MCG: 50 INJECTION, SOLUTION INTRAMUSCULAR; INTRAVENOUS at 08:07

## 2024-07-30 RX ADMIN — MUPIROCIN: 20 OINTMENT TOPICAL at 08:07

## 2024-07-30 NOTE — OP NOTE
Date of Procedure: 7/30/2024     Procedure: Procedure(s) (LRB):  HYSTERECTOMY, TOTAL LAPAROSCOPIC, BILATERAL SALPINGECTOMY,CYSTOSCOPY (N/A)       Surgeons and Role:     * Brenda Rios MD - Primary      Assistant: Janessa Jefferson MD  (A qualified resident not available at the time of surgery.)     Pre-Operative Diagnosis: NATALIE II (cervical intraepithelial neoplasia II) [N87.1]  Dysmenorrhea [N94.6]  Pelvic pain [R10.2]    Post-Operative Diagnosis: Post-Op Diagnosis Codes:     * NATALIE II (cervical intraepithelial neoplasia II) [N87.1]     * Dysmenorrhea [N94.6]     * Pelvic pain [R10.2]    Anesthesia: General    Technical Procedures Used:   The patient was taken to the operating room and placed first in the supine position. Calf high pneumatic compression boots were placed for DVT prophylaxis. She received 2 gms of Cefazolin IV as antibiotic prophylaxis. She then underwent uneventful general endotracheal anesthesia. She was placed in the semi-dorsolithotomy position using padded Daniel stirrups. She was then prepped and draped in the usual sterile fashion. A surgical time-out was performed.     A Glynn catheter was placed in the usual fashion. The cervix was visualized and grasped with a tenaculum for retraction.  Using 0 Vicryl suture, 2 stay sutures were placed at 3 o'clock and 9 o'clock on the cervix.  The uterus was sounded to 8 cm.  The BEE uterine manipulator with a KOH cup was then inserted without difficulty.    Gloves were changed and attention was turned to the abdomen.     All trocar sites were pretreated with 0.25% plain Marcaine. A 10 mm incision was made in the base of the umbilicus and grasped with towel clips and the Veress needle was passed through the base of the incision into the abdomen with an opening pressure of 5 mmHG. The abdomen was insufflated to a pressure of 15, the Veress needle was removed, and a 11 mm trocar was placed under optical guidance. There was no evidence of injury below the  level of insertion. The upper abdomen was inspected and was noted to be normal. The pelvis was inspected with the below findings. A 5 mm right lateral and 5 mm left lateral trocar, each lateral to their respective epigastric vessels in the lower quadrants were placed under direct visualization.     Both fallopian tubes were identified and were normal.  Both ovaries were normal.  Cul-de-sac was normal.  The uterus appeared normal.       The procedure was performed with a Ligasure and Harmonic Hook.    Attention was turned to the right-hand side.  Using the Ligasure, the right fallopian tube was  from the underlying ovary by transecting the mesosalpinx.   The right utero-ovarian ligament was cauterized and transected to the level of the round ligament.  The round ligament was cauterized and transected.  The anterior and posterior leaves of the broad ligament were dissected open. The bladder was dissected down below the level of the KOH cup anteriorly.     The uterine vessels were then skeletonized, cauterized, transected and lateralized.       The same procedures were repeated on the left hand side. The harmonic hook was then used to make an anterior colpotomy. Using the KOH cup as a guide, a circumferential incision was made around the cervico-uterine junction to free the specimen. The specimen was then delivered through the vagina and intact.       The vagina was then closed laparoscopically using the Endostitch and a V-lock suture in a running fashion.       Irrigation was performed and there was noted to be excellent hemostasis on low pressure.     A limited diagnostic cystoscopy was then performed. The Glynn was removed and a 30 degree cystoscope was placed. 200 cc of sterile saline were instilled into the bladder. The dome of the bladder was intact-there was no suture material visible. Both ureteral orifices were visualized and strong jets of urine were noted bilaterally. The cystoscope was then removed  and the bladder emptied of saline.      Gloves were changed and attention was then turned the abdomen to close the port incisions.  The abdomen was desufflated and ports were removed.  The fascia of the umbilical port incision was closed with 0-Vicryl suture.  The skin of the port incisions was closed with 4-0 Monocryl and Dermabond.    The patient tolerated the procedure well.      Sponge, lap, and needle count was correct     The patient was taken to the recover room in stable condition.        Description of the Findings of the Procedure:   - - Normal uterus, fallopian tubes, and ovaries  - No suture material in the bladder  - strong jets of urine from both ureteral orifices were noted bilaterally     Complications: No    Estimated Blood Loss (EBL): 25 mL           Implants: * No implants in log *    Specimens:   Specimen (24h ago, onward)       Start     Ordered    07/30/24 0835  Specimen to Pathology, Surgery Gynecology and Obstetrics  Once        Comments: Pre-op Diagnosis: NATALIE II (cervical intraepithelial neoplasia II) [N87.1]Dysmenorrhea [N94.6]Pelvic pain [R10.2]Procedure(s):HYSTERECTOMY, TOTAL LAPAROSCOPIC, BILATERAL SALPINGECTOMY,CYSTOSCOPY Number of specimens: 1Name of specimens: CERVIX, UTERUS, AND BILATERAL TUBES     References:    Click here for ordering Quick Tip   Question Answer Comment   Procedure Type: Gynecology and Obstetrics    Specimen Class: Routine/Screening    Release to patient Immediate        07/30/24 0835                            Condition: Good    Disposition: PACU - hemodynamically stable.    Attestation: I was present and scrubbed for the entire procedure.

## 2024-07-30 NOTE — ANESTHESIA PROCEDURE NOTES
Intubation    Date/Time: 7/30/2024 7:33 AM    Performed by: Michell Coronel CRNA  Authorized by: Justino Urias II, MD    Intubation:     Induction:  Intravenous    Intubated:  Postinduction    Mask Ventilation:  Easy mask    Attempts:  1    Attempted By:  Student (TOBIAS Hathaway)    Method of Intubation:  Video laryngoscopy    Blade:  Little 3    Laryngeal View Grade: Grade I - full view of cords      Difficult Airway Encountered?: No      Complications:  None    Airway Device:  Oral endotracheal tube    Airway Device Size:  7.0    Style/Cuff Inflation:  Cuffed (inflated to minimal occlusive pressure)    Tube secured:  21    Secured at:  The lips    Placement Verified By:  Capnometry    Complicating Factors:  None    Findings Post-Intubation:  BS equal bilateral and atraumatic/condition of teeth unchanged

## 2024-07-30 NOTE — ANESTHESIA PREPROCEDURE EVALUATION
07/30/2024  Snow Barney is a 38 y.o., female.      Pre-op Assessment    I have reviewed the Patient Summary Reports.     I have reviewed the Nursing Notes. I have reviewed the NPO Status.   I have reviewed the Medications.     Review of Systems  Anesthesia Hx:  No problems with previous Anesthesia                Social:  Non-Smoker, No Alcohol Use       Hematology/Oncology:    Oncology Normal                                   EENT/Dental:  EENT/Dental Normal           Cardiovascular:  Cardiovascular Normal                                            Pulmonary:    Asthma    Sleep Apnea                Renal/:  Renal/ Normal                 Hepatic/GI:    Hiatal Hernia, GERD             Neurological:      Headaches                                 Endocrine:  Endocrine Normal            Psych:   anxiety depression                Physical Exam  General: Well nourished, Cooperative, Alert and Oriented    Airway:  Mallampati: II / II  Mouth Opening: Normal  TM Distance: Normal  Tongue: Normal  Neck ROM: Normal ROM    Chest/Lungs:  Clear to auscultation, Normal Respiratory Rate    Heart:  Rate: Normal  Rhythm: Regular Rhythm  Sounds: Normal        Anesthesia Plan  Type of Anesthesia, risks & benefits discussed:    Anesthesia Type: Gen ETT  Intra-op Monitoring Plan: Standard ASA Monitors  Post Op Pain Control Plan: multimodal analgesia  Induction:  IV  Airway Plan: Direct and Video, Post-Induction  Informed Consent: Informed consent signed with the Patient and all parties understand the risks and agree with anesthesia plan.  All questions answered.   ASA Score: 2  Day of Surgery Review of History & Physical: H&P Update referred to the surgeon/provider.    Ready For Surgery From Anesthesia Perspective.     .

## 2024-07-30 NOTE — TRANSFER OF CARE
Anesthesia Transfer of Care Note    Patient: Snow Barney    Procedure(s) Performed: Procedure(s) (LRB):  HYSTERECTOMY, TOTAL LAPAROSCOPIC, BILATERAL SALPINGECTOMY,CYSTOSCOPY (N/A)    Patient location: PACU    Anesthesia Type: general    Transport from OR: Transported from OR on 6-10 L/min O2 by face mask with adequate spontaneous ventilation    Post pain: adequate analgesia    Post assessment: no apparent anesthetic complications and tolerated procedure well    Post vital signs: stable    Level of consciousness: awake and alert    Nausea/Vomiting: no nausea/vomiting    Complications: none    Transfer of care protocol was followed      Last vitals: Visit Vitals  /61 (BP Location: Left arm, Patient Position: Lying)   Pulse 66   Temp 36.6 °C (97.8 °F)   Resp 17   Wt 77.6 kg (171 lb 2.4 oz)   SpO2 100%   Breastfeeding No   BMI 26.81 kg/m²

## 2024-07-30 NOTE — PLAN OF CARE
VSS. Afebrile  Ambulating in without difficulty.   Lap sites clean dry and intact.   Voiding without difficulty.   Pain controlled with PRN meds.  Scant amount of vaginal bleeding present.   Plan of care reviewed with patient. All questions answered.

## 2024-07-30 NOTE — BRIEF OP NOTE
[facility] - Surgery  OBGYN  Brief Operative Note    SUMMARY     Date of Procedure: 7/30/2024     Procedure: Procedure(s) (LRB):  HYSTERECTOMY, TOTAL LAPAROSCOPIC, BILATERAL SALPINGECTOMY,CYSTOSCOPY (N/A)       Surgeons and Role:     * Brenda Rios MD - Primary      Assistant: Janessa Jefferson MD  (A qualified resident not available at the time of surgery.)     Pre-Operative Diagnosis: NATALIE II (cervical intraepithelial neoplasia II) [N87.1]  Dysmenorrhea [N94.6]  Pelvic pain [R10.2]    Post-Operative Diagnosis: Post-Op Diagnosis Codes:     * NATALIE II (cervical intraepithelial neoplasia II) [N87.1]     * Dysmenorrhea [N94.6]     * Pelvic pain [R10.2]    Anesthesia: General    Description of the Findings of the Procedure:   - - Normal uterus, fallopian tubes, and ovaries  - No suture material in the bladder  - strong jets of urine from both ureteral orifices were noted bilaterally     Complications: No    Estimated Blood Loss (EBL): 25 mL           Implants: * No implants in log *    Specimens:   Specimen (24h ago, onward)       Start     Ordered    07/30/24 0835  Specimen to Pathology, Surgery Gynecology and Obstetrics  Once        Comments: Pre-op Diagnosis: NATALIE II (cervical intraepithelial neoplasia II) [N87.1]Dysmenorrhea [N94.6]Pelvic pain [R10.2]Procedure(s):HYSTERECTOMY, TOTAL LAPAROSCOPIC, BILATERAL SALPINGECTOMY,CYSTOSCOPY Number of specimens: 1Name of specimens: CERVIX, UTERUS, AND BILATERAL TUBES     References:    Click here for ordering Quick Tip   Question Answer Comment   Procedure Type: Gynecology and Obstetrics    Specimen Class: Routine/Screening    Release to patient Immediate        07/30/24 0835                            Condition: Good    Disposition: PACU - hemodynamically stable.    Attestation: I was present and scrubbed for the entire procedure.

## 2024-07-31 ENCOUNTER — PATIENT MESSAGE (OUTPATIENT)
Dept: OBSTETRICS AND GYNECOLOGY | Facility: CLINIC | Age: 39
End: 2024-07-31
Payer: COMMERCIAL

## 2024-07-31 ENCOUNTER — NURSE TRIAGE (OUTPATIENT)
Dept: ADMINISTRATIVE | Facility: CLINIC | Age: 39
End: 2024-07-31
Payer: COMMERCIAL

## 2024-07-31 VITALS
BODY MASS INDEX: 26.81 KG/M2 | DIASTOLIC BLOOD PRESSURE: 58 MMHG | OXYGEN SATURATION: 97 % | RESPIRATION RATE: 18 BRPM | WEIGHT: 171.13 LBS | SYSTOLIC BLOOD PRESSURE: 110 MMHG | TEMPERATURE: 98 F | HEART RATE: 62 BPM

## 2024-07-31 PROBLEM — N94.6 DYSMENORRHEA: Status: RESOLVED | Noted: 2024-07-30 | Resolved: 2024-07-31

## 2024-07-31 PROBLEM — R10.2 PELVIC PAIN: Status: RESOLVED | Noted: 2024-07-30 | Resolved: 2024-07-31

## 2024-07-31 LAB
BASOPHILS # BLD AUTO: 0.04 K/UL (ref 0–0.2)
BASOPHILS NFR BLD: 0.5 % (ref 0–1.9)
DIFFERENTIAL METHOD BLD: ABNORMAL
EOSINOPHIL # BLD AUTO: 0.1 K/UL (ref 0–0.5)
EOSINOPHIL NFR BLD: 0.9 % (ref 0–8)
ERYTHROCYTE [DISTWIDTH] IN BLOOD BY AUTOMATED COUNT: 13.1 % (ref 11.5–14.5)
HCT VFR BLD AUTO: 35.4 % (ref 37–48.5)
HGB BLD-MCNC: 11 G/DL (ref 12–16)
IMM GRANULOCYTES # BLD AUTO: 0.02 K/UL (ref 0–0.04)
IMM GRANULOCYTES NFR BLD AUTO: 0.2 % (ref 0–0.5)
LYMPHOCYTES # BLD AUTO: 2.4 K/UL (ref 1–4.8)
LYMPHOCYTES NFR BLD: 29.7 % (ref 18–48)
MCH RBC QN AUTO: 28.9 PG (ref 27–31)
MCHC RBC AUTO-ENTMCNC: 31.1 G/DL (ref 32–36)
MCV RBC AUTO: 93 FL (ref 82–98)
MONOCYTES # BLD AUTO: 0.5 K/UL (ref 0.3–1)
MONOCYTES NFR BLD: 5.9 % (ref 4–15)
NEUTROPHILS # BLD AUTO: 5.1 K/UL (ref 1.8–7.7)
NEUTROPHILS NFR BLD: 62.8 % (ref 38–73)
NRBC BLD-RTO: 0 /100 WBC
PLATELET # BLD AUTO: 180 K/UL (ref 150–450)
PMV BLD AUTO: 11.1 FL (ref 9.2–12.9)
RBC # BLD AUTO: 3.81 M/UL (ref 4–5.4)
WBC # BLD AUTO: 8.12 K/UL (ref 3.9–12.7)

## 2024-07-31 PROCEDURE — 63600175 PHARM REV CODE 636 W HCPCS: Performed by: OBSTETRICS & GYNECOLOGY

## 2024-07-31 PROCEDURE — 85025 COMPLETE CBC W/AUTO DIFF WBC: CPT | Performed by: OBSTETRICS & GYNECOLOGY

## 2024-07-31 PROCEDURE — 99900035 HC TECH TIME PER 15 MIN (STAT)

## 2024-07-31 PROCEDURE — 36415 COLL VENOUS BLD VENIPUNCTURE: CPT | Performed by: OBSTETRICS & GYNECOLOGY

## 2024-07-31 PROCEDURE — 25000003 PHARM REV CODE 250: Performed by: OBSTETRICS & GYNECOLOGY

## 2024-07-31 RX ORDER — DOCUSATE SODIUM 100 MG/1
100 CAPSULE, LIQUID FILLED ORAL 2 TIMES DAILY
Qty: 60 CAPSULE | Refills: 1 | Status: SHIPPED | OUTPATIENT
Start: 2024-07-31 | End: 2025-07-31

## 2024-07-31 RX ORDER — IBUPROFEN 800 MG/1
800 TABLET ORAL EVERY 8 HOURS PRN
Qty: 60 TABLET | Refills: 2 | Status: SHIPPED | OUTPATIENT
Start: 2024-07-31 | End: 2025-07-31

## 2024-07-31 RX ORDER — OXYCODONE HYDROCHLORIDE 5 MG/1
5 TABLET ORAL EVERY 4 HOURS PRN
Qty: 20 TABLET | Refills: 0 | Status: SHIPPED | OUTPATIENT
Start: 2024-07-31

## 2024-07-31 RX ORDER — ENOXAPARIN SODIUM 100 MG/ML
40 INJECTION SUBCUTANEOUS ONCE
Status: COMPLETED | OUTPATIENT
Start: 2024-07-31 | End: 2024-07-31

## 2024-07-31 RX ORDER — PANTOPRAZOLE SODIUM 40 MG/1
40 TABLET, DELAYED RELEASE ORAL ONCE
Status: COMPLETED | OUTPATIENT
Start: 2024-07-31 | End: 2024-07-31

## 2024-07-31 RX ORDER — ACETAMINOPHEN 500 MG
1000 TABLET ORAL EVERY 6 HOURS PRN
Qty: 100 TABLET | Refills: 2 | Status: SHIPPED | OUTPATIENT
Start: 2024-07-31 | End: 2025-07-31

## 2024-07-31 RX ADMIN — PANTOPRAZOLE SODIUM 40 MG: 40 TABLET, DELAYED RELEASE ORAL at 09:07

## 2024-07-31 RX ADMIN — SIMETHICONE 80 MG: 80 TABLET, CHEWABLE ORAL at 07:07

## 2024-07-31 RX ADMIN — DOCUSATE SODIUM 100 MG: 100 CAPSULE, LIQUID FILLED ORAL at 08:07

## 2024-07-31 RX ADMIN — ENOXAPARIN SODIUM 40 MG: 40 INJECTION SUBCUTANEOUS at 09:07

## 2024-07-31 RX ADMIN — OXYCODONE 10 MG: 5 TABLET ORAL at 08:07

## 2024-07-31 RX ADMIN — OXYCODONE 5 MG: 5 TABLET ORAL at 02:07

## 2024-07-31 RX ADMIN — ACETAMINOPHEN 1000 MG: 500 TABLET ORAL at 08:07

## 2024-07-31 RX ADMIN — SERTRALINE HYDROCHLORIDE 50 MG: 50 TABLET ORAL at 07:07

## 2024-07-31 RX ADMIN — MUPIROCIN: 20 OINTMENT TOPICAL at 08:07

## 2024-07-31 RX ADMIN — ACETAMINOPHEN 1000 MG: 500 TABLET ORAL at 02:07

## 2024-07-31 RX ADMIN — IBUPROFEN 800 MG: 400 TABLET ORAL at 08:07

## 2024-07-31 NOTE — DISCHARGE INSTRUCTIONS
Take showers, not baths until instructed by your doctor.   For the next 5 days, continue to use Hibiclens solution when you shower. Always use a clean towel when drying incision.  Incision dressing should be removed 7 days after surgery. If dressing begins to peel up prior to this day, gently remove.    No tampons, douching or sexual intercourse for next 6 weeks or until ok with doctor  No driving for 2 weeks   Do not lift anything heavier than 10 pounds for next 6 weeks  Walking is only form of exercise allowed for next 6 weeks unless instructed otherwise by your doctor    Notify your doctor if you have:  -pain not relieved by your pain medication  -unexplained swelling of arms or legs  -uncontrolled nausea/vomiting  -redness, swelling, or drainage from incision  -fever of 100.4* or higher  -sudden severe pain in arms, back, or legs  -heavy vaginal bleeding

## 2024-07-31 NOTE — DISCHARGE SUMMARY
[facility]  Obstetrics & Gynecology  Discharge Summary    Patient Name: Snow Barney  MRN: [id number]  Admission Date: 7/30/2024  Hospital Length of Stay: 0 days  Discharge Date and Time:  07/31/2024 8:45 AM  Attending Physician: Brenda Rios MD   Discharging Provider: Brenda Rios MD  Primary Care Provider: Marcelo Joseph MD    HPI: Patient admitted for scheduled total laparoscopic hysterectomy with bilateral salpingectomy.      Hospital Course: Routine post operative course.     Procedure(s) (LRB):  HYSTERECTOMY, TOTAL LAPAROSCOPIC, BILATERAL SALPINGECTOMY,CYSTOSCOPY (N/A)     Consults:     Significant Diagnostic Studies: Labs: CBC   Recent Labs   Lab 07/31/24  0557   WBC 8.12   HGB 11.0*   HCT 35.4*          Pending Diagnostic Studies:       Procedure Component Value Units Date/Time    Specimen to Pathology, Surgery Gynecology and Obstetrics [[id number]] Collected: 07/30/24 0835    Order Status: Sent Lab Status: In process Updated: 07/30/24 1027    Specimen: Tissue           Final Active Diagnoses:    Diagnosis Date Noted POA    PRINCIPAL PROBLEM:  S/P laparoscopic hysterectomy [Z90.710] 07/30/2024 Yes    NATALIE II (cervical intraepithelial neoplasia II) [N87.1] 07/30/2024 Yes      Problems Resolved During this Admission:    Diagnosis Date Noted Date Resolved POA    Dysmenorrhea [N94.6] 07/30/2024 07/31/2024 Yes    Pelvic pain [R10.2] 07/30/2024 07/31/2024 Yes        Discharged Condition: good    Disposition: Home or Self Care    Follow Up:   Follow-up Information       Brenda Rios MD. Go in 2 week(s).    Specialty: Obstetrics and Gynecology  Why: For wound re-check  Contact information:  [address]  [address] [place] [id number] [phone]                           Patient Instructions:      Diet Adult Regular     Lifting restrictions     No driving until:   Order Comments: No longer taking narcotics     Pelvic Rest     No dressing needed     Leave dressing on - Keep it clean,  dry, and intact until clinic visit     Notify your health care provider if you experience any of the following:  temperature >100.4     Notify your health care provider if you experience any of the following:  persistent nausea and vomiting or diarrhea     Notify your health care provider if you experience any of the following:  severe uncontrolled pain     Notify your health care provider if you experience any of the following:  redness, tenderness, or signs of infection (pain, swelling, redness, odor or green/yellow discharge around incision site)     Notify your health care provider if you experience any of the following:  severe persistent headache     Notify your health care provider if you experience any of the following:  worsening rash     Notify your health care provider if you experience any of the following:  persistent dizziness, light-headedness, or visual disturbances     Notify your health care provider if you experience any of the following:  increased confusion or weakness     Medications:  Reconciled Home Medications:      Medication List        START taking these medications      acetaminophen 500 MG tablet  Commonly known as: TYLENOL EXTRA STRENGTH  Take 2 tablets (1,000 mg total) by mouth every 6 (six) hours as needed for Pain.     docusate sodium 100 MG capsule  Commonly known as: COLACE  Take 1 capsule (100 mg total) by mouth 2 (two) times daily.     ibuprofen 800 MG tablet  Commonly known as: ADVIL,MOTRIN  Take 1 tablet (800 mg total) by mouth every 8 (eight) hours as needed for Pain.            CONTINUE taking these medications      * albuterol 90 mcg/actuation inhaler  Commonly known as: PROVENTIL/VENTOLIN HFA  Inhale 2 puffs into the lungs every 4 (four) hours as needed.     * albuterol 90 mcg/actuation inhaler  Commonly known as: PROVENTIL/VENTOLIN HFA  INHALE 2 PUFFS INTO THE LUNGS EVERY 4 HOURS AS NEEDED FOR WHEEZING OR SHORTNESS OF BREATH     dextroamphetamine-amphetamine 5 mg  Tab  Commonly known as: AdderalL  Take 5 mg by mouth 2 (two) times daily as needed (add).     EPINEPHrine 0.3 mg/0.3 mL Atin  Commonly known as: EPIPEN  Inject 0.3 mLs (0.3 mg total) into the muscle once. for 1 dose     linaCLOtide 72 mcg Cap capsule  Commonly known as: LINZESS  Take 1 capsule (72 mcg total) by mouth before breakfast.     LORazepam 1 MG tablet  Commonly known as: ATIVAN  TAKE 1 TABLET BY MOUTH ONCE DAILY AS NEEDED FOR ANXIETY     multivitamin capsule  Take 1 capsule by mouth.     ondansetron 4 MG Tbdl  Commonly known as: ZOFRAN-ODT  DISSOLVE 1 TABLET BY MOUTH EVERY 6 HOURS AS NEEDED FOR NAUSEA OR VOMITING     oxyCODONE 5 MG immediate release tablet  Commonly known as: ROXICODONE  Take 1 tablet (5 mg total) by mouth every 4 (four) hours as needed for Pain.     pantoprazole 40 MG tablet  Commonly known as: PROTONIX  Take 1 tablet (40 mg total) by mouth once daily.     semaglutide (weight loss) 2.4 mg/0.75 mL Pnij  Coumpounded version, 1.2 mL injection for 2.4 mg SC weekly dose     sertraline 50 MG tablet  Commonly known as: ZOLOFT  TAKE ONE TABLET BY MOUTH EVERY DAY AS DIRECTED     * traZODone 50 MG tablet  Commonly known as: DESYREL  TAKE 1 TABLET BY MOUTH EVERY DAY     * traZODone 50 MG tablet  Commonly known as: DESYREL  Take 1 tablet every day by oral route for 30 days.     VITAMIN D2 1,250 mcg (50,000 unit) capsule  Generic drug: ergocalciferol  Take 1 capsule (50,000 Units total) by mouth every 7 days.           * This list has 4 medication(s) that are the same as other medications prescribed for you. Read the directions carefully, and ask your doctor or other care provider to review them with you.                  Brenda Rios MD  Obstetrics & Gynecology  [facility] [place] [facility]

## 2024-07-31 NOTE — ANESTHESIA POSTPROCEDURE EVALUATION
Anesthesia Post Evaluation    Patient: Snow Barney    Procedure(s) Performed: Procedure(s) (LRB):  HYSTERECTOMY,TOTAL,LAPAROSCOPIC,WITH SALPINGECTOMY (N/A)    Final Anesthesia Type: general      Patient location during evaluation: PACU  Patient participation: Yes- Able to Participate  Level of consciousness: awake and alert  Post-procedure vital signs: reviewed and stable  Pain management: adequate  Airway patency: patent    PONV status at discharge: No PONV  Anesthetic complications: no      Respiratory status: spontaneous ventilation and room air  Hydration status: euvolemic  Follow-up not needed.              Vitals Value Taken Time   /58 07/31/24 0748   Temp 36.8 °C (98.3 °F) 07/31/24 0748   Pulse 62 07/31/24 0748   Resp 18 07/31/24 0813   SpO2 97 % 07/31/24 0748         Event Time   Out of Recovery 10:31:00         Pain/Chaya Score: Pain Rating Prior to Med Admin: 7 (7/31/2024  8:14 AM)  Pain Rating Post Med Admin: 0 (7/31/2024  9:13 AM)  Chaya Score: 9 (7/30/2024 10:00 AM)

## 2024-08-01 NOTE — TELEPHONE ENCOUNTER
"Pt reports she had a hysterectomy yesterday, tonight having pains in her chest and she can't take a deep breath. Pt states she is not sure if it is just gas pains, but does have a history of having a pulmonary embolis before. Pt states she feels like she needs to cough something up, but can't because she can't take a deep breath. Pt advised to go to the ED now per protocol, pt states if she doesn't feel better soon she will go. Pt encouraged to call back with any worsening symptoms or questions. She verbalized understanding.    Reason for Disposition   History of prior "blood clot" in leg or lungs (i.e., deep vein thrombosis, pulmonary embolism)    Additional Information   Negative: Sounds like a life-threatening emergency to the triager   Difficulty breathing   Negative: SEVERE difficulty breathing (e.g., struggling for each breath, speaks in single words)   Negative: [1] Breathing stopped AND [2] hasn't returned   Negative: Choking on something   Negative: Bluish (or gray) lips or face now   Negative: Difficult to awaken or acting confused (e.g., disoriented, slurred speech)   Negative: Passed out (e.g., fainted, lost consciousness, blacked out and was not responding)   Negative: Wheezing started suddenly after medicine, an allergic food or bee sting   Negative: Stridor (harsh sound while breathing in)   Negative: Slow, shallow and weak breathing   Negative: Sounds like a life-threatening emergency to the triager   Negative: Oxygen level (e.g., pulse oximetry) 90% or lower   Negative: [1] MODERATE difficulty breathing (e.g., speaks in phrases, SOB even at rest, pulse 100-120) AND [2] NEW-onset or WORSE than normal   Negative: Wheezing can be heard across the room   Negative: Drooling or spitting out saliva (because can't swallow)    Protocols used: Post-Op Symptoms and Dalsfpood-G-JN, Breathing Difficulty-A-AH    "

## 2024-08-03 ENCOUNTER — PATIENT MESSAGE (OUTPATIENT)
Dept: FAMILY MEDICINE | Facility: CLINIC | Age: 39
End: 2024-08-03
Payer: COMMERCIAL

## 2024-08-05 LAB
COMMENT: NORMAL
FINAL PATHOLOGIC DIAGNOSIS: NORMAL
GROSS: NORMAL
Lab: NORMAL

## 2024-08-07 ENCOUNTER — PATIENT MESSAGE (OUTPATIENT)
Dept: OBSTETRICS AND GYNECOLOGY | Facility: CLINIC | Age: 39
End: 2024-08-07
Payer: COMMERCIAL

## 2024-08-08 DIAGNOSIS — K59.04 CHRONIC IDIOPATHIC CONSTIPATION: ICD-10-CM

## 2024-08-08 DIAGNOSIS — F98.8 ATTENTION DEFICIT DISORDER, UNSPECIFIED HYPERACTIVITY PRESENCE: ICD-10-CM

## 2024-08-08 RX ORDER — DEXTROAMPHETAMINE SACCHARATE, AMPHETAMINE ASPARTATE, DEXTROAMPHETAMINE SULFATE AND AMPHETAMINE SULFATE 1.25; 1.25; 1.25; 1.25 MG/1; MG/1; MG/1; MG/1
1 TABLET ORAL 2 TIMES DAILY PRN
Qty: 60 TABLET | Refills: 0 | Status: SHIPPED | OUTPATIENT
Start: 2024-08-08

## 2024-08-14 ENCOUNTER — OFFICE VISIT (OUTPATIENT)
Dept: OBSTETRICS AND GYNECOLOGY | Facility: CLINIC | Age: 39
End: 2024-08-14
Payer: COMMERCIAL

## 2024-08-14 VITALS
DIASTOLIC BLOOD PRESSURE: 80 MMHG | BODY MASS INDEX: 28.24 KG/M2 | SYSTOLIC BLOOD PRESSURE: 122 MMHG | WEIGHT: 180.31 LBS

## 2024-08-14 DIAGNOSIS — Z90.710 S/P LAPAROSCOPIC HYSTERECTOMY: Primary | ICD-10-CM

## 2024-08-14 PROCEDURE — 3079F DIAST BP 80-89 MM HG: CPT | Mod: CPTII,S$GLB,, | Performed by: OBSTETRICS & GYNECOLOGY

## 2024-08-14 PROCEDURE — 99999 PR PBB SHADOW E&M-EST. PATIENT-LVL III: CPT | Mod: PBBFAC,,, | Performed by: OBSTETRICS & GYNECOLOGY

## 2024-08-14 PROCEDURE — 99024 POSTOP FOLLOW-UP VISIT: CPT | Mod: S$GLB,,, | Performed by: OBSTETRICS & GYNECOLOGY

## 2024-08-14 PROCEDURE — 1159F MED LIST DOCD IN RCRD: CPT | Mod: CPTII,S$GLB,, | Performed by: OBSTETRICS & GYNECOLOGY

## 2024-08-14 PROCEDURE — 3074F SYST BP LT 130 MM HG: CPT | Mod: CPTII,S$GLB,, | Performed by: OBSTETRICS & GYNECOLOGY

## 2024-08-14 NOTE — PROGRESS NOTES
Subjective     Patient ID: Snow Barney is a 38 y.o. female.    Chief Complaint:  Post-op Evaluation      History of Present Illness  HPI  Postoperative Follow-up  Patient presents to the clinic 2 weeks status post total laparoscopic hysterectomy for abnormal uterine bleeding and NATALIE II . Eating a regular diet without difficulty. Bowel movements are normal. The patient is not having any pain.    GYN & OB History  Patient's last menstrual period was 2024.   Date of Last Pap: 2024    OB History    Para Term  AB Living   7 4 3 1 3 3   SAB IAB Ectopic Multiple Live Births                  # Outcome Date GA Lbr Benjamin/2nd Weight Sex Type Anes PTL Lv   7 Term      Vag-Spont      6 Term      Vag-Spont      5 Term      Vag-Spont      4 AB            3 AB            2 AB            1                Obstetric Comments   GYN Hx: Menarche at 13   History of abnormal pap with colposcopy   History of chlamydia.       Review of Systems  Review of Systems       Objective   Physical Exam:   Constitutional: She is oriented to person, place, and time. She appears well-developed.    HENT:   Head: Normocephalic and atraumatic.    Eyes: EOM are normal.     Cardiovascular:  Normal rate.             Pulmonary/Chest: Effort normal.        Abdominal: Soft. There is no abdominal tenderness.             Musculoskeletal: Normal range of motion.       Neurological: She is oriented to person, place, and time.    Skin: Skin is warm.    Psychiatric: She has a normal mood and affect.            Assessment and Plan     1. S/P laparoscopic hysterectomy             Plan:  - Continue pelvic rest.  - follow up in 4 wks.

## 2024-08-17 ENCOUNTER — PATIENT MESSAGE (OUTPATIENT)
Dept: FAMILY MEDICINE | Facility: CLINIC | Age: 39
End: 2024-08-17
Payer: COMMERCIAL

## 2024-08-17 NOTE — TELEPHONE ENCOUNTER
No care due was identified.  [facility] Embedded Care Due Messages. Reference number: [id number].   8/17/2024 12:53:39 PM CDT

## 2024-08-18 RX ORDER — SERTRALINE HYDROCHLORIDE 50 MG/1
50 TABLET, FILM COATED ORAL
Qty: 90 TABLET | Refills: 0 | Status: SHIPPED | OUTPATIENT
Start: 2024-08-18

## 2024-08-18 NOTE — TELEPHONE ENCOUNTER
Refill Routing Note   Medication(s) are not appropriate for processing by [facility] for the following reason(s):        No active prescription written by provider  New or recently adjusted medication    [facility] action(s):  Defer               Appointments  past 12m or future 3m with PCP    Date Provider   Last Visit   6/5/2024 Marcelo Joseph MD   Next Visit   9/7/2024 Marcelo Joseph MD   ED visits in past 90 days: 0        Note composed:2:42 AM 08/18/2024

## 2024-08-19 RX ORDER — ERGOCALCIFEROL 1.25 MG/1
50000 CAPSULE ORAL
Qty: 12 CAPSULE | Refills: 1 | OUTPATIENT
Start: 2024-08-19

## 2024-09-05 ENCOUNTER — PATIENT MESSAGE (OUTPATIENT)
Dept: FAMILY MEDICINE | Facility: CLINIC | Age: 39
End: 2024-09-05
Payer: COMMERCIAL

## 2024-09-06 ENCOUNTER — PATIENT MESSAGE (OUTPATIENT)
Dept: OBSTETRICS AND GYNECOLOGY | Facility: CLINIC | Age: 39
End: 2024-09-06
Payer: COMMERCIAL

## 2024-09-07 ENCOUNTER — LAB VISIT (OUTPATIENT)
Dept: LAB | Facility: HOSPITAL | Age: 39
End: 2024-09-07
Attending: FAMILY MEDICINE
Payer: COMMERCIAL

## 2024-09-07 ENCOUNTER — OFFICE VISIT (OUTPATIENT)
Dept: FAMILY MEDICINE | Facility: CLINIC | Age: 39
End: 2024-09-07
Payer: COMMERCIAL

## 2024-09-07 ENCOUNTER — HOSPITAL ENCOUNTER (EMERGENCY)
Facility: HOSPITAL | Age: 39
Discharge: HOME | End: 2024-09-07
Attending: STUDENT IN AN ORGANIZED HEALTH CARE EDUCATION/TRAINING PROGRAM
Payer: COMMERCIAL

## 2024-09-07 VITALS
BODY MASS INDEX: 27.15 KG/M2 | WEIGHT: 173 LBS | SYSTOLIC BLOOD PRESSURE: 122 MMHG | DIASTOLIC BLOOD PRESSURE: 83 MMHG | HEIGHT: 67 IN

## 2024-09-07 VITALS
HEIGHT: 67 IN | HEART RATE: 88 BPM | SYSTOLIC BLOOD PRESSURE: 125 MMHG | TEMPERATURE: 98 F | DIASTOLIC BLOOD PRESSURE: 70 MMHG | BODY MASS INDEX: 27.15 KG/M2 | WEIGHT: 173 LBS | OXYGEN SATURATION: 98 % | RESPIRATION RATE: 18 BRPM

## 2024-09-07 DIAGNOSIS — J45.20 ASTHMA IN ADULT, MILD INTERMITTENT, UNCOMPLICATED: ICD-10-CM

## 2024-09-07 DIAGNOSIS — R07.9 CHEST PAIN WITH LOW RISK FOR CARDIAC ETIOLOGY: ICD-10-CM

## 2024-09-07 DIAGNOSIS — M79.661 RIGHT CALF PAIN: ICD-10-CM

## 2024-09-07 DIAGNOSIS — F98.8 ATTENTION DEFICIT DISORDER, UNSPECIFIED HYPERACTIVITY PRESENCE: Primary | ICD-10-CM

## 2024-09-07 DIAGNOSIS — Z86.711 HISTORY OF PULMONARY EMBOLUS (PE): ICD-10-CM

## 2024-09-07 DIAGNOSIS — R11.0 NAUSEA: ICD-10-CM

## 2024-09-07 DIAGNOSIS — Z00.00 ANNUAL PHYSICAL EXAM: ICD-10-CM

## 2024-09-07 DIAGNOSIS — E55.9 VITAMIN D DEFICIENCY: ICD-10-CM

## 2024-09-07 LAB
ALBUMIN SERPL BCP-MCNC: 4.2 G/DL (ref 3.5–5.2)
ALP SERPL-CCNC: 65 U/L (ref 55–135)
ALT SERPL W/O P-5'-P-CCNC: 14 U/L (ref 10–44)
ANION GAP SERPL CALC-SCNC: 9 MMOL/L (ref 8–16)
APTT PPP: 26.6 SEC (ref 21–32)
AST SERPL-CCNC: 12 U/L (ref 10–40)
BASOPHILS # BLD AUTO: 0.05 K/UL (ref 0–0.2)
BASOPHILS NFR BLD: 1 % (ref 0–1.9)
BILIRUB SERPL-MCNC: 0.5 MG/DL (ref 0.1–1)
BUN SERPL-MCNC: 15 MG/DL (ref 6–20)
CALCIUM SERPL-MCNC: 9.8 MG/DL (ref 8.7–10.5)
CHLORIDE SERPL-SCNC: 108 MMOL/L (ref 95–110)
CHOLEST SERPL-MCNC: 207 MG/DL (ref 120–199)
CHOLEST/HDLC SERPL: 4.7 {RATIO} (ref 2–5)
CO2 SERPL-SCNC: 22 MMOL/L (ref 23–29)
CREAT SERPL-MCNC: 0.8 MG/DL (ref 0.5–1.4)
D DIMER PPP IA.FEU-MCNC: 0.63 MG/L FEU
DIFFERENTIAL METHOD BLD: NORMAL
EOSINOPHIL # BLD AUTO: 0.2 K/UL (ref 0–0.5)
EOSINOPHIL NFR BLD: 4 % (ref 0–8)
ERYTHROCYTE [DISTWIDTH] IN BLOOD BY AUTOMATED COUNT: 13 % (ref 11.5–14.5)
EST. GFR  (NO RACE VARIABLE): >60 ML/MIN/1.73 M^2
ESTIMATED AVG GLUCOSE: 88 MG/DL (ref 68–131)
GLUCOSE SERPL-MCNC: 96 MG/DL (ref 70–110)
HBA1C MFR BLD: 4.7 % (ref 4–5.6)
HCT VFR BLD AUTO: 41.3 % (ref 37–48.5)
HDLC SERPL-MCNC: 44 MG/DL (ref 40–75)
HDLC SERPL: 21.3 % (ref 20–50)
HGB BLD-MCNC: 13.3 G/DL (ref 12–16)
IMM GRANULOCYTES # BLD AUTO: 0.01 K/UL (ref 0–0.04)
IMM GRANULOCYTES NFR BLD AUTO: 0.2 % (ref 0–0.5)
INR PPP: 1 (ref 0.8–1.2)
LDLC SERPL CALC-MCNC: 129.8 MG/DL (ref 63–159)
LYMPHOCYTES # BLD AUTO: 1.8 K/UL (ref 1–4.8)
LYMPHOCYTES NFR BLD: 36 % (ref 18–48)
MCH RBC QN AUTO: 29 PG (ref 27–31)
MCHC RBC AUTO-ENTMCNC: 32.2 G/DL (ref 32–36)
MCV RBC AUTO: 90 FL (ref 82–98)
MONOCYTES # BLD AUTO: 0.3 K/UL (ref 0.3–1)
MONOCYTES NFR BLD: 6.7 % (ref 4–15)
NEUTROPHILS # BLD AUTO: 2.6 K/UL (ref 1.8–7.7)
NEUTROPHILS NFR BLD: 52.1 % (ref 38–73)
NONHDLC SERPL-MCNC: 163 MG/DL
NRBC BLD-RTO: 0 /100 WBC
PLATELET # BLD AUTO: 185 K/UL (ref 150–450)
PMV BLD AUTO: 10.3 FL (ref 9.2–12.9)
POTASSIUM SERPL-SCNC: 4.5 MMOL/L (ref 3.5–5.1)
PROT SERPL-MCNC: 7.3 G/DL (ref 6–8.4)
PROTHROMBIN TIME: 11.1 SEC (ref 9–12.5)
RBC # BLD AUTO: 4.58 M/UL (ref 4–5.4)
SODIUM SERPL-SCNC: 139 MMOL/L (ref 136–145)
TRIGL SERPL-MCNC: 166 MG/DL (ref 30–150)
TROPONIN I SERPL DL<=0.01 NG/ML-MCNC: <0.006 NG/ML (ref 0–0.03)
TSH SERPL DL<=0.005 MIU/L-ACNC: 0.83 UIU/ML (ref 0.4–4)
WBC # BLD AUTO: 4.94 K/UL (ref 3.9–12.7)

## 2024-09-07 PROCEDURE — 85610 PROTHROMBIN TIME: CPT | Performed by: EMERGENCY MEDICINE

## 2024-09-07 PROCEDURE — 36415 COLL VENOUS BLD VENIPUNCTURE: CPT | Performed by: FAMILY MEDICINE

## 2024-09-07 PROCEDURE — 25500020 PHARM REV CODE 255: Performed by: STUDENT IN AN ORGANIZED HEALTH CARE EDUCATION/TRAINING PROGRAM

## 2024-09-07 PROCEDURE — G2211 COMPLEX E/M VISIT ADD ON: HCPCS | Mod: 95,,, | Performed by: FAMILY MEDICINE

## 2024-09-07 PROCEDURE — 84443 ASSAY THYROID STIM HORMONE: CPT | Performed by: FAMILY MEDICINE

## 2024-09-07 PROCEDURE — 99285 EMERGENCY DEPT VISIT HI MDM: CPT | Mod: 25

## 2024-09-07 PROCEDURE — 80053 COMPREHEN METABOLIC PANEL: CPT | Performed by: FAMILY MEDICINE

## 2024-09-07 PROCEDURE — 85379 FIBRIN DEGRADATION QUANT: CPT | Performed by: FAMILY MEDICINE

## 2024-09-07 PROCEDURE — 93010 ELECTROCARDIOGRAM REPORT: CPT | Mod: ,,, | Performed by: INTERNAL MEDICINE

## 2024-09-07 PROCEDURE — 3074F SYST BP LT 130 MM HG: CPT | Mod: CPTII,95,, | Performed by: FAMILY MEDICINE

## 2024-09-07 PROCEDURE — 3008F BODY MASS INDEX DOCD: CPT | Mod: CPTII,95,, | Performed by: FAMILY MEDICINE

## 2024-09-07 PROCEDURE — 3079F DIAST BP 80-89 MM HG: CPT | Mod: CPTII,95,, | Performed by: FAMILY MEDICINE

## 2024-09-07 PROCEDURE — 84484 ASSAY OF TROPONIN QUANT: CPT | Performed by: EMERGENCY MEDICINE

## 2024-09-07 PROCEDURE — 83036 HEMOGLOBIN GLYCOSYLATED A1C: CPT | Performed by: FAMILY MEDICINE

## 2024-09-07 PROCEDURE — 93005 ELECTROCARDIOGRAM TRACING: CPT

## 2024-09-07 PROCEDURE — 85730 THROMBOPLASTIN TIME PARTIAL: CPT | Performed by: EMERGENCY MEDICINE

## 2024-09-07 PROCEDURE — 85025 COMPLETE CBC W/AUTO DIFF WBC: CPT | Performed by: FAMILY MEDICINE

## 2024-09-07 PROCEDURE — 80061 LIPID PANEL: CPT | Performed by: FAMILY MEDICINE

## 2024-09-07 PROCEDURE — 99215 OFFICE O/P EST HI 40 MIN: CPT | Mod: 95,,, | Performed by: FAMILY MEDICINE

## 2024-09-07 RX ORDER — ALBUTEROL SULFATE 90 UG/1
INHALANT RESPIRATORY (INHALATION)
Qty: 6.7 G | Refills: 3 | Status: SHIPPED | OUTPATIENT
Start: 2024-09-07

## 2024-09-07 RX ORDER — DEXTROAMPHETAMINE SACCHARATE, AMPHETAMINE ASPARTATE, DEXTROAMPHETAMINE SULFATE AND AMPHETAMINE SULFATE 1.25; 1.25; 1.25; 1.25 MG/1; MG/1; MG/1; MG/1
1 TABLET ORAL 2 TIMES DAILY PRN
Qty: 60 TABLET | Refills: 0 | Status: SHIPPED | OUTPATIENT
Start: 2024-09-07

## 2024-09-07 RX ORDER — DEXTROAMPHETAMINE SACCHARATE, AMPHETAMINE ASPARTATE, DEXTROAMPHETAMINE SULFATE AND AMPHETAMINE SULFATE 1.25; 1.25; 1.25; 1.25 MG/1; MG/1; MG/1; MG/1
1 TABLET ORAL 2 TIMES DAILY PRN
Qty: 60 TABLET | Refills: 0 | Status: SHIPPED | OUTPATIENT
Start: 2024-10-05

## 2024-09-07 RX ORDER — DEXTROAMPHETAMINE SACCHARATE, AMPHETAMINE ASPARTATE, DEXTROAMPHETAMINE SULFATE AND AMPHETAMINE SULFATE 1.25; 1.25; 1.25; 1.25 MG/1; MG/1; MG/1; MG/1
1 TABLET ORAL 2 TIMES DAILY PRN
Qty: 60 TABLET | Refills: 0 | Status: SHIPPED | OUTPATIENT
Start: 2024-11-03

## 2024-09-07 RX ORDER — ERGOCALCIFEROL 1.25 MG/1
50000 CAPSULE ORAL
Qty: 12 CAPSULE | Refills: 1 | Status: SHIPPED | OUTPATIENT
Start: 2024-09-07

## 2024-09-07 RX ORDER — ONDANSETRON 8 MG/1
8 TABLET, ORALLY DISINTEGRATING ORAL 2 TIMES DAILY
Qty: 20 TABLET | Refills: 11 | Status: SHIPPED | OUTPATIENT
Start: 2024-09-07

## 2024-09-07 RX ADMIN — IOHEXOL 75 ML: 350 INJECTION, SOLUTION INTRAVENOUS at 02:09

## 2024-09-07 NOTE — PROGRESS NOTES
Chief Complaint   Patient presents with    Medication Refill       SUBJECTIVE:  Snow Barney is a 38 y.o. female here for follow up of ADHD.   Patient has ADHD and is well controleld without drug side effects and doing well overall without any unusual symptoms or history.      The patient location is: [place]  The chief complaint leading to consultation is: medication / clots    Visit type: audiovisual    Face to Face time with patient: 14  29 minutes of total time spent on the encounter, which includes face to face time and non-face to face time preparing to see the patient (eg, review of tests), Obtaining and/or reviewing separately obtained history, Documenting clinical information in the electronic or other health record, Independently interpreting results (not separately reported) and communicating results to the patient/family/caregiver, or Care coordination (not separately reported).         Each patient to whom he or she provides medical services by telemedicine is:  (1) informed of the relationship between the physician and patient and the respective role of any other health care provider with respect to management of the patient; and (2) notified that he or she may decline to receive medical services by telemedicine and may withdraw from such care at any time.    Notes:          Currently has co-morbidities including below problem list.        Patient Active Problem List    Diagnosis Date Noted    S/P laparoscopic hysterectomy 07/30/2024    NATALIE II (cervical intraepithelial neoplasia II) 07/30/2024    Attention deficit disorder 06/28/2024    Asthma in adult, mild intermittent, uncomplicated 07/27/2023    PVC's (premature ventricular contractions) 10/18/2022    History of pulmonary embolus (PE) 03/10/2022    YUDY (obstructive sleep apnea) 03/10/2022    Palpitations 02/02/2022    Migraine without aura and without status migrainosus, not intractable 10/29/2021    Mixed anxiety and depressive disorder 06/05/2018     "Supraventricular tachycardia 06/05/2018    Vitamin D deficiency 03/01/2018     level 22.4 (range )      Anxiety 01/17/2018    Tachycardia 10/18/2015       ROS    OBJECTIVE:  LMP 07/01/2024     Wt Readings from Last 3 Encounters:   08/14/24 81.8 kg (180 lb 5.4 oz)   07/25/24 77.6 kg (171 lb 2.4 oz)   07/22/24 78 kg (171 lb 15.3 oz)     BP Readings from Last 3 Encounters:   08/14/24 122/80   07/31/24 (!) 110/58   07/22/24 120/70       Patient is in usual state of health, alert and oriented without signs of intoxication.  No evidence on exam of illegal substance use or concerning symptoms involving abuse or intoxication (specifically evidence of IVDU, unusual bruising, slurred speech, unusual explanations).               Review of old Records:  Reviewed per epic and   NARx OD score/stimulant score: reviewed  Review of old labs:    Lab Results   Component Value Date    TSH 0.797 11/15/2023     Lab Results   Component Value Date    WBC 8.12 07/31/2024    HGB 11.0 (L) 07/31/2024    HCT 35.4 (L) 07/31/2024    MCV 93 07/31/2024     07/31/2024       Chemistry        Component Value Date/Time     07/22/2024 1115    K 4.1 07/22/2024 1115     07/22/2024 1115    CO2 22 (L) 07/22/2024 1115    BUN 17 07/22/2024 1115    CREATININE 0.7 07/22/2024 1115    GLU 87 07/22/2024 1115        Component Value Date/Time    CALCIUM 9.8 07/22/2024 1115    ALKPHOS 60 07/22/2024 1115    AST 16 07/22/2024 1115    ALT 23 07/22/2024 1115    BILITOT 0.6 07/22/2024 1115    ESTGFRAFRICA 90 12/19/2022 1609    ESTGFRAFRICA >60 07/30/2022 0816    EGFRNONAA >60 07/30/2022 0816        No results found for: "CHOL"  No results found for: "HDL"  No results found for: "LDLCALC"  No results found for: "TRIG"  No results found for: "CHOLHDL"      Review of old imaging:  Reviewed last EKG tracing if available.          ASSESSMENT:    ICD-10-CM ICD-9-CM   1. Annual physical exam  Z00.00 V70.0   2. History of pulmonary embolus (PE)  " Z86.711 V12.55   3. Asthma in adult, mild intermittent, uncomplicated  J45.20 493.90   4. Vitamin D deficiency  E55.9 268.9   5. Nausea  R11.0 787.02   6. Attention deficit disorder, unspecified hyperactivity presence  F98.8 314.00       No evidence of abuse/diversion/addiction noted through history and physical, or checking the .  Risks, benefits and alternate treatments are considered and plan of care reflects that shared decision with the patient.  Went over schedule II responsibilities, including abuse, side effects, refill restrictions, necessary visits, drug testing, protection of medication.  Patient voices understanding.      PLAN:    Problem List Items Addressed This Visit       Asthma in adult, mild intermittent, uncomplicated    Relevant Medications    albuterol (PROVENTIL/VENTOLIN HFA) 90 mcg/actuation inhaler    albuterol-budesonide (AIRSUPRA) 90-80 mcg/actuation    History of pulmonary embolus (PE)    Relevant Orders    D-Dimer, Quantitative    Vitamin D deficiency    Overview     level 22.4 (range )         Relevant Medications    ergocalciferol (VITAMIN D2) 50,000 unit Cap    Attention deficit disorder    Relevant Medications    dextroamphetamine-amphetamine (ADDERALL) 5 mg Tab    dextroamphetamine-amphetamine (ADDERALL) 5 mg Tab (Start on 10/5/2024)    dextroamphetamine-amphetamine (ADDERALL) 5 mg Tab (Start on 11/3/2024)     Other Visit Diagnoses       Annual physical exam    -  Primary    Relevant Orders    CBC Auto Differential    Comprehensive Metabolic Panel    Urinalysis, Reflex to Urine Culture Urine, Clean Catch    Hemoglobin A1C    Lipid Panel    TSH    Nausea        Relevant Medications    ondansetron (ZOFRAN-ODT) 8 MG TbDL              Medication List with Changes/Refills   New Medications    ALBUTEROL-BUDESONIDE (AIRSUPRA) 90-80 MCG/ACTUATION    Inhale 2 puffs into the lungs every 4 (four) hours as needed (wheezing). 3 month supply    DEXTROAMPHETAMINE-AMPHETAMINE (ADDERALL) 5  MG TAB    Take 5 mg by mouth 2 (two) times daily as needed (add).    DEXTROAMPHETAMINE-AMPHETAMINE (ADDERALL) 5 MG TAB    Take 5 mg by mouth 2 (two) times daily as needed (add).    ONDANSETRON (ZOFRAN-ODT) 8 MG TBDL    Take 1 tablet (8 mg total) by mouth 2 (two) times daily.   Current Medications    ACETAMINOPHEN (TYLENOL EXTRA STRENGTH) 500 MG TABLET    Take 2 tablets (1,000 mg total) by mouth every 6 (six) hours as needed for Pain.    DOCUSATE SODIUM (COLACE) 100 MG CAPSULE    Take 1 capsule (100 mg total) by mouth 2 (two) times daily.    EPINEPHRINE (EPIPEN) 0.3 MG/0.3 ML ATIN    Inject 0.3 mLs (0.3 mg total) into the muscle once. for 1 dose    IBUPROFEN (ADVIL,MOTRIN) 800 MG TABLET    Take 1 tablet (800 mg total) by mouth every 8 (eight) hours as needed for Pain.    LINACLOTIDE (LINZESS) 72 MCG CAP CAPSULE    Take 1 capsule (72 mcg total) by mouth before breakfast.    LORAZEPAM (ATIVAN) 1 MG TABLET    TAKE 1 TABLET BY MOUTH ONCE DAILY AS NEEDED FOR ANXIETY    MULTIVITAMIN CAPSULE    Take 1 capsule by mouth.    ONDANSETRON (ZOFRAN-ODT) 4 MG TBDL    DISSOLVE 1 TABLET BY MOUTH EVERY 6 HOURS AS NEEDED FOR NAUSEA OR VOMITING    PANTOPRAZOLE (PROTONIX) 40 MG TABLET    Take 1 tablet (40 mg total) by mouth once daily.    SEMAGLUTIDE, WEIGHT LOSS, 2.4 MG/0.75 ML PNIJ    Coumpounded version, 1.2 mL injection for 2.4 mg SC weekly dose    SERTRALINE (ZOLOFT) 50 MG TABLET    TAKE ONE TABLET BY MOUTH EVERY DAY AS DIRECTED    TRAZODONE (DESYREL) 50 MG TABLET    TAKE 1 TABLET BY MOUTH EVERY DAY    TRAZODONE (DESYREL) 50 MG TABLET    Take 1 tablet every day by oral route for 30 days.   Changed and/or Refilled Medications    Modified Medication Previous Medication    ALBUTEROL (PROVENTIL/VENTOLIN HFA) 90 MCG/ACTUATION INHALER albuterol (PROVENTIL/VENTOLIN HFA) 90 mcg/actuation inhaler       INHALE 2 PUFFS INTO THE LUNGS EVERY 4 HOURS AS NEEDED FOR WHEEZING OR SHORTNESS OF BREATH    INHALE 2 PUFFS INTO THE LUNGS EVERY 4 HOURS AS  NEEDED FOR WHEEZING OR SHORTNESS OF BREATH    DEXTROAMPHETAMINE-AMPHETAMINE (ADDERALL) 5 MG TAB dextroamphetamine-amphetamine (ADDERALL) 5 mg Tab       Take 5 mg by mouth 2 (two) times daily as needed (add).    Take 5 mg by mouth 2 (two) times daily as needed (add).    ERGOCALCIFEROL (VITAMIN D2) 50,000 UNIT CAP ergocalciferol (VITAMIN D2) 50,000 unit Cap       Take 1 capsule (50,000 Units total) by mouth every 7 days.    Take 1 capsule (50,000 Units total) by mouth every 7 days.   Discontinued Medications    ALBUTEROL (PROVENTIL/VENTOLIN HFA) 90 MCG/ACTUATION INHALER    Inhale 2 puffs into the lungs every 4 (four) hours as needed.    OXYCODONE (ROXICODONE) 5 MG IMMEDIATE RELEASE TABLET    Take 1 tablet (5 mg total) by mouth every 4 (four) hours as needed for Pain.         Continue with current care unless changes noted below.  We will monitor b/p and get the labs  She has tachycardia and still with glp1RA  Monitor her weight loss  Post op doing well, recheck d dimer.  She had surgery which can elevate it, but had calf pain  Improved with aspirin  Use d dimer as a marker to see nextsteps.    High risk medication review completed per guidelines to insure safest use of medication.      We reviewed our goals of care:    Improvement in concentration and mood  Aid focus in completing tasks at work/school  Safety first priority      And discontinuation      Intolerable side effects  Evidence of abuse/misuse or diversion      Future Appointments   Date Time Provider Department Center   9/11/2024  1:15 PM Brenda Rios MD [facility]       3 months or sooner as needed

## 2024-09-07 NOTE — ED NOTES
Pt presents to Ed for abnormal DVT with hx of PE. Pt reports pain to right calf x 1 week so her obgyn orders labs. D-dimer was elevated so pt was instructed to come to ED.     Pt is alert, age appropriate and in no acute distress. Pt is nontoxic appearing. Respirations are even and unlabored. pt denies change in feeding, bowel or bladder habits. Skin is warm and color is appropriate for ethnicity.  Right calf 34cm, left calf 31 cm in diameter. DP pulses WNL bilaterally.  Pt moves all extremities well. Conjunctivae normal. Pt is dressed appropriately and well groomed

## 2024-09-07 NOTE — DISCHARGE INSTRUCTIONS

## 2024-09-07 NOTE — ED PROVIDER NOTES
Encounter Date: 9/7/2024       History     Chief Complaint   Patient presents with    abormal labs     Pt to ED c/o having an elevated D-dimer on blood work. Reports recent surgery in the last 6 wks. PmHX of blood clots.      HPI    This 38-year-old white female presents emergency room with a history of the pulmonary embolus.  She complains of chest pain over the course of the last 6 days the pain is not sharper worse with deep breathing.  The patient has chronic shortness of breath.  There is no coughing or fever.  She also has swelling and pain in the right calf.  She was seen by her primary care doctor who ordered a D-dimer that was elevated.  The patient is a cigarette smoker.  She has no known history of cardiac disease.  There is no sweating or nausea.      Review of patient's allergies indicates:   Allergen Reactions    Demerol (pf) [meperidine (pf)] Hives     Past Medical History:   Diagnosis Date    Anxiety     Asthma     Depression     Fibrocystic breast disease     GERD (gastroesophageal reflux disease)     Hernia, hiatal     IBS (irritable bowel syndrome)     Inappropriate sinus tachycardia     POTS (postural orthostatic tachycardia syndrome)     Pulmonary emboli 2020    Pulmonary embolism 07/15/2020    Self-inflicted injury     2021 IMO Regulatory Import      Sepsis     Urticaria      Past Surgical History:   Procedure Laterality Date    COLONOSCOPY N/A 4/2/2024    Procedure: COLONOSCOPY;  Surgeon: Luis Naik MD;  Location: [facility];  Service: Endoscopy;  Laterality: N/A;    HYSTERECTOMY, TOTAL, LAPAROSCOPIC, WITH SALPINGECTOMY N/A 7/30/2024    Procedure: HYSTERECTOMY,TOTAL,LAPAROSCOPIC,WITH SALPINGECTOMY;  Surgeon: Brenda Rios MD;  Location: [facility] OR;  Service: OB/GYN;  Laterality: N/A;  RN PREOP 7/22/2024    T/S--done----, UPT ON 7/29/2024--NEG    TYMPANOSTOMY TUBE PLACEMENT       Family History   Problem Relation Name Age of Onset    Allergies Mother      Hypertension Mother      Diabetes  Mother      Allergies Father      Asthma Father      Hypertension Father      Diabetes Father      Breast cancer Maternal Aunt      Breast cancer Paternal Aunt      Colon cancer Maternal Grandmother      Breast cancer Paternal Grandmother      Allergies Son      Ovarian cancer Cousin       Social History     Tobacco Use    Smoking status: Some Days     Current packs/day: 0.00     Average packs/day: 1 pack/day for 25.5 years (25.5 ttl pk-yrs)     Types: Cigarettes     Start date: 7/15/1996     Last attempt to quit: 2022     Years since quittin.6    Smokeless tobacco: Never    Tobacco comments:     varies from 7 cigarettes to whole pack in day   Substance Use Topics    Alcohol use: Yes     Comment: ocassionally    Drug use: No     Review of Systems  The patient was questioned specifically with regard to the following.  General: Fever, chills, sweats. Neuro: Headache. Eyes: eye problems. ENT: Ear pain, sore throat. Cardiovascular: Chest pain. Respiratory: Cough, shortness of breath. Gastrointestinal: Abdominal pain, vomiting, diarrhea. Genitourinary: Painful urination.  Musculoskeletal: Arm and leg problems. Skin: Rash.  The review of systems was negative except for the following:  Chest pain shortness of breath right calf swelling  Physical Exam     Initial Vitals [24 1346]   BP Pulse Resp Temp SpO2   136/86 (!) 116 16 98.4 °F (36.9 °C) 100 %      MAP       --         Physical Exam  The patient was examined specifically for the following:   General:No significant distress, Good color, Warm and dry. Head and neck:Scalp atraumatic, Neck supple. Neurological:Appropriate conversation, Gross motor deficits. Eyes:Conjugate gaze, Clear corneas. ENT: No epistaxis. Cardiac: Regular rate and rhythm, Grossly normal heart tones. Pulmonary: Wheezing, Rales. Gastrointestinal: Abdominal tenderness, Abdominal distention. Musculoskeletal: Extremity deformity, Apparent pain with range of motion of the joints. Skin: Rash.    The findings on examination were normal except for the following:  The patient's heart rate is 116.  Lungs are clear.  The heart tones are normal.  There is no clinical evidence of respiratory distress.  Oxygen saturations are 100%.  There is mild swelling and tenderness of the right calf.  ED Course   Procedures  Labs Reviewed   PROTIME-INR       Result Value    Prothrombin Time 11.1      INR 1.0     APTT    aPTT 26.6     TROPONIN I    Troponin I <0.006       EKG Readings: (Independently Interpreted)   This patient is in a normal sinus rhythm with a heart rate in 96 there are no significant ST segment changes.  There are no significant T-wave changes.  There is no definite evidence of acute myocardial infarction or malignant arrhythmia.       Imaging Results              US Lower Extremity Veins Right (Final result)  Result time 09/07/24 16:30:01      Final result by Aki Campos MD (09/07/24 16:30:01)                   Impression:      No evidence of deep venous thrombosis in the right lower extremity.      Electronically signed by: Aki Campos MD  Date:    09/07/2024  Time:    16:30               Narrative:    EXAMINATION:  US LOWER EXTREMITY VEINS RIGHT    CLINICAL HISTORY:  Pain in right lower leg    TECHNIQUE:  Duplex and color flow Doppler evaluation and graded compression of the right lower extremity veins was performed.    COMPARISON:  Bilateral lower extremity venous Doppler ultrasound 11/10/2020    FINDINGS:  Right thigh veins: The common femoral, femoral, popliteal, upper greater saphenous, and deep femoral veins are patent and free of thrombus. The veins are normally compressible and have normal phasic flow and augmentation response.    Right calf veins: The visualized calf veins are patent.    Miscellaneous: None                                       CTA Chest Non-Coronary (PE Studies) (Final result)  Result time 09/07/24 15:20:56      Final result by Julio Espinal MD (09/07/24 15:20:56)                    Impression:      1. No evidence of pulmonary embolism  2. Trace pericardial effusion  3. Small hiatal hernia.      Electronically signed by: Julio Vazquez  Date:    09/07/2024  Time:    15:20               Narrative:    EXAMINATION:  CTA CHEST NON CORONARY (PE STUDIES)    CLINICAL HISTORY:  Pulmonary embolism (PE) suspected, positive D-dimer;    TECHNIQUE:  Low dose axial images, sagittal and coronal reformations were obtained from the thoracic inlet to the lung bases following the IV administration of 75 mL of Omnipaque 350.  Contrast timing was optimized to evaluate the pulmonary arteries.  MIP images were performed.    COMPARISON:  01/09/2024    FINDINGS:  Pulmonary arteries:Pulmonary arteries are adequately enhanced.No filling defects to indicate pulmonary embolism.    Thoracic soft tissues: Unremarkable.    Aorta: Left-sided aortic arch.  No aneurysm or dissection.    Heart: Normal size.  Trace pericardial effusion.    Leti/Mediastinum: No pathologic dereck enlargement.    Airways: Patent.    Small hiatal hernia.    Lungs/Pleura: Clear lungs. No pleural effusion or thickening.    Esophagus: Unremarkable.    Upper Abdomen: No abnormality of the partially imaged upper abdomen.    Bones: No acute fracture. No suspicious lytic or sclerotic lesions.                                       Medications   iohexoL (OMNIPAQUE 350) injection 75 mL (75 mLs Intravenous Given 9/7/24 1455)     Medical Decision Making  37 yo F w/hx of PE, no longer on AC, presenting with chest pain, R calf pain, elevated d-dimer on outside labs.       Differential diagnosis includes but is not limited to: DVT, PE, somatic symptoms, asthma    CTA for PE negative, no other significant acute findings. Discussed need for limiting future CTA unless strongly indicated with patient as she has had multiple CTA in the last several years for PE work up. Troponin normal.   US for DVT negative in RLE. Labs obtained in clinic today otherwise  normal, no further indication for additional lab work in ED today.    I discussed with the patient/family the diagnosis, treatment plan, indications for return to the emergency department, as well as for expected follow-up. The patient/family verbalized an understanding. The patient/family is asked if there were any questions or concerns, which were addressed to patient/family satisfaction. Patient/family understands and is agreeable to the plan.           Amount and/or Complexity of Data Reviewed  Labs: ordered. Decision-making details documented in ED Course.  Radiology: ordered.    Risk  Prescription drug management.               ED Course as of 09/07/24 2377   Sat Sep 07, 2024   1524 Troponin I: <0.006 [LF]      ED Course User Index  [LF] Stephanie Ryan MD                           Clinical Impression:  Final diagnoses:  [R07.9] Chest pain with low risk for cardiac etiology  [M79.661] Right calf pain          ED Disposition Condition    Discharge Stable          ED Prescriptions    None       Follow-up Information       Follow up With Specialties Details Why Contact Info    Marcelo Joseph MD Family Medicine   [address] [facility]  [place] [id number] [phone]               Stephanie Ryan MD  09/07/24 2307

## 2024-09-08 ENCOUNTER — PATIENT MESSAGE (OUTPATIENT)
Dept: FAMILY MEDICINE | Facility: CLINIC | Age: 39
End: 2024-09-08
Payer: COMMERCIAL

## 2024-09-10 LAB
OHS QRS DURATION: 78 MS
OHS QTC CALCULATION: 404 MS

## 2024-09-26 ENCOUNTER — PATIENT MESSAGE (OUTPATIENT)
Dept: FAMILY MEDICINE | Facility: CLINIC | Age: 39
End: 2024-09-26
Payer: COMMERCIAL

## 2024-09-26 ENCOUNTER — OFFICE VISIT (OUTPATIENT)
Dept: OBSTETRICS AND GYNECOLOGY | Facility: CLINIC | Age: 39
End: 2024-09-26
Payer: COMMERCIAL

## 2024-09-26 VITALS
HEIGHT: 67 IN | DIASTOLIC BLOOD PRESSURE: 86 MMHG | BODY MASS INDEX: 27.12 KG/M2 | WEIGHT: 172.81 LBS | SYSTOLIC BLOOD PRESSURE: 112 MMHG

## 2024-09-26 DIAGNOSIS — Z90.710 S/P LAPAROSCOPIC HYSTERECTOMY: Primary | ICD-10-CM

## 2024-09-26 PROCEDURE — 99024 POSTOP FOLLOW-UP VISIT: CPT | Mod: S$GLB,,, | Performed by: OBSTETRICS & GYNECOLOGY

## 2024-09-26 PROCEDURE — 99999 PR PBB SHADOW E&M-EST. PATIENT-LVL IV: CPT | Mod: PBBFAC,,, | Performed by: OBSTETRICS & GYNECOLOGY

## 2024-09-26 PROCEDURE — 3074F SYST BP LT 130 MM HG: CPT | Mod: CPTII,S$GLB,, | Performed by: OBSTETRICS & GYNECOLOGY

## 2024-09-26 PROCEDURE — 3044F HG A1C LEVEL LT 7.0%: CPT | Mod: CPTII,S$GLB,, | Performed by: OBSTETRICS & GYNECOLOGY

## 2024-09-26 PROCEDURE — 1159F MED LIST DOCD IN RCRD: CPT | Mod: CPTII,S$GLB,, | Performed by: OBSTETRICS & GYNECOLOGY

## 2024-09-26 PROCEDURE — 3079F DIAST BP 80-89 MM HG: CPT | Mod: CPTII,S$GLB,, | Performed by: OBSTETRICS & GYNECOLOGY

## 2024-09-26 NOTE — PROGRESS NOTES
Subjective     Patient ID: Snow Barney is a 38 y.o. female.    Chief Complaint:  Post-op Evaluation      History of Present Illness  HPI  Postoperative Follow-up  Patient presents to the clinic 8 weeks status post total laparoscopic hysterectomy for abnormal uterine bleeding and NATALIE II . Eating a regular diet without difficulty. Bowel movements are normal. The patient is not having any pain.    GYN & OB History  Patient's last menstrual period was 2024.   Date of Last Pap: 2024    OB History    Para Term  AB Living   7 4 3 1 3 3   SAB IAB Ectopic Multiple Live Births                  # Outcome Date GA Lbr Benjamin/2nd Weight Sex Type Anes PTL Lv   7 Term      Vag-Spont      6 Term      Vag-Spont      5 Term      Vag-Spont      4 AB            3 AB            2 AB            1                Obstetric Comments   GYN Hx: Menarche at 13   History of abnormal pap with colposcopy   History of chlamydia.       Review of Systems  Review of Systems       Objective   Physical Exam:   Constitutional: She is oriented to person, place, and time. She appears well-developed.    HENT:   Head: Normocephalic and atraumatic.    Eyes: EOM are normal.     Cardiovascular:  Normal rate.             Pulmonary/Chest: Effort normal.        Abdominal: Soft. There is no abdominal tenderness.     Genitourinary:    Vagina, right adnexa and left adnexa normal.      Pelvic exam was performed with patient supine.   The external female genitalia was normal.   No external genitalia lesions identified,Genitalia hair distrobution normal .     Labial bartholins normal.There is no rash, tenderness, lesion or injury on the right labia. There is no rash, tenderness, lesion or injury on the left labia. Right adnexum displays no tenderness and no fullness. Left adnexum displays no tenderness and no fullness. No erythema, vaginal discharge, bleeding or prolapse of vaginal walls in the vagina. Vagina was moist.Cervix is absent.Uterus  is absent. Normal urethral meatus.Urethral Meatus exhibits: no urethral lesionUrethra findings: no urethral mass, no tenderness and no prolapsedBladder findings: no bladder distention and no bladder tenderness          Musculoskeletal: Normal range of motion.       Neurological: She is oriented to person, place, and time.    Skin: Skin is warm.    Psychiatric: She has a normal mood and affect.            Assessment and Plan     1. S/P laparoscopic hysterectomy               Plan:  - well recovered.    - follow up prn or for annual exam.

## 2024-09-28 DIAGNOSIS — F41.9 ANXIETY: ICD-10-CM

## 2024-09-28 NOTE — TELEPHONE ENCOUNTER
Care Due:                  Date            Visit Type   Department     Provider  --------------------------------------------------------------------------------                                ESTABLISHED   [facility]                              PATIENT -    MEDICINE/INTERN  Last Visit: 09-      VIRTUAL      AL BENJAMÍN Joseph                               -         [facility]                              PRIMARY      MEDICINE/INTERN  Next Visit: 12-      CARE ([facility])   [facility]         Marcelo Joseph                                                            Last  Test          Frequency    Reason                     Performed    Due Date  --------------------------------------------------------------------------------    Vitamin D...  12 months..  ergocalciferol...........  07-   07-    [facility] Embedded Care Due Messages. Reference number: [id number].   9/28/2024 8:03:30 AM CDT

## 2024-09-30 ENCOUNTER — E-VISIT (OUTPATIENT)
Dept: FAMILY MEDICINE | Facility: CLINIC | Age: 39
End: 2024-09-30
Payer: COMMERCIAL

## 2024-09-30 DIAGNOSIS — Z72.0 TOBACCO ABUSE: Primary | ICD-10-CM

## 2024-09-30 RX ORDER — LORAZEPAM 1 MG/1
1 TABLET ORAL DAILY PRN
Qty: 20 TABLET | Refills: 2 | Status: SHIPPED | OUTPATIENT
Start: 2024-09-30

## 2024-10-01 RX ORDER — VARENICLINE TARTRATE 0.5 (11)-1
KIT ORAL
Qty: 1 EACH | Refills: 0 | Status: SHIPPED | OUTPATIENT
Start: 2024-10-01

## 2024-10-01 NOTE — PROGRESS NOTES
Patient ID: Snow Barney is a 38 y.o. female.    Chief Complaint: General Illness (Entered automatically based on patient selection in [facility].)    The patient initiated a request through [facility] on 9/30/2024 for evaluation and management with a chief complaint of General Illness (Entered automatically based on patient selection in [facility].)     I evaluated the questionnaire submission on 10/1/2024.    [facility] P[facility] Evisit [facility]-Medication    9/30/2024 10:49 AM CDT - Filed by Patient   What do you need help with? Quit Smoking   Do you agree to participate in an E-Visit? Yes   If you have any of the following symptoms, please present to your local emergency room or call 911:  I acknowledge   Medication requests for narcotics will not be addressed via an E-Visit.  Please schedule an appointment. I acknowledge   Are you pregnant, could you be pregnant, or are you breast feeding? None of the above   Do you want to address a new or existing medication? I would like to start a new medication that I do not already take   What is the main issue you would like addressed today? Would like to try chantix as patches alone are not helping   What is the name of the medication that you would like to start? Chantix   Have you taken a similar medication in the past? No   Why are you requesting this particular medication? Stop smoking    What medical condition is the  medication intended to treat? Smoking   Provide any additional information you feel is important.    Please attach any relevant images or files    Are you able to take your vital signs? No         Encounter Diagnosis   Name Primary?    Tobacco abuse Yes        No orders of the defined types were placed in this encounter.     Medications Ordered This Encounter   Medications    varenicline (CHANTIX STARTING MONTH BOX) 0.5 mg (11)- 1 mg (42) tablet     Sig: Take one 0.5mg tab by mouth once daily X3 days,then increase to one 0.5mg tab twice daily X4 days,then increase to  one 1mg tab twice daily     Dispense:  1 each     Refill:  0      See the written copy of this report in the patient's paper medical record.  These results did not interface directly into the electronic medical record and are summarized here.  Potential medication side effects were discussed with the patient; let me know if any occur.    No follow-ups on file.      E-Visit Time Tracking:    Day 1 Time (in minutes): 5    Total Time (in minutes): 5

## 2024-10-23 ENCOUNTER — E-VISIT (OUTPATIENT)
Dept: FAMILY MEDICINE | Facility: CLINIC | Age: 39
End: 2024-10-23
Payer: COMMERCIAL

## 2024-10-23 DIAGNOSIS — K21.9 GASTROESOPHAGEAL REFLUX DISEASE WITHOUT ESOPHAGITIS: ICD-10-CM

## 2024-10-23 DIAGNOSIS — R11.0 NAUSEA: ICD-10-CM

## 2024-10-23 RX ORDER — CIPROFLOXACIN 500 MG/1
500 TABLET ORAL EVERY 12 HOURS
Qty: 14 TABLET | Refills: 0 | Status: SHIPPED | OUTPATIENT
Start: 2024-10-23 | End: 2024-10-30

## 2024-10-23 RX ORDER — PANTOPRAZOLE SODIUM 40 MG/1
40 TABLET, DELAYED RELEASE ORAL DAILY
Qty: 90 TABLET | Refills: 3 | Status: SHIPPED | OUTPATIENT
Start: 2024-10-23

## 2024-10-23 RX ORDER — ONDANSETRON 8 MG/1
8 TABLET, ORALLY DISINTEGRATING ORAL 2 TIMES DAILY
Qty: 20 TABLET | Refills: 11 | Status: SHIPPED | OUTPATIENT
Start: 2024-10-23

## 2024-10-24 NOTE — PROGRESS NOTES
Patient ID: Snow Barney is a 38 y.o. female.    Chief Complaint: General Illness (Entered automatically based on patient selection in [facility].)    The patient initiated a request through [facility] on 10/23/2024 for evaluation and management with a chief complaint of General Illness (Entered automatically based on patient selection in [facility].)     I evaluated the questionnaire submission on 10/23/24.    [facility] P[facility] Evisit [facility]-Cough And Cold    10/23/2024 10:22 AM CDT - Filed by Patient   What do you need help with? Other Concern   Do you agree to participate in an E-Visit? Yes   If you have any of the following symptoms, please present to your local emergency room or call 911:  I acknowledge   Are you pregnant, could you be pregnant, or are you breast feeding? None of the above   What is the main issue you would like addressed today? Intermittent epigastric pain, fatigue, nausea, sore throat   Please describe your symptoms Began having svere intermittent epigastric pain at 1 am that is worse when im laying fown. Nausea started on Monday. Diarrhea started on tuesday. Severe bloating. Sore throat and congestion started yesterday, not sure if its related to the other symptoms   Where is your problem located? Abdomen   How severe are your symptoms? Moderate   Have you had these symptoms before? No   How long have you been having these symptoms? For a few days   Please list any medications or treatments you have used for your condition and indicate if it was effective or not. Na   What makes this feel better? Nothing   What makes this feel worse? Laying down   Are these symptoms related to a condition that you currently have? I am not sure   What is the condition?    When were you last seen for this condition?    Please describe any probable cause for these symptoms Possible Flu or other communicable illness,  pancreas, or gall bladder   Provide any additional information you feel is important.    Please attach any  relevant images or files    Are you able to take your vital signs? Yes   Systolic Blood Pressure: 108   Diastolic Blood Pressure: 72   Weight: 176   Height: 67   Pulse: 80   Temperature: 97.7   Respiration rate: 17   Pulse Oxygen: 97         Encounter Diagnoses   Name Primary?    Nausea     Gastroesophageal reflux disease without esophagitis         No orders of the defined types were placed in this encounter.     Medications Ordered This Encounter   Medications    ciprofloxacin HCl (CIPRO) 500 MG tablet     Sig: Take 1 tablet (500 mg total) by mouth every 12 (twelve) hours. for 7 days     Dispense:  14 tablet     Refill:  0    ondansetron (ZOFRAN-ODT) 8 MG TbDL     Sig: Take 1 tablet (8 mg total) by mouth 2 (two) times daily.     Dispense:  20 tablet     Refill:  11    pantoprazole (PROTONIX) 40 MG tablet     Sig: Take 1 tablet (40 mg total) by mouth once daily.     Dispense:  90 tablet     Refill:  3        No follow-ups on file.      E-Visit Time Tracking:    Day 1 Time (in minutes): 6    Total Time (in minutes): 6

## 2024-11-07 DIAGNOSIS — K59.04 CHRONIC IDIOPATHIC CONSTIPATION: ICD-10-CM

## 2024-11-07 DIAGNOSIS — F41.9 ANXIETY: ICD-10-CM

## 2024-11-07 DIAGNOSIS — E55.9 VITAMIN D DEFICIENCY: ICD-10-CM

## 2024-11-08 RX ORDER — SERTRALINE HYDROCHLORIDE 50 MG/1
50 TABLET, FILM COATED ORAL
Qty: 90 TABLET | Refills: 0 | Status: SHIPPED | OUTPATIENT
Start: 2024-11-08

## 2024-11-08 RX ORDER — ERGOCALCIFEROL 1.25 MG/1
50000 CAPSULE ORAL
Qty: 12 CAPSULE | Refills: 1 | Status: SHIPPED | OUTPATIENT
Start: 2024-11-08

## 2024-11-08 RX ORDER — LORAZEPAM 1 MG/1
1 TABLET ORAL DAILY PRN
Qty: 20 TABLET | Refills: 2 | Status: SHIPPED | OUTPATIENT
Start: 2024-11-08

## 2024-11-08 NOTE — TELEPHONE ENCOUNTER
No care due was identified.  [facility] Embedded Care Due Messages. Reference number: [id number].   11/07/2024 6:38:18 PM CST

## 2024-12-05 NOTE — DISCHARGE INSTRUCTIONS
Recommend continuing still well hydrated.    Seek medical care if symptoms worsens or any concerns.    Follow up with the primary care provider within the next 3-5 days.   No

## 2024-12-06 ENCOUNTER — OFFICE VISIT (OUTPATIENT)
Dept: FAMILY MEDICINE | Facility: CLINIC | Age: 39
End: 2024-12-06
Payer: COMMERCIAL

## 2024-12-06 VITALS — BODY MASS INDEX: 26.59 KG/M2 | WEIGHT: 169.75 LBS

## 2024-12-06 DIAGNOSIS — F98.8 ATTENTION DEFICIT DISORDER, UNSPECIFIED TYPE: ICD-10-CM

## 2024-12-06 PROCEDURE — 99999 PR PBB SHADOW E&M-EST. PATIENT-LVL II: CPT | Mod: PBBFAC,,, | Performed by: FAMILY MEDICINE

## 2024-12-06 RX ORDER — DEXTROAMPHETAMINE SACCHARATE, AMPHETAMINE ASPARTATE, DEXTROAMPHETAMINE SULFATE AND AMPHETAMINE SULFATE 1.25; 1.25; 1.25; 1.25 MG/1; MG/1; MG/1; MG/1
1 TABLET ORAL 2 TIMES DAILY PRN
Qty: 60 TABLET | Refills: 0 | Status: SHIPPED | OUTPATIENT
Start: 2024-12-06

## 2024-12-06 NOTE — PROGRESS NOTES
Chief Complaint   Patient presents with    Medication Refill       SUBJECTIVE:  Snow Barney is a 39 y.o. female here for follow up of ADHD.   Patient has ADHD and is well controleld without drug side effects and doing well overall without any unusual symptoms or history.             Currently has co-morbidities including below problem list.        Patient Active Problem List    Diagnosis Date Noted    S/P laparoscopic hysterectomy 07/30/2024    NATALIE II (cervical intraepithelial neoplasia II) 07/30/2024    Attention deficit disorder 06/28/2024    Asthma in adult, mild intermittent, uncomplicated 07/27/2023    PVC's (premature ventricular contractions) 10/18/2022    History of pulmonary embolus (PE) 03/10/2022    YUDY (obstructive sleep apnea) 03/10/2022    Palpitations 02/02/2022    Migraine without aura and without status migrainosus, not intractable 10/29/2021    Mixed anxiety and depressive disorder 06/05/2018    Supraventricular tachycardia 06/05/2018    Vitamin D deficiency 03/01/2018     level 22.4 (range )      Anxiety 01/17/2018    Tachycardia 10/18/2015       ROS  Per HPI  OBJECTIVE:  Wt 77 kg (169 lb 12.1 oz)   LMP 07/01/2024   BMI 26.59 kg/m²     Wt Readings from Last 3 Encounters:   12/06/24 77 kg (169 lb 12.1 oz)   09/26/24 78.4 kg (172 lb 13.5 oz)   09/07/24 78.5 kg (173 lb)     BP Readings from Last 3 Encounters:   09/26/24 112/86   09/07/24 125/70   09/07/24 122/83       Patient is in usual state of health, alert and oriented without signs of intoxication.  No evidence on exam of illegal substance use or concerning symptoms involving abuse or intoxication (specifically evidence of IVDU, unusual bruising, slurred speech, unusual explanations).               Review of old Records:  Reviewed per epic and   NARx OD score/stimulant score: reviewed  Review of old labs:    Lab Results   Component Value Date    TSH 0.830 09/07/2024     Lab Results   Component Value Date    WBC 4.94 09/07/2024    HGB  13.3 09/07/2024    HCT 41.3 09/07/2024    MCV 90 09/07/2024     09/07/2024       Chemistry        Component Value Date/Time     09/07/2024 1114    K 4.5 09/07/2024 1114     09/07/2024 1114    CO2 22 (L) 09/07/2024 1114    BUN 15 09/07/2024 1114    CREATININE 0.8 09/07/2024 1114    GLU 96 09/07/2024 1114        Component Value Date/Time    CALCIUM 9.8 09/07/2024 1114    ALKPHOS 65 09/07/2024 1114    AST 12 09/07/2024 1114    ALT 14 09/07/2024 1114    BILITOT 0.5 09/07/2024 1114    ESTGFRAFRICA 90 12/19/2022 1609    ESTGFRAFRICA >60 07/30/2022 0816    EGFRNONAA >60 07/30/2022 0816        Lab Results   Component Value Date    CHOL 207 (H) 09/07/2024     Lab Results   Component Value Date    HDL 44 09/07/2024     Lab Results   Component Value Date    LDLCALC 129.8 09/07/2024     Lab Results   Component Value Date    TRIG 166 (H) 09/07/2024     Lab Results   Component Value Date    CHOLHDL 21.3 09/07/2024         Review of old imaging:  Reviewed last EKG tracing if available.          ASSESSMENT:    ICD-10-CM ICD-9-CM   1. Attention deficit disorder, unspecified type  F98.8 314.00       No evidence of abuse/diversion/addiction noted through history and physical, or checking the .  Risks, benefits and alternate treatments are considered and plan of care reflects that shared decision with the patient.  Went over schedule II responsibilities, including abuse, side effects, refill restrictions, necessary visits, drug testing, protection of medication.  Patient voices understanding.      PLAN:    Problem List Items Addressed This Visit       Attention deficit disorder         Medication List with Changes/Refills   Current Medications    ALBUTEROL (PROVENTIL/VENTOLIN HFA) 90 MCG/ACTUATION INHALER    INHALE 2 PUFFS INTO THE LUNGS EVERY 4 HOURS AS NEEDED FOR WHEEZING OR SHORTNESS OF BREATH    ALBUTEROL-BUDESONIDE (AIRSUPRA) 90-80 MCG/ACTUATION    Inhale 2 puffs into the lungs every 4 (four) hours as needed  (wheezing). 3 month supply    DEXTROAMPHETAMINE-AMPHETAMINE (ADDERALL) 5 MG TAB    Take 5 mg by mouth 2 (two) times daily as needed (add).    EPINEPHRINE (EPIPEN) 0.3 MG/0.3 ML ATIN    Inject 0.3 mLs (0.3 mg total) into the muscle once. for 1 dose    ERGOCALCIFEROL (VITAMIN D2) 50,000 UNIT CAP    Take 1 capsule (50,000 Units total) by mouth every 7 days.    LINACLOTIDE (LINZESS) 72 MCG CAP CAPSULE    Take 1 capsule (72 mcg total) by mouth before breakfast.    LORAZEPAM (ATIVAN) 1 MG TABLET    Take 1 tablet (1 mg total) by mouth daily as needed for Anxiety.    MULTIVITAMIN CAPSULE    Take 1 capsule by mouth.    ONDANSETRON (ZOFRAN-ODT) 8 MG TBDL    Take 1 tablet (8 mg total) by mouth 2 (two) times daily.    PANTOPRAZOLE (PROTONIX) 40 MG TABLET    Take 1 tablet (40 mg total) by mouth once daily.    SEMAGLUTIDE, WEIGHT LOSS, 2.4 MG/0.75 ML PNIJ    Coumpounded version, 1.2 mL injection for 2.4 mg SC weekly dose    SERTRALINE (ZOLOFT) 50 MG TABLET    TAKE ONE TABLET BY MOUTH EVERY DAY AS DIRECTED    TRAZODONE (DESYREL) 50 MG TABLET    Take 1 tablet every day by oral route for 30 days.    VARENICLINE (CHANTIX STARTING MONTH BOX) 0.5 MG (11)- 1 MG (42) TABLET    Take one 0.5mg tab by mouth once daily X3 days,then increase to one 0.5mg tab twice daily X4 days,then increase to one 1mg tab twice daily         Continue with current care unless changes noted below.  Continue [id number]  High risk medication review completed per guidelines to insure safest use of medication.      We reviewed our goals of care:    Improvement in concentration and mood  Aid focus in completing tasks at work/school  Safety first priority      And discontinuation      Intolerable side effects  Evidence of abuse/misuse or diversion      No future appointments.    3 months or sooner as needed

## 2024-12-07 DIAGNOSIS — K59.04 CHRONIC IDIOPATHIC CONSTIPATION: ICD-10-CM

## 2024-12-09 NOTE — TELEPHONE ENCOUNTER
Please see the attached refill request.   [Follow-Up: _____] : a [unfilled] follow-up visit [FreeTextEntry1] : SANDRA MCQUEEN is a 68 year old male presents for follow up visit and review of MRI lumbar spine/CT lumbar spine imaging for complaints  of lower back and LE pain and surgical discussion.  Today he is accompanied by his wife.  Past medical history includes emphysema. He mentions his symptoms started about 2006, progressively worsening in nature. Patient reports progressive lumbar region pain with intermittent radiation to L > R buttock/posterior lateral thigh/calf and foot. Patient endorses lower extremity weakness. Pain intensity 8/10. Denies any saddle anesthesia, urinary or bowel dysfunction. Patient has been ambulating with the assistance of a cane for several years at this point. Patient endorses difficulty with ambulation. Patient is currently involved in physical therapy but does not note any improvement as of yet. Patient has been under the care of pain management. No recent treatments secondary to pain management stating he needs neurosurgical evaluation given he has failed multiple pain management treatments. He is managing his symptoms with NSAIDs as needed.  No falls or injuries.  Patient ambulates with the assistance of a cane at baseline but endorses it is more challenging to climb his 20 flights of stairs to enter his apartment.   \par Patient was unable to tolerate physical therapy secondary to wearing masks during exercises which exacerbates his emphysema. \par \par

## 2024-12-25 ENCOUNTER — PATIENT MESSAGE (OUTPATIENT)
Dept: OBSTETRICS AND GYNECOLOGY | Facility: CLINIC | Age: 39
End: 2024-12-25
Payer: COMMERCIAL

## 2024-12-27 ENCOUNTER — OFFICE VISIT (OUTPATIENT)
Dept: OBSTETRICS AND GYNECOLOGY | Facility: CLINIC | Age: 39
End: 2024-12-27
Payer: COMMERCIAL

## 2024-12-27 VITALS — DIASTOLIC BLOOD PRESSURE: 80 MMHG | WEIGHT: 171.75 LBS | SYSTOLIC BLOOD PRESSURE: 120 MMHG | BODY MASS INDEX: 26.9 KG/M2

## 2024-12-27 DIAGNOSIS — N93.9 VAGINAL BLEEDING: Primary | ICD-10-CM

## 2024-12-27 PROCEDURE — 99999 PR PBB SHADOW E&M-EST. PATIENT-LVL III: CPT | Mod: PBBFAC,,, | Performed by: OBSTETRICS & GYNECOLOGY

## 2024-12-27 NOTE — PROGRESS NOTES
Subjective     Patient ID: Snow Barney is a 39 y.o. female.    Chief Complaint:  Vaginal Pain      History of Present Illness  HPI  She is s/p Tlh in July.  Was having intercourse and felt a sharp pain and started having some vaginal bleeding.  Bleeding has gotten much lighter.  No pain today.    GYN & OB History  Patient's last menstrual period was 2024.   Date of Last Pap: 2024    OB History    Para Term  AB Living   7 4 3 1 3 3   SAB IAB Ectopic Multiple Live Births                  # Outcome Date GA Lbr Benjamin/2nd Weight Sex Type Anes PTL Lv   7 Term      Vag-Spont      6 Term      Vag-Spont      5 Term      Vag-Spont      4 AB            3 AB            2 AB            1                Obstetric Comments   GYN Hx: Menarche at 13   History of abnormal pap with colposcopy   History of chlamydia.       Review of Systems  Review of Systems   Constitutional: Negative.    HENT: Negative.     Eyes: Negative.    Respiratory: Negative.     Cardiovascular: Negative.    Gastrointestinal: Negative.    Endocrine: Negative.    Genitourinary: Negative.    Musculoskeletal: Negative.    Integumentary:  Negative.   Neurological: Negative.    Hematological: Negative.    Psychiatric/Behavioral: Negative.     Breast: negative.           Objective   Physical Exam:   Constitutional: She is oriented to person, place, and time. She appears well-developed.    HENT:   Head: Normocephalic and atraumatic.    Eyes: EOM are normal.     Cardiovascular:  Normal rate.             Pulmonary/Chest: Effort normal.        Abdominal: Soft. There is no abdominal tenderness.     Genitourinary:    Right adnexa and left adnexa normal.      Pelvic exam was performed with patient supine.   The external female genitalia was normal.   No external genitalia lesions identified,Genitalia hair distrobution normal .     Labial bartholins normal.There is no rash, tenderness, lesion or injury on the right labia. There is no rash,  tenderness, lesion or injury on the left labia. Right adnexum displays no tenderness and no fullness. Left adnexum displays no tenderness and no fullness. There is bleeding (very small amount of blood in vaginal vault.) in the vagina. No erythema, vaginal discharge or prolapse of vaginal walls in the vagina.    No signs of injury (vaginal cuff intact) in the vagina.   Vagina was moist.Cervix is absent.Uterus is absent. Normal urethral meatus.Urethral Meatus exhibits: no urethral lesionUrethra findings: no urethral mass, no tenderness and no prolapsedBladder findings: no bladder distention and no bladder tenderness          Musculoskeletal: Normal range of motion.       Neurological: She is oriented to person, place, and time.    Skin: Skin is warm.    Psychiatric: She has a normal mood and affect.            Assessment and Plan     1. Vaginal bleeding             Plan:  - advised pelvic rest x 1 week.    - expectant management for now.

## 2025-01-07 ENCOUNTER — HOSPITAL ENCOUNTER (OUTPATIENT)
Dept: RADIOLOGY | Facility: HOSPITAL | Age: 40
Discharge: HOME | End: 2025-01-07
Attending: NURSE PRACTITIONER
Payer: COMMERCIAL

## 2025-01-07 ENCOUNTER — OFFICE VISIT (OUTPATIENT)
Dept: FAMILY MEDICINE | Facility: CLINIC | Age: 40
End: 2025-01-07
Payer: COMMERCIAL

## 2025-01-07 VITALS
SYSTOLIC BLOOD PRESSURE: 124 MMHG | OXYGEN SATURATION: 99 % | WEIGHT: 170.63 LBS | HEART RATE: 98 BPM | BODY MASS INDEX: 26.78 KG/M2 | DIASTOLIC BLOOD PRESSURE: 76 MMHG | TEMPERATURE: 98 F | HEIGHT: 67 IN

## 2025-01-07 DIAGNOSIS — M54.2 NECK PAIN, ACUTE: ICD-10-CM

## 2025-01-07 DIAGNOSIS — Z72.0 TOBACCO USE: ICD-10-CM

## 2025-01-07 DIAGNOSIS — Z86.711 HISTORY OF PULMONARY EMBOLUS (PE): ICD-10-CM

## 2025-01-07 DIAGNOSIS — E78.2 MIXED HYPERLIPIDEMIA: ICD-10-CM

## 2025-01-07 DIAGNOSIS — M54.2 NECK PAIN, ACUTE: Primary | ICD-10-CM

## 2025-01-07 PROCEDURE — 3074F SYST BP LT 130 MM HG: CPT | Mod: CPTII,S$GLB,, | Performed by: NURSE PRACTITIONER

## 2025-01-07 PROCEDURE — 1159F MED LIST DOCD IN RCRD: CPT | Mod: CPTII,S$GLB,, | Performed by: NURSE PRACTITIONER

## 2025-01-07 PROCEDURE — 3078F DIAST BP <80 MM HG: CPT | Mod: CPTII,S$GLB,, | Performed by: NURSE PRACTITIONER

## 2025-01-07 PROCEDURE — 99999 PR PBB SHADOW E&M-EST. PATIENT-LVL V: CPT | Mod: PBBFAC,,, | Performed by: NURSE PRACTITIONER

## 2025-01-07 PROCEDURE — 72040 X-RAY EXAM NECK SPINE 2-3 VW: CPT | Mod: 26,,, | Performed by: INTERNAL MEDICINE

## 2025-01-07 PROCEDURE — 72040 X-RAY EXAM NECK SPINE 2-3 VW: CPT | Mod: TC,FY

## 2025-01-07 PROCEDURE — 93880 EXTRACRANIAL BILAT STUDY: CPT | Mod: TC

## 2025-01-07 PROCEDURE — 93880 EXTRACRANIAL BILAT STUDY: CPT | Mod: 26,,, | Performed by: RADIOLOGY

## 2025-01-07 PROCEDURE — 99213 OFFICE O/P EST LOW 20 MIN: CPT | Mod: S$GLB,,, | Performed by: NURSE PRACTITIONER

## 2025-01-07 PROCEDURE — 3008F BODY MASS INDEX DOCD: CPT | Mod: CPTII,S$GLB,, | Performed by: NURSE PRACTITIONER

## 2025-01-07 NOTE — PROGRESS NOTES
Subjective:       Patient ID: Snow Barney is a 39 y.o. female.    Chief Complaint: Neck Pain    HPI     Snow Barney is a 39 y.o. female patient that presents to clinic with a chief complaint of neck pain. Past medical and surgical history reviewed as listed. PCP is Marcelo Joseph MD , she is  new  to me. Patient reports worsening right sided neck pain associated with occipital headaches, burning and occasional pain to left arm.   Denies numbness and tingling.   Tobacco use 4 cigarettes per day. Smoking off and on for 14 years.     Neck Pain   This is a new problem. The current episode started 1 to 4 weeks ago. The problem occurs constantly. The problem has been unchanged. The pain is associated with nothing. The quality of the pain is described as burning. The pain is severe. The pain is Same all the time. Associated symptoms include headaches. Pertinent negatives include no numbness, photophobia, syncope, tingling or weakness. She has tried nothing for the symptoms.     ROS as listed.   Past Medical History:   Diagnosis Date    Anxiety     Asthma     Depression     Fibrocystic breast disease     GERD (gastroesophageal reflux disease)     Hernia, hiatal     IBS (irritable bowel syndrome)     Inappropriate sinus tachycardia     POTS (postural orthostatic tachycardia syndrome)     Pulmonary emboli 2020    Pulmonary embolism 07/15/2020    Self-inflicted injury     2021 IMO Regulatory Import      Sepsis     Urticaria       Past Surgical History:   Procedure Laterality Date    COLONOSCOPY N/A 4/2/2024    Procedure: COLONOSCOPY;  Surgeon: Luis Naik MD;  Location: [facility] ENDO;  Service: Endoscopy;  Laterality: N/A;    HYSTERECTOMY, TOTAL, LAPAROSCOPIC, WITH SALPINGECTOMY N/A 7/30/2024    Procedure: HYSTERECTOMY,TOTAL,LAPAROSCOPIC,WITH SALPINGECTOMY;  Surgeon: Brenda Rios MD;  Location: [facility] OR;  Service: OB/GYN;  Laterality: N/A;  RN PREOP 7/22/2024    T/S--done----, UPT ON 7/29/2024--NEG    TYMPANOSTOMY TUBE  PLACEMENT        Family History   Problem Relation Name Age of Onset    Allergies Mother Karla     Hypertension Mother Karla     Diabetes Mother Karla     Depression Mother Karla     Hyperlipidemia Mother Karla     Allergies Father Krzysztof     Asthma Father Krzysztof     Hypertension Father Krzysztof     Diabetes Father Krzysztof     Heart disease Father Krzysztof     Hyperlipidemia Father Krzysztof     Breast cancer Maternal Aunt Marcela     Heart disease Maternal Aunt Marcela     Breast cancer Paternal Aunt      Colon cancer Maternal Grandmother Radha     Cancer Maternal Grandmother Radha     Breast cancer Paternal Grandmother      Allergies Son Dany     Asthma Son Dany     Learning disabilities Son Dany     Ovarian cancer Cousin      Diabetes Maternal Grandfather Jacobo     Heart disease Maternal Grandfather Jacobo     Hypertension Maternal Grandfather Jacobo     Heart disease Paternal Grandfather Mickey     Hyperlipidemia Paternal Grandfather Mickey     Hypertension Paternal Grandfather Mickey     Kidney disease Paternal Grandfather Mickey     Depression Sister Guille     Drug abuse Sister Guille     Mental illness Sister Guille     Depression Brother Krzysztof     Drug abuse Brother Krzysztof     Mental illness Brother Krzysztof     Depression Brother Isaiha     Drug abuse Brother Isaiha     Hyperlipidemia Brother Isaiha     Mental illness Brother Isaiha     Heart disease Paternal Aunt Chelsey       Review of patient's allergies indicates:   Allergen Reactions    Demerol (pf) [meperidine (pf)] Hives     Review of Systems   Eyes:  Negative for photophobia.   Cardiovascular:  Negative for syncope.   Musculoskeletal:  Positive for neck pain (burning skin on right side of neck).   Neurological:  Positive for dizziness (POTS not new) and headaches. Negative for tingling, tremors, seizures, syncope, speech difficulty, weakness and numbness.   Psychiatric/Behavioral:  Negative for sleep disturbance.        Objective:      Vitals:    01/07/25 1021   BP: 124/76   Pulse: 98  "  Temp: 98.3 °F (36.8 °C)   TempSrc: Oral   SpO2: 99%   Weight: 77.4 kg (170 lb 10.2 oz)   Height: 5' 7" (1.702 m)      Physical Exam  Vitals and nursing note reviewed.   Constitutional:       General: She is not in acute distress.  HENT:      Head: Normocephalic.   Eyes:      Conjunctiva/sclera: Conjunctivae normal.      Pupils: Pupils are equal, round, and reactive to light.   Cardiovascular:      Rate and Rhythm: Normal rate and regular rhythm.      Heart sounds: Normal heart sounds. No murmur heard.  Pulmonary:      Effort: Pulmonary effort is normal. No respiratory distress.      Breath sounds: Normal breath sounds.   Musculoskeletal:      Cervical back: Normal range of motion and neck supple. Tenderness present.   Lymphadenopathy:      Cervical: No cervical adenopathy.   Skin:     General: Skin is warm and dry.   Neurological:      Mental Status: She is alert and oriented to person, place, and time.      Gait: Gait normal.   Psychiatric:         Mood and Affect: Mood normal.         Behavior: Behavior normal.         Lab Results   Component Value Date    WBC 4.94 09/07/2024    HGB 13.3 09/07/2024    HCT 41.3 09/07/2024     09/07/2024    CHOL 207 (H) 09/07/2024    TRIG 166 (H) 09/07/2024    HDL 44 09/07/2024    ALT 14 09/07/2024    AST 12 09/07/2024     09/07/2024    K 4.5 09/07/2024     09/07/2024    CREATININE 0.8 09/07/2024    BUN 15 09/07/2024    CO2 22 (L) 09/07/2024    TSH 0.830 09/07/2024    INR 1.0 09/07/2024    HGBA1C 4.7 09/07/2024      Assessment:       1. Neck pain, acute    2. Tobacco use    3. History of pulmonary embolus (PE)    4. Mixed hyperlipidemia        Plan:       Neck pain, acute  Neck pain of unknown etiology.   Patient with history of PE. Will check ultrasound and xray.   If xray negative, consider cervical MRI.   -     US Carotid Bilateral; Future; Expected date: 01/07/2025  -     X-Ray Cervical Spine AP And Lateral; Future; Expected date: 01/07/2025  -     CBC W/ " AUTO DIFFERENTIAL; Future; Expected date: 01/07/2025    Tobacco use  Recommend cessation.     History of pulmonary embolus (PE)  -     D-DIMER, QUANTITATIVE; Future; Expected date: 01/07/2025    Mixed hyperlipidemia  Low cholesterol diet.   -     LIPID PANEL; Future; Expected date: 01/07/2025      Medication List with Changes/Refills   Current Medications    ALBUTEROL (PROVENTIL/VENTOLIN HFA) 90 MCG/ACTUATION INHALER    INHALE 2 PUFFS INTO THE LUNGS EVERY 4 HOURS AS NEEDED FOR WHEEZING OR SHORTNESS OF BREATH    ALBUTEROL-BUDESONIDE (AIRSUPRA) 90-80 MCG/ACTUATION    Inhale 2 puffs into the lungs every 4 (four) hours as needed (wheezing). 3 month supply    DEXTROAMPHETAMINE-AMPHETAMINE (ADDERALL) 5 MG TAB    Take 5 mg by mouth 2 (two) times daily as needed (add).    EPINEPHRINE (EPIPEN) 0.3 MG/0.3 ML ATIN    Inject 0.3 mLs (0.3 mg total) into the muscle once. for 1 dose    ERGOCALCIFEROL (VITAMIN D2) 50,000 UNIT CAP    Take 1 capsule (50,000 Units total) by mouth every 7 days.    LINACLOTIDE (LINZESS) 72 MCG CAP CAPSULE    Take 1 capsule (72 mcg total) by mouth before breakfast.    LORAZEPAM (ATIVAN) 1 MG TABLET    Take 1 tablet (1 mg total) by mouth daily as needed for Anxiety.    MULTIVITAMIN CAPSULE    Take 1 capsule by mouth.    ONDANSETRON (ZOFRAN-ODT) 8 MG TBDL    Take 1 tablet (8 mg total) by mouth 2 (two) times daily.    PANTOPRAZOLE (PROTONIX) 40 MG TABLET    Take 1 tablet (40 mg total) by mouth once daily.    SEMAGLUTIDE, WEIGHT LOSS, 2.4 MG/0.75 ML PNIJ    Coumpounded version, 1.2 mL injection for 2.4 mg SC weekly dose    SERTRALINE (ZOLOFT) 50 MG TABLET    TAKE ONE TABLET BY MOUTH EVERY DAY AS DIRECTED    TRAZODONE (DESYREL) 50 MG TABLET    Take 1 tablet every day by oral route for 30 days.    VARENICLINE (CHANTIX STARTING MONTH BOX) 0.5 MG (11)- 1 MG (42) TABLET    Take one 0.5mg tab by mouth once daily X3 days,then increase to one 0.5mg tab twice daily X4 days,then increase to one 1mg tab twice daily                 Cristy Rico, DNP, APRN, FNP-C  Family Medicine  [facility]  01/07/2025 10:32 AM

## 2025-01-08 ENCOUNTER — PATIENT MESSAGE (OUTPATIENT)
Dept: FAMILY MEDICINE | Facility: CLINIC | Age: 40
End: 2025-01-08
Payer: COMMERCIAL

## 2025-01-08 DIAGNOSIS — M54.2 NECK PAIN, ACUTE: Primary | ICD-10-CM

## 2025-01-17 ENCOUNTER — HOSPITAL ENCOUNTER (OUTPATIENT)
Dept: RADIOLOGY | Facility: HOSPITAL | Age: 40
Discharge: HOME | End: 2025-01-17
Attending: NURSE PRACTITIONER
Payer: COMMERCIAL

## 2025-01-17 DIAGNOSIS — M54.2 NECK PAIN, ACUTE: ICD-10-CM

## 2025-01-17 PROCEDURE — 72141 MRI NECK SPINE W/O DYE: CPT | Mod: TC

## 2025-01-17 PROCEDURE — 72141 MRI NECK SPINE W/O DYE: CPT | Mod: 26,,, | Performed by: INTERNAL MEDICINE

## 2025-01-20 ENCOUNTER — PATIENT MESSAGE (OUTPATIENT)
Dept: FAMILY MEDICINE | Facility: CLINIC | Age: 40
End: 2025-01-20
Payer: COMMERCIAL

## 2025-01-21 ENCOUNTER — PATIENT MESSAGE (OUTPATIENT)
Dept: FAMILY MEDICINE | Facility: CLINIC | Age: 40
End: 2025-01-21
Payer: COMMERCIAL

## 2025-01-21 DIAGNOSIS — M50.30 DEGENERATIVE DISC DISEASE, CERVICAL: ICD-10-CM

## 2025-01-21 DIAGNOSIS — M54.2 NECK PAIN, ACUTE: Primary | ICD-10-CM

## 2025-01-21 RX ORDER — METHYLPREDNISOLONE 4 MG/1
TABLET ORAL
Qty: 21 EACH | Refills: 0 | Status: SHIPPED | OUTPATIENT
Start: 2025-01-21 | End: 2025-01-24 | Stop reason: SDUPTHER

## 2025-01-24 RX ORDER — METHYLPREDNISOLONE 4 MG/1
TABLET ORAL
Qty: 21 EACH | Refills: 0 | Status: SHIPPED | OUTPATIENT
Start: 2025-01-24 | End: 2025-02-14

## 2025-01-28 ENCOUNTER — PATIENT MESSAGE (OUTPATIENT)
Dept: FAMILY MEDICINE | Facility: CLINIC | Age: 40
End: 2025-01-28
Payer: COMMERCIAL

## 2025-01-29 ENCOUNTER — TELEPHONE (OUTPATIENT)
Dept: NEUROSURGERY | Facility: CLINIC | Age: 40
End: 2025-01-29
Payer: COMMERCIAL

## 2025-01-29 ENCOUNTER — TELEPHONE (OUTPATIENT)
Dept: FAMILY MEDICINE | Facility: CLINIC | Age: 40
End: 2025-01-29
Payer: COMMERCIAL

## 2025-01-29 DIAGNOSIS — R42 DIZZINESS: ICD-10-CM

## 2025-01-29 DIAGNOSIS — M54.2 NECK PAIN, ACUTE: Primary | ICD-10-CM

## 2025-01-29 NOTE — TELEPHONE ENCOUNTER
----- Message from Tech Violet sent at 1/29/2025  8:42 AM CST -----  Regarding: Referral  .Type:  Patient Requesting Referral    Who Called:self     Referral to What Specialty:Neurology     Reason for Referral:pain everywhere. Caller states a referral for neurosurgery was placed in chart but states department explained to her that she needs to see a Neurologist     Does the patient want the referral with a specific physician?:No    [facility]     Would the patient rather a call back or a response via [facility]? Call     Best Call Back Number:.[phone]      Additional Information: caller requested urgent matter

## 2025-01-29 NOTE — TELEPHONE ENCOUNTER
Spoke to the pt and informed her of the results of her Mri and the next plan of care the patient as advised to f/u neurology and provided information for  the neurologist that she have seen per the NP. Pt verbalized understanding

## 2025-02-12 DIAGNOSIS — F41.9 ANXIETY: ICD-10-CM

## 2025-02-12 RX ORDER — LORAZEPAM 1 MG/1
1 TABLET ORAL
Qty: 20 TABLET | Refills: 2 | Status: SHIPPED | OUTPATIENT
Start: 2025-02-12

## 2025-02-12 NOTE — TELEPHONE ENCOUNTER
Care Due:                  Date            Visit Type   Department     Provider  --------------------------------------------------------------------------------                                [facility] FAMILY                              PRIMARY      MEDICINE/INTERN  Last Visit: 12-      CARE ([facility])   [facility]         Marcelo Joseph  Next Visit: None Scheduled  None         None Found                                                            Last  Test          Frequency    Reason                     Performed    Due Date  --------------------------------------------------------------------------------    HBA1C.......  6 months...  semaglutide,.............  09- 03-    Vitamin D...  12 months..  ergocalciferol...........  07-   07-    [employer] Embedded Care Due Messages. Reference number: [id number].   2/12/2025 8:05:44 AM CST  
Universal Safety Interventions

## 2025-03-28 DIAGNOSIS — F98.8 ATTENTION DEFICIT DISORDER, UNSPECIFIED TYPE: ICD-10-CM

## 2025-03-28 DIAGNOSIS — K59.04 CHRONIC IDIOPATHIC CONSTIPATION: ICD-10-CM

## 2025-03-28 NOTE — TELEPHONE ENCOUNTER
Care Due:                  Date            Visit Type   Department     Provider  --------------------------------------------------------------------------------                                [facility] FAMILY                              PRIMARY      MEDICINE/INTERN  Last Visit: 12-      CARE ([facility])   [facility]         Marcelo Joseph  Next Visit: None Scheduled  None         None Found                                                            Last  Test          Frequency    Reason                     Performed    Due Date  --------------------------------------------------------------------------------    Office Visit  6 months...  varenicline..............  12- 06-    [employer] Embedded Care Due Messages. Reference number: [id number].   3/28/2025 7:58:30 AM CDT

## 2025-03-31 ENCOUNTER — E-VISIT (OUTPATIENT)
Dept: FAMILY MEDICINE | Facility: CLINIC | Age: 40
End: 2025-03-31
Payer: COMMERCIAL

## 2025-03-31 DIAGNOSIS — F98.8 ATTENTION DEFICIT DISORDER, UNSPECIFIED TYPE: ICD-10-CM

## 2025-03-31 RX ORDER — DEXTROAMPHETAMINE SACCHARATE, AMPHETAMINE ASPARTATE, DEXTROAMPHETAMINE SULFATE AND AMPHETAMINE SULFATE 1.25; 1.25; 1.25; 1.25 MG/1; MG/1; MG/1; MG/1
1 TABLET ORAL 2 TIMES DAILY PRN
Qty: 60 TABLET | Refills: 0 | Status: SHIPPED | OUTPATIENT
Start: 2025-03-31

## 2025-04-02 NOTE — PROGRESS NOTES
Patient ID: Snow Barney is a 39 y.o. female.    Chief Complaint: ADD (Entered automatically based on patient selection in [employer].)    The patient initiated a request through [employer] on 3/31/2025 for evaluation and management with a chief complaint of ADD (Entered automatically based on patient selection in [employer].)     I evaluated the questionnaire submission on 04/01/2025  .    [facility] Peq Evisit Adhd    4/1/2025  8:08 AM CDT - Filed by Patient   Do you agree to participate in an E-Visit? Yes   If you have any of the following symptoms, please present to your local emergency room or call 911: I acknowledge   Choose the state of your primary residence [place]   Do you have any of the following pregnancy-related conditions? None   The following vitals are required in order to proceed    Systolic Blood Pressure: 115   Diastolic Blood Pressure: 68   Weight: 162   Height: 67   Pulse: 110   Reason for E-Visit Medication refill   What is the name of the medication(s)?  Adderall   How often are you supposed to take your medication? 2 times daily   What is your current dose? The dose is usually listed in milligrams or milliliters on the label. 5   Are you taking it as prescribed? Yes   Do you have any of the following side effects? None   How often do you take your medication as prescribed? Most days   Have any of the following kept you from taking your medications as prescribed? (Select all that apply) Not needed   Would you like to change or continue your medication? Continue medication   Which option below best describes the reason for your request?  I am out of refills and need more   What symptoms do you have? Inattention;  Trouble staying organized;  Forgetfulness;  Restlessness;  Difficulty following instructions;  Poor time management;  Lack of focus;  Trouble with multitasking   How would you describe your symptoms of Attention Deficit Disorder (ADD)? Improving   Have you been diagnosed with any new heart  condition? No   Does anyone in your immediate family have a history of sudden cardiac arrest at a young age (younger than 50)? Yes   Does anyone in your immediate family have any of the following heart conditions: Hypertrophic Cardiomyopathy, Prolonged QT Syndrome, Brugada Syndrome or Nolasco Parkinson White Syndrome? No   Provide any additional information you feel is important.    Please attach any relevant images or files          Encounter Diagnosis   Name Primary?    Attention deficit disorder, unspecified type         No orders of the defined types were placed in this encounter.           Follow up in about 4 weeks (around 4/28/2025) for assess treatment plan.      E-Visit Time Tracking:    Day 1 Time (in minutes): 11    Total Time (in minutes): 11

## 2025-04-04 ENCOUNTER — OFFICE VISIT (OUTPATIENT)
Dept: CARDIOLOGY | Facility: CLINIC | Age: 40
End: 2025-04-04
Payer: COMMERCIAL

## 2025-04-04 ENCOUNTER — LAB VISIT (OUTPATIENT)
Dept: LAB | Facility: HOSPITAL | Age: 40
End: 2025-04-04
Attending: INTERNAL MEDICINE
Payer: COMMERCIAL

## 2025-04-04 DIAGNOSIS — R00.2 PALPITATIONS: ICD-10-CM

## 2025-04-04 DIAGNOSIS — I49.3 PVC (PREMATURE VENTRICULAR CONTRACTION): ICD-10-CM

## 2025-04-04 DIAGNOSIS — R00.2 PALPITATIONS: Primary | ICD-10-CM

## 2025-04-04 DIAGNOSIS — R06.09 DOE (DYSPNEA ON EXERTION): ICD-10-CM

## 2025-04-04 DIAGNOSIS — F41.9 ANXIETY: ICD-10-CM

## 2025-04-04 DIAGNOSIS — R06.02 SOB (SHORTNESS OF BREATH): ICD-10-CM

## 2025-04-04 DIAGNOSIS — R00.0 SINUS TACHYCARDIA: ICD-10-CM

## 2025-04-04 PROBLEM — E66.01 MORBID (SEVERE) OBESITY DUE TO EXCESS CALORIES: Status: ACTIVE | Noted: 2025-04-04

## 2025-04-04 LAB
BNP SERPL-MCNC: <10 PG/ML (ref 0–99)
TSH SERPL-ACNC: 1.25 UIU/ML (ref 0.4–4)

## 2025-04-04 PROCEDURE — 36415 COLL VENOUS BLD VENIPUNCTURE: CPT

## 2025-04-04 PROCEDURE — 84443 ASSAY THYROID STIM HORMONE: CPT

## 2025-04-04 PROCEDURE — 83880 ASSAY OF NATRIURETIC PEPTIDE: CPT

## 2025-04-04 RX ORDER — METOPROLOL SUCCINATE 25 MG/1
25 TABLET, EXTENDED RELEASE ORAL DAILY
Qty: 90 TABLET | Refills: 3 | Status: SHIPPED | OUTPATIENT
Start: 2025-04-04

## 2025-04-04 NOTE — PROGRESS NOTES
CARDIOVASCULAR CONSULTATION    REASON FOR CONSULT:   Snow Barney is a 39 y.o. female who presents for evaluation    HISTORY OF PRESENT ILLNESS:     Patient is a very pleasant 37-year-old lady who works as a nurse at [employer].  She states that she is been suffering from PVCs for some years now.  Was undergoing workup at [facility].  Was initiated on Toprol-XL and the dose has been recently increased to 25 mg daily.  States that occasionally gets PVCs, now the frequency has increased to almost daily.  She had an episode where she almost passed out and felt her heart racing very fast.  She was sitting at that time.  There was another episode when she was standing when her heart started beating very fast and she had a presyncopal episode.  Denies orthopnea, PND, swelling of feet.  Drinks about 40 oz of water a day.  Occasionally gets chest tightness.  States has significant family history of premature coronary artery disease and sudden cardiac death in the family had a stress test done at outside hospital which did not show any significant ischemia.  Echo and event monitor had not revealed any significant arrhythmias last year.  States that she had a pulmonary embolism in 2020 and was on anticoagulation for 3 months.  It was felt at that time that the pulmonary embolism was secondary to emergency contraceptive pill as per the patient.  She denies heavy alcohol use.  Used to be a smoker but quit last year.  Drug abuse in drug abuse in her  20s, cocaine and ecstasy.  But no recent drug abuse for many years.    2022       CONCLUSIONS     NSR 75 bpm     Normal right heart size     Mild TR / PAsys ~ 22 mmHg     LA borderline size / LAD 36 mm, Qasim 27 cc/m2     Normal MV / minimal MR - early systolic     Borderline LV internal dimension          LVEDD 53 mm, EPSS 9 mm        LVESD 37 mm     LV EF measures 54%       No LV systolic wall motion abns identified     Normal diastolic parameters     Normal aortic annulus /  three leaflet aortic valve     No AS and no AR     minimal pericardial effusion       REC clinical correlation        CONCLUSIONS   Probably normal treadmill nuclear stress test with breast attenuation.   Normal GATED study - EF 64% without wall motion abnormalities.   Perfusion scans with fixed anteroseptal defect with normal thickening likely attenuation artifact.   No angina at 6 minutes on Law protocol treadmill stress (7 METS).      2/23:      Preliminary Findings   *The observed rhythms are sinus rhythm to sinus tachycardia.   *The Maximum Heart Rate recorded was 145 bpm, Day 1 / 05:22:07 pm, the   Minimum Heart Rate recorded was 73   bpm, Day 2 / 02:32:49 am and the Average Heart Rate was 95 bpm.   *There were 4974 PVCs with a burden of 0.8 %.   *There were 9 PSVCs with a burden of < 0.01 %.   *There were 30 Patient reported events.     Sinus rhythm with intermittent sinus tachycardia with a gradual increase   in rate. No sudden onset or offset occurrences. Rare PACs. Infrequent   PVCs. Ventricular bigeminy and trigeminy.          Notes from August 23:  Patient here for follow-up.  Doing much better.  States that her PVCs have gone away.  CTA showed normal coronaries with 0 calcium score    Notes from May 24:  Patient here for follow-up.  Denies any chest pains at rest on exertion, orthopnea, PND.  Needs to go for hysterectomy    APR 25:    The patient location is: HER HOME   The chief complaint leading to consultation is: palpitations SOB    Visit type: audiovisual    Face to Face time with patient: 10 min  25 minutes of total time spent on the encounter, which includes face to face time and non-face to face time preparing to see the patient (eg, review of tests), Obtaining and/or reviewing separately obtained history, Documenting clinical information in the electronic or other health record, Independently interpreting results (not separately reported) and communicating results to the  patient/family/caregiver, or Care coordination (not separately reported).         Each patient to whom he or she provides medical services by telemedicine is:  (1) informed of the relationship between the physician and patient and the respective role of any other health care provider with respect to management of the patient; and (2) notified that he or she may decline to receive medical services by telemedicine and may withdraw from such care at any time.    Notes:    Patient complaining of SOB, palpitations.         PAST MEDICAL HISTORY:     Past Medical History:   Diagnosis Date    Anxiety     Asthma     Depression     Fibrocystic breast disease     GERD (gastroesophageal reflux disease)     Hernia, hiatal     IBS (irritable bowel syndrome)     Inappropriate sinus tachycardia     POTS (postural orthostatic tachycardia syndrome)     Pulmonary emboli 2020    Pulmonary embolism 07/15/2020    Self-inflicted injury     2021 IMO Regulatory Import      Sepsis     Urticaria        PAST SURGICAL HISTORY:     Past Surgical History:   Procedure Laterality Date    COLONOSCOPY N/A 4/2/2024    Procedure: COLONOSCOPY;  Surgeon: Luis Naik MD;  Location: [facility] ENDO;  Service: Endoscopy;  Laterality: N/A;    HYSTERECTOMY, TOTAL, LAPAROSCOPIC, WITH SALPINGECTOMY N/A 7/30/2024    Procedure: HYSTERECTOMY,TOTAL,LAPAROSCOPIC,WITH SALPINGECTOMY;  Surgeon: Brenda Rios MD;  Location: [facility] OR;  Service: OB/GYN;  Laterality: N/A;  RN PREOP 7/22/2024    T/S--done----, UPT ON 7/29/2024--NEG    TYMPANOSTOMY TUBE PLACEMENT         ALLERGIES AND MEDICATION:     Review of patient's allergies indicates:   Allergen Reactions    Demerol (pf) [meperidine (pf)] Hives        Medication List            Accurate as of April 4, 2025 11:59 PM. If you have any questions, ask your nurse or doctor.                START taking these medications      metoprolol succinate 25 MG 24 hr tablet  Commonly known as: TOPROL-XL  Take 1 tablet (25 mg total)  by mouth once daily.  Started by: Parrish Mckeon MD            CONTINUE taking these medications      albuterol-budesonide 90-80 mcg/actuation  Commonly known as: Airsupra  Inhale 2 puffs into the lungs every 4 (four) hours as needed (wheezing). 3 month supply     dextroamphetamine-amphetamine 5 mg Tab  Commonly known as: AdderalL  Take 5 mg by mouth 2 (two) times daily as needed (add).     EPINEPHrine 0.3 mg/0.3 mL Atin  Commonly known as: EPIPEN  Inject 0.3 mLs (0.3 mg total) into the muscle once. for 1 dose     ergocalciferol 50,000 unit Cap  Commonly known as: VITAMIN D2  Take 1 capsule (50,000 Units total) by mouth every 7 days.     linaCLOtide 72 mcg Cap capsule  Commonly known as: LINZESS  Take 1 capsule (72 mcg total) by mouth before breakfast.     LORazepam 1 MG tablet  Commonly known as: ATIVAN  TAKE ONE TABLET BY MOUTH DAILY AS NEEDED FOR ANXIETY     multivitamin capsule     ondansetron 8 MG Tbdl  Commonly known as: ZOFRAN-ODT  Take 1 tablet (8 mg total) by mouth 2 (two) times daily.     pantoprazole 40 MG tablet  Commonly known as: PROTONIX  Take 1 tablet (40 mg total) by mouth once daily.     semaglutide (weight loss) 2.4 mg/0.75 mL Pnij  Coumpounded version, 1.2 mL injection for 2.4 mg SC weekly dose     sertraline 50 MG tablet  Commonly known as: ZOLOFT  TAKE ONE TABLET BY MOUTH EVERY DAY AS DIRECTED               Where to Get Your Medications        These medications were sent to [facility] - [facility] - [address]  [address], [place] [address]      Phone: [phone]   metoprolol succinate 25 MG 24 hr tablet         SOCIAL HISTORY:     Social History     Socioeconomic History    Marital status: Single   Tobacco Use    Smoking status: Former     Current packs/day: 0.00     Average packs/day: 1 pack/day for 25.5 years (25.5 ttl pk-yrs)     Types: Cigarettes     Start date: 7/15/1996     Quit date: 1/9/2022     Years since quitting: 3.3    Smokeless tobacco: Never    Tobacco  comments:     varies from 7 cigarettes to whole pack in day   Substance and Sexual Activity    Alcohol use: Yes     Alcohol/week: 3.0 standard drinks of alcohol     Types: 3 Cans of beer per week     Comment: ocassionally    Drug use: No    Sexual activity: Yes     Partners: Male     Birth control/protection: I.U.D.     Social Drivers of Health     Financial Resource Strain: Low Risk  (12/26/2024)    Overall Financial Resource Strain (CARDIA)     Difficulty of Paying Living Expenses: Not very hard   Food Insecurity: Food Insecurity Present (12/26/2024)    Hunger Vital Sign     Worried About Running Out of Food in the Last Year: Sometimes true     Ran Out of Food in the Last Year: Sometimes true   Transportation Needs: No Transportation Needs (11/29/2023)    PRAPARE - Transportation     Lack of Transportation (Medical): No     Lack of Transportation (Non-Medical): No   Physical Activity: Insufficiently Active (12/26/2024)    Exercise Vital Sign     Days of Exercise per Week: 1 day     Minutes of Exercise per Session: 30 min   Stress: Stress Concern Present (12/26/2024)    [employer] [place] [employer] - Occupational Stress Questionnaire     Feeling of Stress : Very much   Housing Stability: Unknown (11/29/2023)    Housing Stability Vital Sign     Unable to Pay for Housing in the Last Year: No     Unstable Housing in the Last Year: No       FAMILY HISTORY:     Family History   Problem Relation Name Age of Onset    Allergies Mother Karla     Hypertension Mother Karla     Diabetes Mother Karla     Depression Mother Karla     Hyperlipidemia Mother Karla     Allergies Father Krzysztof     Asthma Father Krzysztof     Hypertension Father Krzysztof     Diabetes Father Krzysztof     Heart disease Father Krzysztof     Hyperlipidemia Father Krzysztof     Breast cancer Maternal Aunt Marcela     Heart disease Maternal Aunt Marcela     Breast cancer Paternal Aunt      Colon cancer Maternal Grandmother Radha     Cancer Maternal Grandmother Radha      Breast cancer Paternal Grandmother      Allergies Son Dany     Asthma Son Dany     Learning disabilities Son Dany     Ovarian cancer Cousin      Diabetes Maternal Grandfather Jacobo     Heart disease Maternal Grandfather Jacobo     Hypertension Maternal Grandfather Jacobo     Heart disease Paternal Grandfather Mickey     Hyperlipidemia Paternal Grandfather Mickey     Hypertension Paternal Grandfather Mickey     Kidney disease Paternal Grandfather Mickey     Depression Sister Guille     Drug abuse Sister Guille     Mental illness Sister Guille     Depression Brother Krzysztof     Drug abuse Brother Krzysztof     Mental illness Brother Krzysztof     Depression Brother Isaiha     Drug abuse Brother Isaiha     Hyperlipidemia Brother Isaiha     Mental illness Brother Isaiha     Heart disease Paternal Aunt Chelsey        REVIEW OF SYSTEMS:   Review of Systems   Constitutional: Negative.   HENT: Negative.     Eyes: Negative.    Cardiovascular:  Positive for palpitations.   Respiratory:  Positive for shortness of breath.    Endocrine: Negative.    Hematologic/Lymphatic: Negative.    Skin: Negative.    Musculoskeletal: Negative.    Gastrointestinal: Negative.    Genitourinary: Negative.    Neurological: Negative.    Psychiatric/Behavioral: Negative.     Allergic/Immunologic: Negative.        A 10 point review of systems was performed and all the pertinent positives have been mentioned. Rest of review of systems was negative.        PHYSICAL EXAM:     There were no vitals filed for this visit.   There is no height or weight on file to calculate BMI.            Physical Exam      DATA:     Laboratory:  CBC:  Recent Labs   Lab 07/31/24  0557 09/07/24  1114 01/07/25  1108   WBC 8.12 4.94 6.75   Hemoglobin 11.0 L 13.3 13.8   Hematocrit 35.4 L 41.3 43.3   Platelets 180 185 200       CHEMISTRIES:  Recent Labs   Lab 07/30/22  0816 10/20/22  0857 01/04/23  2043 02/10/23  1836 06/01/23  1723 08/30/23  1802 01/09/24  1236 01/25/24  1331 07/22/24  1115 09/07/24  1114    Glucose 102   < >  --    < > 102   < > 122 H  --  87 96   Sodium 141   < >  --    < > 139   < > 137 138 140 139   Potassium 4.2   < >  --    < > 3.5   < > 3.6 3.4 L 4.1 4.5   BUN 13   < >  --    < > 11   < > 5 L 12.9 17 15   Creatinine 0.7   < >  --    < > 1.0   < > 0.8 0.71 0.7 0.8   eGFR if  >60  --   --   --   --   --   --   --   --   --    eGFR if non  >60  --   --   --   --   --   --   --   --   --    Calcium 9.3   < >  --    < > 10.2   < > 9.3 9.3 9.8 9.8   Magnesium  --   --  1.9  --  1.8  --   --   --   --   --     < > = values in this interval not displayed.       CARDIAC BIOMARKERS:  Recent Labs   Lab 08/30/23  1802 01/09/24  1236 09/07/24  1438   CPK 51  --   --    Troponin I <0.006 <0.006 <0.006       COAGS:  Recent Labs   Lab 09/07/24  1438   INR 1.0       LIPIDS/LFTS:  Recent Labs   Lab 01/25/24  1331 07/22/24  1115 09/07/24  1114 01/07/25  1108   Cholesterol  --   --  207 H 222 H   Triglycerides  --   --  166 H 120   HDL  --   --  44 51   LDL Cholesterol  --   --  129.8 147.0   Non-HDL Cholesterol  --   --  163 171   AST 9 L 16 12  --    ALT 14 23 14  --        Hemoglobin A1C   Date Value Ref Range Status   09/07/2024 4.7 4.0 - 5.6 % Final     Comment:     ADA Screening Guidelines:  5.7-6.4%  Consistent with prediabetes  >or=6.5%  Consistent with diabetes    High levels of fetal hemoglobin interfere with the HbA1C  assay. Heterozygous hemoglobin variants (HbS, HgC, etc)do  not significantly interfere with this assay.   However, presence of multiple variants may affect accuracy.         TSH  Recent Labs   Lab 11/15/23  1248 09/07/24  1114 04/04/25  1436   TSH 0.797 0.830 1.252       The ASCVD Risk score (Brittney CALVO, et al., 2019) failed to calculate for the following reasons:    The 2019 ASCVD risk score is only valid for ages 40 to 79       BNP    Lab Results   Component Value Date/Time    BNP <10 04/04/2025 02:36 PM    BNP <10 06/01/2023 05:23 PM    BNP 11 01/04/2023  08:23 PM    BNP <10 02/24/2022 07:00 PM    BNP <10 11/10/2020 01:25 AM    BNP 15 11/02/2020 01:30 PM    BNP 21 07/14/2020 09:05 PM    BNP <10 06/01/2019 01:55 AM    BNP 14 04/22/2018 06:20 PM    BNP <10 05/28/2015 07:03 PM             ASSESSMENT AND PLAN     Patient Active Problem List   Diagnosis    Palpitations    Anxiety    Tachycardia    Migraine without aura and without status migrainosus, not intractable    Asthma in adult, mild intermittent, uncomplicated    History of pulmonary embolus (PE)    Mixed anxiety and depressive disorder    YUDY (obstructive sleep apnea)    PVC's (premature ventricular contractions)    Supraventricular tachycardia    Vitamin D deficiency    Attention deficit disorder    S/P laparoscopic hysterectomy    NATALIE II (cervical intraepithelial neoplasia II)         Orders Placed This Encounter   Procedures    BNP    TSH    Holter monitor - 48 hour    Exercise Stress - EKG    Echo     Palpitations and sob: THS, BNO, HOLTER, STRESS TEST AND ECHO.     Patient with near-syncope.  Family history of premature CAD.  Used to be a smoker in the past, quit last year.  CTA showed normal coronaries with 0 calcium score.  Frequent PVCs on Holter monitoring, which the patient states have gone away.  She did try metoprolol, but could not tolerate it.    Orthostatic hypotension:  I have asked her to stay well hydrated and to wear compression stockings.  If that does not help and she continues having symptoms despite these, initiate midodrine.  She was recently initiated on spironolactone for acne.  I have asked her to watch out for symptoms of dizziness getting worse.  If her symptoms of dizziness crossed worse, she might have to stop her spironolactone.  She has been without hypotension or episodes of dizziness        Follow-up after testing      Thank you very much for involving me in the care of your patient.  Please do not hesitate to contact me if there are any questions.      Parrish Mckeon MD,  FAC, [facility]  Interventional Cardiologist, [facility].           This note was dictated with the help of speech recognition software.  There might be un-intended errors and/or substitutions.        Visit today included increased complexity associated with the care of the episodic problem sob, palpitations,  addressed and managing the longitudinal care of the patient due to the serious and/or complex managed problem(s) Problem List[1]  .                 [1]   Patient Active Problem List  Diagnosis    Palpitations    Anxiety    Tachycardia    Migraine without aura and without status migrainosus, not intractable    Asthma in adult, mild intermittent, uncomplicated    History of pulmonary embolus (PE)    Mixed anxiety and depressive disorder    YUDY (obstructive sleep apnea)    PVC's (premature ventricular contractions)    Supraventricular tachycardia    Vitamin D deficiency    Attention deficit disorder    S/P laparoscopic hysterectomy    NATALIE II (cervical intraepithelial neoplasia II)    Morbid (severe) obesity due to excess calories

## 2025-05-07 ENCOUNTER — HOSPITAL ENCOUNTER (OUTPATIENT)
Dept: CARDIOLOGY | Facility: HOSPITAL | Age: 40
Discharge: HOME | End: 2025-05-07
Attending: INTERNAL MEDICINE
Payer: COMMERCIAL

## 2025-05-07 VITALS — HEIGHT: 67 IN | WEIGHT: 170.63 LBS | BODY MASS INDEX: 26.78 KG/M2

## 2025-05-07 DIAGNOSIS — R00.2 PALPITATIONS: ICD-10-CM

## 2025-05-07 DIAGNOSIS — R06.09 DOE (DYSPNEA ON EXERTION): ICD-10-CM

## 2025-05-07 LAB
ASCENDING AORTA: 2.6 CM
AV INDEX (PROSTH): 0.82
AV MEAN GRADIENT: 3 MMHG
AV PEAK GRADIENT: 5 MMHG
AV VALVE AREA BY VELOCITY RATIO: 2.6 CM²
AV VALVE AREA: 2.6 CM²
AV VELOCITY RATIO: 0.82
BSA FOR ECHO PROCEDURE: 1.91 M2
CV ECHO LV RWT: 0.29 CM
CV STRESS BASE HR: 100 BPM
DIASTOLIC BLOOD PRESSURE: 82 MMHG
DOP CALC AO PEAK VEL: 1.1 M/S
DOP CALC AO VTI: 20.1 CM
DOP CALC LVOT AREA: 3.1 CM2
DOP CALC LVOT DIAMETER: 2 CM
DOP CALC LVOT PEAK VEL: 0.9 M/S
DOP CALC LVOT STROKE VOLUME: 51.8 CM3
DOP CALC MV VTI: 14.8 CM
DOP CALCLVOT PEAK VEL VTI: 16.5 CM
E WAVE DECELERATION TIME: 145 MSEC
E/A RATIO: 1.34
E/E' RATIO: 7 M/S
ECHO LV POSTERIOR WALL: 0.7 CM (ref 0.6–1.1)
FRACTIONAL SHORTENING: 32.7 % (ref 28–44)
INTERVENTRICULAR SEPTUM: 0.7 CM (ref 0.6–1.1)
IVC DIAMETER: 1.79 CM
LA MAJOR: 4.9 CM
LA MINOR: 4.4 CM
LA WIDTH: 3.3 CM
LEFT ATRIUM SIZE: 3.3 CM
LEFT ATRIUM VOLUME INDEX: 23 ML/M2
LEFT ATRIUM VOLUME: 43 CM3
LEFT INTERNAL DIMENSION IN SYSTOLE: 3.3 CM (ref 2.1–4)
LEFT VENTRICLE DIASTOLIC VOLUME INDEX: 58.73 ML/M2
LEFT VENTRICLE DIASTOLIC VOLUME: 111 ML
LEFT VENTRICLE MASS INDEX: 58.6 G/M2
LEFT VENTRICLE SYSTOLIC VOLUME INDEX: 22.8 ML/M2
LEFT VENTRICLE SYSTOLIC VOLUME: 43 ML
LEFT VENTRICULAR INTERNAL DIMENSION IN DIASTOLE: 4.9 CM (ref 3.5–6)
LEFT VENTRICULAR MASS: 110.8 G
LV LATERAL E/E' RATIO: 6.7 M/S
LV SEPTAL E/E' RATIO: 6.7 M/S
LVED V (TEICH): 110.74 ML
LVES V (TEICH): 42.56 ML
LVOT MG: 1.8 MMHG
LVOT MV: 0.63 CM/S
MV MEAN GRADIENT: 1 MMHG
MV PEAK A VEL: 0.5 M/S
MV PEAK E VEL: 0.67 M/S
MV PEAK GRADIENT: 2 MMHG
MV STENOSIS PRESSURE HALF TIME: 42.18 MS
MV VALVE AREA BY CONTINUITY EQUATION: 3.5 CM2
MV VALVE AREA P 1/2 METHOD: 5.22 CM2
OHS CV CPX 1 MINUTE RECOVERY HEART RATE: 142 BPM
OHS CV CPX 85 PERCENT MAX PREDICTED HEART RATE MALE: 154
OHS CV CPX ESTIMATED METS: 10
OHS CV CPX MAX PREDICTED HEART RATE: 181
OHS CV CPX PATIENT IS FEMALE: 1
OHS CV CPX PATIENT IS MALE: 0
OHS CV CPX PEAK DIASTOLIC BLOOD PRESSURE: 67 MMHG
OHS CV CPX PEAK HEAR RATE: 162 BPM
OHS CV CPX PEAK RATE PRESSURE PRODUCT: NORMAL
OHS CV CPX PEAK SYSTOLIC BLOOD PRESSURE: 154 MMHG
OHS CV CPX PERCENT MAX PREDICTED HEART RATE ACHIEVED: 94
OHS CV CPX RATE PRESSURE PRODUCT PRESENTING: NORMAL
OHS CV RV/LV RATIO: 0.59 CM
PISA TR MAX VEL: 1.8 M/S
PULM VEIN S/D RATIO: 0.96
PV PEAK D VEL: 0.47 M/S
PV PEAK GRADIENT: 2 MMHG
PV PEAK S VEL: 0.45 M/S
PV PEAK VELOCITY: 0.79 M/S
RA MAJOR: 4.25 CM
RA PRESSURE ESTIMATED: 3 MMHG
RA WIDTH: 3.1 CM
RIGHT VENTRICLE DIASTOLIC BASEL DIMENSION: 2.9 CM
RIGHT VENTRICULAR END-DIASTOLIC DIMENSION: 2.85 CM
RV TB RVSP: 5 MMHG
RV TISSUE DOPPLER FREE WALL SYSTOLIC VELOCITY 1 (APICAL 4 CHAMBER VIEW): 12.9 CM/S
SINUS: 2.76 CM
STJ: 2.3 CM
STRESS ECHO POST EXERCISE DUR MIN: 9 MINUTES
STRESS ECHO POST EXERCISE DUR SEC: 0 SECONDS
SYSTOLIC BLOOD PRESSURE: 128 MMHG
TDI LATERAL: 0.1 M/S
TDI SEPTAL: 0.1 M/S
TDI: 0.1 M/S
TR MAX PG: 13 MMHG
TRICUSPID ANNULAR PLANE SYSTOLIC EXCURSION: 1.8 CM
TV REST PULMONARY ARTERY PRESSURE: 16 MMHG
Z-SCORE OF LEFT VENTRICULAR DIMENSION IN END DIASTOLE: -0.77
Z-SCORE OF LEFT VENTRICULAR DIMENSION IN END SYSTOLE: 0.09

## 2025-05-07 PROCEDURE — 93016 CV STRESS TEST SUPVJ ONLY: CPT | Mod: ,,, | Performed by: INTERNAL MEDICINE

## 2025-05-07 PROCEDURE — 93306 TTE W/DOPPLER COMPLETE: CPT | Mod: 26,,, | Performed by: INTERNAL MEDICINE

## 2025-05-07 PROCEDURE — 93017 CV STRESS TEST TRACING ONLY: CPT

## 2025-05-07 PROCEDURE — 93306 TTE W/DOPPLER COMPLETE: CPT

## 2025-05-07 PROCEDURE — 93018 CV STRESS TEST I&R ONLY: CPT | Mod: ,,, | Performed by: INTERNAL MEDICINE

## 2025-05-07 PROCEDURE — 93225 XTRNL ECG REC<48 HRS REC: CPT

## 2025-05-10 DIAGNOSIS — K21.9 GASTROESOPHAGEAL REFLUX DISEASE WITHOUT ESOPHAGITIS: ICD-10-CM

## 2025-05-10 NOTE — TELEPHONE ENCOUNTER
Care Due:                  Date            Visit Type   Department     Provider  --------------------------------------------------------------------------------                                [facility]                              PRIMARY      MEDICINE /  Last Visit: 12-      CARE ([facility])   INTERNAL MED   Marcelo Joseph  Next Visit: None Scheduled  None         None Found                                                            Last  Test          Frequency    Reason                     Performed    Due Date  --------------------------------------------------------------------------------    HBA1C.......  6 months...  semaglutide,.............  09- 03-    Vitamin D...  12 months..  ergocalciferol...........  07-   07-    [employer] Embedded Care Due Messages. Reference number: [id number].   5/10/2025 12:15:23 PM CDT

## 2025-05-12 RX ORDER — PANTOPRAZOLE SODIUM 40 MG/1
40 TABLET, DELAYED RELEASE ORAL DAILY
Qty: 90 TABLET | Refills: 1 | Status: SHIPPED | OUTPATIENT
Start: 2025-05-12

## 2025-05-12 NOTE — TELEPHONE ENCOUNTER
Provider Staff:  Action required for this patient    Requires labs      Please see care gap opportunities below in Care Due Message.    Thanks!  [facility]     Appointments      Date Provider   Last Visit   3/31/2025 Marcelo Joseph MD   Next Visit   Visit date not found Marcelo Joseph MD     Refill Decision Note   Snow Barney  is requesting a refill authorization.  Brief Assessment and Rationale for Refill:  Approve     Medication Therapy Plan:         Comments:     Note composed:9:25 AM 05/12/2025

## 2025-05-19 RX ORDER — METOPROLOL SUCCINATE 25 MG/1
25 TABLET, EXTENDED RELEASE ORAL DAILY
Qty: 90 TABLET | Refills: 3 | Status: SHIPPED | OUTPATIENT
Start: 2025-05-19

## 2025-05-23 ENCOUNTER — OFFICE VISIT (OUTPATIENT)
Dept: CARDIOLOGY | Facility: CLINIC | Age: 40
End: 2025-05-23
Payer: COMMERCIAL

## 2025-05-23 VITALS
OXYGEN SATURATION: 99 % | BODY MASS INDEX: 24.74 KG/M2 | WEIGHT: 157.63 LBS | HEIGHT: 67 IN | RESPIRATION RATE: 15 BRPM | SYSTOLIC BLOOD PRESSURE: 101 MMHG | DIASTOLIC BLOOD PRESSURE: 80 MMHG | HEART RATE: 97 BPM

## 2025-05-23 DIAGNOSIS — R06.09 DOE (DYSPNEA ON EXERTION): ICD-10-CM

## 2025-05-23 DIAGNOSIS — R00.2 PALPITATIONS: ICD-10-CM

## 2025-05-23 DIAGNOSIS — G90.A POTS (POSTURAL ORTHOSTATIC TACHYCARDIA SYNDROME): Primary | ICD-10-CM

## 2025-05-23 DIAGNOSIS — R06.02 SOB (SHORTNESS OF BREATH): ICD-10-CM

## 2025-05-23 PROBLEM — E66.01 MORBID (SEVERE) OBESITY DUE TO EXCESS CALORIES: Status: RESOLVED | Noted: 2025-04-04 | Resolved: 2025-05-23

## 2025-05-23 PROCEDURE — 99999 PR PBB SHADOW E&M-EST. PATIENT-LVL V: CPT | Mod: PBBFAC,,, | Performed by: INTERNAL MEDICINE

## 2025-05-23 NOTE — PROGRESS NOTES
CARDIOVASCULAR CONSULTATION    REASON FOR CONSULT:   Snow Barney is a 39 y.o. female who presents for evaluation    HISTORY OF PRESENT ILLNESS:     Patient is a very pleasant 37-year-old lady who works as a nurse at [employer].  She states that she is been suffering from PVCs for some years now.  Was undergoing workup at [facility].  Was initiated on Toprol-XL and the dose has been recently increased to 25 mg daily.  States that occasionally gets PVCs, now the frequency has increased to almost daily.  She had an episode where she almost passed out and felt her heart racing very fast.  She was sitting at that time.  There was another episode when she was standing when her heart started beating very fast and she had a presyncopal episode.  Denies orthopnea, PND, swelling of feet.  Drinks about 40 oz of water a day.  Occasionally gets chest tightness.  States has significant family history of premature coronary artery disease and sudden cardiac death in the family had a stress test done at outside hospital which did not show any significant ischemia.  Echo and event monitor had not revealed any significant arrhythmias last year.  States that she had a pulmonary embolism in 2020 and was on anticoagulation for 3 months.  It was felt at that time that the pulmonary embolism was secondary to emergency contraceptive pill as per the patient.  She denies heavy alcohol use.  Used to be a smoker but quit last year.  Drug abuse in drug abuse in her  20s, cocaine and ecstasy.  But no recent drug abuse for many years.    2022       CONCLUSIONS     NSR 75 bpm     Normal right heart size     Mild TR / PAsys ~ 22 mmHg     LA borderline size / LAD 36 mm, Qasim 27 cc/m2     Normal MV / minimal MR - early systolic     Borderline LV internal dimension          LVEDD 53 mm, EPSS 9 mm        LVESD 37 mm     LV EF measures 54%       No LV systolic wall motion abns identified     Normal diastolic parameters     Normal aortic annulus /  three leaflet aortic valve     No AS and no AR     minimal pericardial effusion       REC clinical correlation        CONCLUSIONS   Probably normal treadmill nuclear stress test with breast attenuation.   Normal GATED study - EF 64% without wall motion abnormalities.   Perfusion scans with fixed anteroseptal defect with normal thickening likely attenuation artifact.   No angina at 6 minutes on Law protocol treadmill stress (7 METS).      2/23:      Preliminary Findings   *The observed rhythms are sinus rhythm to sinus tachycardia.   *The Maximum Heart Rate recorded was 145 bpm, Day 1 / 05:22:07 pm, the   Minimum Heart Rate recorded was 73   bpm, Day 2 / 02:32:49 am and the Average Heart Rate was 95 bpm.   *There were 4974 PVCs with a burden of 0.8 %.   *There were 9 PSVCs with a burden of < 0.01 %.   *There were 30 Patient reported events.     Sinus rhythm with intermittent sinus tachycardia with a gradual increase   in rate. No sudden onset or offset occurrences. Rare PACs. Infrequent   PVCs. Ventricular bigeminy and trigeminy.          Notes from August 23:  Patient here for follow-up.  Doing much better.  States that her PVCs have gone away.  CTA showed normal coronaries with 0 calcium score    Notes from May 24:  Patient here for follow-up.  Denies any chest pains at rest on exertion, orthopnea, PND.  Needs to go for hysterectomy    APR 25:    The patient location is: HER HOME   The chief complaint leading to consultation is: palpitations SOB    Visit type: audiovisual    Face to Face time with patient: 10 min  25 minutes of total time spent on the encounter, which includes face to face time and non-face to face time preparing to see the patient (eg, review of tests), Obtaining and/or reviewing separately obtained history, Documenting clinical information in the electronic or other health record, Independently interpreting results (not separately reported) and communicating results to the  patient/family/caregiver, or Care coordination (not separately reported).         Each patient to whom he or she provides medical services by telemedicine is:  (1) informed of the relationship between the physician and patient and the respective role of any other health care provider with respect to management of the patient; and (2) notified that he or she may decline to receive medical services by telemedicine and may withdraw from such care at any time.    Notes:    Patient complaining of SOB, palpitations.       May 25:    History of Present Illness    CHIEF COMPLAINT:  Snow presents today for follow up of palpitations and dizziness    POTS AND CARDIOVASCULAR SYMPTOMS:  She reports severe dizzy spells over the past week, particularly when transitioning from bending over or squatting to standing up. Her self-measured BP this morning was 89/65 upon waking, accompanied by lightheadedness. She experienced tachycardia of 150 bpm while painting in a hot bathroom yesterday. She denies current palpitations but acknowledges occasional tachycardia, though not in the 130s range as previously experienced. She has a history of POTS, diagnosed by Dr. Clay without a tilt table test, with baseline occasional dizziness and low BP prior to recent exacerbation. Holter monitor showed 4 PVCs and 46 PACs over 48 hours of monitoring, during which she experienced some palpitations but notes they were not severe or breath-taking as previously experienced.    MEDICATION RESPONSE:  She reports worsening of orthostatic symptoms after starting metoprolol, with more consistent dizziness particularly when bending over, squatting, or standing up. Tachycardia symptoms worsen on days when metoprolol doses are missed. She developed an allergic reaction to Holter monitor adhesive resulting in blisters and scarring.    WEIGHT MANAGEMENT:  She has achieved weight loss from 202 lbs to under 160 lbs over the past 3 years using Ozempic. She is  currently not taking Ozempic due to financial constraints.    ALLODYNIA:  Previous workup was negative for MS and lupus. Gabapentin has not provided relief for her symptoms. She reports improvement in skin sensation after adopting a gluten-free diet for 4-5 weeks.           PAST MEDICAL HISTORY:     Past Medical History:   Diagnosis Date    Anxiety     Asthma     Depression     Fibrocystic breast disease     GERD (gastroesophageal reflux disease)     Hernia, hiatal     IBS (irritable bowel syndrome)     Inappropriate sinus tachycardia     POTS (postural orthostatic tachycardia syndrome)     Pulmonary emboli 2020    Pulmonary embolism 07/15/2020    Self-inflicted injury     2021 IMO Regulatory Import      Sepsis     Urticaria        PAST SURGICAL HISTORY:     Past Surgical History:   Procedure Laterality Date    COLONOSCOPY N/A 4/2/2024    Procedure: COLONOSCOPY;  Surgeon: Luis Naik MD;  Location: [facility];  Service: Endoscopy;  Laterality: N/A;    HYSTERECTOMY, TOTAL, LAPAROSCOPIC, WITH SALPINGECTOMY N/A 7/30/2024    Procedure: HYSTERECTOMY,TOTAL,LAPAROSCOPIC,WITH SALPINGECTOMY;  Surgeon: Brenda Rios MD;  Location: [facility] OR;  Service: OB/GYN;  Laterality: N/A;  RN PREOP 7/22/2024    T/S--done----, UPT ON 7/29/2024--NEG    TYMPANOSTOMY TUBE PLACEMENT         ALLERGIES AND MEDICATION:     Review of patient's allergies indicates:   Allergen Reactions    Demerol (pf) [meperidine (pf)] Hives        Medication List            Accurate as of May 23, 2025  3:23 PM. If you have any questions, ask your nurse or doctor.                CONTINUE taking these medications      albuterol-budesonide 90-80 mcg/actuation  Commonly known as: Airsupra  Inhale 2 puffs into the lungs every 4 (four) hours as needed (wheezing). 3 month supply     dextroamphetamine-amphetamine 5 mg Tab  Commonly known as: AdderalL  Take 5 mg by mouth 2 (two) times daily as needed (add).     EPINEPHrine 0.3 mg/0.3 mL Atin  Commonly known as:  EPIPEN  Inject 0.3 mLs (0.3 mg total) into the muscle once. for 1 dose     ergocalciferol 50,000 unit Cap  Commonly known as: VITAMIN D2  Take 1 capsule (50,000 Units total) by mouth every 7 days.     linaCLOtide 72 mcg Cap capsule  Commonly known as: LINZESS  Take 1 capsule (72 mcg total) by mouth before breakfast.     LORazepam 1 MG tablet  Commonly known as: ATIVAN  TAKE ONE TABLET BY MOUTH DAILY AS NEEDED FOR ANXIETY     metoprolol succinate 25 MG 24 hr tablet  Commonly known as: TOPROL-XL  Take 1 tablet (25 mg total) by mouth once daily.     multivitamin capsule     ondansetron 8 MG Tbdl  Commonly known as: ZOFRAN-ODT  Take 1 tablet (8 mg total) by mouth 2 (two) times daily.     pantoprazole 40 MG tablet  Commonly known as: PROTONIX  Take 1 tablet (40 mg total) by mouth once daily.     semaglutide (weight loss) 2.4 mg/0.75 mL Pnij  Coumpounded version, 1.2 mL injection for 2.4 mg SC weekly dose     sertraline 50 MG tablet  Commonly known as: ZOLOFT  TAKE ONE TABLET BY MOUTH EVERY DAY AS DIRECTED              SOCIAL HISTORY:     Social History     Socioeconomic History    Marital status: Single   Tobacco Use    Smoking status: Former     Current packs/day: 0.00     Average packs/day: 1 pack/day for 25.5 years (25.5 ttl pk-yrs)     Types: Cigarettes     Start date: 7/15/1996     Quit date: 1/9/2022     Years since quitting: 3.3    Smokeless tobacco: Never    Tobacco comments:     varies from 7 cigarettes to whole pack in day   Substance and Sexual Activity    Alcohol use: Yes     Alcohol/week: 3.0 standard drinks of alcohol     Types: 3 Cans of beer per week     Comment: ocassionally    Drug use: No    Sexual activity: Yes     Partners: Male     Birth control/protection: I.U.D.     Social Drivers of Health     Financial Resource Strain: Low Risk  (12/26/2024)    Overall Financial Resource Strain (CARDIA)     Difficulty of Paying Living Expenses: Not very hard   Food Insecurity: Food Insecurity Present  (12/26/2024)    Hunger Vital Sign     Worried About Running Out of Food in the Last Year: Sometimes true     Ran Out of Food in the Last Year: Sometimes true   Transportation Needs: No Transportation Needs (11/29/2023)    PRAPARE - Transportation     Lack of Transportation (Medical): No     Lack of Transportation (Non-Medical): No   Physical Activity: Insufficiently Active (12/26/2024)    Exercise Vital Sign     Days of Exercise per Week: 1 day     Minutes of Exercise per Session: 30 min   Stress: Stress Concern Present (12/26/2024)    [employer] - Occupational Stress Questionnaire     Feeling of Stress : Very much   Housing Stability: Unknown (11/29/2023)    Housing Stability Vital Sign     Unable to Pay for Housing in the Last Year: No     Unstable Housing in the Last Year: No       FAMILY HISTORY:     Family History   Problem Relation Name Age of Onset    Allergies Mother Karla     Hypertension Mother Karla     Diabetes Mother Karla     Depression Mother Karla     Hyperlipidemia Mother Karla     Allergies Father Krzysztof     Asthma Father Krzysztof     Hypertension Father Krzysztof     Diabetes Father Krzysztof     Heart disease Father Krzysztof     Hyperlipidemia Father Krzysztof     Breast cancer Maternal Aunt Marcela     Heart disease Maternal Aunt Marcela     Breast cancer Paternal Aunt      Colon cancer Maternal Grandmother Radha     Cancer Maternal Grandmother Radha     Breast cancer Paternal Grandmother      Allergies Son Dany     Asthma Son Dany     Learning disabilities Son Dany     Ovarian cancer Cousin      Diabetes Maternal Grandfather Jacobo     Heart disease Maternal Grandfather Jacobo     Hypertension Maternal Grandfather Jacobo     Heart disease Paternal Grandfather Mickey     Hyperlipidemia Paternal Grandfather Mickey     Hypertension Paternal Grandfather Mickey     Kidney disease Paternal Grandfather Mickey     Depression Sister Guille     Drug abuse Sister Guille     Mental illness Sister Guille     Depression  "Brother Krzysztof     Drug abuse Brother Krzysztof     Mental illness Brother Krzysztof     Depression Brother Isaiha     Drug abuse Brother Isaiha     Hyperlipidemia Brother Isaiha     Mental illness Brother Iscassidy     Heart disease Paternal Aunt Chelsey        REVIEW OF SYSTEMS:   Review of Systems   Constitutional: Negative.   HENT: Negative.     Eyes: Negative.    Cardiovascular:  Positive for palpitations.   Respiratory:  Positive for shortness of breath.    Endocrine: Negative.    Hematologic/Lymphatic: Negative.    Skin: Negative.    Musculoskeletal: Negative.    Gastrointestinal: Negative.    Genitourinary: Negative.    Neurological: Negative.    Psychiatric/Behavioral: Negative.     Allergic/Immunologic: Negative.        A 10 point review of systems was performed and all the pertinent positives have been mentioned. Rest of review of systems was negative.        PHYSICAL EXAM:     Vitals:    05/23/25 1316   BP: 101/80   Pulse: 97   Resp: 15      Body mass index is 24.69 kg/m².  Weight: 71.5 kg (157 lb 10.1 oz)   Height: 5' 7" (170.2 cm)     Physical Exam  Constitutional:       Appearance: Normal appearance. She is well-developed.   HENT:      Head: Normocephalic.   Eyes:      Pupils: Pupils are equal, round, and reactive to light.   Cardiovascular:      Rate and Rhythm: Normal rate and regular rhythm.   Pulmonary:      Effort: Pulmonary effort is normal.      Breath sounds: Normal breath sounds.   Abdominal:      General: Bowel sounds are normal.      Palpations: Abdomen is soft.      Tenderness: There is no abdominal tenderness.   Musculoskeletal:         General: Normal range of motion.      Cervical back: Normal range of motion and neck supple.   Skin:     General: Skin is warm.   Neurological:      Mental Status: She is alert and oriented to person, place, and time.           DATA:     Laboratory:  CBC:  Recent Labs   Lab 07/31/24  0557 09/07/24  1114 01/07/25  1108   WBC 8.12 4.94 6.75   Hemoglobin 11.0 L 13.3 " 13.8   Hematocrit 35.4 L 41.3 43.3   Platelets 180 185 200       CHEMISTRIES:  Recent Labs   Lab 07/30/22  0816 10/20/22  0857 01/04/23  2043 02/10/23  1836 06/01/23  1723 08/30/23  1802 01/09/24  1236 01/25/24  1331 07/22/24  1115 09/07/24  1114   Glucose 102   < >  --    < > 102   < > 122 H  --  87 96   Sodium 141   < >  --    < > 139   < > 137 138 140 139   Potassium 4.2   < >  --    < > 3.5   < > 3.6 3.4 L 4.1 4.5   BUN 13   < >  --    < > 11   < > 5 L 12.9 17 15   Creatinine 0.7   < >  --    < > 1.0   < > 0.8 0.71 0.7 0.8   eGFR if  >60  --   --   --   --   --   --   --   --   --    eGFR if non  >60  --   --   --   --   --   --   --   --   --    Calcium 9.3   < >  --    < > 10.2   < > 9.3 9.3 9.8 9.8   Magnesium  --   --  1.9  --  1.8  --   --   --   --   --     < > = values in this interval not displayed.       CARDIAC BIOMARKERS:  Recent Labs   Lab 08/30/23  1802 01/09/24  1236 09/07/24  1438   CPK 51  --   --    Troponin I <0.006 <0.006 <0.006       COAGS:  Recent Labs   Lab 09/07/24  1438   INR 1.0       LIPIDS/LFTS:  Recent Labs   Lab 01/25/24  1331 07/22/24  1115 09/07/24  1114 01/07/25  1108   Cholesterol  --   --  207 H 222 H   Triglycerides  --   --  166 H 120   HDL  --   --  44 51   LDL Cholesterol  --   --  129.8 147.0   Non-HDL Cholesterol  --   --  163 171   AST 9 L 16 12  --    ALT 14 23 14  --        Hemoglobin A1C   Date Value Ref Range Status   09/07/2024 4.7 4.0 - 5.6 % Final     Comment:     ADA Screening Guidelines:  5.7-6.4%  Consistent with prediabetes  >or=6.5%  Consistent with diabetes    High levels of fetal hemoglobin interfere with the HbA1C  assay. Heterozygous hemoglobin variants (HbS, HgC, etc)do  not significantly interfere with this assay.   However, presence of multiple variants may affect accuracy.         TSH  Recent Labs   Lab 11/15/23  1248 09/07/24  1114 04/04/25  1436   TSH 0.797 0.830 1.252       The ASCVD Risk score (Harcourt DK, et al.,  2019) failed to calculate for the following reasons:    The 2019 ASCVD risk score is only valid for ages 40 to 79       BNP    Lab Results   Component Value Date/Time    BNP <10 04/04/2025 02:36 PM    BNP <10 06/01/2023 05:23 PM    BNP 11 01/04/2023 08:23 PM    BNP <10 02/24/2022 07:00 PM    BNP <10 11/10/2020 01:25 AM    BNP 15 11/02/2020 01:30 PM    BNP 21 07/14/2020 09:05 PM    BNP <10 06/01/2019 01:55 AM    BNP 14 04/22/2018 06:20 PM    BNP <10 05/28/2015 07:03 PM             ASSESSMENT AND PLAN     Patient Active Problem List   Diagnosis    Palpitations    Anxiety    Tachycardia    Migraine without aura and without status migrainosus, not intractable    Asthma in adult, mild intermittent, uncomplicated    History of pulmonary embolus (PE)    Mixed anxiety and depressive disorder    YUDY (obstructive sleep apnea)    PVC's (premature ventricular contractions)    Supraventricular tachycardia    Vitamin D deficiency    Attention deficit disorder    S/P laparoscopic hysterectomy    NATALIE II (cervical intraepithelial neoplasia II)         Orders Placed This Encounter   Procedures    Tilt table       Assessment & Plan    IMPRESSION:  Echo with normal EF and diastolic function reviewed  Holter monitor results show minimal PVCs and PACs over 48 hours.  Initiated trial reduction of metoprolol dose to address orthostatic symptoms while monitoring for return of palpitations - decreased to half the current dose for 1 week trial, may discontinue after 1 week if patient feels good pending approval, return to original dose if palpitations worsen.      PLAN SUMMARY:  - Reduce metoprolol dose to half for 1 week trial; may discontinue after 1 week pending approval  - Ordered tilt table test at [facility] or [facility]  - Maintain fluid intake of at least 80 oz of water daily  - Wear knee-high compression stockings consistently  - Follow up in 3 months  - Contact office if palpitations worsen on reduced metoprolol  dose    POSTURAL ORTHOSTATIC TACHYCARDIA SYNDROME (POTS)   - Explained the mechanism of orthostatic symptoms and their relation to blood pooling when changing positions.  - Snow to maintain fluid intake of at least 80 oz of water daily, wear knee-high compression stockings consistently.  - Ordered tilt table test at [place] or [facility] to differentiate between pure POTS and neurocardiogenic syncope, which would inform treatment strategy.    FOLLOW-UP:  - Follow up in 3 months.         Patient with near-syncope.  Family history of premature CAD.  Used to be a smoker in the past, quit. CTA showed normal coronaries with 0 calcium score.  Frequent PVCs on Holter monitoring, which the patient states have gone away.  She did try metoprolol, but could not tolerate it.      Follow-up after testing      Thank you very much for involving me in the care of your patient.  Please do not hesitate to contact me if there are any questions.      Parrish Mckeon MD, FACC, [facility]  Interventional Cardiologist, [facility].           This note was dictated with the help of speech recognition software.  There might be un-intended errors and/or substitutions.        Visit today included increased complexity associated with the care of the episodic problem sob, palpitations,  addressed and managing the longitudinal care of the patient due to the serious and/or complex managed problem(s) Problem List[1]  .                   [1]   Patient Active Problem List  Diagnosis    Palpitations    Anxiety    Tachycardia    Migraine without aura and without status migrainosus, not intractable    Asthma in adult, mild intermittent, uncomplicated    History of pulmonary embolus (PE)    Mixed anxiety and depressive disorder    YUDY (obstructive sleep apnea)    PVC's (premature ventricular contractions)    Supraventricular tachycardia    Vitamin D deficiency    Attention deficit disorder    S/P laparoscopic hysterectomy    NATALIE II (cervical  intraepithelial neoplasia II)

## 2025-06-09 ENCOUNTER — PATIENT MESSAGE (OUTPATIENT)
Dept: FAMILY MEDICINE | Facility: CLINIC | Age: 40
End: 2025-06-09
Payer: COMMERCIAL

## 2025-06-09 ENCOUNTER — PATIENT MESSAGE (OUTPATIENT)
Dept: CARDIOLOGY | Facility: CLINIC | Age: 40
End: 2025-06-09
Payer: COMMERCIAL

## 2025-06-13 ENCOUNTER — E-VISIT (OUTPATIENT)
Dept: FAMILY MEDICINE | Facility: CLINIC | Age: 40
End: 2025-06-13
Payer: COMMERCIAL

## 2025-06-13 ENCOUNTER — PATIENT MESSAGE (OUTPATIENT)
Dept: FAMILY MEDICINE | Facility: CLINIC | Age: 40
End: 2025-06-13
Payer: COMMERCIAL

## 2025-06-13 DIAGNOSIS — F98.8 ATTENTION DEFICIT DISORDER, UNSPECIFIED TYPE: ICD-10-CM

## 2025-06-13 PROCEDURE — 99421 OL DIG E/M SVC 5-10 MIN: CPT | Mod: ,,, | Performed by: FAMILY MEDICINE

## 2025-06-13 RX ORDER — DEXTROAMPHETAMINE SACCHARATE, AMPHETAMINE ASPARTATE, DEXTROAMPHETAMINE SULFATE AND AMPHETAMINE SULFATE 1.25; 1.25; 1.25; 1.25 MG/1; MG/1; MG/1; MG/1
1 TABLET ORAL 2 TIMES DAILY PRN
Qty: 60 TABLET | Refills: 0 | Status: CANCELLED | OUTPATIENT
Start: 2025-06-13

## 2025-06-13 NOTE — TELEPHONE ENCOUNTER
Care Due:                  Date            Visit Type   Department     Provider  --------------------------------------------------------------------------------                                [facility]                              PRIMARY      MEDICINE /  Last Visit: 12-      CARE ([facility])   INTERNAL MED   Marcelo Joseph  Next Visit: None Scheduled  None         None Found                                                            Last  Test          Frequency    Reason                     Performed    Due Date  --------------------------------------------------------------------------------    Ca..........  12 months..  ergocalciferol...........  09- 09-    [facility] Embedded Care Due Messages. Reference number: [id number].   6/13/2025 1:04:21 PM CDT

## 2025-06-14 ENCOUNTER — PATIENT MESSAGE (OUTPATIENT)
Dept: FAMILY MEDICINE | Facility: CLINIC | Age: 40
End: 2025-06-14
Payer: COMMERCIAL

## 2025-06-14 VITALS — DIASTOLIC BLOOD PRESSURE: 62 MMHG | SYSTOLIC BLOOD PRESSURE: 113 MMHG

## 2025-06-14 RX ORDER — DEXTROAMPHETAMINE SACCHARATE, AMPHETAMINE ASPARTATE, DEXTROAMPHETAMINE SULFATE AND AMPHETAMINE SULFATE 1.25; 1.25; 1.25; 1.25 MG/1; MG/1; MG/1; MG/1
1 TABLET ORAL 2 TIMES DAILY PRN
Qty: 60 TABLET | Refills: 0 | Status: SHIPPED | OUTPATIENT
Start: 2025-06-14

## 2025-06-14 NOTE — PROGRESS NOTES
Patient ID: Snow Barney is a 39 y.o. female.        E-Visit Time Tracking:   Day 1 Time (in minutes): 8  Total Time (in minutes): 8      Chief Complaint: ADD (Entered automatically based on patient selection in [employer].)      The patient initiated a request through [employer] on 6/13/2025 for evaluation and management with a chief complaint of ADD (Entered automatically based on patient selection in [employer].)     I evaluated the questionnaire submission on 06/14/2025.    [facility] Peq Evisit Adhd    6/13/2025  7:28 PM CDT - Filed by Patient   Do you agree to participate in an E-Visit? Yes   If you have any of the following symptoms, please present to your local emergency room or call 911: I acknowledge   Choose the state of your primary residence [place]   Do you have any of the following pregnancy-related conditions? None   The following vitals are required in order to proceed    Systolic Blood Pressure: 113   Diastolic Blood Pressure: 62   Weight: 157   Height: 67   Pulse: 99   Reason for E-Visit Medication change   What is the name of the medication(s)?  Adderall   How often are you supposed to take your medication? 2 times daily   What is your current dose? The dose is usually listed in milligrams or milliliters on the label. 5mg   Are you taking it as prescribed? Yes   Do you have any of the following side effects? None   How often do you take your medication as prescribed? Most days   Have any of the following kept you from taking your medications as prescribed? (Select all that apply) Not needed   Would you like to change or continue your medication? Continue medication   Which option below best describes the reason for your request?  I am out of refills and need more   What symptoms do you have? Inattention;  Hyperactivity;  Trouble staying organized;  Forgetfulness;  Lack of focus;  Trouble with multitasking   How would you describe your symptoms of Attention Deficit Disorder (ADD)? Improving   Have you been  diagnosed with any new heart condition? No   Does anyone in your immediate family have a history of sudden cardiac arrest at a young age (younger than 50)? Yes   Does anyone in your immediate family have any of the following heart conditions: Hypertrophic Cardiomyopathy, Prolonged QT Syndrome, Brugada Syndrome or Nolasco Parkinson White Syndrome? No   Provide any additional information you feel is important.    Please attach any relevant images or files          Encounter Diagnosis   Name Primary?    Attention deficit disorder, unspecified type         No orders of the defined types were placed in this encounter.     Medications Ordered This Encounter   Medications    dextroamphetamine-amphetamine (ADDERALL) 5 mg Tab     Sig: Take 5 mg by mouth 2 (two) times daily as needed (add).     Dispense:  60 tablet     Refill:  0        Follow up in about 4 weeks (around 7/11/2025) for assess treatment plan.

## 2025-06-24 ENCOUNTER — PATIENT MESSAGE (OUTPATIENT)
Dept: FAMILY MEDICINE | Facility: CLINIC | Age: 40
End: 2025-06-24
Payer: COMMERCIAL

## 2025-06-24 ENCOUNTER — PATIENT MESSAGE (OUTPATIENT)
Dept: CARDIOLOGY | Facility: CLINIC | Age: 40
End: 2025-06-24
Payer: COMMERCIAL

## 2025-07-14 ENCOUNTER — LAB VISIT (OUTPATIENT)
Dept: LAB | Facility: HOSPITAL | Age: 40
End: 2025-07-14
Attending: FAMILY MEDICINE
Payer: COMMERCIAL

## 2025-07-14 ENCOUNTER — OFFICE VISIT (OUTPATIENT)
Dept: FAMILY MEDICINE | Facility: CLINIC | Age: 40
End: 2025-07-14
Payer: COMMERCIAL

## 2025-07-14 VITALS
HEART RATE: 95 BPM | HEIGHT: 67 IN | TEMPERATURE: 98 F | OXYGEN SATURATION: 99 % | DIASTOLIC BLOOD PRESSURE: 80 MMHG | BODY MASS INDEX: 23.94 KG/M2 | WEIGHT: 152.56 LBS | SYSTOLIC BLOOD PRESSURE: 126 MMHG

## 2025-07-14 DIAGNOSIS — R00.2 PALPITATIONS: ICD-10-CM

## 2025-07-14 DIAGNOSIS — F98.8 ATTENTION DEFICIT DISORDER, UNSPECIFIED TYPE: ICD-10-CM

## 2025-07-14 DIAGNOSIS — Z00.00 ANNUAL PHYSICAL EXAM: Primary | ICD-10-CM

## 2025-07-14 DIAGNOSIS — G43.009 MIGRAINE WITHOUT AURA AND WITHOUT STATUS MIGRAINOSUS, NOT INTRACTABLE: ICD-10-CM

## 2025-07-14 DIAGNOSIS — E34.9 HORMONE DISORDER: ICD-10-CM

## 2025-07-14 DIAGNOSIS — F41.9 ANXIETY: ICD-10-CM

## 2025-07-14 DIAGNOSIS — F41.8 MIXED ANXIETY AND DEPRESSIVE DISORDER: ICD-10-CM

## 2025-07-14 LAB
ABSOLUTE EOSINOPHIL (OHS): 0.07 K/UL
ABSOLUTE MONOCYTE (OHS): 0.34 K/UL (ref 0.3–1)
ABSOLUTE NEUTROPHIL COUNT (OHS): 3.86 K/UL (ref 1.8–7.7)
ALBUMIN SERPL BCP-MCNC: 4.2 G/DL (ref 3.5–5.2)
ALP SERPL-CCNC: 55 UNIT/L (ref 40–150)
ALT SERPL W/O P-5'-P-CCNC: 10 UNIT/L (ref 10–44)
ANION GAP (OHS): 10 MMOL/L (ref 8–16)
AST SERPL-CCNC: 12 UNIT/L (ref 11–45)
BASOPHILS # BLD AUTO: 0.04 K/UL
BASOPHILS NFR BLD AUTO: 0.7 %
BILIRUB SERPL-MCNC: 0.6 MG/DL (ref 0.1–1)
BUN SERPL-MCNC: 6 MG/DL (ref 6–20)
CALCIUM SERPL-MCNC: 9.1 MG/DL (ref 8.7–10.5)
CHLORIDE SERPL-SCNC: 105 MMOL/L (ref 95–110)
CHOLEST SERPL-MCNC: 181 MG/DL (ref 120–199)
CHOLEST/HDLC SERPL: 4.1 {RATIO} (ref 2–5)
CO2 SERPL-SCNC: 23 MMOL/L (ref 23–29)
CREAT SERPL-MCNC: 0.7 MG/DL (ref 0.5–1.4)
D DIMER PPP IA.FEU-MCNC: 0.19 MG/L FEU
ERYTHROCYTE [DISTWIDTH] IN BLOOD BY AUTOMATED COUNT: 13.2 % (ref 11.5–14.5)
GFR SERPLBLD CREATININE-BSD FMLA CKD-EPI: >60 ML/MIN/1.73/M2
GLUCOSE SERPL-MCNC: 80 MG/DL (ref 70–110)
HCT VFR BLD AUTO: 41 % (ref 37–48.5)
HDLC SERPL-MCNC: 44 MG/DL (ref 40–75)
HDLC SERPL: 24.3 % (ref 20–50)
HGB BLD-MCNC: 13.1 GM/DL (ref 12–16)
IMM GRANULOCYTES # BLD AUTO: 0.01 K/UL (ref 0–0.04)
IMM GRANULOCYTES NFR BLD AUTO: 0.2 % (ref 0–0.5)
LDLC SERPL CALC-MCNC: 114.8 MG/DL (ref 63–159)
LYMPHOCYTES # BLD AUTO: 1.77 K/UL (ref 1–4.8)
MCH RBC QN AUTO: 29.2 PG (ref 27–31)
MCHC RBC AUTO-ENTMCNC: 32 G/DL (ref 32–36)
MCV RBC AUTO: 91 FL (ref 82–98)
NONHDLC SERPL-MCNC: 137 MG/DL
NUCLEATED RBC (/100WBC) (OHS): 0 /100 WBC
PLATELET # BLD AUTO: 216 K/UL (ref 150–450)
PMV BLD AUTO: 11.5 FL (ref 9.2–12.9)
POTASSIUM SERPL-SCNC: 3.7 MMOL/L (ref 3.5–5.1)
PROT SERPL-MCNC: 7.4 GM/DL (ref 6–8.4)
RBC # BLD AUTO: 4.49 M/UL (ref 4–5.4)
RELATIVE EOSINOPHIL (OHS): 1.1 %
RELATIVE LYMPHOCYTE (OHS): 29.1 % (ref 18–48)
RELATIVE MONOCYTE (OHS): 5.6 % (ref 4–15)
RELATIVE NEUTROPHIL (OHS): 63.3 % (ref 38–73)
SODIUM SERPL-SCNC: 138 MMOL/L (ref 136–145)
TRIGL SERPL-MCNC: 111 MG/DL (ref 30–150)
TSH SERPL-ACNC: 1.1 UIU/ML (ref 0.4–4)
WBC # BLD AUTO: 6.09 K/UL (ref 3.9–12.7)

## 2025-07-14 PROCEDURE — 36415 COLL VENOUS BLD VENIPUNCTURE: CPT | Mod: PO

## 2025-07-14 PROCEDURE — 82672 ASSAY OF ESTROGEN: CPT

## 2025-07-14 PROCEDURE — 85379 FIBRIN DEGRADATION QUANT: CPT

## 2025-07-14 PROCEDURE — 80053 COMPREHEN METABOLIC PANEL: CPT

## 2025-07-14 PROCEDURE — 3008F BODY MASS INDEX DOCD: CPT | Mod: CPTII,S$GLB,, | Performed by: FAMILY MEDICINE

## 2025-07-14 PROCEDURE — 99999 PR PBB SHADOW E&M-EST. PATIENT-LVL IV: CPT | Mod: PBBFAC,,, | Performed by: FAMILY MEDICINE

## 2025-07-14 PROCEDURE — 3079F DIAST BP 80-89 MM HG: CPT | Mod: CPTII,S$GLB,, | Performed by: FAMILY MEDICINE

## 2025-07-14 PROCEDURE — 80061 LIPID PANEL: CPT

## 2025-07-14 PROCEDURE — 3074F SYST BP LT 130 MM HG: CPT | Mod: CPTII,S$GLB,, | Performed by: FAMILY MEDICINE

## 2025-07-14 PROCEDURE — 84403 ASSAY OF TOTAL TESTOSTERONE: CPT

## 2025-07-14 PROCEDURE — 83001 ASSAY OF GONADOTROPIN (FSH): CPT

## 2025-07-14 PROCEDURE — 83036 HEMOGLOBIN GLYCOSYLATED A1C: CPT

## 2025-07-14 PROCEDURE — 1159F MED LIST DOCD IN RCRD: CPT | Mod: CPTII,S$GLB,, | Performed by: FAMILY MEDICINE

## 2025-07-14 PROCEDURE — 99395 PREV VISIT EST AGE 18-39: CPT | Mod: S$GLB,,, | Performed by: FAMILY MEDICINE

## 2025-07-14 PROCEDURE — 85025 COMPLETE CBC W/AUTO DIFF WBC: CPT

## 2025-07-14 PROCEDURE — 84443 ASSAY THYROID STIM HORMONE: CPT

## 2025-07-14 RX ORDER — METOPROLOL SUCCINATE 25 MG/1
25 TABLET, EXTENDED RELEASE ORAL
COMMUNITY
End: 2025-07-14 | Stop reason: ALTCHOICE

## 2025-07-14 RX ORDER — FLUCONAZOLE 150 MG/1
TABLET ORAL
COMMUNITY
End: 2025-07-14 | Stop reason: ALTCHOICE

## 2025-07-14 RX ORDER — AMITRIPTYLINE HYDROCHLORIDE 25 MG/1
25 TABLET, FILM COATED ORAL 2 TIMES DAILY
COMMUNITY
End: 2025-07-14 | Stop reason: ALTCHOICE

## 2025-07-14 RX ORDER — SPIRONOLACTONE 100 MG/1
100 TABLET, FILM COATED ORAL
COMMUNITY
End: 2025-07-14 | Stop reason: ALTCHOICE

## 2025-07-14 RX ORDER — GABAPENTIN 300 MG/1
300 CAPSULE ORAL 3 TIMES DAILY
COMMUNITY
End: 2025-07-14 | Stop reason: ALTCHOICE

## 2025-07-14 RX ORDER — DIAZEPAM 5 MG/1
TABLET ORAL
COMMUNITY
End: 2025-07-14 | Stop reason: ALTCHOICE

## 2025-07-14 RX ORDER — BUPROPION HYDROCHLORIDE 150 MG/1
150 TABLET ORAL
COMMUNITY
End: 2025-07-14 | Stop reason: ALTCHOICE

## 2025-07-14 RX ORDER — ASPIRIN 325 MG
TABLET, DELAYED RELEASE (ENTERIC COATED) ORAL
COMMUNITY

## 2025-07-14 RX ORDER — DEXTROAMPHETAMINE SACCHARATE, AMPHETAMINE ASPARTATE, DEXTROAMPHETAMINE SULFATE AND AMPHETAMINE SULFATE 5; 5; 5; 5 MG/1; MG/1; MG/1; MG/1
1 TABLET ORAL 2 TIMES DAILY PRN
Qty: 60 TABLET | Refills: 0 | Status: SHIPPED | OUTPATIENT
Start: 2025-07-14 | End: 2025-08-13

## 2025-07-14 RX ORDER — ARMODAFINIL 150 MG/1
150 TABLET ORAL EVERY MORNING
COMMUNITY
End: 2025-07-14 | Stop reason: ALTCHOICE

## 2025-07-14 RX ORDER — LORAZEPAM 1 MG/1
1 TABLET ORAL
Qty: 20 TABLET | Refills: 2 | Status: SHIPPED | OUTPATIENT
Start: 2025-07-14

## 2025-07-14 RX ORDER — NORTRIPTYLINE HYDROCHLORIDE 10 MG/1
10 CAPSULE ORAL NIGHTLY
COMMUNITY
End: 2025-07-14 | Stop reason: ALTCHOICE

## 2025-07-14 NOTE — PROGRESS NOTES
"Chief Complaint   Patient presents with    Annual Exam       SUBJECTIVE:   39 y.o. female for annual routine checkup.    Current Medications[1]  Allergies: Demerol (pf) [meperidine (pf)], Armodafinil, and Methylprednisolone   Patient's last menstrual period was 07/01/2024.    ROS:  Feeling well. No dyspnea or chest pain on exertion.  No abdominal pain, change in bowel habits, black or bloody stools.  No urinary tract symptoms. GYN ROS: normal menses, no abnormal bleeding, pelvic pain or discharge, no breast pain or new or enlarging lumps on self exam. No neurological complaints.    OBJECTIVE:   The patient appears well, alert, oriented x 3, in no distress.  /80 (BP Location: Right arm, Patient Position: Sitting)   Pulse 95   Temp 98.4 °F (36.9 °C) (Oral)   Ht 5' 7" (1.702 m)   Wt 69.2 kg (152 lb 8.9 oz)   LMP 07/01/2024   SpO2 99%   BMI 23.89 kg/m²   Wt Readings from Last 5 Encounters:   07/14/25 69.2 kg (152 lb 8.9 oz)   05/23/25 71.5 kg (157 lb 10.1 oz)   05/07/25 77.4 kg (170 lb 10.2 oz)   01/07/25 77.4 kg (170 lb 10.2 oz)   12/27/24 77.9 kg (171 lb 11.8 oz)       ENT normal.  Neck supple. No adenopathy or thyromegaly. SHAVON. Lungs are clear, good air entry, no wheezes, rhonchi or rales. S1 and S2 normal, no murmurs, regular rate and rhythm. Abdomen soft without tenderness, guarding, mass or organomegaly. Extremities show no edema, normal peripheral pulses. Neurological is normal, no focal findings.      BREAST EXAM: deferred    PELVIC EXAM: deferred    ASSESSMENT:   1. Annual physical exam    2. Attention deficit disorder, unspecified type          PLAN:   Counseled on age appropriate medical preventative services, including age appropriate cancer screenings, over all nutritional health, need for a consistent exercise regimen and an over all push towards maintaining a vigorous and active lifestyle.  Counseled on age appropriate vaccines and discussed upcoming health care needs based on age/gender.  " Spent time with patient counseling on need for a good patient/doctor relationship moving forward.  Discussed use of common OTC medications and supplements.  Discussed common dietary aids and use of caffeine and the need for good sleep hygiene and stress management.    Problem List Items Addressed This Visit       Attention deficit disorder    Relevant Medications    dextroamphetamine-amphetamine (ADDERALL) 20 mg tablet     Other Visit Diagnoses         Annual physical exam    -  Primary          Needs to get the new labs  F/u in 1 year for wellness         [1]   Current Outpatient Medications   Medication Sig Dispense Refill    albuterol-budesonide (AIRSUPRA) 90-80 mcg/actuation Inhale 2 puffs into the lungs every 4 (four) hours as needed (wheezing). 3 month supply 54 g 3    cholecalciferol, vitamin D3, 1,250 mcg (50,000 unit) capsule TAKE ONE CAPSULE BY MOUTH once weekly AS DIRECTED      dextroamphetamine-amphetamine (ADDERALL) 5 mg Tab Take 5 mg by mouth 2 (two) times daily as needed (add). 60 tablet 0    ergocalciferol (VITAMIN D2) 50,000 unit Cap Take 1 capsule (50,000 Units total) by mouth every 7 days. 12 capsule 1    LORazepam (ATIVAN) 1 MG tablet TAKE ONE TABLET BY MOUTH DAILY AS NEEDED FOR ANXIETY 20 tablet 2    multivitamin capsule Take 1 capsule by mouth.      pantoprazole (PROTONIX) 40 MG tablet Take 1 tablet (40 mg total) by mouth once daily. 90 tablet 1    sertraline (ZOLOFT) 50 MG tablet TAKE ONE TABLET BY MOUTH EVERY DAY AS DIRECTED 90 tablet 0    dextroamphetamine-amphetamine (ADDERALL) 20 mg tablet Take 1 tablet by mouth 2 (two) times daily as needed (adhd symptoms). 60 tablet 0    EPINEPHrine (EPIPEN) 0.3 mg/0.3 mL AtIn Inject 0.3 mLs (0.3 mg total) into the muscle once. for 1 dose 2 each 0     Current Facility-Administered Medications   Medication Dose Route Frequency Provider Last Rate Last Admin    levonorgestreL (MIRENA) 20 mcg/24 hours (7 yrs) 52 mg IUD 1 Intra Uterine Device  1 Intra Uterine  Device Intrauterine  Brenda Rios MD   1 Intra Uterine Device at 07/21/22 1041

## 2025-07-14 NOTE — TELEPHONE ENCOUNTER
No care due was identified.  [facility] Embedded Care Due Messages. Reference number: [id number].   7/14/2025 4:16:46 PM CDT

## 2025-07-15 LAB
EAG (OHS): 85 MG/DL (ref 68–131)
FSH SERPL-ACNC: 5.79 MIU/ML
HBA1C MFR BLD: 4.6 % (ref 4–5.6)

## 2025-07-18 LAB
ESTRADIOL: 272 PG/ML
ESTROGEN SERPL-MCNC: 397 PG/ML
ESTRONE PG/ML: 125 PG/ML
W ALBUMIN: 4.2 G/DL
W SEX HORMONE BINDING GLOBULIN: 90 NMOL/L
W TESTOSTERONE, BIOAVAIL: 2.2 NG/DL
W TESTOSTERONE, FREE: 1.2 PG/ML
W TESTOSTERONE, TOTAL, MS: 23 NG/DL

## 2025-07-31 ENCOUNTER — PATIENT MESSAGE (OUTPATIENT)
Dept: CARDIOLOGY | Facility: CLINIC | Age: 40
End: 2025-07-31
Payer: COMMERCIAL

## 2025-08-06 ENCOUNTER — PATIENT MESSAGE (OUTPATIENT)
Dept: FAMILY MEDICINE | Facility: CLINIC | Age: 40
End: 2025-08-06
Payer: COMMERCIAL

## 2025-08-26 DIAGNOSIS — F98.8 ATTENTION DEFICIT DISORDER, UNSPECIFIED TYPE: ICD-10-CM

## 2025-08-27 RX ORDER — DEXTROAMPHETAMINE SACCHARATE, AMPHETAMINE ASPARTATE, DEXTROAMPHETAMINE SULFATE AND AMPHETAMINE SULFATE 1.25; 1.25; 1.25; 1.25 MG/1; MG/1; MG/1; MG/1
1 TABLET ORAL 2 TIMES DAILY PRN
Qty: 60 TABLET | Refills: 0 | Status: SHIPPED | OUTPATIENT
Start: 2025-08-27

## 2025-08-28 ENCOUNTER — TELEPHONE (OUTPATIENT)
Dept: FAMILY MEDICINE | Facility: CLINIC | Age: 40
End: 2025-08-28
Payer: COMMERCIAL

## 2025-09-04 ENCOUNTER — OFFICE VISIT (OUTPATIENT)
Dept: FAMILY MEDICINE | Facility: CLINIC | Age: 40
End: 2025-09-04
Payer: COMMERCIAL

## 2025-09-04 VITALS
WEIGHT: 149.06 LBS | DIASTOLIC BLOOD PRESSURE: 82 MMHG | SYSTOLIC BLOOD PRESSURE: 120 MMHG | HEIGHT: 67 IN | BODY MASS INDEX: 23.39 KG/M2 | TEMPERATURE: 99 F | HEART RATE: 110 BPM | OXYGEN SATURATION: 98 %

## 2025-09-04 DIAGNOSIS — Z76.89 ENCOUNTER TO ESTABLISH CARE: ICD-10-CM

## 2025-09-04 DIAGNOSIS — F98.8 ATTENTION DEFICIT DISORDER, UNSPECIFIED TYPE: ICD-10-CM

## 2025-09-04 DIAGNOSIS — R35.89 POLYURIA: ICD-10-CM

## 2025-09-04 DIAGNOSIS — F41.8 MIXED ANXIETY AND DEPRESSIVE DISORDER: Chronic | ICD-10-CM

## 2025-09-04 DIAGNOSIS — Z79.899 CHRONIC PRESCRIPTION BENZODIAZEPINE USE: Primary | Chronic | ICD-10-CM

## 2025-09-04 LAB
BILIRUB UR QL STRIP.AUTO: NEGATIVE
CLARITY UR: CLEAR
COLOR UR AUTO: YELLOW
CTP QC/QA: YES
GLUCOSE UR QL STRIP: NEGATIVE
HGB UR QL STRIP: NEGATIVE
KETONES UR QL STRIP: NEGATIVE
LEUKOCYTE ESTERASE UR QL STRIP: NEGATIVE
NITRITE UR QL STRIP: NEGATIVE
PH UR STRIP: 7 [PH]
POC 10 PANEL DRUG SCREEN: NEGATIVE
PROT UR QL STRIP: NEGATIVE
SP GR UR STRIP: <1.005
UROBILINOGEN UR STRIP-ACNC: NEGATIVE EU/DL

## 2025-09-04 PROCEDURE — 99999 PR PBB SHADOW E&M-EST. PATIENT-LVL IV: CPT | Mod: PBBFAC,,,

## 2025-09-04 PROCEDURE — 81003 URINALYSIS AUTO W/O SCOPE: CPT

## 2025-09-05 PROBLEM — Z79.899 CHRONIC PRESCRIPTION BENZODIAZEPINE USE: Status: ACTIVE | Noted: 2025-09-05

## (undated) DEVICE — JELLY LUBRICANT STERILE 4 OZ

## (undated) DEVICE — ADAPTER DISP HAND SWITCH

## (undated) DEVICE — PACK LAPAROSCOPY/PELVISCOPY II

## (undated) DEVICE — PAD PINK TRENDELENBURG POS XL

## (undated) DEVICE — UNDERGLOVES BIOGEL PI SZ 6 LF

## (undated) DEVICE — SOL NS 1000CC

## (undated) DEVICE — SUT VICRYL PLUS 0 CT1 36IN

## (undated) DEVICE — NDL HYPO REG 25G X 1 1/2

## (undated) DEVICE — DISSECTOR CURVED 5DCD

## (undated) DEVICE — BOWL STERILE LARGE 32OZ

## (undated) DEVICE — SUT MONOCRYL 4.0 PS2 CP496G

## (undated) DEVICE — APPLICATOR CHLORAPREP ORN 26ML

## (undated) DEVICE — DRAPE TOP 53X102IN

## (undated) DEVICE — TROCAR KII FIOS 11MM X 100MM

## (undated) DEVICE — COVER PROXIMA MAYO STAND

## (undated) DEVICE — DRAPE STERI LONG

## (undated) DEVICE — TOWEL OR XRAY BLUE 17X26IN

## (undated) DEVICE — SOL CLEARIFY VISUALIZATION LAP

## (undated) DEVICE — ADHESIVE DERMABOND MINI HV

## (undated) DEVICE — IRRIGATOR ENDOSCOPY DISP.

## (undated) DEVICE — KIT ANTIFOG

## (undated) DEVICE — SOL NACL IRR 1000ML BTL

## (undated) DEVICE — PAD PREP CUFFED NS 24X48IN

## (undated) DEVICE — SYR 10CC LUER LOCK

## (undated) DEVICE — TRAY CATH 1-LYR URIMTR 16FR

## (undated) DEVICE — PAD SANITARY OB STERILE

## (undated) DEVICE — SET CYSTO IRRIGATION UNIV SPIK

## (undated) DEVICE — NDL INSUF ULTRA VERESS 120MM

## (undated) DEVICE — DRAPE UINDERBUT GRAD PCH

## (undated) DEVICE — GOWN NONREINF SET-IN SLV XL

## (undated) DEVICE — BLADE SURG CARBON STEEL SZ11

## (undated) DEVICE — SYR 50CC LL

## (undated) DEVICE — TROCAR KII FIOS 5MM X 100MM

## (undated) DEVICE — COVER OVERHEAD SURG LT BLUE

## (undated) DEVICE — CANISTER SUCTION RIGID 2000CC

## (undated) DEVICE — TIP RUMI BLUE DISPOSABLE 5/BX

## (undated) DEVICE — OCCLUDER COLPO-PNEUMO STERILE

## (undated) DEVICE — HOOK DISSECTING 5MM

## (undated) DEVICE — ELECTRODE REM PLYHSV RETURN 9

## (undated) DEVICE — GOWN IMPERVIOUS BLUE XXL

## (undated) DEVICE — Device